# Patient Record
Sex: MALE | Race: WHITE | Employment: OTHER | URBAN - METROPOLITAN AREA
[De-identification: names, ages, dates, MRNs, and addresses within clinical notes are randomized per-mention and may not be internally consistent; named-entity substitution may affect disease eponyms.]

---

## 2017-01-01 ENCOUNTER — PATIENT OUTREACH (OUTPATIENT)
Dept: FAMILY MEDICINE CLINIC | Age: 77
End: 2017-01-01

## 2017-01-01 ENCOUNTER — OFFICE VISIT (OUTPATIENT)
Dept: ONCOLOGY | Age: 77
End: 2017-01-01

## 2017-01-01 ENCOUNTER — TELEPHONE (OUTPATIENT)
Dept: FAMILY MEDICINE CLINIC | Age: 77
End: 2017-01-01

## 2017-01-01 ENCOUNTER — APPOINTMENT (OUTPATIENT)
Dept: MRI IMAGING | Age: 77
DRG: 470 | End: 2017-01-01
Attending: INTERNAL MEDICINE
Payer: MEDICARE

## 2017-01-01 ENCOUNTER — TELEPHONE (OUTPATIENT)
Dept: ONCOLOGY | Age: 77
End: 2017-01-01

## 2017-01-01 ENCOUNTER — HOSPITAL ENCOUNTER (OUTPATIENT)
Dept: CT IMAGING | Age: 77
Discharge: HOME | End: 2017-12-06
Attending: INTERNAL MEDICINE
Payer: MEDICARE

## 2017-01-01 ENCOUNTER — HOSPITAL ENCOUNTER (OUTPATIENT)
Dept: CT IMAGING | Age: 77
Discharge: HOME | End: 2017-12-15
Attending: INTERNAL MEDICINE
Payer: MEDICARE

## 2017-01-01 ENCOUNTER — OFFICE VISIT (OUTPATIENT)
Dept: FAMILY MEDICINE CLINIC | Age: 77
End: 2017-01-01

## 2017-01-01 ENCOUNTER — ANESTHESIA (OUTPATIENT)
Dept: SURGERY | Age: 77
DRG: 470 | End: 2017-01-01
Payer: MEDICARE

## 2017-01-01 ENCOUNTER — HOSPITAL ENCOUNTER (INPATIENT)
Age: 77
LOS: 2 days | Discharge: HOME HEALTH | DRG: 470 | End: 2017-12-23
Attending: STUDENT IN AN ORGANIZED HEALTH CARE EDUCATION/TRAINING PROGRAM | Admitting: FAMILY MEDICINE
Payer: MEDICARE

## 2017-01-01 ENCOUNTER — HOME CARE VISIT (OUTPATIENT)
Dept: SCHEDULING | Facility: HOME HEALTH | Age: 77
End: 2017-01-01
Payer: MEDICARE

## 2017-01-01 ENCOUNTER — DOCUMENTATION ONLY (OUTPATIENT)
Dept: ONCOLOGY | Age: 77
End: 2017-01-01

## 2017-01-01 ENCOUNTER — APPOINTMENT (OUTPATIENT)
Dept: GENERAL RADIOLOGY | Age: 77
DRG: 470 | End: 2017-01-01
Attending: ORTHOPAEDIC SURGERY
Payer: MEDICARE

## 2017-01-01 ENCOUNTER — ANESTHESIA EVENT (OUTPATIENT)
Dept: SURGERY | Age: 77
DRG: 470 | End: 2017-01-01
Payer: MEDICARE

## 2017-01-01 ENCOUNTER — APPOINTMENT (OUTPATIENT)
Dept: GENERAL RADIOLOGY | Age: 77
DRG: 470 | End: 2017-01-01
Attending: STUDENT IN AN ORGANIZED HEALTH CARE EDUCATION/TRAINING PROGRAM
Payer: MEDICARE

## 2017-01-01 ENCOUNTER — HOSPITAL ENCOUNTER (OUTPATIENT)
Dept: GENERAL RADIOLOGY | Age: 77
Discharge: HOME | End: 2017-12-15
Attending: RADIOLOGY
Payer: MEDICARE

## 2017-01-01 ENCOUNTER — HOSPITAL ENCOUNTER (OUTPATIENT)
Dept: INFUSION THERAPY | Age: 77
Discharge: HOME | End: 2017-11-29
Payer: MEDICARE

## 2017-01-01 ENCOUNTER — HOME CARE VISIT (OUTPATIENT)
Dept: HOME HEALTH SERVICES | Facility: HOME HEALTH | Age: 77
End: 2017-01-01
Payer: MEDICARE

## 2017-01-01 ENCOUNTER — HOSPITAL ENCOUNTER (OUTPATIENT)
Dept: CT IMAGING | Age: 77
Discharge: HOME | End: 2017-11-22
Attending: FAMILY MEDICINE
Payer: SELF-PAY

## 2017-01-01 ENCOUNTER — HOME HEALTH ADMISSION (OUTPATIENT)
Dept: HOME HEALTH SERVICES | Facility: HOME HEALTH | Age: 77
End: 2017-01-01
Payer: MEDICARE

## 2017-01-01 VITALS
HEART RATE: 82 BPM | TEMPERATURE: 98.1 F | HEIGHT: 70 IN | WEIGHT: 184 LBS | SYSTOLIC BLOOD PRESSURE: 111 MMHG | RESPIRATION RATE: 20 BRPM | OXYGEN SATURATION: 95 % | BODY MASS INDEX: 26.34 KG/M2 | DIASTOLIC BLOOD PRESSURE: 69 MMHG

## 2017-01-01 VITALS
WEIGHT: 184 LBS | HEIGHT: 70 IN | RESPIRATION RATE: 16 BRPM | OXYGEN SATURATION: 99 % | TEMPERATURE: 97.6 F | SYSTOLIC BLOOD PRESSURE: 121 MMHG | HEART RATE: 81 BPM | DIASTOLIC BLOOD PRESSURE: 73 MMHG | BODY MASS INDEX: 26.34 KG/M2

## 2017-01-01 VITALS
HEIGHT: 71 IN | WEIGHT: 183 LBS | TEMPERATURE: 96.6 F | SYSTOLIC BLOOD PRESSURE: 123 MMHG | HEART RATE: 78 BPM | BODY MASS INDEX: 25.62 KG/M2 | OXYGEN SATURATION: 94 % | DIASTOLIC BLOOD PRESSURE: 71 MMHG | RESPIRATION RATE: 20 BRPM

## 2017-01-01 VITALS
HEART RATE: 81 BPM | SYSTOLIC BLOOD PRESSURE: 116 MMHG | OXYGEN SATURATION: 99 % | RESPIRATION RATE: 18 BRPM | WEIGHT: 184 LBS | BODY MASS INDEX: 25.76 KG/M2 | DIASTOLIC BLOOD PRESSURE: 72 MMHG | HEIGHT: 71 IN | TEMPERATURE: 98.1 F

## 2017-01-01 VITALS
TEMPERATURE: 100 F | HEART RATE: 120 BPM | RESPIRATION RATE: 18 BRPM | DIASTOLIC BLOOD PRESSURE: 73 MMHG | SYSTOLIC BLOOD PRESSURE: 121 MMHG

## 2017-01-01 VITALS
HEART RATE: 115 BPM | RESPIRATION RATE: 18 BRPM | DIASTOLIC BLOOD PRESSURE: 70 MMHG | TEMPERATURE: 100.7 F | BODY MASS INDEX: 26.2 KG/M2 | SYSTOLIC BLOOD PRESSURE: 118 MMHG | OXYGEN SATURATION: 91 % | HEIGHT: 70 IN | WEIGHT: 183 LBS

## 2017-01-01 VITALS
RESPIRATION RATE: 18 BRPM | TEMPERATURE: 97.9 F | SYSTOLIC BLOOD PRESSURE: 120 MMHG | DIASTOLIC BLOOD PRESSURE: 62 MMHG | OXYGEN SATURATION: 96 % | HEART RATE: 86 BPM

## 2017-01-01 VITALS
RESPIRATION RATE: 22 BRPM | SYSTOLIC BLOOD PRESSURE: 121 MMHG | WEIGHT: 192 LBS | HEIGHT: 70 IN | OXYGEN SATURATION: 96 % | TEMPERATURE: 97.8 F | DIASTOLIC BLOOD PRESSURE: 76 MMHG | BODY MASS INDEX: 27.49 KG/M2 | HEART RATE: 83 BPM

## 2017-01-01 VITALS
HEART RATE: 89 BPM | RESPIRATION RATE: 26 BRPM | OXYGEN SATURATION: 97 % | DIASTOLIC BLOOD PRESSURE: 51 MMHG | HEIGHT: 71 IN | SYSTOLIC BLOOD PRESSURE: 98 MMHG | WEIGHT: 184 LBS | BODY MASS INDEX: 25.76 KG/M2

## 2017-01-01 VITALS
DIASTOLIC BLOOD PRESSURE: 60 MMHG | TEMPERATURE: 98.1 F | SYSTOLIC BLOOD PRESSURE: 108 MMHG | HEART RATE: 75 BPM | OXYGEN SATURATION: 98 %

## 2017-01-01 DIAGNOSIS — Z71.89 ADVANCE CARE PLANNING: ICD-10-CM

## 2017-01-01 DIAGNOSIS — R50.9 FEVER, UNSPECIFIED FEVER CAUSE: ICD-10-CM

## 2017-01-01 DIAGNOSIS — C34.92 NON-SMALL CELL CANCER OF LEFT LUNG (HCC): Primary | ICD-10-CM

## 2017-01-01 DIAGNOSIS — S72.001D CLOSED FRACTURE OF NECK OF RIGHT FEMUR WITH ROUTINE HEALING, SUBSEQUENT ENCOUNTER: ICD-10-CM

## 2017-01-01 DIAGNOSIS — C34.12 MALIGNANT NEOPLASM OF UPPER LOBE OF LEFT LUNG (HCC): ICD-10-CM

## 2017-01-01 DIAGNOSIS — R93.89 ABNORMAL CHEST CT: ICD-10-CM

## 2017-01-01 DIAGNOSIS — Z71.89 DNR (DO NOT RESUSCITATE) DISCUSSION: ICD-10-CM

## 2017-01-01 DIAGNOSIS — I10 ESSENTIAL HYPERTENSION: ICD-10-CM

## 2017-01-01 DIAGNOSIS — C34.12 MALIGNANT NEOPLASM OF UPPER LOBE OF LEFT LUNG (HCC): Primary | ICD-10-CM

## 2017-01-01 DIAGNOSIS — J44.9 CHRONIC OBSTRUCTIVE PULMONARY DISEASE, UNSPECIFIED COPD TYPE (HCC): ICD-10-CM

## 2017-01-01 DIAGNOSIS — E27.8 ADRENAL MASS (HCC): ICD-10-CM

## 2017-01-01 DIAGNOSIS — R91.8 LUNG MASS: ICD-10-CM

## 2017-01-01 DIAGNOSIS — E27.8 ADRENAL NODULE (HCC): ICD-10-CM

## 2017-01-01 DIAGNOSIS — Z71.89 GOALS OF CARE, COUNSELING/DISCUSSION: ICD-10-CM

## 2017-01-01 DIAGNOSIS — R05.9 COUGH: ICD-10-CM

## 2017-01-01 DIAGNOSIS — C34.90 NON-SMALL CELL LUNG CANCER, UNSPECIFIED LATERALITY (HCC): ICD-10-CM

## 2017-01-01 DIAGNOSIS — Z71.89 ACP (ADVANCE CARE PLANNING): ICD-10-CM

## 2017-01-01 DIAGNOSIS — R91.8 LUNG MASS: Primary | ICD-10-CM

## 2017-01-01 DIAGNOSIS — R04.2 BLOOD IN SPUTUM: ICD-10-CM

## 2017-01-01 DIAGNOSIS — M06.9 RHEUMATOID ARTHRITIS INVOLVING MULTIPLE SITES, UNSPECIFIED RHEUMATOID FACTOR PRESENCE: ICD-10-CM

## 2017-01-01 DIAGNOSIS — K59.00 CONSTIPATION, UNSPECIFIED CONSTIPATION TYPE: ICD-10-CM

## 2017-01-01 DIAGNOSIS — C79.72 MALIGNANT NEOPLASM METASTATIC TO LEFT ADRENAL GLAND (HCC): ICD-10-CM

## 2017-01-01 DIAGNOSIS — S72.001A CLOSED FRACTURE OF NECK OF RIGHT FEMUR, INITIAL ENCOUNTER (HCC): Primary | ICD-10-CM

## 2017-01-01 DIAGNOSIS — C34.92 NON-SMALL CELL CANCER OF LEFT LUNG (HCC): ICD-10-CM

## 2017-01-01 DIAGNOSIS — Z00.00 ROUTINE GENERAL MEDICAL EXAMINATION AT A HEALTH CARE FACILITY: Primary | ICD-10-CM

## 2017-01-01 DIAGNOSIS — M25.551 RIGHT HIP PAIN: ICD-10-CM

## 2017-01-01 DIAGNOSIS — R06.02 SHORTNESS OF BREATH: ICD-10-CM

## 2017-01-01 LAB
ABO + RH BLD: NORMAL
ALBUMIN SERPL-MCNC: 1.8 G/DL (ref 3.5–5)
ALBUMIN SERPL-MCNC: 2 G/DL (ref 3.5–5)
ALBUMIN SERPL-MCNC: 2.1 G/DL (ref 3.5–5)
ALBUMIN SERPL-MCNC: 2.6 G/DL (ref 3.5–4.8)
ALBUMIN/GLOB SERPL: 0.4 {RATIO} (ref 1.1–2.2)
ALBUMIN/GLOB SERPL: 0.7 {RATIO} (ref 1.2–2.2)
ALP SERPL-CCNC: 59 U/L (ref 45–117)
ALP SERPL-CCNC: 64 IU/L (ref 39–117)
ALP SERPL-CCNC: 65 U/L (ref 45–117)
ALP SERPL-CCNC: 67 U/L (ref 45–117)
ALT SERPL-CCNC: 14 U/L (ref 12–78)
ALT SERPL-CCNC: 16 U/L (ref 12–78)
ALT SERPL-CCNC: 30 IU/L (ref 0–44)
ALT SERPL-CCNC: 9 U/L (ref 12–78)
ANION GAP SERPL CALC-SCNC: 3 MMOL/L (ref 5–15)
ANION GAP SERPL CALC-SCNC: 6 MMOL/L (ref 5–15)
ANION GAP SERPL CALC-SCNC: 7 MMOL/L (ref 5–15)
APTT PPP: 31.7 SEC (ref 22.1–32.5)
AST SERPL-CCNC: 21 U/L (ref 15–37)
AST SERPL-CCNC: 22 U/L (ref 15–37)
AST SERPL-CCNC: 23 U/L (ref 15–37)
AST SERPL-CCNC: 44 IU/L (ref 0–40)
ATRIAL RATE: 85 BPM
BASOPHILS # BLD AUTO: 0 X10E3/UL (ref 0–0.2)
BASOPHILS # BLD: 0 K/UL (ref 0–0.1)
BASOPHILS NFR BLD AUTO: 0 %
BASOPHILS NFR BLD: 0 % (ref 0–1)
BILIRUB SERPL-MCNC: 0.3 MG/DL (ref 0.2–1)
BILIRUB SERPL-MCNC: 0.3 MG/DL (ref 0.2–1)
BILIRUB SERPL-MCNC: 0.4 MG/DL (ref 0.2–1)
BILIRUB SERPL-MCNC: 0.5 MG/DL (ref 0–1.2)
BLOOD GROUP ANTIBODIES SERPL: NORMAL
BUN SERPL-MCNC: 10 MG/DL (ref 6–20)
BUN SERPL-MCNC: 7 MG/DL (ref 6–20)
BUN SERPL-MCNC: 9 MG/DL (ref 6–20)
BUN SERPL-MCNC: 9 MG/DL (ref 8–27)
BUN/CREAT SERPL: 12 (ref 12–20)
BUN/CREAT SERPL: 14 (ref 12–20)
BUN/CREAT SERPL: 17 (ref 10–24)
BUN/CREAT SERPL: 9 (ref 12–20)
CALCIUM SERPL-MCNC: 8.7 MG/DL (ref 8.5–10.1)
CALCIUM SERPL-MCNC: 8.7 MG/DL (ref 8.5–10.1)
CALCIUM SERPL-MCNC: 8.8 MG/DL (ref 8.5–10.1)
CALCIUM SERPL-MCNC: 8.9 MG/DL (ref 8.6–10.2)
CALCULATED P AXIS, ECG09: 17 DEGREES
CALCULATED R AXIS, ECG10: -60 DEGREES
CALCULATED T AXIS, ECG11: 66 DEGREES
CHLORIDE SERPL-SCNC: 86 MMOL/L (ref 96–106)
CHLORIDE SERPL-SCNC: 95 MMOL/L (ref 97–108)
CHLORIDE SERPL-SCNC: 98 MMOL/L (ref 97–108)
CHLORIDE SERPL-SCNC: 99 MMOL/L (ref 97–108)
CO2 SERPL-SCNC: 26 MMOL/L (ref 21–32)
CO2 SERPL-SCNC: 28 MMOL/L (ref 18–29)
CO2 SERPL-SCNC: 28 MMOL/L (ref 21–32)
CO2 SERPL-SCNC: 30 MMOL/L (ref 21–32)
CREAT SERPL-MCNC: 0.53 MG/DL (ref 0.76–1.27)
CREAT SERPL-MCNC: 0.7 MG/DL (ref 0.7–1.3)
CREAT SERPL-MCNC: 0.75 MG/DL (ref 0.7–1.3)
CREAT SERPL-MCNC: 0.76 MG/DL (ref 0.7–1.3)
DIAGNOSIS, 93000: NORMAL
DIFFERENTIAL METHOD BLD: ABNORMAL
EOSINOPHIL # BLD AUTO: 0 X10E3/UL (ref 0–0.4)
EOSINOPHIL # BLD: 0 K/UL (ref 0–0.4)
EOSINOPHIL # BLD: 0.1 K/UL (ref 0–0.4)
EOSINOPHIL # BLD: 0.1 K/UL (ref 0–0.4)
EOSINOPHIL # BLD: 0.2 K/UL (ref 0–0.4)
EOSINOPHIL NFR BLD AUTO: 1 %
EOSINOPHIL NFR BLD: 0 % (ref 0–7)
EOSINOPHIL NFR BLD: 1 % (ref 0–7)
EOSINOPHIL NFR BLD: 1 % (ref 0–7)
EOSINOPHIL NFR BLD: 4 % (ref 0–7)
ERYTHROCYTE [DISTWIDTH] IN BLOOD BY AUTOMATED COUNT: 14.4 % (ref 11.5–14.5)
ERYTHROCYTE [DISTWIDTH] IN BLOOD BY AUTOMATED COUNT: 14.9 % (ref 12.3–15.4)
ERYTHROCYTE [DISTWIDTH] IN BLOOD BY AUTOMATED COUNT: 15 % (ref 11.5–14.5)
ERYTHROCYTE [DISTWIDTH] IN BLOOD BY AUTOMATED COUNT: 15.2 % (ref 11.5–14.5)
ERYTHROCYTE [DISTWIDTH] IN BLOOD BY AUTOMATED COUNT: 15.2 % (ref 11.5–14.5)
ERYTHROCYTE [DISTWIDTH] IN BLOOD BY AUTOMATED COUNT: 15.3 % (ref 11.5–14.5)
FERRITIN SERPL-MCNC: 461 NG/ML (ref 26–388)
FOLATE SERPL-MCNC: 16 NG/ML (ref 5–21)
GFR SERPLBLD CREATININE-BSD FMLA CKD-EPI: 103 ML/MIN/1.73
GFR SERPLBLD CREATININE-BSD FMLA CKD-EPI: 120 ML/MIN/1.73
GLOBULIN SER CALC-MCNC: 3.7 G/DL (ref 1.5–4.5)
GLOBULIN SER CALC-MCNC: 4.6 G/DL (ref 2–4)
GLOBULIN SER CALC-MCNC: 4.8 G/DL (ref 2–4)
GLOBULIN SER CALC-MCNC: 4.9 G/DL (ref 2–4)
GLUCOSE SERPL-MCNC: 102 MG/DL (ref 65–100)
GLUCOSE SERPL-MCNC: 88 MG/DL (ref 65–100)
GLUCOSE SERPL-MCNC: 95 MG/DL (ref 65–100)
GLUCOSE SERPL-MCNC: 97 MG/DL (ref 65–99)
HCT VFR BLD AUTO: 25.9 % (ref 36.6–50.3)
HCT VFR BLD AUTO: 26.9 % (ref 36.6–50.3)
HCT VFR BLD AUTO: 27 % (ref 36.6–50.3)
HCT VFR BLD AUTO: 28.7 % (ref 37.5–51)
HCT VFR BLD AUTO: 29.5 % (ref 36.6–50.3)
HCT VFR BLD AUTO: 30.5 % (ref 36.6–50.3)
HGB BLD-MCNC: 10 G/DL (ref 12.1–17)
HGB BLD-MCNC: 8.3 G/DL (ref 12.1–17)
HGB BLD-MCNC: 8.8 G/DL (ref 12.1–17)
HGB BLD-MCNC: 8.9 G/DL (ref 12.1–17)
HGB BLD-MCNC: 9.4 G/DL (ref 12.1–17)
HGB BLD-MCNC: 9.6 G/DL (ref 12.6–17.7)
IMM GRANULOCYTES # BLD: 0 X10E3/UL (ref 0–0.1)
IMM GRANULOCYTES NFR BLD: 0 %
INR PPP: 1.1 (ref 0.9–1.1)
INR PPP: 1.2 (ref 0.9–1.1)
IRON SATN MFR SERPL: 8 % (ref 20–50)
IRON SERPL-MCNC: 17 UG/DL (ref 35–150)
LYMPHOCYTES # BLD AUTO: 0.9 X10E3/UL (ref 0.7–3.1)
LYMPHOCYTES # BLD: 0.5 K/UL (ref 0.8–3.5)
LYMPHOCYTES # BLD: 0.9 K/UL (ref 0.8–3.5)
LYMPHOCYTES # BLD: 1 K/UL (ref 0.8–3.5)
LYMPHOCYTES # BLD: 1 K/UL (ref 0.8–3.5)
LYMPHOCYTES NFR BLD AUTO: 12 %
LYMPHOCYTES NFR BLD: 11 % (ref 12–49)
LYMPHOCYTES NFR BLD: 12 % (ref 12–49)
LYMPHOCYTES NFR BLD: 19 % (ref 12–49)
LYMPHOCYTES NFR BLD: 8 % (ref 12–49)
MCH RBC QN AUTO: 27.7 PG (ref 26–34)
MCH RBC QN AUTO: 28.1 PG (ref 26–34)
MCH RBC QN AUTO: 28.2 PG (ref 26–34)
MCH RBC QN AUTO: 28.3 PG (ref 26–34)
MCH RBC QN AUTO: 28.4 PG (ref 26–34)
MCH RBC QN AUTO: 29.4 PG (ref 26.6–33)
MCHC RBC AUTO-ENTMCNC: 31.9 G/DL (ref 30–36.5)
MCHC RBC AUTO-ENTMCNC: 32 G/DL (ref 30–36.5)
MCHC RBC AUTO-ENTMCNC: 32.6 G/DL (ref 30–36.5)
MCHC RBC AUTO-ENTMCNC: 32.8 G/DL (ref 30–36.5)
MCHC RBC AUTO-ENTMCNC: 33.1 G/DL (ref 30–36.5)
MCHC RBC AUTO-ENTMCNC: 33.4 G/DL (ref 31.5–35.7)
MCV RBC AUTO: 85.4 FL (ref 80–99)
MCV RBC AUTO: 86.2 FL (ref 80–99)
MCV RBC AUTO: 86.3 FL (ref 80–99)
MCV RBC AUTO: 87.1 FL (ref 80–99)
MCV RBC AUTO: 88 FL (ref 79–97)
MCV RBC AUTO: 88.1 FL (ref 80–99)
MONOCYTES # BLD AUTO: 1 X10E3/UL (ref 0.1–0.9)
MONOCYTES # BLD: 0.5 K/UL (ref 0–1)
MONOCYTES # BLD: 0.6 K/UL (ref 0–1)
MONOCYTES # BLD: 0.6 K/UL (ref 0–1)
MONOCYTES # BLD: 0.7 K/UL (ref 0–1)
MONOCYTES NFR BLD AUTO: 12 %
MONOCYTES NFR BLD: 10 % (ref 5–13)
MONOCYTES NFR BLD: 11 % (ref 5–13)
MONOCYTES NFR BLD: 7 % (ref 5–13)
MONOCYTES NFR BLD: 9 % (ref 5–13)
NEUTROPHILS # BLD AUTO: 5.8 X10E3/UL (ref 1.4–7)
NEUTROPHILS NFR BLD AUTO: 75 %
NEUTS SEG # BLD: 3.6 K/UL (ref 1.8–8)
NEUTS SEG # BLD: 4.7 K/UL (ref 1.8–8)
NEUTS SEG # BLD: 5.5 K/UL (ref 1.8–8)
NEUTS SEG # BLD: 7.4 K/UL (ref 1.8–8)
NEUTS SEG NFR BLD: 66 % (ref 32–75)
NEUTS SEG NFR BLD: 77 % (ref 32–75)
NEUTS SEG NFR BLD: 82 % (ref 32–75)
NEUTS SEG NFR BLD: 82 % (ref 32–75)
OSMOLALITY SERPL: 276 MOSM/KG H2O
OSMOLALITY UR: 390 MOSM/KG H2O
P-R INTERVAL, ECG05: 122 MS
PLATELET # BLD AUTO: 124 K/UL (ref 150–400)
PLATELET # BLD AUTO: 126 K/UL (ref 150–400)
PLATELET # BLD AUTO: 139 K/UL (ref 150–400)
PLATELET # BLD AUTO: 158 K/UL (ref 150–400)
PLATELET # BLD AUTO: 191 X10E3/UL (ref 150–379)
PLATELET # BLD AUTO: 192 K/UL (ref 150–400)
POTASSIUM SERPL-SCNC: 4.2 MMOL/L (ref 3.5–5.2)
POTASSIUM SERPL-SCNC: 4.3 MMOL/L (ref 3.5–5.1)
POTASSIUM SERPL-SCNC: 4.3 MMOL/L (ref 3.5–5.1)
POTASSIUM SERPL-SCNC: 4.4 MMOL/L (ref 3.5–5.1)
PROT SERPL-MCNC: 6.3 G/DL (ref 6–8.5)
PROT SERPL-MCNC: 6.4 G/DL (ref 6.4–8.2)
PROT SERPL-MCNC: 6.9 G/DL (ref 6.4–8.2)
PROT SERPL-MCNC: 6.9 G/DL (ref 6.4–8.2)
PROTHROMBIN TIME: 11.3 SEC (ref 9–11.1)
PROTHROMBIN TIME: 11.8 SEC (ref 9–11.1)
Q-T INTERVAL, ECG07: 356 MS
QRS DURATION, ECG06: 104 MS
QTC CALCULATION (BEZET), ECG08: 423 MS
RBC # BLD AUTO: 3 M/UL (ref 4.1–5.7)
RBC # BLD AUTO: 3.1 M/UL (ref 4.1–5.7)
RBC # BLD AUTO: 3.15 M/UL (ref 4.1–5.7)
RBC # BLD AUTO: 3.26 X10E6/UL (ref 4.14–5.8)
RBC # BLD AUTO: 3.35 M/UL (ref 4.1–5.7)
RBC # BLD AUTO: 3.54 M/UL (ref 4.1–5.7)
RBC MORPH BLD: ABNORMAL
RETICS/RBC NFR AUTO: 0.8 % (ref 0.7–2.1)
SODIUM SERPL-SCNC: 127 MMOL/L (ref 134–144)
SODIUM SERPL-SCNC: 128 MMOL/L (ref 136–145)
SODIUM SERPL-SCNC: 132 MMOL/L (ref 136–145)
SODIUM SERPL-SCNC: 132 MMOL/L (ref 136–145)
SODIUM UR-SCNC: 74 MMOL/L
SPECIMEN EXP DATE BLD: NORMAL
THERAPEUTIC RANGE,PTTT: NORMAL SECS (ref 58–77)
TIBC SERPL-MCNC: 202 UG/DL (ref 250–450)
TSH SERPL DL<=0.005 MIU/L-ACNC: 1.78 UIU/ML (ref 0.45–4.5)
VENTRICULAR RATE, ECG03: 85 BPM
VIT B12 SERPL-MCNC: 858 PG/ML (ref 211–911)
WBC # BLD AUTO: 5.4 K/UL (ref 4.1–11.1)
WBC # BLD AUTO: 5.8 K/UL (ref 4.1–11.1)
WBC # BLD AUTO: 6.5 K/UL (ref 4.1–11.1)
WBC # BLD AUTO: 7.2 K/UL (ref 4.1–11.1)
WBC # BLD AUTO: 7.8 X10E3/UL (ref 3.4–10.8)
WBC # BLD AUTO: 9.1 K/UL (ref 4.1–11.1)

## 2017-01-01 PROCEDURE — 65270000029 HC RM PRIVATE

## 2017-01-01 PROCEDURE — 74011000258 HC RX REV CODE- 258

## 2017-01-01 PROCEDURE — 97530 THERAPEUTIC ACTIVITIES: CPT

## 2017-01-01 PROCEDURE — 77030011640 HC PAD GRND REM COVD -A: Performed by: ORTHOPAEDIC SURGERY

## 2017-01-01 PROCEDURE — 83930 ASSAY OF BLOOD OSMOLALITY: CPT | Performed by: HOSPITALIST

## 2017-01-01 PROCEDURE — 77030013140 HC MSK NEB VYRM -A

## 2017-01-01 PROCEDURE — 36415 COLL VENOUS BLD VENIPUNCTURE: CPT | Performed by: INTERNAL MEDICINE

## 2017-01-01 PROCEDURE — 97161 PT EVAL LOW COMPLEX 20 MIN: CPT

## 2017-01-01 PROCEDURE — 36415 COLL VENOUS BLD VENIPUNCTURE: CPT | Performed by: STUDENT IN AN ORGANIZED HEALTH CARE EDUCATION/TRAINING PROGRAM

## 2017-01-01 PROCEDURE — G0152 HHCP-SERV OF OT,EA 15 MIN: HCPCS

## 2017-01-01 PROCEDURE — 70553 MRI BRAIN STEM W/O & W/DYE: CPT

## 2017-01-01 PROCEDURE — 77030011943

## 2017-01-01 PROCEDURE — 74011250636 HC RX REV CODE- 250/636: Performed by: ANESTHESIOLOGY

## 2017-01-01 PROCEDURE — 77030006812 HC BLD SAW RECIP STRY -B: Performed by: ORTHOPAEDIC SURGERY

## 2017-01-01 PROCEDURE — 74011000250 HC RX REV CODE- 250: Performed by: ORTHOPAEDIC SURGERY

## 2017-01-01 PROCEDURE — 86900 BLOOD TYPING SEROLOGIC ABO: CPT | Performed by: STUDENT IN AN ORGANIZED HEALTH CARE EDUCATION/TRAINING PROGRAM

## 2017-01-01 PROCEDURE — 85025 COMPLETE CBC W/AUTO DIFF WBC: CPT | Performed by: STUDENT IN AN ORGANIZED HEALTH CARE EDUCATION/TRAINING PROGRAM

## 2017-01-01 PROCEDURE — 74011250636 HC RX REV CODE- 250/636: Performed by: RADIOLOGY

## 2017-01-01 PROCEDURE — 71260 CT THORAX DX C+: CPT

## 2017-01-01 PROCEDURE — 400013 HH SOC

## 2017-01-01 PROCEDURE — 88173 CYTOPATH EVAL FNA REPORT: CPT | Performed by: RADIOLOGY

## 2017-01-01 PROCEDURE — C1776 JOINT DEVICE (IMPLANTABLE): HCPCS | Performed by: ORTHOPAEDIC SURGERY

## 2017-01-01 PROCEDURE — 74011000250 HC RX REV CODE- 250: Performed by: STUDENT IN AN ORGANIZED HEALTH CARE EDUCATION/TRAINING PROGRAM

## 2017-01-01 PROCEDURE — 74011250636 HC RX REV CODE- 250/636: Performed by: ORTHOPAEDIC SURGERY

## 2017-01-01 PROCEDURE — G0297 LDCT FOR LUNG CA SCREEN: HCPCS

## 2017-01-01 PROCEDURE — 77030031139 HC SUT VCRL2 J&J -A: Performed by: ORTHOPAEDIC SURGERY

## 2017-01-01 PROCEDURE — 74011000250 HC RX REV CODE- 250: Performed by: INTERNAL MEDICINE

## 2017-01-01 PROCEDURE — 97116 GAIT TRAINING THERAPY: CPT

## 2017-01-01 PROCEDURE — 93005 ELECTROCARDIOGRAM TRACING: CPT

## 2017-01-01 PROCEDURE — 88305 TISSUE EXAM BY PATHOLOGIST: CPT | Performed by: RADIOLOGY

## 2017-01-01 PROCEDURE — 36415 COLL VENOUS BLD VENIPUNCTURE: CPT | Performed by: ORTHOPAEDIC SURGERY

## 2017-01-01 PROCEDURE — 74011250636 HC RX REV CODE- 250/636: Performed by: STUDENT IN AN ORGANIZED HEALTH CARE EDUCATION/TRAINING PROGRAM

## 2017-01-01 PROCEDURE — 88342 IMHCHEM/IMCYTCHM 1ST ANTB: CPT | Performed by: RADIOLOGY

## 2017-01-01 PROCEDURE — 3E0T3BZ INTRODUCTION OF ANESTHETIC AGENT INTO PERIPHERAL NERVES AND PLEXI, PERCUTANEOUS APPROACH: ICD-10-PCS | Performed by: ANESTHESIOLOGY

## 2017-01-01 PROCEDURE — 94640 AIRWAY INHALATION TREATMENT: CPT

## 2017-01-01 PROCEDURE — 77030003666 HC NDL SPINAL BD -A

## 2017-01-01 PROCEDURE — 74011250636 HC RX REV CODE- 250/636: Performed by: PHYSICIAN ASSISTANT

## 2017-01-01 PROCEDURE — 97535 SELF CARE MNGMENT TRAINING: CPT | Performed by: OCCUPATIONAL THERAPIST

## 2017-01-01 PROCEDURE — 77030007866 HC KT SPN ANES BBMI -B

## 2017-01-01 PROCEDURE — 49180 BIOPSY ABDOMINAL MASS: CPT

## 2017-01-01 PROCEDURE — 77030012935 HC DRSG AQUACEL BMS -B

## 2017-01-01 PROCEDURE — 74011250637 HC RX REV CODE- 250/637: Performed by: ORTHOPAEDIC SURGERY

## 2017-01-01 PROCEDURE — 74011000250 HC RX REV CODE- 250

## 2017-01-01 PROCEDURE — 85730 THROMBOPLASTIN TIME PARTIAL: CPT | Performed by: INTERNAL MEDICINE

## 2017-01-01 PROCEDURE — 74011636320 HC RX REV CODE- 636/320: Performed by: RADIOLOGY

## 2017-01-01 PROCEDURE — 82728 ASSAY OF FERRITIN: CPT | Performed by: INTERNAL MEDICINE

## 2017-01-01 PROCEDURE — 74178 CT ABD&PLV WO CNTR FLWD CNTR: CPT

## 2017-01-01 PROCEDURE — 96372 THER/PROPH/DIAG INJ SC/IM: CPT

## 2017-01-01 PROCEDURE — 77030018547 HC SUT ETHBND1 J&J -B: Performed by: ORTHOPAEDIC SURGERY

## 2017-01-01 PROCEDURE — G0151 HHCP-SERV OF PT,EA 15 MIN: HCPCS

## 2017-01-01 PROCEDURE — 77030033067 HC SUT PDO STRATFX SPIR J&J -B: Performed by: ORTHOPAEDIC SURGERY

## 2017-01-01 PROCEDURE — 99285 EMERGENCY DEPT VISIT HI MDM: CPT

## 2017-01-01 PROCEDURE — 77030011264 HC ELECTRD BLD EXT COVD -A: Performed by: ORTHOPAEDIC SURGERY

## 2017-01-01 PROCEDURE — 76010000153 HC OR TIME 1.5 TO 2 HR: Performed by: ORTHOPAEDIC SURGERY

## 2017-01-01 PROCEDURE — 82607 VITAMIN B-12: CPT | Performed by: INTERNAL MEDICINE

## 2017-01-01 PROCEDURE — 73552 X-RAY EXAM OF FEMUR 2/>: CPT

## 2017-01-01 PROCEDURE — 77012 CT SCAN FOR NEEDLE BIOPSY: CPT

## 2017-01-01 PROCEDURE — 77030003503 HC NDL BIOP TISS BD -B

## 2017-01-01 PROCEDURE — 76450000000

## 2017-01-01 PROCEDURE — 83540 ASSAY OF IRON: CPT | Performed by: INTERNAL MEDICINE

## 2017-01-01 PROCEDURE — 74011250637 HC RX REV CODE- 250/637: Performed by: NURSE PRACTITIONER

## 2017-01-01 PROCEDURE — 77030002933 HC SUT MCRYL J&J -A: Performed by: ORTHOPAEDIC SURGERY

## 2017-01-01 PROCEDURE — 74011000250 HC RX REV CODE- 250: Performed by: ANESTHESIOLOGY

## 2017-01-01 PROCEDURE — 77030020788: Performed by: ORTHOPAEDIC SURGERY

## 2017-01-01 PROCEDURE — 77030020186 HC BOOT HL PROTCT SAGE -B

## 2017-01-01 PROCEDURE — A9576 INJ PROHANCE MULTIPACK: HCPCS | Performed by: RADIOLOGY

## 2017-01-01 PROCEDURE — 76000 FLUOROSCOPY <1 HR PHYS/QHP: CPT

## 2017-01-01 PROCEDURE — 85610 PROTHROMBIN TIME: CPT | Performed by: INTERNAL MEDICINE

## 2017-01-01 PROCEDURE — 74011250636 HC RX REV CODE- 250/636

## 2017-01-01 PROCEDURE — 71010 XR CHEST PORT: CPT

## 2017-01-01 PROCEDURE — 0SRR0JA REPLACEMENT OF RIGHT HIP JOINT, FEMORAL SURFACE WITH SYNTHETIC SUBSTITUTE, UNCEMENTED, OPEN APPROACH: ICD-10-PCS | Performed by: ORTHOPAEDIC SURGERY

## 2017-01-01 PROCEDURE — 85610 PROTHROMBIN TIME: CPT | Performed by: HOSPITALIST

## 2017-01-01 PROCEDURE — 73502 X-RAY EXAM HIP UNI 2-3 VIEWS: CPT

## 2017-01-01 PROCEDURE — 77030032490 HC SLV COMPR SCD KNE COVD -B

## 2017-01-01 PROCEDURE — 80053 COMPREHEN METABOLIC PANEL: CPT | Performed by: STUDENT IN AN ORGANIZED HEALTH CARE EDUCATION/TRAINING PROGRAM

## 2017-01-01 PROCEDURE — 84300 ASSAY OF URINE SODIUM: CPT | Performed by: HOSPITALIST

## 2017-01-01 PROCEDURE — 85025 COMPLETE CBC W/AUTO DIFF WBC: CPT | Performed by: ORTHOPAEDIC SURGERY

## 2017-01-01 PROCEDURE — 82746 ASSAY OF FOLIC ACID SERUM: CPT | Performed by: INTERNAL MEDICINE

## 2017-01-01 PROCEDURE — 77030018836 HC SOL IRR NACL ICUM -A: Performed by: ORTHOPAEDIC SURGERY

## 2017-01-01 PROCEDURE — 83935 ASSAY OF URINE OSMOLALITY: CPT | Performed by: HOSPITALIST

## 2017-01-01 PROCEDURE — 77030035236 HC SUT PDS STRATFX BARB J&J -B: Performed by: ORTHOPAEDIC SURGERY

## 2017-01-01 PROCEDURE — 85025 COMPLETE CBC W/AUTO DIFF WBC: CPT | Performed by: INTERNAL MEDICINE

## 2017-01-01 PROCEDURE — 99152 MOD SED SAME PHYS/QHP 5/>YRS: CPT

## 2017-01-01 PROCEDURE — 88360 TUMOR IMMUNOHISTOCHEM/MANUAL: CPT | Performed by: RADIOLOGY

## 2017-01-01 PROCEDURE — 97110 THERAPEUTIC EXERCISES: CPT

## 2017-01-01 PROCEDURE — 97165 OT EVAL LOW COMPLEX 30 MIN: CPT | Performed by: OCCUPATIONAL THERAPIST

## 2017-01-01 PROCEDURE — 74011250636 HC RX REV CODE- 250/636: Performed by: NURSE PRACTITIONER

## 2017-01-01 PROCEDURE — 77030020782 HC GWN BAIR PAWS FLX 3M -B

## 2017-01-01 PROCEDURE — G0157 HHC PT ASSISTANT EA 15: HCPCS

## 2017-01-01 PROCEDURE — 99153 MOD SED SAME PHYS/QHP EA: CPT

## 2017-01-01 PROCEDURE — 85045 AUTOMATED RETICULOCYTE COUNT: CPT | Performed by: INTERNAL MEDICINE

## 2017-01-01 PROCEDURE — 80053 COMPREHEN METABOLIC PANEL: CPT | Performed by: ORTHOPAEDIC SURGERY

## 2017-01-01 PROCEDURE — 36415 COLL VENOUS BLD VENIPUNCTURE: CPT

## 2017-01-01 PROCEDURE — 85027 COMPLETE CBC AUTOMATED: CPT

## 2017-01-01 PROCEDURE — 77010033678 HC OXYGEN DAILY

## 2017-01-01 PROCEDURE — 76060000034 HC ANESTHESIA 1.5 TO 2 HR: Performed by: ORTHOPAEDIC SURGERY

## 2017-01-01 PROCEDURE — 76210000006 HC OR PH I REC 0.5 TO 1 HR: Performed by: ORTHOPAEDIC SURGERY

## 2017-01-01 PROCEDURE — 77030012935 HC DRSG AQUACEL BMS -B: Performed by: ORTHOPAEDIC SURGERY

## 2017-01-01 PROCEDURE — 77030010507 HC ADH SKN DERMBND J&J -B: Performed by: ORTHOPAEDIC SURGERY

## 2017-01-01 PROCEDURE — 77030027138 HC INCENT SPIROMETER -A

## 2017-01-01 DEVICE — ARTICUL/EZE FEMORAL HEAD DIAMETER 28MM +1.5 12/14 TAPER
Type: IMPLANTABLE DEVICE | Site: HIP | Status: FUNCTIONAL
Brand: ARTICUL/EZE

## 2017-01-01 DEVICE — CORAIL HIP SYSTEM CEMENTLESS FEMORAL STEM 12/14 AMT 135 DEGREES KHO SIZE 15 HA COATED HIGH OFFSET NO COLLAR
Type: IMPLANTABLE DEVICE | Site: HIP | Status: FUNCTIONAL
Brand: CORAIL

## 2017-01-01 DEVICE — SELF CENTERING BI-POLAR HEAD 28MM ID 51MM OD
Type: IMPLANTABLE DEVICE | Site: HIP | Status: FUNCTIONAL
Brand: SELF CENTERING

## 2017-01-01 DEVICE — STEM FEM PRSS FT HIP BPLR/UPLR CEM: Type: IMPLANTABLE DEVICE | Site: HIP | Status: FUNCTIONAL

## 2017-01-01 RX ORDER — ASPIRIN 325 MG
325 TABLET, DELAYED RELEASE (ENTERIC COATED) ORAL 2 TIMES DAILY
Status: DISCONTINUED | OUTPATIENT
Start: 2017-01-01 | End: 2017-01-01 | Stop reason: HOSPADM

## 2017-01-01 RX ORDER — HYDROXYZINE HYDROCHLORIDE 10 MG/1
10 TABLET, FILM COATED ORAL
Status: DISCONTINUED | OUTPATIENT
Start: 2017-01-01 | End: 2017-01-01 | Stop reason: HOSPADM

## 2017-01-01 RX ORDER — LIDOCAINE HYDROCHLORIDE 10 MG/ML
0.1 INJECTION, SOLUTION EPIDURAL; INFILTRATION; INTRACAUDAL; PERINEURAL AS NEEDED
Status: DISCONTINUED | OUTPATIENT
Start: 2017-01-01 | End: 2017-01-01 | Stop reason: HOSPADM

## 2017-01-01 RX ORDER — NALOXONE HYDROCHLORIDE 0.4 MG/ML
0.4 INJECTION, SOLUTION INTRAMUSCULAR; INTRAVENOUS; SUBCUTANEOUS AS NEEDED
Status: DISCONTINUED | OUTPATIENT
Start: 2017-01-01 | End: 2017-01-01 | Stop reason: HOSPADM

## 2017-01-01 RX ORDER — FAMOTIDINE 20 MG/1
20 TABLET, FILM COATED ORAL 2 TIMES DAILY
Status: DISCONTINUED | OUTPATIENT
Start: 2017-01-01 | End: 2017-01-01 | Stop reason: HOSPADM

## 2017-01-01 RX ORDER — IPRATROPIUM BROMIDE AND ALBUTEROL SULFATE 2.5; .5 MG/3ML; MG/3ML
3 SOLUTION RESPIRATORY (INHALATION)
Status: DISCONTINUED | OUTPATIENT
Start: 2017-01-01 | End: 2017-01-01 | Stop reason: HOSPADM

## 2017-01-01 RX ORDER — ACETAMINOPHEN 500 MG
1000 TABLET ORAL ONCE
Status: COMPLETED | OUTPATIENT
Start: 2017-01-01 | End: 2017-01-01

## 2017-01-01 RX ORDER — ONDANSETRON 2 MG/ML
4 INJECTION INTRAMUSCULAR; INTRAVENOUS AS NEEDED
Status: DISCONTINUED | OUTPATIENT
Start: 2017-01-01 | End: 2017-01-01 | Stop reason: HOSPADM

## 2017-01-01 RX ORDER — SODIUM CHLORIDE, SODIUM LACTATE, POTASSIUM CHLORIDE, CALCIUM CHLORIDE 600; 310; 30; 20 MG/100ML; MG/100ML; MG/100ML; MG/100ML
125 INJECTION, SOLUTION INTRAVENOUS CONTINUOUS
Status: DISCONTINUED | OUTPATIENT
Start: 2017-01-01 | End: 2017-01-01 | Stop reason: HOSPADM

## 2017-01-01 RX ORDER — IPRATROPIUM BROMIDE AND ALBUTEROL SULFATE 2.5; .5 MG/3ML; MG/3ML
3 SOLUTION RESPIRATORY (INHALATION)
Qty: 150 NEBULE | Refills: 11 | Status: SHIPPED | OUTPATIENT
Start: 2017-01-01

## 2017-01-01 RX ORDER — SODIUM CHLORIDE 0.9 % (FLUSH) 0.9 %
5-10 SYRINGE (ML) INJECTION EVERY 8 HOURS
Status: DISCONTINUED | OUTPATIENT
Start: 2017-01-01 | End: 2017-01-01 | Stop reason: HOSPADM

## 2017-01-01 RX ORDER — CEFAZOLIN SODIUM IN 0.9 % NACL 2 G/50 ML
2 INTRAVENOUS SOLUTION, PIGGYBACK (ML) INTRAVENOUS EVERY 8 HOURS
Status: DISCONTINUED | OUTPATIENT
Start: 2017-01-01 | End: 2017-01-01 | Stop reason: SDUPTHER

## 2017-01-01 RX ORDER — LIDOCAINE AND PRILOCAINE 25; 25 MG/G; MG/G
CREAM TOPICAL AS NEEDED
Qty: 30 G | Refills: 0 | Status: SHIPPED | OUTPATIENT
Start: 2017-01-01

## 2017-01-01 RX ORDER — FENTANYL CITRATE 50 UG/ML
INJECTION, SOLUTION INTRAMUSCULAR; INTRAVENOUS AS NEEDED
Status: DISCONTINUED | OUTPATIENT
Start: 2017-01-01 | End: 2017-01-01 | Stop reason: HOSPADM

## 2017-01-01 RX ORDER — CELECOXIB 100 MG/1
200 CAPSULE ORAL 2 TIMES DAILY
Status: DISCONTINUED | OUTPATIENT
Start: 2017-01-01 | End: 2017-01-01 | Stop reason: HOSPADM

## 2017-01-01 RX ORDER — DOCUSATE SODIUM 100 MG/1
100 CAPSULE, LIQUID FILLED ORAL
Status: DISCONTINUED | OUTPATIENT
Start: 2017-01-01 | End: 2017-01-01 | Stop reason: HOSPADM

## 2017-01-01 RX ORDER — OXYCODONE HYDROCHLORIDE 5 MG/1
5 TABLET ORAL
Status: DISCONTINUED | OUTPATIENT
Start: 2017-01-01 | End: 2017-01-01 | Stop reason: SDUPTHER

## 2017-01-01 RX ORDER — ALBUTEROL SULFATE 0.83 MG/ML
5 SOLUTION RESPIRATORY (INHALATION)
Qty: 225 EACH | Refills: 5 | Status: SHIPPED | OUTPATIENT
Start: 2017-01-01 | End: 2017-01-01 | Stop reason: ALTCHOICE

## 2017-01-01 RX ORDER — POLYETHYLENE GLYCOL 3350 17 G/17G
17 POWDER, FOR SOLUTION ORAL DAILY PRN
Status: DISCONTINUED | OUTPATIENT
Start: 2017-01-01 | End: 2017-01-01 | Stop reason: HOSPADM

## 2017-01-01 RX ORDER — ALBUTEROL SULFATE 0.83 MG/ML
2.5 SOLUTION RESPIRATORY (INHALATION)
Status: COMPLETED | OUTPATIENT
Start: 2017-01-01 | End: 2017-01-01

## 2017-01-01 RX ORDER — ALBUTEROL SULFATE 0.83 MG/ML
5 SOLUTION RESPIRATORY (INHALATION)
Qty: 225 EACH | Refills: 5 | Status: SHIPPED | OUTPATIENT
Start: 2017-01-01 | End: 2017-01-01 | Stop reason: SDUPTHER

## 2017-01-01 RX ORDER — MIDAZOLAM HYDROCHLORIDE 1 MG/ML
INJECTION, SOLUTION INTRAMUSCULAR; INTRAVENOUS AS NEEDED
Status: DISCONTINUED | OUTPATIENT
Start: 2017-01-01 | End: 2017-01-01 | Stop reason: HOSPADM

## 2017-01-01 RX ORDER — MORPHINE SULFATE 2 MG/ML
2 INJECTION, SOLUTION INTRAMUSCULAR; INTRAVENOUS
Status: DISCONTINUED | OUTPATIENT
Start: 2017-01-01 | End: 2017-01-01 | Stop reason: HOSPADM

## 2017-01-01 RX ORDER — KETOROLAC TROMETHAMINE 30 MG/ML
30 INJECTION, SOLUTION INTRAMUSCULAR; INTRAVENOUS
Status: DISCONTINUED | OUTPATIENT
Start: 2017-01-01 | End: 2017-01-01

## 2017-01-01 RX ORDER — FENTANYL CITRATE 50 UG/ML
100 INJECTION, SOLUTION INTRAMUSCULAR; INTRAVENOUS
Status: DISPENSED | OUTPATIENT
Start: 2017-01-01 | End: 2017-01-01

## 2017-01-01 RX ORDER — POLYETHYLENE GLYCOL 3350 17 G/17G
17 POWDER, FOR SOLUTION ORAL DAILY
Status: DISCONTINUED | OUTPATIENT
Start: 2017-01-01 | End: 2017-01-01 | Stop reason: HOSPADM

## 2017-01-01 RX ORDER — LIDOCAINE AND PRILOCAINE 25; 25 MG/G; MG/G
CREAM TOPICAL AS NEEDED
Qty: 30 G | Refills: 0 | Status: SHIPPED | OUTPATIENT
Start: 2017-01-01 | End: 2017-01-01 | Stop reason: SDUPTHER

## 2017-01-01 RX ORDER — HYDROMORPHONE HYDROCHLORIDE 2 MG/ML
.25-1 INJECTION, SOLUTION INTRAMUSCULAR; INTRAVENOUS; SUBCUTANEOUS
Status: DISCONTINUED | OUTPATIENT
Start: 2017-01-01 | End: 2017-01-01 | Stop reason: HOSPADM

## 2017-01-01 RX ORDER — PROPOFOL 10 MG/ML
INJECTION, EMULSION INTRAVENOUS
Status: DISCONTINUED | OUTPATIENT
Start: 2017-01-01 | End: 2017-01-01 | Stop reason: HOSPADM

## 2017-01-01 RX ORDER — SODIUM CHLORIDE 9 MG/ML
125 INJECTION, SOLUTION INTRAVENOUS CONTINUOUS
Status: DISPENSED | OUTPATIENT
Start: 2017-01-01 | End: 2017-01-01

## 2017-01-01 RX ORDER — DIPHENHYDRAMINE HYDROCHLORIDE 50 MG/ML
12.5 INJECTION, SOLUTION INTRAMUSCULAR; INTRAVENOUS AS NEEDED
Status: DISCONTINUED | OUTPATIENT
Start: 2017-01-01 | End: 2017-01-01 | Stop reason: HOSPADM

## 2017-01-01 RX ORDER — MIDAZOLAM HYDROCHLORIDE 1 MG/ML
5 INJECTION, SOLUTION INTRAMUSCULAR; INTRAVENOUS
Status: DISPENSED | OUTPATIENT
Start: 2017-01-01 | End: 2017-01-01

## 2017-01-01 RX ORDER — OXYCODONE HYDROCHLORIDE 5 MG/1
5 TABLET ORAL
Status: DISCONTINUED | OUTPATIENT
Start: 2017-01-01 | End: 2017-01-01 | Stop reason: HOSPADM

## 2017-01-01 RX ORDER — SODIUM CHLORIDE 0.9 % (FLUSH) 0.9 %
5-10 SYRINGE (ML) INJECTION AS NEEDED
Status: DISCONTINUED | OUTPATIENT
Start: 2017-01-01 | End: 2017-01-01 | Stop reason: HOSPADM

## 2017-01-01 RX ORDER — OXYCODONE HYDROCHLORIDE 5 MG/1
10 TABLET ORAL
Status: DISCONTINUED | OUTPATIENT
Start: 2017-01-01 | End: 2017-01-01 | Stop reason: HOSPADM

## 2017-01-01 RX ORDER — SODIUM CHLORIDE 9 MG/ML
125 INJECTION, SOLUTION INTRAVENOUS CONTINUOUS
Status: DISCONTINUED | OUTPATIENT
Start: 2017-01-01 | End: 2017-01-01

## 2017-01-01 RX ORDER — ONDANSETRON 2 MG/ML
4 INJECTION INTRAMUSCULAR; INTRAVENOUS
Status: ACTIVE | OUTPATIENT
Start: 2017-01-01 | End: 2017-01-01

## 2017-01-01 RX ORDER — LEVOFLOXACIN 750 MG/1
750 TABLET ORAL DAILY
Qty: 7 TAB | Refills: 0 | Status: ON HOLD | OUTPATIENT
Start: 2017-01-01 | End: 2017-01-01

## 2017-01-01 RX ORDER — HEPARIN SODIUM 5000 [USP'U]/ML
5000 INJECTION, SOLUTION INTRAVENOUS; SUBCUTANEOUS ONCE
Status: COMPLETED | OUTPATIENT
Start: 2017-01-01 | End: 2017-01-01

## 2017-01-01 RX ORDER — FACIAL-BODY WIPES
10 EACH TOPICAL DAILY PRN
Status: DISCONTINUED | OUTPATIENT
Start: 2017-01-01 | End: 2017-01-01 | Stop reason: HOSPADM

## 2017-01-01 RX ORDER — PROCHLORPERAZINE MALEATE 10 MG
10 TABLET ORAL
Qty: 50 TAB | Refills: 5 | Status: SHIPPED | OUTPATIENT
Start: 2017-01-01 | End: 2017-01-01 | Stop reason: SDUPTHER

## 2017-01-01 RX ORDER — CEFAZOLIN SODIUM IN 0.9 % NACL 2 G/50 ML
2 INTRAVENOUS SOLUTION, PIGGYBACK (ML) INTRAVENOUS ONCE
Status: DISCONTINUED | OUTPATIENT
Start: 2017-01-01 | End: 2017-01-01

## 2017-01-01 RX ORDER — OXYCODONE HYDROCHLORIDE 5 MG/1
5-10 TABLET ORAL
Qty: 56 TAB | Refills: 0 | Status: SHIPPED | OUTPATIENT
Start: 2017-01-01 | End: 2018-01-01

## 2017-01-01 RX ORDER — SODIUM CHLORIDE, SODIUM LACTATE, POTASSIUM CHLORIDE, CALCIUM CHLORIDE 600; 310; 30; 20 MG/100ML; MG/100ML; MG/100ML; MG/100ML
100 INJECTION, SOLUTION INTRAVENOUS CONTINUOUS
Status: DISCONTINUED | OUTPATIENT
Start: 2017-01-01 | End: 2017-01-01 | Stop reason: HOSPADM

## 2017-01-01 RX ORDER — AMOXICILLIN 250 MG
1 CAPSULE ORAL 2 TIMES DAILY
Status: DISCONTINUED | OUTPATIENT
Start: 2017-01-01 | End: 2017-01-01 | Stop reason: HOSPADM

## 2017-01-01 RX ORDER — ACETAMINOPHEN 325 MG/1
650 TABLET ORAL EVERY 6 HOURS
Status: DISCONTINUED | OUTPATIENT
Start: 2017-01-01 | End: 2017-01-01 | Stop reason: HOSPADM

## 2017-01-01 RX ORDER — SODIUM CHLORIDE 0.9 % (FLUSH) 0.9 %
5-10 SYRINGE (ML) INJECTION EVERY 8 HOURS
Status: DISCONTINUED | OUTPATIENT
Start: 2017-01-01 | End: 2017-01-01

## 2017-01-01 RX ORDER — ENOXAPARIN SODIUM 100 MG/ML
40 INJECTION SUBCUTANEOUS EVERY 24 HOURS
Status: DISCONTINUED | OUTPATIENT
Start: 2017-01-01 | End: 2017-01-01 | Stop reason: HOSPADM

## 2017-01-01 RX ORDER — BUPIVACAINE HYDROCHLORIDE 7.5 MG/ML
INJECTION, SOLUTION EPIDURAL; RETROBULBAR AS NEEDED
Status: DISCONTINUED | OUTPATIENT
Start: 2017-01-01 | End: 2017-01-01 | Stop reason: HOSPADM

## 2017-01-01 RX ORDER — CEFAZOLIN SODIUM IN 0.9 % NACL 2 G/50 ML
2 INTRAVENOUS SOLUTION, PIGGYBACK (ML) INTRAVENOUS EVERY 8 HOURS
Status: COMPLETED | OUTPATIENT
Start: 2017-01-01 | End: 2017-01-01

## 2017-01-01 RX ORDER — ACETAMINOPHEN 325 MG/1
325 TABLET ORAL EVERY 6 HOURS
Qty: 112 TAB | Refills: 0 | Status: SHIPPED | OUTPATIENT
Start: 2017-01-01 | End: 2018-01-01

## 2017-01-01 RX ORDER — IPRATROPIUM BROMIDE AND ALBUTEROL SULFATE 2.5; .5 MG/3ML; MG/3ML
3 SOLUTION RESPIRATORY (INHALATION)
Qty: 180 ML | Refills: 11 | Status: SHIPPED | OUTPATIENT
Start: 2017-01-01 | End: 2017-01-01 | Stop reason: SDUPTHER

## 2017-01-01 RX ORDER — KETOROLAC TROMETHAMINE 30 MG/ML
30 INJECTION, SOLUTION INTRAMUSCULAR; INTRAVENOUS
Status: COMPLETED | OUTPATIENT
Start: 2017-01-01 | End: 2017-01-01

## 2017-01-01 RX ORDER — ONDANSETRON HYDROCHLORIDE 8 MG/1
8 TABLET, FILM COATED ORAL
Qty: 45 TAB | Refills: 5 | Status: SHIPPED | OUTPATIENT
Start: 2017-01-01

## 2017-01-01 RX ORDER — ONDANSETRON HYDROCHLORIDE 8 MG/1
8 TABLET, FILM COATED ORAL
Qty: 45 TAB | Refills: 5 | Status: SHIPPED | OUTPATIENT
Start: 2017-01-01 | End: 2017-01-01 | Stop reason: SDUPTHER

## 2017-01-01 RX ORDER — PROCHLORPERAZINE MALEATE 10 MG
10 TABLET ORAL
Qty: 50 TAB | Refills: 5 | Status: SHIPPED | OUTPATIENT
Start: 2017-01-01

## 2017-01-01 RX ORDER — ALBUTEROL SULFATE 0.83 MG/ML
SOLUTION RESPIRATORY (INHALATION)
Qty: 225 EACH | Refills: 11 | Status: SHIPPED | OUTPATIENT
Start: 2017-01-01

## 2017-01-01 RX ADMIN — IPRATROPIUM BROMIDE AND ALBUTEROL SULFATE 3 ML: .5; 3 SOLUTION RESPIRATORY (INHALATION) at 15:54

## 2017-01-01 RX ADMIN — IPRATROPIUM BROMIDE 1 DOSE: 0.5 SOLUTION RESPIRATORY (INHALATION) at 20:00

## 2017-01-01 RX ADMIN — SODIUM CHLORIDE 125 ML/HR: 900 INJECTION, SOLUTION INTRAVENOUS at 03:01

## 2017-01-01 RX ADMIN — ACETAMINOPHEN 650 MG: 325 TABLET ORAL at 17:58

## 2017-01-01 RX ADMIN — IOPAMIDOL 95 ML: 755 INJECTION, SOLUTION INTRAVENOUS at 14:36

## 2017-01-01 RX ADMIN — IPRATROPIUM BROMIDE 1 DOSE: 0.5 SOLUTION RESPIRATORY (INHALATION) at 07:36

## 2017-01-01 RX ADMIN — IPRATROPIUM BROMIDE 1 DOSE: 0.5 SOLUTION RESPIRATORY (INHALATION) at 15:08

## 2017-01-01 RX ADMIN — SODIUM CHLORIDE 125 ML/HR: 900 INJECTION, SOLUTION INTRAVENOUS at 03:37

## 2017-01-01 RX ADMIN — CELECOXIB 200 MG: 100 CAPSULE ORAL at 09:44

## 2017-01-01 RX ADMIN — SODIUM CHLORIDE 125 ML/HR: 900 INJECTION, SOLUTION INTRAVENOUS at 18:34

## 2017-01-01 RX ADMIN — IPRATROPIUM BROMIDE AND ALBUTEROL SULFATE 3 ML: .5; 3 SOLUTION RESPIRATORY (INHALATION) at 09:31

## 2017-01-01 RX ADMIN — ASPIRIN 325 MG: 325 TABLET, DELAYED RELEASE ORAL at 09:18

## 2017-01-01 RX ADMIN — CELECOXIB 200 MG: 100 CAPSULE ORAL at 09:18

## 2017-01-01 RX ADMIN — DOCUSATE SODIUM AND SENNOSIDES 1 TABLET: 8.6; 5 TABLET, FILM COATED ORAL at 17:57

## 2017-01-01 RX ADMIN — ALBUTEROL SULFATE 2.5 MG: 2.5 SOLUTION RESPIRATORY (INHALATION) at 11:45

## 2017-01-01 RX ADMIN — CEFAZOLIN 0.2 G: 1 INJECTION, POWDER, FOR SOLUTION INTRAMUSCULAR; INTRAVENOUS; PARENTERAL at 10:50

## 2017-01-01 RX ADMIN — FENTANYL CITRATE 25 MCG: 50 INJECTION, SOLUTION INTRAMUSCULAR; INTRAVENOUS at 10:09

## 2017-01-01 RX ADMIN — MIDAZOLAM HYDROCHLORIDE 2 MG: 1 INJECTION, SOLUTION INTRAMUSCULAR; INTRAVENOUS at 13:00

## 2017-01-01 RX ADMIN — IPRATROPIUM BROMIDE AND ALBUTEROL SULFATE 3 ML: .5; 3 SOLUTION RESPIRATORY (INHALATION) at 22:35

## 2017-01-01 RX ADMIN — ACETAMINOPHEN 650 MG: 325 TABLET ORAL at 22:08

## 2017-01-01 RX ADMIN — POLYETHYLENE GLYCOL 3350 17 G: 17 POWDER, FOR SOLUTION ORAL at 09:18

## 2017-01-01 RX ADMIN — BUPIVACAINE HYDROCHLORIDE 2.8 ML: 7.5 INJECTION, SOLUTION EPIDURAL; RETROBULBAR at 12:28

## 2017-01-01 RX ADMIN — ASPIRIN 325 MG: 325 TABLET, DELAYED RELEASE ORAL at 09:44

## 2017-01-01 RX ADMIN — CEFAZOLIN 2 G: 1 INJECTION, POWDER, FOR SOLUTION INTRAMUSCULAR; INTRAVENOUS; PARENTERAL at 12:30

## 2017-01-01 RX ADMIN — ACETAMINOPHEN 650 MG: 325 TABLET ORAL at 11:16

## 2017-01-01 RX ADMIN — PROPOFOL 25 MCG/KG/MIN: 10 INJECTION, EMULSION INTRAVENOUS at 13:00

## 2017-01-01 RX ADMIN — ACETAMINOPHEN 1000 MG: 500 TABLET ORAL at 10:42

## 2017-01-01 RX ADMIN — FAMOTIDINE 20 MG: 20 TABLET, FILM COATED ORAL at 09:44

## 2017-01-01 RX ADMIN — ACETAMINOPHEN 650 MG: 325 TABLET ORAL at 05:53

## 2017-01-01 RX ADMIN — OXYCODONE HYDROCHLORIDE 5 MG: 5 TABLET ORAL at 05:53

## 2017-01-01 RX ADMIN — IPRATROPIUM BROMIDE AND ALBUTEROL SULFATE 3 ML: .5; 3 SOLUTION RESPIRATORY (INHALATION) at 04:23

## 2017-01-01 RX ADMIN — MIDAZOLAM HYDROCHLORIDE 1 MG: 1 INJECTION, SOLUTION INTRAMUSCULAR; INTRAVENOUS at 10:07

## 2017-01-01 RX ADMIN — Medication 10 ML: at 00:53

## 2017-01-01 RX ADMIN — ASPIRIN 325 MG: 325 TABLET, DELAYED RELEASE ORAL at 17:58

## 2017-01-01 RX ADMIN — IPRATROPIUM BROMIDE 1 DOSE: 0.5 SOLUTION RESPIRATORY (INHALATION) at 13:16

## 2017-01-01 RX ADMIN — IPRATROPIUM BROMIDE AND ALBUTEROL SULFATE 3 ML: .5; 3 SOLUTION RESPIRATORY (INHALATION) at 19:25

## 2017-01-01 RX ADMIN — KETOROLAC TROMETHAMINE 30 MG: 30 INJECTION, SOLUTION INTRAMUSCULAR at 21:39

## 2017-01-01 RX ADMIN — GADOTERIDOL 15 ML: 279.3 INJECTION, SOLUTION INTRAVENOUS at 09:15

## 2017-01-01 RX ADMIN — OXYCODONE HYDROCHLORIDE 5 MG: 5 TABLET ORAL at 22:08

## 2017-01-01 RX ADMIN — CELECOXIB 200 MG: 100 CAPSULE ORAL at 17:57

## 2017-01-01 RX ADMIN — IPRATROPIUM BROMIDE 1 DOSE: 0.5 SOLUTION RESPIRATORY (INHALATION) at 11:13

## 2017-01-01 RX ADMIN — SODIUM CHLORIDE, SODIUM LACTATE, POTASSIUM CHLORIDE, AND CALCIUM CHLORIDE 100 ML/HR: 600; 310; 30; 20 INJECTION, SOLUTION INTRAVENOUS at 10:44

## 2017-01-01 RX ADMIN — Medication 10 ML: at 01:54

## 2017-01-01 RX ADMIN — FENTANYL CITRATE 25 MCG: 50 INJECTION, SOLUTION INTRAMUSCULAR; INTRAVENOUS at 10:01

## 2017-01-01 RX ADMIN — Medication 10 ML: at 01:29

## 2017-01-01 RX ADMIN — MORPHINE SULFATE 2 MG: 2 INJECTION, SOLUTION INTRAMUSCULAR; INTRAVENOUS at 03:37

## 2017-01-01 RX ADMIN — OXYCODONE HYDROCHLORIDE 5 MG: 5 TABLET ORAL at 20:15

## 2017-01-01 RX ADMIN — FENTANYL CITRATE 50 MCG: 50 INJECTION, SOLUTION INTRAMUSCULAR; INTRAVENOUS at 12:18

## 2017-01-01 RX ADMIN — Medication 10 ML: at 03:38

## 2017-01-01 RX ADMIN — Medication 10 ML: at 22:25

## 2017-01-01 RX ADMIN — FAMOTIDINE 20 MG: 20 TABLET, FILM COATED ORAL at 17:57

## 2017-01-01 RX ADMIN — DOCUSATE SODIUM AND SENNOSIDES 1 TABLET: 8.6; 5 TABLET, FILM COATED ORAL at 09:18

## 2017-01-01 RX ADMIN — MIDAZOLAM HYDROCHLORIDE 1 MG: 1 INJECTION, SOLUTION INTRAMUSCULAR; INTRAVENOUS at 12:18

## 2017-01-01 RX ADMIN — MIDAZOLAM HYDROCHLORIDE 1 MG: 1 INJECTION, SOLUTION INTRAMUSCULAR; INTRAVENOUS at 12:22

## 2017-01-01 RX ADMIN — DOCUSATE SODIUM AND SENNOSIDES 1 TABLET: 8.6; 5 TABLET, FILM COATED ORAL at 09:44

## 2017-01-01 RX ADMIN — CEFAZOLIN 2 G: 1 INJECTION, POWDER, FOR SOLUTION INTRAMUSCULAR; INTRAVENOUS; PARENTERAL at 03:03

## 2017-01-01 RX ADMIN — FAMOTIDINE 20 MG: 20 TABLET, FILM COATED ORAL at 09:18

## 2017-01-01 RX ADMIN — ENOXAPARIN SODIUM 40 MG: 40 INJECTION SUBCUTANEOUS at 22:25

## 2017-01-01 RX ADMIN — ENOXAPARIN SODIUM 40 MG: 40 INJECTION SUBCUTANEOUS at 22:08

## 2017-01-01 RX ADMIN — FENTANYL CITRATE 50 MCG: 50 INJECTION, SOLUTION INTRAMUSCULAR; INTRAVENOUS at 13:00

## 2017-01-01 RX ADMIN — HEPARIN SODIUM 5000 UNITS: 5000 INJECTION, SOLUTION INTRAVENOUS; SUBCUTANEOUS at 00:52

## 2017-01-01 RX ADMIN — CEFAZOLIN 2 G: 1 INJECTION, POWDER, FOR SOLUTION INTRAMUSCULAR; INTRAVENOUS; PARENTERAL at 20:15

## 2017-01-01 RX ADMIN — MIDAZOLAM HYDROCHLORIDE 1 MG: 1 INJECTION, SOLUTION INTRAMUSCULAR; INTRAVENOUS at 10:01

## 2017-08-04 NOTE — TELEPHONE ENCOUNTER
----- Message from Mike Damon sent at 8/4/2017  9:35 AM EDT -----  Regarding: Dr. Mariaelena Brunson  Pt checking on status of refill Solution for his inhaler, to [facility] [phone] on [address]. Best contact number [phone].

## 2017-08-04 NOTE — TELEPHONE ENCOUNTER
PSR called pt to let him know that pharm was sent to pharm. Pt is confused on what he needs to . PSR asked pt if he is talking in regards to inhaler that goes in your mouth or the neb. Pt kept saying the solution that goes in the inhaler. PSR called pharm. Pharm has not filled inhalers for pt before only neb solutions. Please call pt to clarify. Sounds like pt is using everyday because he is asking for a 30 day supply because the last fill only lasted 10days for pt.

## 2017-08-07 NOTE — TELEPHONE ENCOUNTER
Patient wants to get a refill on his solution. Please call him back asap to find out which one he is requesting. He is running out & I am not sure which medication he is talking about.  Call him back [facility]

## 2017-08-08 NOTE — TELEPHONE ENCOUNTER
Katerina Gutierrez from [facility] needs a change for albuterol nebulizer solution rx to be for # of vials for 90 days order.(360 vials per month) Check yesterdays notes.  Please resend or verbal  @[phone]

## 2017-11-13 NOTE — PROGRESS NOTES
Patient here for shortness of breath. He has noticed that in the past 4-5 months he has quit smoking but still the shortness of breath increasing. He also stopped taking lisinopril hctz around the same time. He has nasal congestion. He sometimes spits up blood x 2 weeks ago. Sinus drainage noted. He states clear thick mucous. Congestion noted when lying down, he can not sleep. He states sometimes when lying down he feels pressure on chest.     1. Have you been to the ER, urgent care clinic since your last visit? Hospitalized since your last visit? No    2. Have you seen or consulted any other health care providers outside of the [address] [place] [address] since your last visit? Include any pap smears or colon screening. No     Wt loss, inc cough, due for labs    Due for cpx        Medicare Wellness Exam:    Chief Complaint   Patient presents with    Shortness of Breath     breathing problems with activity, sometimes lying down     he is a 78y.o. year old male who presents for evaluation for their Medicare Wellness Visit. [place] Romance is completed and assessed=yes  Depression Screen is completed and assessed=yes  Medication list reviewed and adjusted for accuracy=yes  Immunizations reviewed and updated=yes  Health/Preventative Screenings reviewed and updated=yes  ADL Functions reviewed=yes    Patient Active Problem List    Diagnosis    Advance care planning     I discussed with patient living will and gave a legal form for patient to fill out and bring back to the office.  HTN (hypertension)    Rheumatoid arthritis(714.0)    COPD (chronic obstructive pulmonary disease) (HCC)    Basal cell cancer       Reviewed PmHx, RxHx, FmHx, SocHx, AllgHx and updated and dated in the chart.     Review of Systems - negative except as listed above in the HPI    Objective:     Vitals:    11/13/17 1417   BP: 121/76   Pulse: 83   Resp: 22   Temp: 97.8 °F (36.6 °C)   SpO2: 96%   Weight: 192 lb (87.1 kg)   Height: 5' 10\" (1.778 m)     Physical Examination: General appearance - alert, well appearing, and in no distress  Neck - supple, no significant adenopathy  Chest - clear to auscultation, no wheezes, rales or rhonchi, symmetric air entry  Heart - normal rate, regular rhythm, normal S1, S2, no murmurs, rubs, clicks or gallops  Abdomen - soft, nontender, nondistended, no masses or organomegaly  Extremities - peripheral pulses normal, no pedal edema, no clubbing or cyanosis    Assessment/ Plan:   Diagnoses and all orders for this visit:    1. Routine general medical examination at a health care facility  -see below    2. Chronic obstructive pulmonary disease, unspecified COPD type ([facility] [address]  -     albuterol-ipratropium (DUO-NEB) 2.5 mg-0.5 mg/3 ml nebu; 3 mL by Nebulization route every four (4) hours as needed. 3. Essential hypertension  -     METABOLIC PANEL, COMPREHENSIVE  -     CBC WITH AUTOMATED DIFF  -     TSH 3RD GENERATION    4. Advance care planning  -I discussed with patient living will and gave a legal form for patient to fill out and bring back to the office. 5. Blood in sputum  -     CT LOW DOSE LUNG CANCER SCREENING; Future  -concern for lung CA with wt loss as well    6. Cough  -     XR CHEST PA LAT; Future  -     albuterol-ipratropium (DUO-NEB) 2.5 mg-0.5 mg/3 ml nebu; 3 mL by Nebulization route every four (4) hours as needed. -     CT LOW DOSE LUNG CANCER SCREENING;  Future         -Pain evaluation performed in office  -Cognitive Screen performed in office  -Depression Screen, Fall risks (by up and go test)  and ADL functionality were addressed  -Medication list updated and reviewed for any changes   -A comprehensive review of medical issues and a plan was formulated  -End of life planning was addressed with pt   -Health Screenings for preventions were addressed and a plan was formulated  -Shingles Vaccine was recommended  -Discussed with patient cancer risk factors and appropriate screenings for age  -Patient evaluated for colonoscopy and referred if needed per screeing criteria  -Labs from previous visits were discussed with patient   -Discussed with patient diet and exercise and formulated a plan as needed  -An Advanced care plan was developed with the patient.  -Alcohol screening performed and was negative    -Follow-up Disposition: Not on File    I have discussed the diagnosis with the patient and the intended plan as seen in the above orders. The patient understands and agrees with the plan. The patient has received an after-visit summary and questions were answered concerning future plans. Medication Side Effects and Warnings were discussed with patien  Patient Labs were reviewed and or requested  Patient Past Records were reviewed and or requested    There are no Patient Instructions on file for this visit.       Mallory Samuels M.D.

## 2017-11-14 NOTE — PROGRESS NOTES
Na is low, pt is anemic, make sure pt gets CT done of chest, have pt rto for further testing after xray    Jamil De Paz

## 2017-11-14 NOTE — PROGRESS NOTES
ID verified X 3- inform pt of lab rersults and that dr. Jacinta Aschoff wants to see him after he has CT done- pt voiced understanding.

## 2017-11-27 NOTE — PROGRESS NOTES
Patient here for results of lung scan. 1. Have you been to the ER, urgent care clinic since your last visit? Hospitalized since your last visit? No    2. Have you seen or consulted any other health care providers outside of the [address] [place] [address] since your last visit? Include any pap smears or colon screening. No       Chief Complaint   Patient presents with    Follow-up     lung scan results     He is a 78 y.o. male who presents for evalution. Reviewed PmHx, RxHx, FmHx, SocHx, AllgHx and updated and dated in the chart. Patient Active Problem List    Diagnosis    Advance care planning     I discussed with patient living will and gave a legal form for patient to fill out and bring back to the office.  HTN (hypertension)    Rheumatoid arthritis(714.0)    COPD (chronic obstructive pulmonary disease) (HCC)    Basal cell cancer       Review of Systems - negative except as listed above in the HPI    Objective:     Vitals:    11/27/17 1335   BP: 111/69   Pulse: 82   Resp: 20   Temp: 98.1 °F (36.7 °C)   SpO2: 95%   Weight: 184 lb (83.5 kg)   Height: 5' 10\" (1.778 m)     Physical Examination: General appearance - alert, well appearing, and in no distress  Chest - clear to auscultation, no wheezes, rales or rhonchi, symmetric air entry  Heart - normal rate, regular rhythm, normal S1, S2, no murmurs, rubs, clicks or gallops    Assessment/ Plan:   Diagnoses and all orders for this visit:    1. Lung mass  -     REFERRAL TO HEMATOLOGY ONCOLOGY  -appt time >50 min  ->50% spent in counseling  -set up appt with Dr Valverde  -[facility] mets   -p CT results    2. Chronic obstructive pulmonary disease, unspecified COPD type ([facility] [address]  -mid stage    3. Essential hypertension  -at goal       Follow-up Disposition:  Return if symptoms worsen or fail to improve. I have discussed the diagnosis with the patient and the intended plan as seen in the above orders. The patient understands and agrees with the plan.  The patient has received an after-visit summary and questions were answered concerning future plans. Medication Side Effects and Warnings were discussed with patient  Patient Labs were reviewed and or requested:  Patient Past Records were reviewed and or requested    Edith Pizarro M.D. There are no Patient Instructions on file for this visit.

## 2017-11-27 NOTE — MR AVS SNAPSHOT
Visit Information Date & Time Provider Department Dept. Phone Encounter #  
 11/27/2017  1:15 PM Ruth Meek MD [facility] [phone] [id number] Follow-up Instructions Return if symptoms worsen or fail to improve. Upcoming Health Maintenance Date Due DTaP/Tdap/Td series (1 - Tdap) 9/29/1963 ZOSTER VACCINE AGE 60> 7/29/2002 GLAUCOMA SCREENING Q2Y 9/29/2007 Pneumococcal 65+ Low/Medium Risk (1 of 2 - PCV13) 9/29/2007 MEDICARE YEARLY EXAM 11/14/2018 Allergies as of 11/27/2017  Review Complete On: 11/27/2017 By: Ruth Meek MD  
  
 Severity Noted Reaction Type Reactions Pcn [Penicillins]  10/21/2014    Hives Current Immunizations  Reviewed on 10/3/2016 No immunizations on file. Not reviewed this visit You Were Diagnosed With   
  
 Codes Comments Lung mass    -  Primary ICD-10-CM: R91.8 ICD-9-CM: 476. 6 Chronic obstructive pulmonary disease, unspecified COPD type ([facility]     ICD-10-CM: J44.9 ICD-9-CM: 321 Essential hypertension     ICD-10-CM: I10 
ICD-9-CM: 401.9 Vitals BP Pulse Temp Resp Height(growth percentile) Weight(growth percentile) 111/69 82 98.1 °F (36.7 °C) 20 5' 10\" (1.778 m) 184 lb (83.5 kg) SpO2 BMI Smoking Status 95% 26.4 kg/m2 Current Every Day Smoker Vitals History BMI and BSA Data Body Mass Index Body Surface Area  
 26.4 kg/m 2 2.03 m 2 Preferred Pharmacy Pharmacy Name Phone [facility] [address] [place] [address], [address] [place] [address] [phone] Your Updated Medication List  
  
   
This list is accurate as of: 11/27/17  1:49 PM.  Always use your most recent med list.  
  
  
  
  
 * albuterol 2.5 mg /3 mL (0.083 %) nebulizer solution Commonly known as:  PROVENTIL VENTOLIN  
USE 2 VIALS VIAL NEBULIZER EVERY 4 HOURS AS NEEDED FOR WHEEZING  
  
 * albuterol 2.5 mg /3 mL (0.083 %) nebulizer solution Commonly known as:  PROVENTIL VENTOLIN  
6 mL by Nebulization route every four (4) hours as needed for Wheezing. albuterol-ipratropium 2.5 mg-0.5 mg/3 ml Nebu Commonly known as:  DUO-NEB  
3 mL by Nebulization route every four (4) hours as needed. * Notice: This list has 2 medication(s) that are the same as other medications prescribed for you. Read the directions carefully, and ask your doctor or other care provider to review them with you. We Performed the Following REFERRAL TO HEMATOLOGY ONCOLOGY [MQK54 Custom] Follow-up Instructions Return if symptoms worsen or fail to improve. Referral Information Referral ID Referred By Referred To  
  
 [id number] René COY MD   
   [address] [place] [address] Phone: [phone] Fax: [phone] Visits Status Start Date End Date 1 New Request 11/27/17 11/27/18 If your referral has a status of pending review or denied, additional information will be sent to support the outcome of this decision. Introducing [facility]! Romayne Duster introduces MarketMeSuite patient portal. Now you can access parts of your medical record, email your doctor's office, and request medication refills online. 1. In your internet browser, go to [email or web address]://[email or web address] Interactive Experience. [employer]/Fitness Interactive Experience 2. Click on the First Time User? Click Here link in the Sign In box. You will see the New Member Sign Up page. 3. Enter your MarketMeSuite Access Code exactly as it appears below. You will not need to use this code after youve completed the sign-up process. If you do not sign up before the expiration date, you must request a new code. · MarketMeSuite Access Code: [email or web address] Expires: 2/19/2018  2:18 PM 
 
4.  Enter the last four digits of your Social Security Number (xxxx) and Date of Birth (mm/dd/yyyy) as indicated and click Submit. You will be taken to the next sign-up page. 5. Create a Hypejar ID. This will be your Hypejar login ID and cannot be changed, so think of one that is secure and easy to remember. 6. Create a Hypejar password. You can change your password at any time. 7. Enter your Password Reset Question and Answer. This can be used at a later time if you forget your password. 8. Enter your e-mail address. You will receive e-mail notification when new information is available in [address] 9. Click Sign Up. You can now view and download portions of your medical record. 10. Click the Download Summary menu link to download a portable copy of your medical information. If you have questions, please visit the Frequently Asked Questions section of the Hypejar website. Remember, Hypejar is NOT to be used for urgent needs. For medical emergencies, dial 911. Now available from your iPhone and Android! Please provide this summary of care documentation to your next provider. Your primary care clinician is listed as NORMA COY. If you have any questions after today's visit, please call [phone].

## 2017-11-29 NOTE — MR AVS SNAPSHOT
Visit Information Date & Time Provider Department Dept. Phone Encounter #  
 11/29/2017  3:00 PM Shakir Dudley MD [facility] at [address] [place] [address] Rd [id number] Follow-up Instructions Return for TBD. Upcoming Health Maintenance Date Due DTaP/Tdap/Td series (1 - Tdap) 9/29/1963 ZOSTER VACCINE AGE 60> 7/29/2002 GLAUCOMA SCREENING Q2Y 9/29/2007 Pneumococcal 65+ Low/Medium Risk (1 of 2 - PCV13) 9/29/2007 MEDICARE YEARLY EXAM 11/14/2018 Allergies as of 11/29/2017  Review Complete On: 11/29/2017 By: Sharmila Carlos LPN Severity Noted Reaction Type Reactions Pcn [Penicillins]  10/21/2014    Hives Current Immunizations  Reviewed on 10/3/2016 No immunizations on file. Not reviewed this visit You Were Diagnosed With   
  
 Codes Comments Malignant neoplasm of upper lobe of left lung ([facility] [address]    -  Primary ICD-10-CM: C34.12 
ICD-9-CM: 162.3 Lung mass     ICD-10-CM: R91.8 ICD-9-CM: 786.6 Abnormal chest CT     ICD-10-CM: R93.8 ICD-9-CM: 793.2 Adrenal nodule ([facility] [address]     ICD-10-CM: E27.9 ICD-9-CM: 255.8 Fever, unspecified fever cause     ICD-10-CM: R50.9 ICD-9-CM: 780.60 Constipation, unspecified constipation type     ICD-10-CM: K59.00 ICD-9-CM: 564.00 Vitals BP Pulse Temp Resp Height(growth percentile) Weight(growth percentile) 118/70 (BP 1 Location: Right arm, BP Patient Position: Sitting) (!) 115 (!) 100.7 °F (38.2 °C) (Oral) 18 5' 10\" (1.778 m) 183 lb (83 kg) SpO2 BMI Smoking Status 91% 26.26 kg/m2 Current Every Day Smoker Vitals History BMI and BSA Data Body Mass Index Body Surface Area  
 26.26 kg/m 2 2.02 m 2 Preferred Pharmacy Pharmacy Name Phone [facility] [address] [place] [address], [address] [place] [address] [phone] Your Updated Medication List  
  
   
 This list is accurate as of: 11/29/17  4:07 PM.  Always use your most recent med list.  
  
  
  
  
 albuterol 2.5 mg /3 mL (0.083 %) nebulizer solution Commonly known as:  PROVENTIL VENTOLIN  
USE 2 VIALS VIAL NEBULIZER EVERY 4 HOURS AS NEEDED FOR WHEEZING  
  
 albuterol-ipratropium 2.5 mg-0.5 mg/3 ml Nebu Commonly known as:  DUO-NEB  
3 mL by Nebulization route every four (4) hours as needed. levoFLOXacin 750 mg tablet Commonly known as:  Lamount Chalet Take 1 Tab by mouth daily. Prescriptions Sent to Pharmacy Refills  
 levoFLOXacin (LEVAQUIN) 750 mg tablet 0 Sig: Take 1 Tab by mouth daily. Class: Normal  
 Pharmacy: [facility] [address] [place] [address] [address] [place] [address] #: [phone] Route: Oral  
  
Follow-up Instructions Return for TBD. To-Do List   
 Around 12/01/2017 Imaging:  CT CHEST ABD PELV W CONT Introducing [facility]! [employer] introduces GetThis patient portal. Now you can access parts of your medical record, email your doctor's office, and request medication refills online. 1. In your internet browser, go to [email or web address] 2. Click on the First Time User? Click Here link in the Sign In box. You will see the New Member Sign Up page. 3. Enter your GetThis Access Code exactly as it appears below. You will not need to use this code after youve completed the sign-up process. If you do not sign up before the expiration date, you must request a new code. · GetThis Access Code: [email or web address] Expires: 2/19/2018  2:18 PM 
 
4. Enter the last four digits of your Social Security Number (xxxx) and Date of Birth (mm/dd/yyyy) as indicated and click Submit. You will be taken to the next sign-up page. 5. Create a GetThis ID. This will be your GetThis login ID and cannot be changed, so think of one that is secure and easy to remember. 6. Create a Parascale password. You can change your password at any time. 7. Enter your Password Reset Question and Answer. This can be used at a later time if you forget your password. 8. Enter your e-mail address. You will receive e-mail notification when new information is available in [address] 9. Click Sign Up. You can now view and download portions of your medical record. 10. Click the Download Summary menu link to download a portable copy of your medical information. If you have questions, please visit the Frequently Asked Questions section of the Parascale website. Remember, Parascale is NOT to be used for urgent needs. For medical emergencies, dial 911. Now available from your iPhone and Android! Please provide this summary of care documentation to your next provider. Your primary care clinician is listed as NORMA COY. If you have any questions after today's visit, please call [phone].

## 2017-11-29 NOTE — PROGRESS NOTES
[facility] VISIT NOTE    5100  Pt arrived at [facility] ambulatory and in no distress for labs. Blood pressure 121/73, pulse (!) 120, temperature 100 °F (37.8 °C), resp. rate 18. Patient says Dr. Dulce Maria Singleton is aware of his fever and high heart rate. Labs drawn peripherally from [facility].    1635  D/C'd from [facility] ambulatory and in no distress.

## 2017-11-29 NOTE — PROGRESS NOTES
[facility] [place] at [address], [address] [place] [address]  Jahaira Deshpande: [phone]  F: [phone]      Reason for Visit:   Leana Aguilar is a 78 y.o. male who is seen in consultation at the request of Dr. Belem Colmenares for evaluation of lung cancer. Treatment History:   · Low-dose CT Chest 11/22/2017: Probably left upper lobe mass obstructing left upper lobe bronchus, near complete atelectasis of FANY, probably left hilar adenopathy. 3.9cm left adrenal nodule. Moderate centrilobular emphysema. History of Present Illness:   Leana Aguilar is a pleasant 78 y.o. male who presents today for evaluation of lung cancer. He reports progressive dyspnea over the past several years, much worse recently. Poor appetite with progressive weight loss, 40 pounds over 1.5 years. Significant fatigue. Some urinary retention symptoms. Some constipation that he is managing with OTC measures. No dysuria. He was febrile in the office today, though he did not feel any subjective fevers, just some chills. He denies pain. He quit smoking summer 2017. Previously smoked 50+ pack years. He lives in [place] with his grandson. He is a retired . Past Medical History:   Diagnosis Date    HTN (hypertension) 10/21/2014    Rheumatoid arthritis(714.0) 10/21/2014    Skin cancer     Sun-damaged skin     Sunburn, blistering       History reviewed. No pertinent surgical history. Social History   Substance Use Topics    Smoking status: Current Every Day Smoker     Packs/day: 1.00    Smokeless tobacco: Never Used    Alcohol use No      History reviewed. No pertinent family history. Current Outpatient Prescriptions   Medication Sig    levoFLOXacin (LEVAQUIN) 750 mg tablet Take 1 Tab by mouth daily.  albuterol-ipratropium (DUO-NEB) 2.5 mg-0.5 mg/3 ml nebu 3 mL by Nebulization route every four (4) hours as needed.     albuterol (PROVENTIL VENTOLIN) 2.5 mg /3 mL (0.083 %) nebulizer solution USE 2 VIALS VIAL NEBULIZER EVERY 4 HOURS AS NEEDED FOR WHEEZING     No current facility-administered medications for this visit. Allergies   Allergen Reactions    Pcn [Penicillins] Hives        Review of Systems: A complete review of systems was obtained, negative except as described above. Physical Exam:     Visit Vitals    /70 (BP 1 Location: Right arm, BP Patient Position: Sitting)    Pulse (!) 115    Temp (!) 100.7 °F (38.2 °C) (Oral)    Resp 18    Ht 5' 10\" (1.778 m)    Wt 183 lb (83 kg)    SpO2 91%    BMI 26.26 kg/m2     ECOG PS: 1  General: No distress  Eyes: PERRLA, anicteric sclerae  HENT: Atraumatic, OP clear  Neck: Supple  Lymphatic: No cervical, supraclavicular, or inguinal adenopathy  Respiratory: CTAB, increased work of breathing  CV: Normal rate, regular rhythm, no murmurs, no peripheral edema  GI: Soft, nontender, nondistended, no masses, no hepatomegaly, no splenomegaly  MS: Normal gait and station. Digits without clubbing or cyanosis. Skin: No rashes, ecchymoses, or petechiae. Normal temperature, turgor, and texture. Psych: Alert, oriented, appropriate affect, normal judgment/insight    Results:     Lab Results   Component Value Date/Time    WBC 7.8 11/13/2017 02:49 PM    HGB 9.6 11/13/2017 02:49 PM    HCT 28.7 11/13/2017 02:49 PM    PLATELET 884 40/05/6699 02:49 PM    MCV 88 11/13/2017 02:49 PM    ABS.  NEUTROPHILS 5.8 11/13/2017 02:49 PM     Lab Results   Component Value Date/Time    Sodium 127 11/13/2017 02:49 PM    Potassium 4.2 11/13/2017 02:49 PM    Chloride 86 11/13/2017 02:49 PM    CO2 28 11/13/2017 02:49 PM    Glucose 97 11/13/2017 02:49 PM    BUN 9 11/13/2017 02:49 PM    Creatinine 0.53 11/13/2017 02:49 PM    GFR est  11/13/2017 02:49 PM    GFR est non- 11/13/2017 02:49 PM    Calcium 8.9 11/13/2017 02:49 PM     Lab Results   Component Value Date/Time    Bilirubin, total 0.5 11/13/2017 02:49 PM    ALT (SGPT) 30 11/13/2017 02:49 PM AST (SGOT) 44 11/13/2017 02:49 PM    Alk. phosphatase 64 11/13/2017 02:49 PM    Protein, total 6.3 11/13/2017 02:49 PM    Albumin 2.6 11/13/2017 02:49 PM       Records reviewed and summarized above. Radiology report(s) reviewed above. Assessment:   1) Abnormal Chest CT  Imaging is highly concerning for lung cancer. As this was a low-dose Chest CT, I recommend further imaging to include a CT C/A/P with contrast.    2) Adrenal nodule  Uncertain significance, but concerning for a met based on size and imaging. CT A/P to further evaluate. 3) Anemia, normocytic  Check anemia labs    4) Fever  Possible post-obstruction PNA? Start levofloxacin. 5) COPD  Noted on CT. He will need pulmonary evaluation at some point, but we can wait on CT results. 6) Constipation  Continue OTC measures. Consider miralax    7) H/o RA  Not currently on therapy, symptoms well controlled. This may limit our ability to treat with immunotherapy, depending on the severity of his disease. Plan:     · Levofloxacin 750mg PO daily for 7 days  · Labs: CBC, PT, PTT, retic, iron profile, ferritin, B12, folate  · CT C/A/P with contrast  · Referral for EBUS vs CT guided biopsy, pending results of above  · Follow up TBD    I appreciate the opportunity to participate in Mr. Qasim Jackson's care.     Signed By: Cristobal Ragsdale MD

## 2017-12-01 NOTE — PROGRESS NOTES
Oncology Navigator  Psychosocial Assessment    Reason for Assessment:    []Depression  []Anxiety  []Caregiver [place]  []Maladaptive Coping with Serious Illness   [x]Other: initial consultation; dx lung cancer    Sources of Information:    [x]Patient  []Family  []Staff  []Medical Record    Advance Care Planning: No AMD, Conversation deferred. No flowsheet data found.     Mental Status:    [x]Alert  []Lethargic  []Unresponsive  Oriented to:  [x]Person  [x]Place  [x]Time  [x]Situation      Barriers to Learning:    []Language  []Developmental  []Cognitive  []Altered Mental Status  []Visual/Hearing Impairment  []Unable to Read/Write  []Motivational   [x]No Barriers Identified  []Other:    Relationship Status:  [x]Single  []  []Significant Other/Life Partner  []  []  [x]      Living Circumstances:  []Lives Alone  [x]Family/Significant Other in Household  []Roommates  []Children in the Home  []Paid Caregivers  []Assisted Living Facility/Group Home  []Skilled [address]  []Homeless  []Incarcerated  []Environmental/Care Concerns  []Other:    Support System:    []Strong  []Fair  []Limited    Financial/Legal Concerns:    []Uninsured  [x]Limited Income/Resources  []Non-Citizen  []No Concerns Identified  []Financial POA:    []Other:    Congregation/Spiritual/Existential:  []Strong Sense of Spirituality  []Involved in [employer]  []Request  Visit  []Expressing [place] Eth  [x]No Concerns Identified    Coping with Illness:         Patient: Family/Caregiver:   Understanding and Acceptance of Illness/Prognosis  [] []   Strong Sense of Resilience [] []   Self Reflection [] []   Engaged Support System [] []   Does not Readily Discuss Illness [] []   Denial of Terminal Status [] []   Anger [] []   Depression [] []   Anxiety/Fear [] []   Bargaining [] []   Recent Diagnosis/Prognosis [x] []   Difficulties with Body Image [] []   Loss of Identity [] []   Excessive Substance Use [] []   Mental Health History [] []   Enmeshed Relationships [] []   History of Loss [] []   Anticipatory Grief [] []   Concern for Complicated Grief [] []   Suicidal Ideation or Plan [] []   Unable to assess [] []                  Narrative: Pt here for initial consultation with Dr. Siddharth Davis. Met with pt to introduce social work role and supports available to pt. Visit was brief as pt verbalized a need to get home and he still has labs work today. Pt tells me he lives with his adult grandson who is disabled. Pt has a neighbor that is able to help with transportation occasionally. No other source of support identified. Pt has Medicare Complete insurance and rx coverage. He expressed concern with his ability to afford out-of-pocket cost of care. His tells me his income is limited. Reviewed information about the Care Card; pt provided with application to complete and bring back with required financial documentation. Will meet with pt when he RTC. Encouraged pt to reach out to me if needed and provided contact information. Pt voiced understanding. Referrals:     I. Transportation    Medicaid ([employer]) []   [facility] []                                    Regional organization  []                                      Financial Assistance/Medication Access    Patient assistance program (Care Card) [x]   Co-pay assistance  []                                    Leukemia [employer] Lymphoma Society []   [address]  []   [facility] [address] []   [facility]  []     Emotional support    Peer support group []   Local counseling []                                    Online support group []   Coordination of psychiatry consult []     Goals/Plan:   1. Pt provided Care Card application  2. This MSW will meet with pt when he RTC  3.  Ongoing psychosocial support as needed    -ESTELA Beauchamp

## 2017-12-06 NOTE — PROGRESS NOTES
[employer]  Financial Navigator Encounter    Patient Name: Sari Clemente    Narrative: Patient here today for a consultation with Dr. Laquita Gale. Met with patient to discuss the care card as he is self pay. Patient states that he has completed the care card application and submitted it about 3 months ago along with his supporting financial documents but has not heard any status updates as of yet. Patient states that he has two copies in case [address] needs one. Informed patient that I will follow up with the financial assistance team to make sure they received his application and supporting documents and check the status of it. Patient states that he wants to apply for Medicaid. Informed him that our , Darin Vásquez, will be meeting with him today and can discuss more about applying to Medicaid with him. Plan: Contact [facility] Fin. Assistance team to check the status of his care card application. Will provide update to patient when I get more information. Patient verbalized understanding.

## 2017-12-06 NOTE — TELEPHONE ENCOUNTER
[address] at [facility]  [phone]    CT results reviewed. Lung mass and adrenal mass which we had seen on his prior CT are noted, along with another indeterminate lung lesion, but nothing else. I will ask IR to get a biopsy of the adrenal mass, and follow up with him to review results. I called and discussed with the patient and he is agreeable.

## 2017-12-07 NOTE — TELEPHONE ENCOUNTER
[address] at [place] [address]  [phone]    12/07/17- Voicemail left for patient to return phone call. Need to follow up appointment with  one week after biopsy is scheduled.

## 2017-12-11 NOTE — TELEPHONE ENCOUNTER
Called pt and left a voicemail requesting a return call to schedule at follow up appointment with Dr. Jailyn Motley a week after 12/15 (biopsy). Awaiting pts return call.

## 2017-12-15 NOTE — H&P
Radiology History and Physical    Patient: Nate Giron 78 y.o. male       Chief Complaint: No chief complaint on file. History of Present Illness: adrenal met bx    History:    Past Medical History:   Diagnosis Date    HTN (hypertension) 10/21/2014    Rheumatoid arthritis(714.0) 10/21/2014    Skin cancer     Sun-damaged skin     Sunburn, blistering      No family history on file. Social History     Social History    Marital status: SINGLE     Spouse name: N/A    Number of children: N/A    Years of education: N/A     Occupational History    Not on file. Social History Main Topics    Smoking status: Current Every Day Smoker     Packs/day: 1.00    Smokeless tobacco: Never Used    Alcohol use No    Drug use: No    Sexual activity: No     Other Topics Concern    Not on file     Social History Narrative       Allergies: Allergies   Allergen Reactions    Pcn [Penicillins] Hives       Current Medications:  Current Outpatient Prescriptions   Medication Sig    levoFLOXacin (LEVAQUIN) 750 mg tablet Take 1 Tab by mouth daily.  albuterol-ipratropium (DUO-NEB) 2.5 mg-0.5 mg/3 ml nebu 3 mL by Nebulization route every four (4) hours as needed.  albuterol (PROVENTIL VENTOLIN) 2.5 mg /3 mL (0.083 %) nebulizer solution USE 2 VIALS VIAL NEBULIZER EVERY 4 HOURS AS NEEDED FOR WHEEZING     Current Facility-Administered Medications   Medication Dose Route Frequency    midazolam (VERSED) injection 5 mg  5 mg IntraVENous RAD PRN    fentaNYL citrate (PF) injection 100 mcg  100 mcg IntraVENous RAD PRN        Physical Exam:  Blood pressure 136/74, pulse 83, resp. rate 17, height 5' 10.5\" (1.791 m), weight 83.5 kg (184 lb), SpO2 100 %.   LUNG: clear to auscultation bilaterally, HEART: regular rate and rhythm      Alerts:    Hospital Problems  Date Reviewed: 11/29/2017    None          Laboratory:    No results for input(s): HGB, HCT, WBC, PLT, INR, BUN, CREA, K, CRCLT, HGBEXT, HCTEXT, PLTEXT in the last 72 hours.    No lab exists for component: PTT, PT, INREXT      Plan of Care/Planned Procedure:  Risks, benefits, and alternatives reviewed with patient and he agrees to proceed with the procedure.        Parth Carty MD

## 2017-12-15 NOTE — DISCHARGE INSTRUCTIONS
[facility] INSTRUCTIONS    General Instructions:     A biopsy is the removal of a small piece of tissue for microscopic examination or testing. Healthy tissue can be obtained for the purpose of tissue-type matching for transplants. Unhealthy tissues are more commonly biopsied to diagnose disease. Lung Biopsy:     A needle lung biopsy is performed when there is a mass discovered in the lung area. The most serious risk is infection, bleeding, and pneumothorax (a collapsed lung). Signs of pneumothorax include shortness of breath, rapid heart rate, and blueness of the skin. If any of these occur, call 911. Liver Biopsy: This test helps detect cancer, infections, and the cause of an enlargement of the liver or elevated liver enzymes. It also helps to diagnose a number of liver diseases. The pain associated with the procedure may be felt in the shoulder. The risks include internal bleeding, pneumothorax, and injury to the surrounding organs. Renal Biopsy: This procedure is sometimes done to evaluate a transplanted kidney. It is also used to evaluate unexplained decrease in kidney function, blood, or protein in the urine. You may see bright red blood in the urine the first 24 hours after the test. If the bleeding lasts longer, you need to call your doctor. There is a risk of infection and bleeding into the muscle, which may cause soreness. Spinal Biopsy: This test is sometimes done in conjunction with other procedures. Your back will be sore, as we are taking a small sample of bone, which is slightly more difficult to sample than tissue. General Biopsy:     A mass can grow in any area of the body, and we are taking a specimen as ordered by your doctor. The risks are the same. They include bleeding, pain, and infection. Home Care Instructions: You may resume your regular diet and medication regimen.  Do not drink alcohol, drive, or make any important legal decisions in the next 24 hours. Do not lift anything heavier than a gallon of milk until the soreness goes away. You may use over the counter acetaminophen or ibuprofen for the soreness. You may apply an ice pack to the affected area for 20-30 minutes at time for the first 24 hours. After that, you may apply a heat pack. Call If: You should call your Physician and/or the Radiology Nurse if you have any questions or concerns about the biopsy site. Call if you should have increased pain, fever, redness, drainage, or bleeding more than a small spot on the bandage. Follow-Up Instructions: Please see your ordering doctor as he/she has requested. To Reach Us:        Patient Signature:  Date: 12/15/2017  Discharging Nurse: Britney Pulido RN             Sedation for a Medical Procedure: Care Instructions  Your Care Instructions    For a minor procedure or surgery, you will get a sedative to help you relax. This drug will make you sleepy. It is usually given in a vein (by IV). A shot may also be used to numb the area. If you had local anesthesia, you may feel some pain and discomfort as it wears off. If you have pain, don't be afraid to say so. Pain medicine works better if you take it before the pain gets bad. Common side effects from sedation include:  · Feeling sleepy. (Your doctors and nurses will make sure you are not too sleepy to go home.)  · Nausea and vomiting. This usually does not last long. · Feeling tired. Follow-up care is a key part of your treatment and safety. Be sure to make and go to all appointments, and call your doctor if you are having problems. It's also a good idea to know your test results and keep a list of the medicines you take. How can you care for yourself at home? Activity  ? · Don't do anything for 24 hours that requires attention to detail.  It takes time for the medicine effects to completely wear off.   ? · For your safety, you should not drive or operate any machinery that could be dangerous until the medicine wears off and you can think clearly and react easily. ? · Rest when you feel tired. Getting enough sleep will help you recover. Diet  ? · You can eat your normal diet, unless your doctor gives you other instructions. If your stomach is upset, try clear liquids and bland, low-fat foods like plain toast or rice. ? · Drink plenty of fluids (unless your doctor tells you not to). ? · Don't drink alcohol for 24 hours. Medicines  ? · Be safe with medicines. Read and follow all instructions on the label. ¨ If the doctor gave you a prescription medicine for pain, take it as prescribed. ¨ If you are not taking a prescription pain medicine, ask your doctor if you can take an over-the-counter medicine. ? · If you think your pain medicine is making you sick to your stomach:  ¨ Take your medicine after meals (unless your doctor has told you not to). ¨ Ask your doctor for a different pain medicine. When should you call for help? Call 911 anytime you think you may need emergency care. For example, call if:  ? · You have severe trouble breathing. ? · You passed out (lost consciousness). ?Call your doctor now or seek immediate medical care if:  ? · You have trouble breathing. ? · You have ongoing or worsening nausea or vomiting. ? · You have a fever. ? · You have a new or worse headache. ? · The medicine is not wearing off and you can't think clearly. ? Watch closely for changes in your health, and be sure to contact your doctor if:  ? · You do not get better as expected. Where can you learn more? Go to http://kayla-dylan.info/. Enter T797 in the search box to learn more about \"Sedation for a Medical Procedure: Care Instructions. \"  Current as of: August 14, 2016  Content Version: 11.4  © 9605-1369 [employer].  Care instructions adapted under license by [employer] (which disclaims liability or warranty for this information). If you have questions about a medical condition or this instruction, always ask your healthcare professional. [place] [address] any warranty or liability for your use of this information.

## 2017-12-15 NOTE — IP AVS SNAPSHOT
[address] [id number] Patient: Terrance Johns MRN: [id number] WX About your hospitalization You were admitted on:  December 15, 2017 You last received care in the:  [facility] You were discharged on:  December 15, 2017 Why you were hospitalized Your primary diagnosis was:  Not on File Things You Need To Do (next 8 weeks) Friday Dec 22, 2017 ESTABLISHED PATIENT with Kenny Ernandez NP at  1:15 PM  
Where:  [facility] at [address]) Discharge Orders None A check maxi indicates which time of day the medication should be taken. My Medications ASK your physician about these medications Instructions Each Dose to Equal  
 Morning Noon Evening Bedtime  
 albuterol 2.5 mg /3 mL (0.083 %) nebulizer solution Commonly known as:  PROVENTIL VENTOLIN Your last dose was: Your next dose is:    
   
   
 USE 2 VIALS VIAL NEBULIZER EVERY 4 HOURS AS NEEDED FOR WHEEZING  
     
   
   
   
  
 albuterol-ipratropium 2.5 mg-0.5 mg/3 ml Nebu Commonly known as:  Keisha Galvan Your last dose was: Your next dose is:    
   
   
 3 mL by Nebulization route every four (4) hours as needed. 3 mL  
    
   
   
   
  
 levoFLOXacin 750 mg tablet Commonly known as:  Chelsi Dowling Your last dose was: Your next dose is: Take 1 Tab by mouth daily. 750 mg Discharge Instructions [address] General Instructions: A biopsy is the removal of a small piece of tissue for microscopic examination or testing. Healthy tissue can be obtained for the purpose of tissue-type matching for transplants. Unhealthy tissues are more commonly biopsied to diagnose disease. Lung Biopsy: A needle lung biopsy is performed when there is a mass discovered in the lung area. The most serious risk is infection, bleeding, and pneumothorax (a collapsed lung). Signs of pneumothorax include shortness of breath, rapid heart rate, and blueness of the skin. If any of these occur, call 911. Liver Biopsy: This test helps detect cancer, infections, and the cause of an enlargement of the liver or elevated liver enzymes. It also helps to diagnose a number of liver diseases. The pain associated with the procedure may be felt in the shoulder. The risks include internal bleeding, pneumothorax, and injury to the surrounding organs. Renal Biopsy: This procedure is sometimes done to evaluate a transplanted kidney. It is also used to evaluate unexplained decrease in kidney function, blood, or protein in the urine. You may see bright red blood in the urine the first 24 hours after the test. If the bleeding lasts longer, you need to call your doctor. There is a risk of infection and bleeding into the muscle, which may cause soreness. Spinal Biopsy: This test is sometimes done in conjunction with other procedures. Your back will be sore, as we are taking a small sample of bone, which is slightly more difficult to sample than tissue. General Biopsy: 
   A mass can grow in any area of the body, and we are taking a specimen as ordered by your doctor. The risks are the same. They include bleeding, pain, and infection. Home Care Instructions: You may resume your regular diet and medication regimen. Do not drink alcohol, drive, or make any important legal decisions in the next 24 hours. Do not lift anything heavier than a gallon of milk until the soreness goes away. You may use over the counter acetaminophen or ibuprofen for the soreness. You may apply an ice pack to the affected area for 20-30 minutes at time for the first 24 hours. After that, you may apply a heat pack. Call If: You should call your Physician and/or the Radiology Nurse if you have any questions or concerns about the biopsy site. Call if you should have increased pain, fever, redness, drainage, or bleeding more than a small spot on the bandage. Follow-Up Instructions: Please see your ordering doctor as he/she has requested. To Reach Us:   
 
 
Patient Signature: 
Date: 12/15/2017 Discharging Nurse: Ambreen Rivera RN Sedation for a Medical Procedure: Care Instructions Your Care Instructions For a minor procedure or surgery, you will get a sedative to help you relax. This drug will make you sleepy. It is usually given in a vein (by IV). A shot may also be used to numb the area. If you had local anesthesia, you may feel some pain and discomfort as it wears off. If you have pain, don't be afraid to say so. Pain medicine works better if you take it before the pain gets bad. Common side effects from sedation include: · Feeling sleepy. (Your doctors and nurses will make sure you are not too sleepy to go home.) · Nausea and vomiting. This usually does not last long. · Feeling tired. Follow-up care is a key part of your treatment and safety. Be sure to make and go to all appointments, and call your doctor if you are having problems. It's also a good idea to know your test results and keep a list of the medicines you take. How can you care for yourself at home? Activity ? · Don't do anything for 24 hours that requires attention to detail. It takes time for the medicine effects to completely wear off.  
? · For your safety, you should not drive or operate any machinery that could be dangerous until the medicine wears off and you can think clearly and react easily. ? · Rest when you feel tired. Getting enough sleep will help you recover. Diet ? · You can eat your normal diet, unless your doctor gives you other instructions.  If your stomach is upset, try clear liquids and bland, low-fat foods like plain toast or rice. ? · Drink plenty of fluids (unless your doctor tells you not to). ? · Don't drink alcohol for 24 hours. Medicines ? · Be safe with medicines. Read and follow all instructions on the label. ¨ If the doctor gave you a prescription medicine for pain, take it as prescribed. ¨ If you are not taking a prescription pain medicine, ask your doctor if you can take an over-the-counter medicine. ? · If you think your pain medicine is making you sick to your stomach: 
¨ Take your medicine after meals (unless your doctor has told you not to). ¨ Ask your doctor for a different pain medicine. When should you call for help? Call 911 anytime you think you may need emergency care. For example, call if: 
? · You have severe trouble breathing. ? · You passed out (lost consciousness). ?Call your doctor now or seek immediate medical care if: 
? · You have trouble breathing. ? · You have ongoing or worsening nausea or vomiting. ? · You have a fever. ? · You have a new or worse headache. ? · The medicine is not wearing off and you can't think clearly. ? Watch closely for changes in your health, and be sure to contact your doctor if: 
? · You do not get better as expected. Where can you learn more? Go to http://kayla-dylan.info/. Enter P426 in the search box to learn more about \"Sedation for a Medical Procedure: Care Instructions. \" Current as of: August 14, 2016 Content Version: 11.4 © 5803-7629 [employer]. Care instructions adapted under license by [employer] (which disclaims liability or warranty for this information). If you have questions about a medical condition or this instruction, always ask your healthcare professional. [address] [address] any warranty or liability for your use of this information. Introducing hospitals & [facility] SERVICES! [facility] introduces [facility] patient portal. Now you can access parts of your medical record, email your doctor's office, and request medication refills online. 1. In your internet browser, go to [email or web address] 2. Click on the First Time User? Click Here link in the Sign In box. You will see the New Member Sign Up page. 3. Enter your [facility] Access Code exactly as it appears below. You will not need to use this code after youve completed the sign-up process. If you do not sign up before the expiration date, you must request a new code. · [facility] Access Code: [email or web address] Expires: 2/19/2018  2:18 PM 
 
4. Enter the last four digits of your Social Security Number (xxxx) and Date of Birth (mm/dd/yyyy) as indicated and click Submit. You will be taken to the next sign-up page. 5. Create a [facility]lan ID. This will be your [facility] login ID and cannot be changed, so think of one that is secure and easy to remember. 6. Create a [facility]lan password. You can change your password at any time. 7. Enter your Password Reset Question and Answer. This can be used at a later time if you forget your password. 8. Enter your e-mail address. You will receive e-mail notification when new information is available in [address] 9. Click Sign Up. You can now view and download portions of your medical record. 10. Click the Download Summary menu link to download a portable copy of your medical information. If you have questions, please visit the Frequently Asked Questions section of the [employer] website. Remember, [facility] is NOT to be used for urgent needs. For medical emergencies, dial 911. Now available from your iPhone and Android! Unresulted Labs-Please follow up with your PCP about these lab tests Order Current Status CT BX GUIDED NDL In process XR CHEST PORT In process Providers Seen During Your Hospitalization Provider Specialty Primary office phone Will Wu MD Hematology and Oncology [phone] Your Primary Care Physician (PCP) Primary Care Physician Office Phone Office Fax 0090 [address] Kruger [phone] You are allergic to the following Allergen Reactions Pcn (Penicillins) Hives Recent Documentation Height Weight BMI Smoking Status 1.791 m 83.5 kg 26.03 kg/m2 Current Every Day Smoker Emergency Contacts Name Discharge Info Relation Home Work Mobile Lucius Jackson DISCHARGE CAREGIVER [3] Other Relative [6] [phone] Patient Belongings The following personal items are in your possession at time of discharge: 
                             
 
  
  
 Please provide this summary of care documentation to your next provider. Signatures-by signing, you are acknowledging that this After Visit Summary has been reviewed with you and you have received a copy. Patient Signature:  ____________________________________________________________ Date:  ____________________________________________________________  
  
Rohan Melvina Provider Signature:  ____________________________________________________________ Date:  ____________________________________________________________

## 2017-12-15 NOTE — PROGRESS NOTES
0830 Patient arrived for CT adrenal mass biopsy. NPO and ride verified. Arrives ambulatory and denies pain. S1S2, Lungs CTA, baseline V.S.'s obtained and entered. I.V. Started and medications ordered and obtained. 9624 Dr. Diana Reilly over to consent patient with review of risks and benefits. 0930 Notified CT of readiness. 5020 Patient taken to CT and placed prone on the table and connected to monitor. Time out 1010 Start time 1010 End time 1032 . Pateint given 2 mg Versed and Fentanyl 50 mcg for sedation. Patient tolerated well. C/D/I dressing to left posterior lower back. 1040 Back to [facility] for recovery. Connected to monitor and fluids provided. Alert and denies pain. Lunch ordered. 2 hour post chest x-ray ordered per Dr. Diana Reilly. [address] arrived and 50 % consumed. Complains of poor appetite. 1155 Discharge instructions reviewed with verbalization of understanding. Paper copy given. Chest x-ray set for 1230.  1230 Portable chest x-ray taken. 1808 [facility] read per Dr. Diana Reilly patient may be discharged. VSS.   1300 I.V. Removed and patient dressed for discharge to home with friend Tiny Farris. Discharge instructions signed as received in computer.   1308 Patient taken via wheelchair to awaiting ride at [facility]

## 2017-12-20 NOTE — IP AVS SNAPSHOT
[address] [place] [address] 104 1007 [facility] 
[phone] Patient: Lloyd Shah MRN: [id number] KKK:[id number] My Medications TAKE these medications as instructed Instructions Each Dose to Equal  
 Morning Noon Evening Bedtime  
 acetaminophen 325 mg tablet Commonly known as:  TYLENOL Your next dose is: Today 12/23/17 at 5pm  
   
 Take 1 Tab by mouth every six (6) hours for 28 days. 325 mg  
    
   
   
   
  
 albuterol 2.5 mg /3 mL (0.083 %) nebulizer solution Commonly known as:  PROVENTIL VENTOLIN Your next dose is: Take as needed USE 2 VIALS VIAL NEBULIZER EVERY 4 HOURS AS NEEDED FOR WHEEZING  
     
   
   
   
  
 albuterol-ipratropium 2.5 mg-0.5 mg/3 ml Nebu Commonly known as:  Pia Mey Your last dose was: Today 12/23/17, 4pm  
Your next dose is: Take as needed 3 mL by Nebulization route every four (4) hours as needed. 3 mL  
    
   
   
   
  
 OTHER(NON-FORMULARY) Your next dose is: Take as needed Natural Colon Cleanse four pills by mouth twice daily as needed for constipation. oxyCODONE IR 5 mg immediate release tablet Commonly known as:  Claribel Fanjalen Your next dose is: Take as needed Take 1-2 Tabs by mouth every four (4) hours as needed. Max Daily Amount: 60 mg.  
 5-10 mg  
    
   
   
   
  
 tiotropium-olodaterol 2.5-2.5 mcg/actuation Mist  
Commonly known as:  STIOLTO RESPIMAT Your next dose is: Take as directed Take 2 Puffs by inhalation daily. 2 Puff Where to Get Your Medications Information on where to get these meds will be given to you by the nurse or doctor. !  Ask your nurse or doctor about these medications  
  acetaminophen 325 mg tablet  
 oxyCODONE IR 5 mg immediate release tablet  
 tiotropium-olodaterol 2.5-2.5 mcg/actuation Mist

## 2017-12-20 NOTE — IP AVS SNAPSHOT
[address] [facility] 
 
 
 [address] [facility] [address] 100[address] [facility] 
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), Htn (Hypertension), Copd (Chronic Obstructive Pulmonary Disease) (Hcc), Rheumatoid Arthritis Involving Multiple Sites (Hcc) Things You Need To Do (next 8 weeks) Schedule an appointment with Avery Hernández MD as soon as possible for a visit today To discuss your cancer therapy Phone:  [phone] Where:  One [facility], [address] Follow up with [address] [place] [address] Phone:  [phone] Where:  [address] 532 [facility], [address] [address] Schedule an appointment with Pancho Willoughby MD as soon as possible for a visit today To discuss COPD and for outpatient lung function tests Phone:  [phone] Where:  [address] [facility], [address] [address],[address], [place] [address] Schedule an appointment with Cecelia May MD as soon as possible for a visit in 4 week(s) Call [phone] extension [phone] Phone:  [phone] Where:  [facility], 61 [facility], [address] [place] [address] Follow up with Ranjana Shirley MD  
  
Phone:  [phone] Where:  [address], [address] [address] Discharge Orders None A check maxi indicates which time of day the medication should be taken. My Medications TAKE these medications as instructed Instructions Each Dose to Equal  
 Morning Noon Evening Bedtime acetaminophen 325 mg tablet Commonly known as:  TYLENOL Your next dose is: Today 12/23/17 at 5pm  
   
 Take 1 Tab by mouth every six (6) hours for 28 days. 325 mg  
    
   
   
   
  
 albuterol 2.5 mg /3 mL (0.083 %) nebulizer solution Commonly known as:  PROVENTIL VENTOLIN Your next dose is: Take as needed USE 2 VIALS VIAL NEBULIZER EVERY 4 HOURS AS NEEDED FOR WHEEZING  
     
   
   
   
  
 albuterol-ipratropium 2.5 mg-0.5 mg/3 ml Nebu Commonly known as:  Kory Oliveira Your last dose was: Today 12/23/17, 4pm  
Your next dose is: Take as needed 3 mL by Nebulization route every four (4) hours as needed. 3 mL  
    
   
   
   
  
 OTHER(NON-FORMULARY) Your next dose is: Take as needed Natural Colon Cleanse four pills by mouth twice daily as needed for constipation. oxyCODONE IR 5 mg immediate release tablet Commonly known as:  Danne Copper Your next dose is: Take as needed Take 1-2 Tabs by mouth every four (4) hours as needed. Max Daily Amount: 60 mg.  
 5-10 mg  
    
   
   
   
  
 tiotropium-olodaterol 2.5-2.5 mcg/actuation Mist  
Commonly known as:  STIOLTO RESPIMAT Your next dose is: Take as directed Take 2 Puffs by inhalation daily. 2 Puff Where to Get Your Medications Information on where to get these meds will be given to you by the nurse or doctor. ! Ask your nurse or doctor about these medications  
  acetaminophen 325 mg tablet  
 oxyCODONE IR 5 mg immediate release tablet  
 tiotropium-olodaterol 2.5-2.5 mcg/actuation Mist  
  
  
  
  
Discharge Instructions After Hospital Care Plan:  Discharge Instructions Anterior Approach Partial Hip Replacement-Dr. Heavenly Romero Patient Lazarus Mayor Date of procedure:12/21/2017 Procedure:Procedure(s): HIP HEMIARTHROPLASTY, RIGHT; ANTERIOR APPROACH Surgeon:Surgeon(s) and Role: Candida Alcantara MD - Primary PCP: Gabi Suero MD 
Date of discharge: No discharge date for patient encounter. Follow up appointments 
-follow up with Dr. Matilde Pulido in 4 weeks. Call [phone] extension [phone] to make an appointment. [facility] Agency: _______________________   phone: _____________________ The agency will contact you to arrange dates/times for visits. Please call them if you do not hear from them within 24 hours after you are discharged When to call your Orthopaedic Surgeon: 
-Signs of hip dislocation-increased hip pain, unrelieved pain or if you have difficulty or are unable to walk 
-Signs of infection-if your incision is red; continues to have drainage; drainage has a foul odor or if you have a persistent fever over 101 degrees 
-Signs of a blood clot in your leg-calf pain, tenderness, redness, swelling of lower leg When to call your Primary Care Physician: 
-Concerns about medical conditions such as diabetes, high blood pressure, asthma, congestive heart failure 
-Call if blood sugars are elevated, persistent headache or dizziness, coughing or congestion, constipation or diarrhea, burning with urination, abnormal heart rate When to call [id number] go to the nearest emergency room 
-acute onset of chest pain, shortness of breath, difficulty breathing Activity 
- weight bearing as tolerated with walker or crutches. Refer to pages 26-36 of your handbook for instructions and pictures 
-20 repetitions of each exercise 3 times each day as instructed by the physical therapist.  Refer to pages 38-45 of our handbook for instructions and pictures 
-get up every one hour and walk (except at night when sleeping) -Avoid extreme movement of hip to prevent dislocation 
-Do not drive or operate heavy machinery. Incision Care 
-the Aquacel (brown, waterproof) surgical dressing is to remain on your hip for 7 days.  On the 7th day have someone gently peel the dressing off by carefully lifting the edge and stretching it slightly to break the adhesive seal and leave incision open to air. You may take a shower with the Aquacel dressing in place. Preventing blood clots - Take aspirin 325 mg twice daily to prevent blood clots. Pain management - Please take scheduled 650 mg tylenol every 6 hours for 4 weeks - For breakthrough please take 5-10 mg oxycodone - Avoid alcoholic beverages while taking pain medication - Do not take any over-the-counter medication for pain except Tylenol (acetaminophen) - Please be aware that many medications contain Tylenol. We do not want you to over medicate so please read the information below as a guide. Do not take more than 4 Grams of Tylenol in a 24 hour - You may place an ice bag on your knee for 15-20 minutes after exercising and as needed throughout the day and night Diet 
-resume usual diet; drink plenty of fluids; eat foods high in fiber 
-Please take a stool softener (such as Senokot-S or Colace) to prevent constipation while you are takingoxycodone. If constipation occurs, take a laxative (such as Dulcolax tablets, Milk of Magnesia, or a suppository). Home Health Care or Rehab Protocol (to be followed by [address]) Physical Therapy-per physicians order Weight bearing status: 
Precautions at Admission: Fall, WBAT Mobility Status: 
Supine to Sit: Supervision, Additional time Sit to Stand: Contact guard assistance, Supervision Sit to Supine: Supervision, Additional time Bed to Chair: Stand-by asssistance, Additional time (using RW) Gait: 
Distance (ft): 200 Feet (ft) Ambulation - Level of Assistance: Supervision, Contact guard assistance Assistive Device: Gait belt, Walker, rolling Gait Abnormalities: Step to gait, Altered arm swing ADL status overall composite: Toilet Transfer : Stand-by asssistance, Additional time (RW) Physical Therapy -assessment and evaluation-bed mobility; functional transfers (bed, chair, bathroom, stairs); ambulation with equipment, car transfers, safety and ability to get out of house in the event of an emergency 
-review and maintain weight bearing as tolerated; avoid extreme movement of hip to prevent hip dislocation 
-discuss pain management 
-review how to do ADLs. Refer to pages 46-50 of handbook 
 
-Home Exercise Program-refer to pages 38-45 of handbook 
-supine exercises-quad sets, hamstring sets, gluteal sets, hip abduction/adduction slides, hip external rotation, heel slides (flexing the knee) -sitting exercises-ankle pumps, bicep curls (use weights as appropriate), triceps pushups, shoulder flexion (use weights as appropriate) -standing exercises holding onto supportive counter-toe raises, heel raises, mini-squats, heel/toe touches with knee bend, marching in place, hip abduction/adduction, hip external rotation, hip extension, hip abduction/extension/external rotation (concentration on gluteus jones), heel toe taps Physical therapy goals by discharge from [address] [place] [address] Drive ambulation with walker, crutches or cane if needed (level surfaces and   stairs) 
-Daily performance of home exercise program 
-Independent with stair climbing and car transfers 
-Progress to community ambulator (least restrictive assistive device) HOME DISCHARGE INSTRUCTIONS Sarah Guerra / [id number] : 1940 Admission date: 2017 Discharge date: 2017 Please bring this form with you to show your care provider at your follow-up appointment. Primary care provider:  Jhonny Moore MD 
 
Discharging provider:  Fernando Jarquin MD  - Family Medicine Resident Dr. Dieudonne Han - Attending, Family Medicine You have been admitted to the hospital with the following diagnoses: 
 
ACUTE DIAGNOSES: 
HIP FRACTURE, RIGHT Femur fracture, right ([facility] [address] . . . . . . . . . . . . . . . . . . . . . . . . . . . . . . . . . . . . . . . . . . . . . . . . . . . . . . . . . . . . . . . . . . . . . . . Amanda King FOLLOW-UP CARE RECOMMENDATIONS: 
 
Appointments Follow-up Information Follow up With Details Comments Contact Info Avery Hernández MD Schedule an appointment as soon as possible for a visit To discuss your cancer therapy [address] [facility] [address] 100[address] [facility] 
[phone] 201 [facility] [address] 
1st Floor [facility] [address] 
[phone] Pancho Willoughby MD Schedule an appointment as soon as possible for a visit To discuss COPD and for outpatient lung function tests 1000 [facility] [address] [place] [address] 
[phone] Cecelia May MD Schedule an appointment as soon as possible for a visit in 4 weeks Call [phone] extension 10 [facility] [address] 1007 [facility] 
[phone] Ranjana Shirley MD   [address]  [address] [address] [phone] Please follow up with your PCP regardin. Lung cancer 2. Your Advanced Medical Directive paperwork - please complete these as soon as possible so your wishes can be followed in the event that you are unable to make your own decisions 3. Make sure your pain after your surgery is under control MEDICATION CHANGES: 
1. Stiolto - 2 puffs every day (at the same time). Per lung doctor recommendations. 2. Take Aspirin 325mg twice daily to prevent blood clots 3. Take tylenol 650mg tylenol every 6 hours for 4 weeks for pain, and for any breakthrough pain take roxicodone 1-2 tablets every 4hours as needed. Follow-up tests needed: Bone scan (per Dr. Raysa Escobar) for staging of your cancer Pending test results: At the time of your discharge the following test results are still pending: none. Please make sure you review these results with your outpatient follow-up provider(s). Specific symptoms to watch for: chest pain, shortness of breath, fever, chills, nausea, vomiting, diarrhea, change in mentation, falling, weakness, bleeding. DIET/what to eat:  Regular Diet ACTIVITY:  Activity as tolerated Wound care: None Equipment needed:  None What to do if new or unexpected symptoms occur? If you experience any of the above symptoms (or should other concerns or questions arise after discharge) please call your primary care physician. Return to the emergency room if you cannot get hold of your doctor. · It is very important that you keep your follow-up appointment(s). · Please bring discharge papers, medication list (and/or medication bottles) to your follow-up appointments for review by your outpatient provider(s). · Please check the list of medications and be sure it includes every medication (even non-prescription medications) that your provider wants you to take. · It is important that you take the medication exactly as they are prescribed. · Keep your medication in the bottles provided by the pharmacist and keep a list of the medication names, dosages, and times to be taken in your wallet. · Do not take other medications without consulting your doctor. · If you have any questions about your medications or other instructions, please talk to your nurse or care provider before you leave the hospital.  
 
Information obtained by:  
 
I understand that if any problems occur once I am at home I am to contact my physician. These instructions were explained to me and I had the opportunity to ask questions. I understand and acknowledge receipt of the instructions indicated above. Physician's or R.N.'s Signature                                                                  Date/Time Patient or Representative Signature                                                          Date/Time Introducing [facility] & HE[facility]! [employer] introduces [employer] patient portal. Now you can access parts of your medical record, email your doctor's office, and request medication refills online. 1. In your internet browser, go to [email or web address] 2. Click on the First Time User? Click Here link in the Sign In box. You will see the New Member Sign Up page. 3. Enter your [employer] Access Code exactly as it appears below. You will not need to use this code after youve completed the sign-up process. If you do not sign up before the expiration date, you must request a new code. · [employer] Access Code: [email or web address] Expires: 2/19/2018  2:18 PM 
 
4. Enter the last four digits of your Social Security Number (xxxx) and Date of Birth (mm/dd/yyyy) as indicated and click Submit. You will be taken to the next sign-up page. 5. Create a [employer] ID. This will be your [employer] login ID and cannot be changed, so think of one that is secure and easy to remember. 6. Create a [employer] password. You can change your password at any time. 7. Enter your Password Reset Question and Answer. This can be used at a later time if you forget your password. 8. Enter your e-mail address. You will receive e-mail notification when new information is available in [address] 9. Click Sign Up. You can now view and download portions of your medical record. 10. Click the Download Summary menu link to download a portable copy of your medical information. If you have questions, please visit the Frequently Asked Questions section of the [employer] website. Remember, [employer] is NOT to be used for urgent needs. For medical emergencies, dial 911. Now available from your iPhone and Android! Last Palliative Care Discharge Note   
 12/22/17 1401  Version 1 of 1 The Palliative Medicine team was consulted as part of your / your loved one's care in the hospital. Our team is a supportive service; we strive to relieve suffering and improve quality of life. You identified the following goal(s) as your main focus for healthcare: Patient/Health Care Proxy Stated Goals: Prolong life Recover from surgery and return home. Pursue chemotherapy for NSCLC. Outpatient follow-up with Palliative Medicine team. Consider completing AMD/POST in near future. We reviewed advance care planning information, which includes the following: 
Advance Care Planning 12/22/2017 Patient's Healthcare Decision Maker is: Legal Next of Kin Primary Decision Maker Relationship to Patient Adult child Confirm Advance Directive None Patient Would Like to Complete Advance Directive Yes We reviewed / discussed your code status as: Full Code Full Code means perform CPR in the event of cardiac arrest 
   [facility] means do NOT perform CPR in the event of cardiac arrest 
   Partial Code means you have specific preferences, please discuss with your health care team 
   No Order means this issue was not addressed / resolved during your stay Because of the importance of this information, we are providing you with a printed copy to share with other healthcare providers after this hospitalization is complete. If any of the above information is incomplete or incorrect, please contact the Palliative Medicine team at [phone]. Providers Seen During Your Hospitalization Provider Specialty Primary office phone Maite Horn MD Emergency Medicine [phone] Efren Pedersen MD [facility] [phone] Jodie Aguilar MD [facility] [phone] Your Primary Care Physician (PCP) Primary Care Physician Office Phone Office Fax 0477 Kortney Patel Button [phone] You are allergic to the following Allergen Reactions Pcn (Penicillins) Hives Tolerated ancef graded challenge 12/21/2017 with no adverse side effects noted Recent Documentation Height Weight BMI Smoking Status 1.778 m 83.5 kg 26.4 kg/m2 Current Every Day Smoker Emergency Contacts Name Discharge Info Relation Home Work Mobile Lucius Jackson DISCHARGE CAREGIVER [3] Other Relative [6] [phone] Patient Belongings The following personal items are in your possession at time of discharge: 
  Dental Appliances: Uppers, Lowers  Visual Aid: Glasses Please provide this summary of care documentation to your next provider. Signatures-by signing, you are acknowledging that this After Visit Summary has been reviewed with you and you have received a copy. Patient Signature:  ____________________________________________________________ Date:  ____________________________________________________________  
  
Josph Perfect Provider Signature:  ____________________________________________________________ Date:  ____________________________________________________________

## 2017-12-21 PROBLEM — C79.72 MALIGNANT NEOPLASM METASTATIC TO LEFT ADRENAL GLAND (HCC): Status: ACTIVE | Noted: 2017-01-01

## 2017-12-21 PROBLEM — S72.91XA FEMUR FRACTURE, RIGHT (HCC): Status: ACTIVE | Noted: 2017-01-01

## 2017-12-21 PROBLEM — C34.92 NON-SMALL CELL CANCER OF LEFT LUNG (HCC): Status: ACTIVE | Noted: 2017-01-01

## 2017-12-21 NOTE — PERIOP NOTES
TRANSFER - OUT REPORT:    Verbal report given to LAURA Hernandez on Marie Pepper  being transferred to [phone] for routine post - op       Report consisted of patients Situation, Background, Assessment and   Recommendations(SBAR). Information from the following report(s) SBAR and MAR was reviewed with the receiving nurse. Opportunity for questions and clarification was provided.       Patient transported with:   Registered Nurse

## 2017-12-21 NOTE — ANESTHESIA POSTPROCEDURE EVALUATION
Post-Anesthesia Evaluation and Assessment    Patient: Emmy Ventura MRN: [id number]  SSN: [id number]    YOB: 1940  Age: 78 y.o. Sex: male       Cardiovascular Function/Vital Signs  Visit Vitals    /75    Pulse 88    Temp 36.9 °C (98.4 °F)    Resp 26    Ht 5' 10\" (1.778 m)    Wt 83.5 kg (184 lb)    SpO2 100%    BMI 26.4 kg/m2       Patient is status post spinal, MAC anesthesia for Procedure(s):  HIP HEMIARTHROPLASTY, RIGHT; ANTERIOR APPROACH. Nausea/Vomiting: None    Postoperative hydration reviewed and adequate. Pain:  Pain Scale 1: Numeric (0 - 10) (12/21/17 1433)  Pain Intensity 1: 0 (12/21/17 1433)   Managed    Neurological Status:   Neuro (WDL): Exceptions to WDL (12/21/17 1433)  Neuro  Neurologic State: Drowsy (12/21/17 1433)  Orientation Level: Oriented to person;Oriented to place;Oriented to situation (12/21/17 1433)  Cognition: Follows commands (12/21/17 1433)  Speech: Clear (12/21/17 1433)  LUE Motor Response: Purposeful (12/21/17 1433)  LLE Motor Response: Pharmacologically paralyzed (12/21/17 1433)  RUE Motor Response: Purposeful (12/21/17 1433)  RLE Motor Response: Pharmacologically paralyzed (12/21/17 1433)   At baseline    Mental Status and Level of Consciousness: Arousable    Pulmonary Status:   O2 Device: Nasal cannula (12/21/17 1433)   Adequate oxygenation and airway patent    Complications related to anesthesia: None    Post-anesthesia assessment completed.  No concerns    Signed By: Yinka Sandra MD     December 21, 2017

## 2017-12-21 NOTE — ED NOTES
TRANSFER - OUT REPORT:    Verbal report given to Bekah Senior RN (name) on Young Pipes  being transferred to ortho/surgical (unit) for routine progression of care       Report consisted of patients Situation, Background, Assessment and   Recommendations(SBAR). Information from the following report(s) SBAR, ED Summary, [facility] and Recent Results was reviewed with the receiving nurse. Lines:   Peripheral IV 12/20/17 Left Hand (Active)   Site Assessment Clean, dry, & intact 12/20/2017 11:02 PM   Phlebitis Assessment 0 12/20/2017 11:02 PM   Infiltration Assessment 0 12/20/2017 11:02 PM   Dressing Status Clean, dry, & intact 12/20/2017 11:02 PM   Hub Color/Line Status Pink 12/20/2017 11:02 PM        Opportunity for questions and clarification was provided.       Patient transported with:   [email or web address]

## 2017-12-21 NOTE — PROGRESS NOTES
Transported off unit to pre-op.  Upper denture plate removed and placed in denture cup and personal belonging bag which remains in room 402

## 2017-12-21 NOTE — PROGRESS NOTES
Problem: Falls - Risk of  Goal: *Absence of Falls  Document Ginger Fall Risk and appropriate interventions in the flowsheet.    Outcome: Progressing Towards Goal  Fall Risk Interventions:  Mobility Interventions: Bed/chair exit alarm         Medication Interventions: Bed/chair exit alarm    Elimination Interventions: Call light in reach, Bed/chair exit alarm, Patient to call for help with toileting needs    History of Falls Interventions: Bed/chair exit alarm, Investigate reason for fall     No falls during admission        Problem: Hip Fracture:Day of Admission Pre-op  Goal: Nutrition/Diet  Outcome: Progressing Towards Goal  NPO

## 2017-12-21 NOTE — CONSULTS
ORTHOPEDIC CONSULT    Subjective:     Date of Consultation:  December 20, 2017    Referring Physician:  Dr. Shwetha Freeman is a 78 y.o. male who was brought to [facility] via EMS tonight after sustaining a ground level fall at home tonight. He was in the kitchen with his grandson who left the  door down making him trip over it. He fell and landed onto his right side. He had immediate pain and deformity of his right hip. He was unable to ambulate following the fall. He states that he is being worked up by pulmonary for possible lung ca and had a biopsy done for a ?liver mass per the patient. He lives at home with his grandson. Patient Active Problem List    Diagnosis Date Noted    Advance care planning 02/09/2016    HTN (hypertension) 10/21/2014    Rheumatoid arthritis(714.0) 10/21/2014    COPD (chronic obstructive pulmonary disease) ([facility] [address] 10/21/2014    Basal cell cancer 10/21/2014     History reviewed. No pertinent family history. Social History   Substance Use Topics    Smoking status: Current Every Day Smoker     Packs/day: 1.00    Smokeless tobacco: Never Used    Alcohol use No     Past Medical History:   Diagnosis Date    HTN (hypertension) 10/21/2014    Rheumatoid arthritis(714.0) 10/21/2014    Skin cancer     Sun-damaged skin     Sunburn, blistering       History reviewed. No pertinent surgical history. Prior to Admission medications    Medication Sig Start Date End Date Taking? Authorizing Provider   levoFLOXacin (LEVAQUIN) 750 mg tablet Take 1 Tab by mouth daily. 11/29/17  Yes Donnie Phillips MD   albuterol-ipratropium (DUO-NEB) 2.5 mg-0.5 mg/3 ml nebu 3 mL by Nebulization route every four (4) hours as needed.  11/13/17  Yes Ramonita Zimmerman MD   albuterol (PROVENTIL VENTOLIN) 2.5 mg /3 mL (0.083 %) nebulizer solution USE 2 VIALS VIAL NEBULIZER EVERY 4 HOURS AS NEEDED FOR WHEEZING 8/7/17  Yes Ramonita Zimmerman MD     No current facility-administered medications for this encounter. Current Outpatient Prescriptions   Medication Sig    levoFLOXacin (LEVAQUIN) 750 mg tablet Take 1 Tab by mouth daily.  albuterol-ipratropium (DUO-NEB) 2.5 mg-0.5 mg/3 ml nebu 3 mL by Nebulization route every four (4) hours as needed.  albuterol (PROVENTIL VENTOLIN) 2.5 mg /3 mL (0.083 %) nebulizer solution USE 2 VIALS VIAL NEBULIZER EVERY 4 HOURS AS NEEDED FOR WHEEZING     Allergies   Allergen Reactions    Pcn [Penicillins] Hives        Review of Systems:  Pertinent items are noted in HPI. Objective:     Patient Vitals for the past 8 hrs:   BP Temp Pulse Resp SpO2 Height Weight   17 2056 104/67 98.7 °F (37.1 °C) 91 16 96 % 5' 10\" (1.778 m) 83.5 kg (184 lb)     Temp (24hrs), Av.7 °F (37.1 °C), Min:98.7 °F (37.1 °C), Max:98.7 °F (37.1 °C)        EXAM: GEN: Well developed male  PSYCH: AAO x 3  MUSC: Right leg is externally rotated and slightly shortened. There is pain to palpation of the right hip. There is no ecchymosis, lesions, or abrasions about the right hip. He is NVI distally. IMAGING:  COMPARISON: None.     FINDINGS: An AP view of the pelvis, a frogleg lateral view of the right hip ,  and frontal and lateral radiographs of the right femur demonstrate a superiorly  displaced, rotated, and angulated right femoral neck fracture.     IMPRESSION  IMPRESSION:  Displaced, rotated, angulated right femoral neck fracture. Data Review No results found for this or any previous visit (from the past 24 hour(s)). Assessment/Plan:     A: displaced right femoral neck fracture    P: 1. Admit to family practice. Appreciate their admission and surgical clearance workup  2. NWB Rt LE. 3. Plan for OR tomorrow at 11:30 with Dr. Archie Myers for right hip hemiarthroplasty if cleared by family practice. 4. Ortho will follow. Discussed case with Dr. Archie Myers who agrees with plan.     Hardy Zepeda, [address]   Orthopaedic Surgery [facility]UD[facility] [address]    Patient seen and examined. Agree with above. Briefly, 79 yo male s/p GLF resulting a left femoral neck fracture. Currently undergoing workup for possible lung Ca.  No obvious lesions of right hip.  -Plan for right hip hemiarthroplasty  - Risks discussed with patient and family  - Jesse, Alejandro Jimenez for [phone] today

## 2017-12-21 NOTE — PROGRESS NOTES
[address]  [phone]  Fax [phone]    Patient Name: Erin Orosco  YOB: 1940    Outpatient NN notified by inpatient care manager of patient's admission for right hip fracture on 12/20/17 to [address] [place] [address]. Plans for right hip hemiarthroplasty. NN will monitor chart for discharge plans.

## 2017-12-21 NOTE — PROGRESS NOTES
[facility]: MED RECONCILIATION    Comments/Recommendations:    Patient is awake, alert, and knowledgeable about home medications    Verifies allergies   Preferred pharmacy entered into EMR   The patient finds the albuterol-ipratropium more helpful for his breathing and he uses about three to five times per day. Medications added:     · Natural Colon Cleanse    Medications removed:    · Levofloxacin 7 day course started 11/29/17 and completed    Medications adjusted:    · None    Allergies: Pcn [penicillins]    Prior to Admission Medications:   Prior to Admission Medications   Prescriptions Last Dose Informant Patient Reported? Taking? OTHER,NON-FORMULARY, 12/20/2017 at am  Yes Yes   Sig: Natural Colon Cleanse four pills by mouth twice daily as needed for constipation. albuterol (PROVENTIL VENTOLIN) 2.5 mg /3 mL (0.083 %) nebulizer solution 12/20/2017 at Unknown time  No Yes   Sig: USE 2 VIALS VIAL NEBULIZER EVERY 4 HOURS AS NEEDED FOR WHEEZING   albuterol-ipratropium (DUO-NEB) 2.5 mg-0.5 mg/3 ml nebu 12/20/2017 at Unknown time  No Yes   Sig: 3 mL by Nebulization route every four (4) hours as needed.       Facility-Administered Medications: None      Thank you,    Terri Gerber, PharmD, BCPS

## 2017-12-21 NOTE — PROGRESS NOTES
[facility]  Family Medicine Residency  Post-operative note    S: Patient states he is doing well, but has no feeling in his legs. Is receiving breathing treatment at this time, feels his breathing is much better and he \"did not know I was having trouble breathing until I started getting the treatment. \" Denies any current SOB or CP, denies any N/V, dizziness, HA, lightheadedness. Is laughing and joking with visitor in room. No questions or complaints at this time. O:  Visit Vitals    /63 (BP 1 Location: Right arm, BP Patient Position: At rest)    Pulse 84    Temp 97.9 °F (36.6 °C)    Resp 18    Ht 5' 10\" (1.778 m)    Wt 184 lb (83.5 kg)    SpO2 92%    BMI 26.4 kg/m2     Gen: Patient resting comfortably in bed, NAD. A&O x 3. Pulm: Currently receiving breathing tx for COPD  Cardio: Normal rate, regular rhythm. Extremities: Lower extremities symmetrical in length, RLE no longer externally rotated. Patient without sensation at all in LE (just returned to room). Unable to move LE. 2+ distal pulses intact.      A/P:  - Currently has 2mg IV morphine q4h PRN for pain  - Will defer to surgery postop pain management  - Will change patient to clear liquid diet, await the rest of ortho's postop orders       See daily progress note for full A/P.

## 2017-12-21 NOTE — PROGRESS NOTES
Patient reports he has not voided since 1700 yesterday, reports that he has an enlarged prostate and either \" pees like a racehorse or just a little at a time\". Bladder scan reveals 258cc.

## 2017-12-21 NOTE — ANESTHESIA PROCEDURE NOTES
Spinal Block    Start time: 12/21/2017 12:18 PM  End time: 12/21/2017 12:28 PM  Performed by: Edita Buck  Authorized by: Edita Buck     Pre-procedure:   Indications: at surgeon's request and primary anesthetic  Preanesthetic Checklist: patient identified, risks and benefits discussed, anesthesia consent, site marked, patient being monitored and timeout performed    Timeout Time: 12:18          Spinal Block:   Patient Position:  Left lateral decubitus  Prep Region:  Lumbar  Prep: Betadine      Location:  L3-4  Technique:  Single shot        Needle:   Needle Type:  Quincke  Needle Gauge:  22 G  Attempts:  2      Events: CSF confirmed, no blood with aspiration and no paresthesia        Assessment:  Insertion:  Uncomplicated  Patient tolerance:  Patient tolerated the procedure well with no immediate complications

## 2017-12-21 NOTE — ANESTHESIA PREPROCEDURE EVALUATION
Anesthetic History   No history of anesthetic complications            Review of Systems / Medical History  Patient summary reviewed, nursing notes reviewed and pertinent labs reviewed    Pulmonary    COPD      Smoker         Neuro/Psych   Within defined limits           Cardiovascular    Hypertension              Exercise tolerance: >4 METS     GI/Hepatic/Renal  Within defined limits              Endo/Other        Cancer (left upper lobe mass) and anemia (hg = 8.9)     Other Findings            Physical Exam    Airway  Mallampati: II  TM Distance: 4 - 6 cm  Neck ROM: normal range of motion   Mouth opening: Normal     Cardiovascular    Rhythm: regular  Rate: normal         Dental    Dentition: Full upper dentures and Full lower dentures     Pulmonary  Breath sounds clear to auscultation               Abdominal         Other Findings            Anesthetic Plan    ASA: 3  Anesthesia type: spinal          Induction: Intravenous  Anesthetic plan and risks discussed with: Patient

## 2017-12-21 NOTE — PROGRESS NOTES
[address] [place] [address]   Office [phone]  Fax [phone]          Assessment and Plan     Lloyd Shah is a 78 y.o. male admitted for R femoral neck fracture after GLF. He has spent 0 night(s) in the hospital.    24 Hour Events: No acute events. R femoral neck fracture: X-ray of R hip and R femur confirmed displaced, rotated, angulated femoral neck fracture on admission.  -Ortho consulted: Patient scheduled for R hip hemiarthroplasty at 11:30 AM by Dr. Nimesh Kim. Patient is medically stable for surgery. -Morphine 2mg Q 4hrs for pain  -PT/OT to evaluate after surgery  -NPO at midnight for procedure     COPD: Patient reports history of COPD/Emphysema but has not had formal PFTs. He reports taking duonebs PRN at home for SOB. 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 Patient has adrenal mass that was recently biopsied and found to be positive for SCC. This is consistent with likely metastasis from primary lung cancer. Patient followed by Dr. Yesenia Okeefe of Heme/Onc outpatient.  -Heme/Onc consulted. -Patient may require further investigation of lung nodules. Will consult pulmonology if Heme/Onc deems this necessary.        Hypertension: Patient reports previously being diagnosed with HTN but stopped taking his medication on his own. He does not recall what medication he was previously prescribed. - BP normotensive on admission 104/67  - Will continue to monitor BPs     Osteoporosis: Patient had a hip fracture following a GLF with likely underlying osteoporosis. -Will start calcium and vitamin D supplementation after surgery  -Patient will need DEXA scan OP and likely will need to be started on bisphosphonate 4-6wks post fracture.        FEN/GI - NPO after midnight. NS at 125 mL/hr.   Activity - bedrest  DVT prophylaxis - Lovenox following surgery  GI prophylaxis - Not indicated at this time  Fall prophylaxis - Fall precautions ordered. Disposition - Admit to Surgical. Plan to d/c to TBD. Code Status - Full, discussed with patient / caregivers. I appreciate the opportunity to participate in the care of this patient,  Froylan Yao MD  [facility] Medicine Resident         Subjective / Objective     Subjective Patient reports he is doing well. Pain in R hip well controlled on current pain regimen. Reports having some back pain which is a chronic problem. Temp (24hrs), Av.5 °F (36.9 °C), Min:98.3 °F (36.8 °C), Max:98.7 °F (37.1 °C)     Objective:  General Appearance: In no acute distress. Vital signs: (most recent): Blood pressure 133/80, pulse 86, temperature 98.3 °F (36.8 °C), resp. rate 19, height 5' 10\" (1.778 m), weight 184 lb (83.5 kg), SpO2 95 %. Vital signs are normal.    HEENT: Normal HEENT exam.    Lungs:  Normal respiratory rate and normal effort. Breath sounds clear to auscultation. Heart: Normal rate. Regular rhythm. S1 normal and S2 normal.  No murmur, gallop or friction rub. Extremities: (RLE externally rotated and shortened. Decreased ROM 2/2 pain. )  Neurological: Patient is alert and oriented to person, place and time. Skin:  Warm. Abdomen: Abdomen is soft and non-distended. Bowel sounds are normal.   There is no abdominal tenderness. Pupils:  Pupils are equal, round, and reactive to light. Pulses: Distal pulses are intact.       Respiratory:   O2 Device: Room air     I/O:       CBC:  Recent Labs      17   2259   WBC  7.2   HGB  8.9*   HCT  26.9*   PLT  491       Metabolic Panel:  Recent Labs      17   2345  17   2259   NA   --   128*   K   --   4.3   CL   --   95*   CO2   --   26   BUN   --   10   CREA   --   0.70   GLU   --   102*   CA   --   8.7   ALB   --   2.1*   SGOT   --   22   ALT   --   14   INR  1.1   --           For Billing    Chief Complaint   Patient presents with   [facility] [place] Problems  Date Reviewed: 11/29/2017          Codes Class Noted POA    Femur fracture, right ([facility] [address] ICD-10-CM: K95.42RM  ICD-9-CM: 821.00  12/21/2017 Unknown

## 2017-12-21 NOTE — ROUTINE PROCESS
Primary Nurse Galen Lynch, LAURA and David Damico RN performed a dual skin assessment on this patient No impairment noted  Dhaval score is 19

## 2017-12-21 NOTE — H&P
[address] [facility] [address] [place] [address]   Office [phone]  Fax [phone]       Admission H&P     Name: Gatito Edmonds MRN: [id number]  Sex: Male   YOB: 1940  Age: 78 y.o. PCP: Mary Schroeder MD     Source of Information: patient, medical records    Chief complaint: GLF    History of Present Illness  Gatito Edmonds is a 78 y.o. 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 Patient reports that earlier today his grandson left the  door open and while walking in the kitchen he hit his left shin on the  door, tripped and fell on his right side. He instantly felt pain in his R hip and had his grandson call 911 and EMS came and brought him to the ED. During the fall he tried catching himself by holding onto the sink but was unsuccessful and on his way down he he did hit his head on a cabinet door. He denies having any CP, palpitations, or dizziness before the fall. Of note, patient has recently been seen by Dr. Crow Doherty of Heme/Onc and had biopsy of adrenal mass which showed SCC. Past Medical History:   Diagnosis Date    HTN (hypertension) 10/21/2014    Rheumatoid arthritis(714.0) 10/21/2014    Skin cancer     Sun-damaged skin     Sunburn, blistering       Patient Vitals for the past 12 hrs:   Temp Pulse Resp BP SpO2   12/21/17 0125 98.3 °F (36.8 °C) 86 19 133/80 95 %   12/21/17 0100 - - - 121/71 96 %   12/21/17 0030 - - - 126/70 95 %   12/21/17 0000 - - - 117/75 95 %   12/20/17 2330 - - - 113/66 95 %   12/20/17 2300 - - - 113/82 95 %   12/20/17 2230 - - - 117/70 95 %   12/20/17 2056 98.7 °F (37.1 °C) 91 16 104/67 96 %       In the ER, vital signs were remarkable for T98.7, HR 91, RR16, /67, SPO2 96%. Labs were remarkable for normal CBC with Hgb 8.9, CMP with sodium 128, INR 1.1. CXR showed unchanged L upper lobe mass.  R femur and R hip X-rays both showed displaced, rotated, angulated R femoral neck fracture. Pt was treated with Toradol 30mg IM X1 for pain. Home Medications   Prior to Admission medications    Medication Sig Start Date End Date Taking? Authorizing Provider   levoFLOXacin (LEVAQUIN) 750 mg tablet Take 1 Tab by mouth daily. 11/29/17  Yes Jorge Paul MD   albuterol-ipratropium (DUO-NEB) 2.5 mg-0.5 mg/3 ml nebu 3 mL by Nebulization route every four (4) hours as needed. 11/13/17  Yes Caroline Garner MD   albuterol (PROVENTIL VENTOLIN) 2.5 mg /3 mL (0.083 %) nebulizer solution USE 2 VIALS VIAL NEBULIZER EVERY 4 HOURS AS NEEDED FOR WHEEZING 8/7/17  Yes Caroline Garner MD       Allergies  Allergies   Allergen Reactions    Pcn [Penicillins] Hives       Past Medical History:   Diagnosis Date    HTN (hypertension) 10/21/2014    Rheumatoid arthritis(714.0) 10/21/2014    Skin cancer     Sun-damaged skin     Sunburn, blistering        Previous Hospitalization(s): None documented    History reviewed. No pertinent surgical history. History reviewed. No pertinent family history. Social History  Social History     Social History    Marital status: SINGLE     Spouse name: N/A    Number of children: N/A    Years of education: N/A     Occupational History    Not on file. Social History Main Topics    Smoking status: Current Every Day Smoker     Packs/day: 1.00    Smokeless tobacco: Never Used    Alcohol use No    Drug use: No    Sexual activity: No     Other Topics Concern    Not on file     Social History Narrative       Alcohol history: Not at all  Smoking history: Former smoker, smoked 1/2 to 1 ppd x 55 years, quit 8 months ago  Illicit drug history: Not at all    Living arrangement: patient lives with their family. Ambulates: Independently prior to current fall     Review of Systems     The following are negative unless bolded. General Fever, chills and unexpected weight change. HENT Ear pain, sinus pressure and sore throat. Eyes Visual disturbance. Respiratory Cough, chest tightness, shortness of breath and wheezing. Cardio Chest pain, palpitations and leg swelling. GI Nausea, vomiting, abd pain, diarrhea, constipation and blood in stool.  Dysuria. Extremities 10/10 pain in R hip with movement   Skin Color change and pallor. Neuro Weakness and headaches. Behavioral Confusion, nervous, anxious. Physical Exam  Visit Vitals    /80 (BP 1 Location: Right arm, BP Patient Position: Head of bed elevated (Comment degrees))    Pulse 86    Temp 98.3 °F (36.8 °C)    Resp 19    Ht 5' 10\" (1.778 m)    Wt 184 lb (83.5 kg)    SpO2 95%    BMI 26.4 kg/m2      O2 Device: Room air     Vitals Reviewed. General Oriented to person, place, and time and well-developed. Appears well-nourished, no distress. Not diaphoretic. HENT Head Normocephalic and atraumatic. Eyes Conjunctivae are normal, no discharge. No scleral icterus. Nose Nose normal, clear turbinates. Oral Oropharynx is clear and moist. No oropharyngeal exudate. Neck No thyromegaly present. No cervical adenopathy. Cardio Normal rate, regular rhythm. Exam reveals no gallop and no friction rub. No murmur heard. No chest wall tenderness. Pulmonary Effort normal and breath sounds normal. No respiratory distress. No wheezes, no rales. Abdominal Soft. Bowel sounds normal. No distension. No tenderness.  Deferred. Extremities RLE shortened and externally rotated with decreased ROM 2/2 pain. Neurological Alert and oriented to person, place, and time. Dermatology Skin is warm and dry. No rash noted. No erythema or pallor.     Psychiatric Affect and judgment normal.       Laboratory Data  Recent Results (from the past 8 hour(s))   EKG, 12 LEAD, INITIAL    Collection Time: 12/20/17 10:53 PM   Result Value Ref Range    Ventricular Rate 85 BPM    Atrial Rate 85 BPM    P-R Interval 122 ms    QRS Duration 104 ms    Q-T Interval 356 ms    QTC Calculation (Bezet) 423 ms    Calculated P Axis 17 degrees    Calculated R Axis -60 degrees    Calculated T Axis 66 degrees    Diagnosis       Sinus rhythm with premature supraventricular complexes  Incomplete right bundle branch block  Left anterior fascicular block  Minimal voltage criteria for LVH, may be normal variant  Abnormal ECG  When compared with ECG of 17-APR-2006 20:29,  premature supraventricular complexes are now present  Left anterior fascicular block is now present  Incomplete right bundle branch block has replaced Right bundle branch block     CBC WITH AUTOMATED DIFF    Collection Time: 12/20/17 10:59 PM   Result Value Ref Range    WBC 7.2 4.1 - 11.1 K/uL    RBC 3.15 (L) 4.10 - 5.70 M/uL    HGB 8.9 (L) 12.1 - 17.0 g/dL    HCT 26.9 (L) 36.6 - 50.3 %    MCV 85.4 80.0 - 99.0 FL    MCH 28.3 26.0 - 34.0 PG    MCHC 33.1 30.0 - 36.5 g/dL    RDW 15.0 (H) 11.5 - 14.5 %    PLATELET 366 497 - 715 K/uL    NEUTROPHILS 77 (H) 32 - 75 %    LYMPHOCYTES 12 12 - 49 %    MONOCYTES 10 5 - 13 %    EOSINOPHILS 1 0 - 7 %    BASOPHILS 0 0 - 1 %    ABS. NEUTROPHILS 5.5 1.8 - 8.0 K/UL    ABS. LYMPHOCYTES 0.9 0.8 - 3.5 K/UL    ABS. MONOCYTES 0.7 0.0 - 1.0 K/UL    ABS. EOSINOPHILS 0.1 0.0 - 0.4 K/UL    ABS.  BASOPHILS 0.0 0.0 - 0.1 K/UL   TYPE & SCREEN    Collection Time: 12/20/17 10:59 PM   Result Value Ref Range    Crossmatch Expiration 12/23/2017     ABO/Rh(D) A NEGATIVE     Antibody screen NEG    METABOLIC PANEL, COMPREHENSIVE    Collection Time: 12/20/17 10:59 PM   Result Value Ref Range    Sodium 128 (L) 136 - 145 mmol/L    Potassium 4.3 3.5 - 5.1 mmol/L    Chloride 95 (L) 97 - 108 mmol/L    CO2 26 21 - 32 mmol/L    Anion gap 7 5 - 15 mmol/L    Glucose 102 (H) 65 - 100 mg/dL    BUN 10 6 - 20 MG/DL    Creatinine 0.70 0.70 - 1.30 MG/DL    BUN/Creatinine ratio 14 12 - 20      GFR est AA >60 >60 ml/min/1.73m2    GFR est non-AA >60 >60 ml/min/1.73m2    Calcium 8.7 8.5 - 10.1 MG/DL    Bilirubin, total 0.3 0.2 - 1.0 MG/DL    ALT (SGPT) 14 12 - 78 U/L    AST (SGOT) 22 15 - 37 U/L    Alk. phosphatase 67 45 - 117 U/L    Protein, total 6.9 6.4 - 8.2 g/dL    Albumin 2.1 (L) 3.5 - 5.0 g/dL    Globulin 4.8 (H) 2.0 - 4.0 g/dL    A-G Ratio 0.4 (L) 1.1 - 2.2     PROTHROMBIN TIME + INR    Collection Time: 12/20/17 11:45 PM   Result Value Ref Range    INR 1.1 0.9 - 1.1      Prothrombin time 11.3 (H) 9.0 - 11.1 sec       Imaging  CXR Results  (Last 48 hours)               12/20/17 2211  XR CHEST PORT Final result    Impression:  IMPRESSION:   Left upper lobe mass similar to prior study. No new abnormality. Narrative:  INDICATION:   eval for airspace dx       EXAM:  AP CHEST RADIOGRAPH       COMPARISON: December 15, 2017       FINDINGS:       AP portable view of the chest demonstrates a normal cardiomediastinal   silhouette. The lungs are adequately expanded. There is left upper lobe mass   similar to the prior study. There is no pneumothorax or pleural effusion. The   osseous structures are unremarkable. CT Results  (Last 48 hours)    None        EKG: NSR with Left axis deviation and PVCs. No previous EKG for comparison. Assessment and Plan     Reubin Erp is a 78 y.o. male who is admitted for R femoral neck fracture 2/2 GLF. R femoral neck fracture: X-ray of R hip and R femur confirmed displaced, rotated, angulated femoral neck fracture on admission.  -Ortho consulted: Patient scheduled for R hip hemiarthroplasty at 11:30 AM by Dr. Ruthy Dockery. Patient is medically stable for surgery. -Morphine 2mg Q 4hrs for pain  -PT/OT to evaluate after surgery  -NPO at midnight for procedure    COPD: Patient reports history of COPD/Emphysema but has not had formal PFTs. He reports taking duonebs PRN at home for SOB. 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 Patient has adrenal mass that was recently biopsied and found positive for SCC. This is consistent with likely metastasis from primary lung cancer. Patient followed by Dr. Yasemin Mehta of Heme/Onc outpatient.  -Heme/Onc consulted. -Patient may require further investigation of lung nodules. Will consult pulmonology if Heme/Onc deems this necessary. Hypertension: Patient reports previously being diagnosed with HTN but stopped taking his medication on his own. He does not recall what medication he was previously prescribed. - BP normotensive on admission 104/67. At this time, 113/66  - Will continue to monitor BPs    Osteoporosis: Patient had a hip fracture following a GLF with likely underlying osteoporosis. -Will start calcium and vitamin D supplementation after procedure  -Patient will need DEXA scan OP and likely will need to be started on bisphosphonate 4-6wks post fracture. FEN/GI - NPO after midnight. NS at 125 mL/hr. Activity - bedrest  DVT prophylaxis - Lovenox following surgery  GI prophylaxis - Not indicated at this time  Fall prophylaxis - Fall precautions ordered. Disposition - Admit to Surgical. Plan to d/c to TBD. Code Status - Full, discussed with patient / caregivers. Next of Kin Name and Contact: Savannah Huffman (Adult grandson) - [phone]  Patient Javan Ayon will be discussed Dr. Cindy Weber.     1:37 AM, 12/21/17  Giovanni Curiel MD  Family Medicine Resident       For Billing    Chief Complaint   Patient presents with   [facility] [place] Problems  Date Reviewed: 11/29/2017          Codes Class Noted POA    Femur fracture, right ([facility] [address] ICD-10-CM: F14.73CT  ICD-9-CM: 821.00  12/21/2017 Unknown

## 2017-12-21 NOTE — CONSULTS
[facility] [place] [address]  [address] [facility], [address] [facility] [address] [facility]  Andrew Frater: [phone]  F: [phone]      Reason for Visit:   Brad Garcia is a 78 y.o. male who is seen in consultation at the request of Dr. Yumiko Stafford for evaluation of lung cacner. Oncology  History:   · Low-dose CT Chest 11/22/2017: Probably left upper lobe mass obstructing left upper lobe bronchus, near complete atelectasis of FANY, probably left hilar adenopathy. 3.9cm left adrenal nodule. Moderate centrilobular emphysema. · Left adrenal bx  12/15/2017: Metastatic squamous cell carcinoma, PDL1 pending  · Stage IV Non-small cell lung cancer    History of Present Illness:     Mr Shanice Lackey was admitted on 12/21/2017 from the ED arriving by EMS after sustaining a GLF at home; tripping over an open  door. ED xray show rt femoral neck fracture. Therefore he was admitted for surgery. Hospital consults: ortho    Mr Shanice Lackey states tripped over  door; tried to catch himself; hit his head slightly. Did not lose consciousness; no pain to head area. Having some pain to back area due to lying in the bed in one position. Had noticed at home he was having more problems breathing; uses an inhaler currently. Appetite was done and he has lost some weight; use to weigh 220#  And now weighs 184. Was experiencing constipation at home and has problems with his hemorrhoids( \" 's disease\") occasionally. Prior to fall; was up ambulating without any assistance or walker or cane; was driving    Smoking hx: smoked 1 ppd x 55+ years; stopped approx 6 months ago  Colonoscopy: never had   Retired . Lives with 27 yo grandson that is [employer].        Past Medical History:   Diagnosis Date    HTN (hypertension) 10/21/2014    Rheumatoid arthritis(714.0) 10/21/2014    Skin cancer     Sun-damaged skin     Sunburn, blistering       Past Surgical History:   Procedure Laterality Date    HX OTHER SURGICAL      Hemmorhiodectomy      Social History   Substance Use Topics    Smoking status: Current Every Day Smoker     Packs/day: 1.00    Smokeless tobacco: Never Used    Alcohol use No      History reviewed. No pertinent family history. Current Facility-Administered Medications   Medication Dose Route Frequency    albuterol-ipratropium (DUO-NEB) 2.5 MG-0.5 MG/3 ML  3 mL Nebulization Q4H PRN    sodium chloride (NS) flush 5-10 mL  5-10 mL IntraVENous Q8H    sodium chloride (NS) flush 5-10 mL  5-10 mL IntraVENous PRN    enoxaparin (LOVENOX) injection 40 mg  40 mg SubCUTAneous Q24H    morphine injection 2 mg  2 mg IntraVENous Q4H PRN    0.9% sodium chloride infusion  125 mL/hr IntraVENous CONTINUOUS      Allergies   Allergen Reactions    Pcn [Penicillins] Hives     Tolerated ancef graded challenge 12/21/2017 with no adverse side effects noted        Review of Systems: A complete review of systems was obtained, negative except as described above. Physical Exam:     Visit Vitals    /63 (BP 1 Location: Right arm, BP Patient Position: At rest)    Pulse 84    Temp 97.9 °F (36.6 °C)    Resp 18    Ht 5' 10\" (1.778 m)    Wt 184 lb (83.5 kg)    SpO2 97%    BMI 26.4 kg/m2     ECOG PS: 2  General: No distress  Eyes: PERRLA, anicteric sclerae  HENT: Atraumatic with normal appearance of ears and nose; OP clear  Neck: Supple; no thyromegaly   Lymphatic: No cervical, supraclavicular, or axillary adenopathy  Respiratory: CTAB, normal respiratory effort  CV: Normal rate, regular rhythm, no murmurs, no peripheral edema  GI: Soft, nontender, nondistended, no masses, no hepatomegaly, no splenomegaly  Skin: No rashes, ecchymoses, or petechiae. Normal temperature, turgor, and texture.   Neuro/Psych: Alert, oriented, appropriate affect, normal judgment/insight      Results:     Lab Results   Component Value Date/Time    WBC 7.2 12/20/2017 10:59 PM    HGB 8.9 12/20/2017 10:59 PM    HCT 26.9 12/20/2017 10:59 PM    PLATELET 034 32/97/7085 10:59 PM    MCV 85.4 12/20/2017 10:59 PM    ABS. NEUTROPHILS 5.5 12/20/2017 10:59 PM     Lab Results   Component Value Date/Time    Sodium 128 12/20/2017 10:59 PM    Potassium 4.3 12/20/2017 10:59 PM    Chloride 95 12/20/2017 10:59 PM    CO2 26 12/20/2017 10:59 PM    Glucose 102 12/20/2017 10:59 PM    BUN 10 12/20/2017 10:59 PM    Creatinine 0.70 12/20/2017 10:59 PM    GFR est AA >60 12/20/2017 10:59 PM    GFR est non-AA >60 12/20/2017 10:59 PM    Calcium 8.7 12/20/2017 10:59 PM     Lab Results   Component Value Date/Time    Bilirubin, total 0.3 12/20/2017 10:59 PM    ALT (SGPT) 14 12/20/2017 10:59 PM    AST (SGOT) 22 12/20/2017 10:59 PM    Alk. phosphatase 67 12/20/2017 10:59 PM    Protein, total 6.9 12/20/2017 10:59 PM    Albumin 2.1 12/20/2017 10:59 PM    Globulin 4.8 12/20/2017 10:59 PM     Lab Results   Component Value Date/Time    Reticulocyte count 0.8 11/29/2017 04:26 PM    Iron % saturation 8 11/29/2017 04:26 PM    TIBC 202 11/29/2017 04:26 PM    Ferritin 461 11/29/2017 04:26 PM    Vitamin B12 858 11/29/2017 04:26 PM    Folate 16.0 11/29/2017 04:26 PM    TSH 1.780 11/13/2017 02:49 PM     Lab Results   Component Value Date/Time    INR 1.1 12/20/2017 11:45 PM    aPTT 31.7 11/29/2017 04:26 PM     Lab Results   Component Value Date/Time    Prostate Specific Ag 3.6 02/09/2016 02:45 PM     12/6/2017 CT CHEST ABD  PELV  IMPRESSION:  1. Left adrenal metastasis. This can be biopsied if clinically indicated. 2. Large mass in the left upper lobe versus markedly atelectatic lung from  central tumor. Bronchoscopy can likely be diagnostic. 3. 7 mm nonspecific pleural-based nodule in the left lower lobe. 4. Extensive emphysematous change.     12/15/2017 CT BX GUIDED/ Left adrenal bx     Specimen Source   1: Fine Needle Aspiration, Left adrenal   CYTOLOGIC INTERPRETATION:   Left adrenal mass, core biopsy:   Metastatic squamous cell carcinoma (see Comment).    General Categorization   Positive for malignancy. 12/20/2017 XR CHEST  IMPRESSION:  Left upper lobe mass similar to prior study. No new abnormality. 12/20/2017 Femur RT  IMPRESSION:  Displaced, rotated, angulated right femoral neck fracture. Assessment and Recommendations:   1. Right Femoral neck fx  S/p GLF  Ortho consulted: plan for Rt hip hemiarthroplasty today/ Dr Manasa Roberts (12/21)      2. Metastatic Non-small cell lung cancer (Squamous Cell Carcinoma)  His cancer is not curable and management is with palliative intent. I reviewed his diagnosis, prognosis, and management options with him this morning. My recommendation is to complete his staging evaluation with an MRI brain. A bone scan would be helpful as well, given the recent fracture. These can wait until after his surgery. PDL1 status on his pathology is pending and will help to guide treatment options (ie immunotherapy vs chemotherapy). He is uncertain about whether he would want palliative chemotherapy, and he wants to give this some thought for a few days. NP discussed with MRI and ortho/Dr Chowdhury and there should be no contraindication following surgery. Will eventually need port placed if he opts for therapy. 3.COPD  Noted on CT. He is rather symptomatic from this, along with his lung mass. We will consult pulmonary for tomorrow (as patient having surgery today) to assist with symptom management and to establish out patient follow up  From oncology standpoint we do not need any additional tissue for diagnostic purposes      4. Symptom Management/Goals of care  He would benefit from palliative care involvement as inpatient and outpatient. Will ask them to see tomorrow (as patient is having surgery today)    5. Constipation  Bowel regimen in place      Patient seen in conjunction with Cameron Jennings NP.       Signed By: Sergei Pierce MD

## 2017-12-21 NOTE — PROGRESS NOTES
Occupational Therapy  Order received and chart reviewed, patient currently off the floor at sx. Will follow up tomorrow.    Figueroa Mendoza, OTR/L

## 2017-12-21 NOTE — ED PROVIDER NOTES
HPI Comments: Mr. Melonie Andujar is a 77 y/o male presenting to ED after he sustained a GLF when the  door was open and he accidentally walked into it and fell onto his right hip. He denies LOC, denies any other injuries. He is complaining of right hip/leg pain. Patient is a 78 y.o. male presenting with fall. The history is provided by the patient. Fall   Pertinent negatives include no fever, no numbness, no abdominal pain, no nausea, no vomiting and no headaches. Past Medical History:   Diagnosis Date    HTN (hypertension) 10/21/2014    Rheumatoid arthritis(714.0) 10/21/2014    Skin cancer     Sun-damaged skin     Sunburn, blistering        History reviewed. No pertinent surgical history. History reviewed. No pertinent family history. Social History     Social History    Marital status: SINGLE     Spouse name: N/A    Number of children: N/A    Years of education: N/A     Occupational History    Not on file. Social History Main Topics    Smoking status: Current Every Day Smoker     Packs/day: 1.00    Smokeless tobacco: Never Used    Alcohol use No    Drug use: No    Sexual activity: No     Other Topics Concern    Not on file     Social History Narrative         ALLERGIES: Pcn [penicillins]    Review of Systems   Constitutional: Negative for chills, diaphoresis, fatigue and fever. HENT: Negative for congestion, rhinorrhea, sinus pressure, sore throat, trouble swallowing and voice change. Eyes: Negative for photophobia and visual disturbance. Respiratory: Negative for cough, chest tightness and shortness of breath. Cardiovascular: Negative for chest pain, palpitations and leg swelling. Gastrointestinal: Negative for abdominal pain, blood in stool, constipation, diarrhea, nausea and vomiting. Musculoskeletal: Positive for gait problem and joint swelling. Negative for arthralgias, myalgias and neck pain.    Neurological: Negative for dizziness, weakness, light-headedness, numbness and headaches. Vitals:    12/20/17 2056   BP: 104/67   Pulse: 91   Resp: 16   Temp: 98.7 °F (37.1 °C)   SpO2: 96%   Weight: 83.5 kg (184 lb)   Height: 5' 10\" (1.778 m)            Physical Exam   Constitutional: He is oriented to person, place, and time. He appears well-developed and well-nourished. No distress. HENT:   Head: Normocephalic and atraumatic. Nose: Nose normal.   Mouth/Throat: Oropharynx is clear and moist. No oropharyngeal exudate. Eyes: Conjunctivae and EOM are normal. Right eye exhibits no discharge. Left eye exhibits no discharge. No scleral icterus. Neck: Normal range of motion. Neck supple. No JVD present. No tracheal deviation present. No thyromegaly present. Cardiovascular: Normal rate, regular rhythm, normal heart sounds and intact distal pulses. Exam reveals no gallop and no friction rub. No murmur heard. Pulmonary/Chest: Effort normal and breath sounds normal. No stridor. No respiratory distress. He has no wheezes. He has no rales. He exhibits no tenderness. Abdominal: Bowel sounds are normal. He exhibits no distension and no mass. There is no tenderness. There is no rebound. Musculoskeletal: He exhibits tenderness. He exhibits no edema. Pt unable to range right hip 2/2 pain. Tender over lateral aspect of femur. Lymphadenopathy:     He has no cervical adenopathy. Neurological: He is alert and oriented to person, place, and time. No cranial nerve deficit. Coordination normal.   Skin: Skin is warm and dry. No rash noted. He is not diaphoretic. No erythema. No pallor. Psychiatric: He has a normal mood and affect. His behavior is normal. Judgment and thought content normal.   Nursing note and vitals reviewed.        MDM  Number of Diagnoses or Management Options  Closed fracture of neck of right femur, initial encounter [facility]):      Amount and/or Complexity of Data Reviewed  Clinical lab tests: ordered and reviewed  Tests in the radiology section of CPT®: ordered and reviewed  Review and summarize past medical records: yes  Discuss the patient with other providers: yes    Risk of Complications, Morbidity, and/or Mortality  Presenting problems: moderate  Diagnostic procedures: moderate  Management options: moderate    Patient Progress  Patient progress: stable    ED Course       Procedures      10:57 PM  Consult to Ortho Dr. Jorge Henriquez pt to be medically cleared and will plan on OR tomorrow @ 11:30 for fixation. 10:58 PM  Patient is being admitted to the hospital.  The results of their tests and reasons for their admission have been discussed with them and/or available family. They convey agreement and understanding for the need to be admitted and for their admission diagnosis. Consultation has been made with the inpatient physician specialist for hospitalization. LABORATORY TESTS:  No results found for this or any previous visit (from the past 12 hour(s)). IMAGING RESULTS:  XR HIP RT W OR WO PELV 2-3 VWS   Final Result      XR FEMUR RT 2 VS   Final Result      XR CHEST PORT   Final Result        Xr Hip Rt W Or Wo Pelv 2-3 Vws    Result Date: 12/20/2017  EXAM:  XR HIP RT W OR WO PELV 2-3 VWS, XR FEMUR RT 2 VS INDICATION:   Fracture after fall. Right hip pain and deformity. COMPARISON: None. FINDINGS: An AP view of the pelvis, a frogleg lateral view of the right hip , and frontal and lateral radiographs of the right femur demonstrate a superiorly displaced, rotated, and angulated right femoral neck fracture. IMPRESSION:  Displaced, rotated, angulated right femoral neck fracture. Xr Femur Rt 2 Vs    Result Date: 12/20/2017  EXAM:  XR HIP RT W OR WO PELV 2-3 VWS, XR FEMUR RT 2 VS INDICATION:   Fracture after fall. Right hip pain and deformity. COMPARISON: None.  FINDINGS: An AP view of the pelvis, a frogleg lateral view of the right hip , and frontal and lateral radiographs of the right femur demonstrate a superiorly displaced, rotated, and angulated right femoral neck fracture. IMPRESSION:  Displaced, rotated, angulated right femoral neck fracture. Xr Chest Port    Result Date: 12/20/2017  INDICATION:   eval for airspace dx EXAM:  AP CHEST RADIOGRAPH COMPARISON: December 15, 2017 FINDINGS: AP portable view of the chest demonstrates a normal cardiomediastinal silhouette. The lungs are adequately expanded. There is left upper lobe mass similar to the prior study. There is no pneumothorax or pleural effusion. The osseous structures are unremarkable. IMPRESSION: Left upper lobe mass similar to prior study. No new abnormality. MEDICATIONS GIVEN:  Medications   ketorolac (TORADOL) injection 30 mg (30 mg IntraMUSCular Given 12/20/17 2139)       IMPRESSION:  1. Closed fracture of neck of right femur, initial encounter ([facility] [address]        PLAN:  1.  Admit to [address], [place]

## 2017-12-21 NOTE — PROGRESS NOTES
1530: Primary Nurse Karina Bruno and Mannie Atkins RN performed a dual skin assessment on this patient No impairment noted--Dhaval score is 16    Bedside and Verbal shift change report given to Negar Franklin (oncoming nurse) by [employer] (offgoing nurse). Report included the following information SBAR, Kardex, Procedure Summary, Intake/Output, MAR and Recent Results.

## 2017-12-21 NOTE — ED TRIAGE NOTES
Pt arrived via EMS with c/o of fall. Patient tripped venancio , c/o of right hip and leg pain. Denies LOC.

## 2017-12-21 NOTE — PROGRESS NOTES
12/21/2017 1:51 PM [facility] are in network with [employer]. Pt's in network and out of network benefits are the same. For pt to go to [facility](in network and out of network) he will have a $295/day copay for the first 6 days and then he would be covered at 100% after. Pt will be covered at 100% for in or out of network SNF placement. CM will follow. 12/21/2017  9:47 AM TYREE Burkett for pt's pcp was notified of pt's admission. 12/21/2017 9:36 AM Case management consult for \"anticipate will need SNF after hip surgery\" received. Met with pt. Charted address and phone number confirmed. Pt lives with his 28year old grandson, Fernando Chandler in a 1 story home in [place]. Pt has no history of home health and does not own any dme. Pt was independent with adls and driving prior to admission. Pt has rx coverage and fills his scripts at [facility]. Rehab placement discussed, [facility] vs SNF, choice given. Pt selected [facility] as preference and [facility] and [facility] as SNF backups. Referrals sent to preferred facilities via [employer]. Pt will need auth for rehab placement. CM will follow for final discharge recommendations. CARMELA Brown   Care Management Interventions  PCP Verified by CM:  Yes Chloe Santamaria )  Transition of Care Consult (CM Consult): SNF  Partner SNF: No  Reason Why Partner SNF Not Chosen: Location  MyChart Signup: No  Discharge Durable Medical Equipment: No  Physical Therapy Consult: Yes  Occupational Therapy Consult: Yes  Speech Therapy Consult: No  Current Support Network: Own Home  Confirm Follow Up Transport: Family

## 2017-12-21 NOTE — PROGRESS NOTES
Orders received to begin PT after hemiarthroplasty of right hip following GLF. Patient received in bed alert but without sensation or motor control following spinal anesthesia. Educated him regarding anterior hip precautions, and handout left at bedside. Will initiate PT early in AM. Nursing aware.

## 2017-12-22 NOTE — PROGRESS NOTES
Spiritual Care Assessment/Progress Notes    Bernhard Lesch [id number]  [id number]    1940  78 y.o.  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 ([facility] 12/21/2017    Advance care planning 02/09/2016    HTN (hypertension) 10/21/2014    Rheumatoid arthritis involving multiple sites ([facility] 10/21/2014    COPD (chronic obstructive pulmonary disease) ([facility] 10/21/2014    Basal cell cancer 10/21/2014        Date: 12/22/2017       Level of Shinto/Spiritual Activity:  []         Involved in mary beth tradition/spiritual practice    []         Not involved in mary beth tradition/spiritual practice  [x]         Spiritually Beliefs   []         Claims no spiritual orientation    []         seeking spiritual identity  []         Feels alienated from Yarsanism practice/tradition  []         Feels angry about Yarsanism practice/tradition  []         Spirituality/Yarsanism tradition  a resource for coping at this time.   []         Not able to assess due to medical condition    Services Provided Today:  []         crisis intervention    []         reading Scriptures  [x]         spiritual assessment    []         prayer  [x]         empathic listening/emotional support  []         rites and rituals (cite in comments)  []         life review     []         Yarsanism support  []         theological development   []         advocacy  []         ethical dialog     []         blessing  []         bereavement support    []         support to family  []         anticipatory grief support   []         help with AMD  []         spiritual guidance    []         meditation      Spiritual Care Needs  []         Emotional Support  []         Spiritual/Adventist Care  []         Loss/Adjustment  []         Advocacy/Referral                /Ethics  []         No needs expressed at               this time  [x]         Other: (note in               comments)  [address]  []         Follow up visits with               pt/family  []         Provide materials  []         Schedule sacraments  []         Contact Community               Clergy  [x]         Follow up as needed  []         Other: (note in               comments)     Comments: Responded to In Basket request for an Advance Medical Directive (AMD). Consulted with Palliative Care RN Practitioner Liana Cook who has addressed AMD and decision making with Mr. Migue Perry. Mr. Migue Perry shared his grandson lives with him. He has some mental challenges that at times are difficult. Mr. Migue Perry began to share about his beliefs in God. He knows there is a God and he believes that God gives humans the freedom of choice and does not interfere with those choices. A neighbor came in during visit and they began to share together. Provided spiritual presence and advised of  Availability.   Visited by: Zuleyka Barber [address] [facility] [address] ([id number])

## 2017-12-22 NOTE — ACP (ADVANCE CARE PLANNING)
Patient does not have an AMD, but was given paperwork by PCP in November. Has not been completed. I reviewed AMD with patient and asked who he would want to make decisions for him in the event that he couldn't make his own decisions. States that it would be his daughter Max Phillips would insist on it\"). I discussed that it is up to him to appoint someone if he would want it to be someone other than his legal nok (which is his daughter). I left a blank AMD with him and he wants to talk to his grandson. I explained that when he is seen in clinic, this can be followed up on as well, but that I wanted him to start to have these discussions with his family so that his wishes are known. ACP updated to reflect this.

## 2017-12-22 NOTE — ACP (ADVANCE CARE PLANNING)
Responded to In Basket request for an Advance Medical Directive (AMD). Consulted with Palliative Care RN Practitioner Servando Felix who has addressed AMD and decision making with Mr. Blossom Peña.     Visited by: Lizz Mchugh [address] [facility] [address] ([id number])

## 2017-12-22 NOTE — CONSULTS
Palliative Medicine Consult  Franco: [phone] ([phone])    Patient Name: Alison Ray  YOB: 1940    Date of Initial Consult: 12/22/2017  Reason for Consult: Overwhelming symptoms  Requesting Provider: Joy Mejia NP  Primary Care Physician: Billie Novoa MD     SUMMARY:   Alison Ray is a 78 y.o. with a past history of HTN, RA, COPD, BCC, tobacco abuse (quit summer 2017) who was admitted on 12/20/2017 from home with a diagnosis of right femoral neck fracture. Patient reported to ED after sustaining a ground level fall at home. He was in the kitchen with his grandson who left the  door down making him trip over it. He fell and landed onto his right side. He had immediate pain and deformity of his right hip. He was unable to ambulate following the fall. Xray of right hip and femur revealed displaced, rotated, angulated right femoral neck fracture. Patient underwent right bipolar hemiarthroplasty on 12/21/2017. In November, 2017, patient presented to PCP with complaints of progressive dyspnea over the past several years, much worse recently. Poor appetite with progressive weight loss, 40 pounds over 1.5 years. Significant fatigue. Underwent low dose CT of lung 11/22--> Probably left upper lobe mass obstructing left upper lobe bronchus, near complete atelectasis of FANY, probably left hilar adenopathy. 3.9cm left adrenal nodule. Moderate centrilobular emphysema. CT chest and abdomen done and showed Left adrenal metastasis, large mass in the left upper lobe versus markedly atelectatic lung from central tumor. Bronchoscopy can likely be diagnostic, 7 mm nonspecific pleural-based nodule in the left lower lobe, and extensive emphysematous change. Biopsy done on 12/15 of adrenal mass revealed metastatic squamous cell carcinoma. Scheduled for MRI today to complete staging.     Current medical issues leading to Palliative Medicine involvement include: pain related to recent hip fracture and replacement. SH: He lives in [place] with his grandson. He is a retired , also did [address] work in [employer]. Has daughter who he has been estranged from for 15 years that he has just reconnected with and will see over the Christmas holiday. Reports he still drives and is independent in ADLs. PALLIATIVE DIAGNOSES:   1. Right hip pain  2. Shortness of breath  3. Goals of care  4. DNR discussion  5. ACP  6. Newly diagnosed NSCLC       PLAN:   1. Met with patient and introduced the role of Palliative Medicine  2. Symptom Management--> Patient reports that his pain is controlled (0.5/10 pain level currently) and his shortness of breath is worse with activity. Relief with nebulizer treatments at home, supplemental oxygen inpatient (not on home oxygen). Continue optimizing symptom management. Heme Onc requested outpatient follow-up with Palliative Medicine for continued symptom management. I have sent an request to nurse navigator for screening and to schedule an appt for patient. 3. Goals of care--> Patient reports that he spoke with Dr. Ruben Russ this morning and he is going to get a port and give chemo \"a shot\". Reports that if it makes him feels worse then he feels now, he may not complete it but wants to give it a chance so that he can have 1-2 more years, was told he would only have 3-6 months if he didn't pursue it and wanting to extend his life for his grandson. Described how he has raised his grandson, Torin Love, since he was born (now 28) and Yvon's struggles with bipolar disorder, unable to graduate college (Chase Loredo always wanted to do and I don't have the heart to tell him he won't be able to do it\"), and unable to maintain meaningful employment. Reports he has a daughter who lives out of state, and until this year hadn't spoken with her for 15 years Eveline Richards has issues of her own\"), but she is coming to [address] [facility] on Sunday.  Patient looking forward to being discharged tomorrow or Sunday. 4. DNR discussion--> Addressed what the patient would want done in the event of cardiopulmonary arrest and what resuscitation would entail. Patient states that he would want chest compressions, defibrillation as well as intubation is his heart were to stop (FULL CODE). Patient also states he would not want \"to live on machines if there was no hope of me getting any better\". Reinforced the importance of having these discussions with family, as well as completing AMD/POST (see below). 5. ACP--> Patient does not have an AMD, but was given paperwork by PCP in November. Has not been completed. I reviewed AMD with patient and asked who he would want to make decisions for him in the event that he couldn't make his own decisions. States that it would be his daughter Gisele Goodpasture would insist on it\"). I discussed that it is up to him to appoint someone if he would want it to be someone other than his legal nok (which is his daughter). I left a blank AMD with him and he wants to talk to his grandson. I explained that when he is seen in clinic, this can be followed up on as well, but that I wanted him to start to have these discussions with his family so that his wishes are known. ACP updated to reflect this. 6. Initial consult note routed to primary continuity provider  7. Communicated plan of care with: Palliative IDT       GOALS OF CARE / TREATMENT PREFERENCES:     GOALS OF CARE: recover from surgery and return home. Pursue chemotherapy for NSCLC. Outpatient follow-up with Palliative Medicine team. Consider completing AMD/POST in near future.   Patient/Health Care Proxy Stated Goals: Prolong life      TREATMENT PREFERENCES:   Code Status: Full Code    Advance Care Planning:  Advance Care Planning 12/21/2017   Patient's Healthcare Decision Maker is: Legal Next of Kin   Confirm Advance Directive None   Patient Would Like to Complete Advance Directive Yes       Medical Interventions: Full interventions Other Instructions: Other:    As far as possible, the palliative care team has discussed with patient / health care proxy about goals of care / treatment preferences for patient. HISTORY:     History obtained from: patient, chart, bedside nurse    CHIEF COMPLAINT: Right hip pain    HPI/SUBJECTIVE:    The patient is:   [x] Verbal and participatory  [] Non-participatory due to:     Patient reported to ED after sustaining a ground level fall at home. He was in the kitchen with his grandson who left the  door down making him trip over it. He fell and landed onto his right side. He had immediate pain and deformity of his right hip. He was unable to ambulate following the fall. Xray of right hip and femur revealed displaced, rotated, angulated right femoral neck fracture. Patient underwent right bipolar hemiarthroplasty on 12/21/2017.       Clinical Pain Assessment (nonverbal scale for severity on nonverbal patients):   Clinical Pain Assessment  Severity: 1  Location: right hip  Character: sore  Duration: since 12/20  Factors: movement  Frequency: intermittant          Duration: for how long has pt been experiencing pain (e.g., 2 days, 1 month, years)  Frequency: how often pain is an issue (e.g., several times per day, once every few days, constant)     FUNCTIONAL ASSESSMENT:     Palliative Performance Scale (PPS):  PPS: 70       PSYCHOSOCIAL/SPIRITUAL SCREENING:     Palliative IDT has assessed this patient for cultural preferences / practices and a referral made as appropriate to needs (Cultural Services, Patient Advocacy, Ethics, etc.)    Advance Care Planning:  Advance Care Planning 12/21/2017   Patient's Healthcare Decision Maker is: Legal Next of Kin   Confirm Advance Directive None   Patient Would Like to Complete Advance Directive Yes       Any spiritual / Mandaen concerns:  [] Yes /  [x] No    Caregiver Burnout:  [] Yes /  [] No /  [x] No Caregiver Present      Anticipatory grief assessment:   [x] Normal  / [] Maladaptive       ESAS Anxiety: Anxiety: 0    ESAS Depression: Depression: 0        REVIEW OF SYSTEMS:     Positive and pertinent negative findings in ROS are noted above in HPI. The following systems were [x] reviewed / [] unable to be reviewed as noted in HPI  Other findings are noted below. Systems: constitutional, ears/nose/mouth/throat, respiratory, gastrointestinal, genitourinary, musculoskeletal, integumentary, neurologic, psychiatric, endocrine. Positive findings noted below. Modified ESAS Completed by: provider   Fatigue: 2 Drowsiness: 0   Depression: 0 Pain: 1 (actual pain acale of 0.5/10 per patient)   Anxiety: 0 Nausea: 0   Anorexia: 0 Dyspnea: 2     Constipation: No              PHYSICAL EXAM:     From RN flowsheet:  Wt Readings from Last 3 Encounters:   12/20/17 184 lb (83.5 kg)   12/15/17 184 lb (83.5 kg)   11/29/17 183 lb (83 kg)     Blood pressure 105/65, pulse 88, temperature 98 °F (36.7 °C), resp. rate 18, height 5' 10\" (1.778 m), weight 184 lb (83.5 kg), SpO2 96 %.     Pain Scale 1: Numeric (0 - 10)  Pain Intensity 1: 2  Pain Onset 1: Sx  Pain Location 1: Hip, Back  Pain Orientation 1: Right, Lower  Pain Description 1: Aching  Pain Intervention(s) 1: Medication (see MAR)  Last bowel movement, if known:     Constitutional: frail, NAD, sitting up in chair eating lunch  Eyes: pupils equal, anicteric  ENMT: no nasal discharge, moist mucous membranes  Cardiovascular: regular rhythm, distal pulses intact, -KATHERINE  Respiratory: breathing not labored, symmetric, diminished breath sounds  Gastrointestinal: soft non-tender, +bowel sounds  Musculoskeletal: right hip with dressing c/d/i, no tenderness to palpation  Skin: warm, dry  Neurologic: following commands, moving all extremities  Psychiatric: full affect, no hallucinations  Other:       HISTORY:     Principal Problem:    Femur fracture, right ([address] (12/21/2017)    Active Problems:    HTN (hypertension) (10/21/2014) Rheumatoid arthritis involving multiple sites [facility]) (10/21/2014)      COPD (chronic obstructive pulmonary disease) ([facility] (10/21/2014)      Non-small cell cancer of left lung ([facility] (12/21/2017)      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 No pertinent family history. History reviewed, no pertinent family history.   Social History   Substance Use Topics    Smoking status: Current Every Day Smoker     Packs/day: 1.00    Smokeless tobacco: Never Used    Alcohol use No     Allergies   Allergen Reactions    Pcn [Penicillins] Hives     Tolerated ancef graded challenge 12/21/2017 with no adverse side effects noted      Current Facility-Administered Medications   Medication Dose Route Frequency    0.9% sodium chloride infusion  125 mL/hr IntraVENous CONTINUOUS    sodium chloride (NS) flush 5-10 mL  5-10 mL IntraVENous PRN    acetaminophen (TYLENOL) tablet 650 mg  650 mg Oral Q6H    oxyCODONE IR (ROXICODONE) tablet 5 mg  5 mg Oral Q3H PRN    celecoxib (CELEBREX) capsule 200 mg  200 mg Oral BID    naloxone (NARCAN) injection 0.4 mg  0.4 mg IntraVENous PRN    ondansetron (ZOFRAN) injection 4 mg  4 mg IntraVENous Q4H PRN    prochlorperazine (COMPAZINE) with saline injection 5 mg  5 mg IntraVENous Q6H PRN    hydrOXYzine HCl (ATARAX) tablet 10 mg  10 mg Oral Q8H PRN    famotidine (PEPCID) tablet 20 mg  20 mg Oral BID    senna-docusate (PERICOLACE) 8.6-50 mg per tablet 1 Tab  1 Tab Oral BID    polyethylene glycol (MIRALAX) packet 17 g  17 g Oral DAILY    [START ON 12/24/2017] bisacodyl (DULCOLAX) suppository 10 mg  10 mg Rectal DAILY PRN    aspirin delayed-release tablet 325 mg 325 mg Oral BID    albuterol/ipratropium (DUONEB) neb solution  1 Dose Nebulization QID RT    albuterol-ipratropium (DUO-NEB) 2.5 MG-0.5 MG/3 ML  3 mL Nebulization Q4H PRN    sodium chloride (NS) flush 5-10 mL  5-10 mL IntraVENous Q8H    sodium chloride (NS) flush 5-10 mL  5-10 mL IntraVENous PRN    enoxaparin (LOVENOX) injection 40 mg  40 mg SubCUTAneous Q24H    morphine injection 2 mg  2 mg IntraVENous Q4H PRN    docusate sodium (COLACE) capsule 100 mg  100 mg Oral DAILY PRN    polyethylene glycol (MIRALAX) packet 17 g  17 g Oral DAILY PRN    oxyCODONE IR (ROXICODONE) tablet 10 mg  10 mg Oral Q4H PRN          LAB AND IMAGING FINDINGS:     Lab Results   Component Value Date/Time    WBC 5.8 12/22/2017 12:54 AM    HGB 9.4 12/22/2017 12:54 AM    PLATELET 219 16/17/4800 12:54 AM     Lab Results   Component Value Date/Time    Sodium 132 12/22/2017 12:54 AM    Potassium 4.4 12/22/2017 12:54 AM    Chloride 98 12/22/2017 12:54 AM    CO2 28 12/22/2017 12:54 AM    BUN 7 12/22/2017 12:54 AM    Creatinine 0.75 12/22/2017 12:54 AM    Calcium 8.7 12/22/2017 12:54 AM      Lab Results   Component Value Date/Time    AST (SGOT) 23 12/22/2017 12:54 AM    Alk.  phosphatase 65 12/22/2017 12:54 AM    Protein, total 6.9 12/22/2017 12:54 AM    Albumin 2.0 12/22/2017 12:54 AM    Globulin 4.9 12/22/2017 12:54 AM     Lab Results   Component Value Date/Time    INR 1.1 12/20/2017 11:45 PM    Prothrombin time 11.3 12/20/2017 11:45 PM    aPTT 31.7 11/29/2017 04:26 PM      Lab Results   Component Value Date/Time    Iron 17 11/29/2017 04:26 PM    TIBC 202 11/29/2017 04:26 PM    Iron % saturation 8 11/29/2017 04:26 PM    Ferritin 461 11/29/2017 04:26 PM      No results found for: PH, PCO2, PO2  No components found for: GLPOC   No results found for: CPK, CKMB             Total time: 70 min  Counseling / coordination time, spent as noted above:   > 50% counseling / coordination?:     Prolonged service was provided for  []30 min   []75 min in face to face time in the presence of the patient, spent as noted above. Time Start:   Time End:   Note: this can only be billed with 68804 (initial) or 44495 (follow up). If multiple start / stop times, list each separately.

## 2017-12-22 NOTE — PROGRESS NOTES
12/22/2017 2:47 PM WEEKEND DISCHARGE:    Planning for pt to discharge home on 12/23 with [facility]. [facility] will see pt on 12/26, confirmed with Ortho this is ok. Nursing on day of discharge please call [facility] at [phone] to notify. 2:46 PM [facility] cannot accept pt.   12/22/2017 12:16 PM At [address] accept pt's insurance. Referrals sent to [facility] and [facility] via [employer] [facility]. [facility] is not in network with pt's insurance. RW delivered to pt.    12/22/2017 11:25 AM [facility] health would have a start of care on 12/26. CM sent referral to [facility] [address] to check if start of care could be at an earlier date. CM will follow up.     12/22/2017 10:29 AM Case management consult for home health and rw received. Discussed with PT and OT, recommending home health for discharge. Met with pt and discussed home health and rw. Choices given for home health and dme agencies. Pt selected [facility]. Referral sent to [facility]. RW order sent to [facility]. Pt's RW will be delivered today. CM will follow.  CARMELA Plummer

## 2017-12-22 NOTE — ROUTINE PROCESS
Orthopedic & Surgical Nursing Communication Tool        12/22/2017     Bedside and Verbal shift change report given to [facility] PSIQUIATRICO MYCHAL, RN (incoming nurse) by Tamera Stewart RN (outgoing nurse) on Tabby Mejía a 78 y.o. male who was admitted on 12/20/2017  8:51 PM. Report included the following information SBAR, Procedure Summary, Intake/Output and MAR. Opportunity for questions and clarifications were given to the incoming nurse. Patient's bed is in low position, side rails x3, bed alarm on, call bell within reach and patient not in distress. Hourly rounding performed throughout shift.     Tamera Stewart RN

## 2017-12-22 NOTE — PROGRESS NOTES
Problem: Self Care Deficits Care Plan (Adult)  Goal: *Acute Goals and Plan of Care (Insert Text)  Occupational Therapy Goals  Initiated 12/22/2017  1. Patient will perform lower body ADLs with AE as needed and supervision/set-up within 7 day(s). 2.  Patient will perform upper body ADLs standing 5 mins without fatigue or LOB with supervision/set-up within 7 day(s). 3.  Patient will perform toilet transfer with supervision/set-up using RW within 7 day(s). 4.  Patient will perform all aspects of toileting with supervision/set-up within 7 day(s). 5.  Patient will participate in upper extremity therapeutic exercise/activities with independence for 10 minutes within 7 day(s). 6.  Patient will utilize energy conservation techniques during functional activities without cues within 7 day(s). Occupational Therapy EVALUATION  Patient: Sarah Guerra (46 y.o. male)  Date: 12/22/2017  Primary Diagnosis: HIP FRACTURE, RIGHT  Femur fracture, right (HCC)  Procedure(s) (LRB):  HIP HEMIARTHROPLASTY, RIGHT; ANTERIOR APPROACH (Right) 1 Day Post-Op   Precautions:  Fall, WBAT, Total hip (anterior)    ASSESSMENT :  Based on the objective data described below, the patient presents with decreased endurance and safety following R anterior THR following fall at home with resulting hip fx. In addition pt was dx last week with metastatic lung CA. He currently requires up to min A for LE ADLs and toileting and CGA for functional mobility using RW to amb. His 27 y/o grandson resides with him and per pt can assist 24/7 as needed. Recommend HH therapy at discharge. Pt stated he does not want rehab. Patient will benefit from skilled intervention to address the above impairments.   Patients rehabilitation potential is considered to be Good  Factors which may influence rehabilitation potential include:   [x]                None noted  []                Mental ability/status  []                Medical condition  [] Home/family situation and support systems  []                Safety awareness  []                Pain tolerance/management  []                Other:      PLAN :  Recommendations and Planned Interventions:  [x]                  Self Care Training                  [x]           Therapeutic Activities  [x]                  Functional Mobility Training    []           Cognitive Retraining  [x]                  Therapeutic Exercises           [x]           Endurance Activities  [x]                  Balance Training                   []           Neuromuscular Re-Education  []                  Visual/Perceptual Training     [x]      Home Safety Training  [x]                  Patient Education                 [x]           Family Training/Education  []                  Other (comment):    Frequency/Duration: Patient will be followed by occupational therapy 5 times a week to address goals. Discharge Recommendations: Home Health  Further Equipment Recommendations for Discharge: RW     SUBJECTIVE:   Patient stated Don't ever smoke.     OBJECTIVE DATA SUMMARY:   HISTORY:   Past Medical History:   Diagnosis Date    HTN (hypertension) 10/21/2014    Rheumatoid arthritis(714.0) 10/21/2014    Skin cancer     Sun-damaged skin     Sunburn, blistering      Past Surgical History:   Procedure Laterality Date    HX OTHER SURGICAL      Hemmorhiodectomy       Prior Level of Function/Environment/Context: Independent, drives, active  Home Situation  Home Environment: Apartment  # Steps to Enter: 0  One/Two Story Residence: One story  Living Alone: No  Support Systems: Family member(s) (30 y/o grandson lives with pt)  Patient Expects to be Discharged to[de-identified] Apartment  Current DME Used/Available at Home: None  Tub or Shower Type: Tub/Shower combination  [x]  Right hand dominant   []  Left hand dominant    EXAMINATION OF PERFORMANCE DEFICITS:  Cognitive/Behavioral Status:  Neurologic State: Alert; Appropriate for age  Orientation Level: Oriented X4  Cognition: Appropriate decision making; Appropriate for age attention/concentration; Follows commands  Perception: Appears intact  Perseveration: No perseveration noted  Safety/Judgement: Awareness of environment;Driving appropriateness; Fall prevention;Home safety; Insight into deficits    Hearing: Auditory  Auditory Impairment: None    Vision/Perceptual:       Acuity: Impaired near vision    Corrective Lenses: Reading glasses    Range of Motion:  AROM: Generally decreased, functional  PROM: Generally decreased, functional                      Strength:  Strength: Generally decreased, functional                Coordination:  Coordination: Within functional limits  Fine Motor Skills-Upper: Left Intact; Right Intact    Gross Motor Skills-Upper: Left Intact; Right Intact    Tone & Sensation:  Tone: Normal  Sensation: Intact                      Balance:  Sitting: Intact  Standing: Intact; With support (RW)    Functional Mobility and Transfers for ADLs:  Bed Mobility:  Supine to Sit: Supervision; Additional time  Sit to Supine: Supervision; Additional time  Scooting: Supervision    Transfers:  Sit to Stand: Stand-by asssistance;Assist x1;Additional time (RW in front of him)  Bed to Chair: Stand-by asssistance; Additional time (using RW)  Toilet Transfer : Stand-by asssistance; Additional time (RW)    ADL Assessment:  Feeding: Independent    Oral Facial Hygiene/Grooming: Stand-by assistance; Additional time (RW standing at sink)    Bathing: Contact guard assistance; Additional time;Assist x1 (seated bending forward to reach feet)    Upper Body Dressing: Setup; Additional time    Lower Body Dressing: Contact guard assistance; Additional time (seated bending forward to reach feet)    Toileting: Stand by assistance; Additional time (using RW for safety with standing)                ADL Intervention and task modifications:  Cognitive Retraining  Safety/Judgement: Awareness of environment;Driving appropriateness; Fall prevention;Home safety; Insight into deficits    Educated patient on energy conservation techniques, strategies to maximize quality of life and decrease stress/anxiety. 1. Deep breathing  2. Educated on pacing and making sure he/she takes short frequent breaks (e.g. In the shower wash the upper body, rest for 1 minute, then wash the lower body, etc)  3. Educated on using cooler water in the shower so as to not get fatigued from the heat  4. Educated on drying off by using a arsen cloth robe  5. Educated on re-arranging his/her routine to allow for rest breaks in the morning routine  6. Educated on using a mantra and medication to decrease feelings of anxiety, especially when short of breath  7. Educated on looking at the consequences of his/her actions before deciding he/she needs to take on a task (e.g not getting down on one's hands and knees to wash floors because it will take all of one's energy for the day and result in exhaustion). 8. Educated on DME used to help conserve energy, such as a shower seat, a stool or chair in the kitchen, and pushing or pulling items instead of carrying them. 9. Educated on fall alert necklaces/bracelets to increase safety    Bathing: Patient instructed and indicated understanding, when bathing to not submerge wound in water, stand to shower or sponge bathe, cover wound with plastic and tape to ensure no water reaches bandage/wound without cues. Dressing joint: Patient instructed to don/doff RLE first/last.  Patient instructed and indicated understanding to don all clothing while sitting prior to standing, doff all clothing to knees while standing, then sit to doff clothing off from knees to feet in order to facilitate fall prevention, pain management, and energy conservation.    Home safety: Patient instructed and indicated understanding on home modifications and safety (raise height of ADL objects, appropriate height of chair surfaces, recliner safety, change of floor surfaces, clear pathways) to increase independence and fall prevention. Standing: Patient instructed to walk up to sink/counter top/surfaces, step into walker to increase safety of joint and fall prevention. Patient educated about hip anatomy verbally and with pictures and educated to avoid external rotation of R LE. Instructed to apply concept to ADLs within the home (no reaching across body to L side, square off while using objects, slide objects along surfaces). Patient instructed to increase amount of time standing, observe standing position during ADLs in order to increase even weight bearing through bilateral LEs in order to increase independence with ADLs. Goal to be reached 30 days post - op, per orthopedic surgeon or per PT. Patient indicated understanding. Tub/shower transfer: Patient instructed and indicated understanding regarding when it is safe to begin transfer into tub/shower (complete stairs with PT, advance exercises with PT high enough to clear tub/shower height). Patient instructed to use the same technique as used with stairs when entering and exiting tub/shower (\"up with the non-surgical, down with the surgical leg\"). Functional Measure:  Barthel Index:    Bathin  Bladder: 10  Bowels: 10  Groomin  Dressin  Feeding: 10  Mobility: 0  Stairs: 0  Toilet Use: 5  Transfer (Bed to Chair and Back): 10  Total: 55       Barthel and G-code impairment scale:  Percentage of impairment CH  0% CI  1-19% CJ  20-39% CK  40-59% CL  60-79% CM  80-99% CN  100%   Barthel Score 0-100 100 99-80 79-60 59-40 20-39 1-19   0   Barthel Score 0-20 20 17-19 13-16 9-12 5-8 1-4 0      The Barthel ADL Index: Guidelines  1. The index should be used as a record of what a patient does, not as a record of what a patient could do. 2. The main aim is to establish degree of independence from any help, physical or verbal, however minor and for whatever reason.   3. The need for supervision renders the patient not independent. 4. A patient's performance should be established using the best available evidence. Asking the patient, friends/relatives and nurses are the usual sources, but direct observation and common sense are also important. However direct testing is not needed. 5. Usually the patient's performance over the preceding 24-48 hours is important, but occasionally longer periods will be relevant. 6. Middle categories imply that the patient supplies over 50 per cent of the effort. 7. Use of aids to be independent is allowed. Shannan Preston., Barthel, D.W. ([id number]). Functional evaluation: the Barthel Index. [facility] (14)2. Milvia Ban carrie [place], J.J.M.F, Saturnino Mccullough., Diane Erickson., [place], [address] [place] [address] (1999). Measuring the change indisability after inpatient rehabilitation; comparison of the responsiveness of the Barthel Index and Functional Shorter Measure. Journal of Neurology, Neurosurgery, and Psychiatry, 66(4), 577-184. Aron Valderrama, N.J.A, CHRISTA Tovar, & Kylie Buitrago MALYSA. (2004.) Assessment of post-stroke quality of life in cost-effectiveness studies: The usefulness of the Barthel Index and the EuroQoL-5D. Quality of Life Research, 13, 376-68       G codes: In compliance with CMSs Claims Based Outcome Reporting, the following G-code set was chosen for this patient based on their primary functional limitation being treated: The outcome measure chosen to determine the severity of the functional limitation was the Barthel Index with a score of 55/100 which was correlated with the impairment scale. ?  Self Care:     - CURRENT STATUS: CK - 40%-59% impaired, limited or restricted    - GOAL STATUS: CJ - 20%-39% impaired, limited or restricted    - D/C STATUS:  ---------------To be determined---------------     Occupational Therapy Evaluation Charge Determination   History Examination Decision-Making   LOW Complexity : Brief history review  LOW Complexity : 1-3 performance deficits relating to physical, cognitive , or psychosocial skils that result in activity limitations and / or participation restrictions  LOW Complexity : No comorbidities that affect functional and no verbal or physical assistance needed to complete eval tasks       Based on the above components, the patient evaluation is determined to be of the following complexity level: LOW     Pain:  Pain Scale 1: Numeric (0 - 10)  Pain Intensity 1: 2              Activity Tolerance:   Fair  Please refer to the flowsheet for vital signs taken during this treatment. After treatment:   [x] Patient left in no apparent distress sitting up in chair  [] Patient left in no apparent distress in bed  [x] Call bell left within reach  [x] Nursing notified  [] Caregiver present  [x] Bed alarm activated    COMMUNICATION/EDUCATION:   The patients plan of care was discussed with: Registered Nurse and Rehabilitation Attendant. [x]    Home safety education was provided and the patient/caregiver indicated understanding. [x]    Patient/family have participated as able in goal setting and plan of care. [x]    Patient/family agree to work toward stated goals and plan of care. []    Patient understands intent and goals of therapy, but is neutral about his/her participation. []    Patient is unable to participate in goal setting and plan of care. This patients plan of care is appropriate for delegation to [facility].     Thank you for this referral.  Anne Camacho OT  Time Calculation: 28 mins

## 2017-12-22 NOTE — DISCHARGE INSTRUCTIONS
After Hospital Care Plan:  Discharge Instructions Anterior Approach   Partial Hip Replacement-Dr. Chowdhury  Patient Name:John D Bernis Spatz  Date of procedure:12/21/2017    Procedure:Procedure(s):  HIP HEMIARTHROPLASTY, RIGHT; ANTERIOR APPROACH  Surgeon:Surgeon(s) and Role:     * Hunter Olivares MD - Primary   PCP: Catarina Dunn MD  Date of discharge: No discharge date for patient encounter. Follow up appointments  -follow up with Dr. Krista Montalvo in 4 weeks. Call [phone] extension [phone] to make an appointment. [facility]: _______________________   phone: _____________________  The agency will contact you to arrange dates/times for visits. Please call them if you do not hear from them within 24 hours after you are discharged    When to call your Orthopaedic Surgeon:  -Signs of hip dislocation-increased hip pain, unrelieved pain or if you have difficulty or are unable to walk  -Signs of infection-if your incision is red; continues to have drainage; drainage has a foul odor or if you have a persistent fever over 101 degrees  -Signs of a blood clot in your leg-calf pain, tenderness, redness, swelling of lower leg    When to call your Primary Care Physician:  -Concerns about medical conditions such as diabetes, high blood pressure, asthma, congestive heart failure  -Call if blood sugars are elevated, persistent headache or dizziness, coughing or congestion, constipation or diarrhea, burning with urination, abnormal heart rate    When to call [id number] go to the nearest emergency room  -acute onset of chest pain, shortness of breath, difficulty breathing    Activity  - weight bearing as tolerated with walker or crutches.  Refer to pages 26-36 of your handbook for instructions and pictures  -20 repetitions of each exercise 3 times each day as instructed by the physical therapist.  Refer to pages 38-45 of our handbook for instructions and pictures  -get up every one hour and walk (except at night when sleeping)  -Avoid extreme movement of hip to prevent dislocation  -Do not drive or operate heavy machinery. Incision Care  -the Aquacel (brown, waterproof) surgical dressing is to remain on your hip for 7 days. On the 7th day have someone gently peel the dressing off by carefully lifting the edge and stretching it slightly to break the adhesive seal and leave incision open to air. You may take a shower with the Aquacel dressing in place. Preventing blood clots   - Take aspirin 325 mg twice daily to prevent blood clots. Pain management  - Please take scheduled 650 mg tylenol every 6 hours for 4 weeks  - For breakthrough please take 5-10 mg oxycodone     - Avoid alcoholic beverages while taking pain medication  - Do not take any over-the-counter medication for pain except Tylenol (acetaminophen)  - Please be aware that many medications contain Tylenol. We do not want you to over medicate so please read the information below as a guide. Do not take more than 4 Grams of Tylenol in a 24 hour  - You may place an ice bag on your knee for 15-20 minutes after exercising and as needed throughout the day and night      Diet  -resume usual diet; drink plenty of fluids; eat foods high in fiber  -Please take a stool softener (such as Senokot-S or Colace) to prevent constipation while you are takingoxycodone. If constipation occurs, take a laxative (such as Dulcolax tablets, Milk of Magnesia, or a suppository).     Home Health Care or Rehab Protocol (to be followed by Frederick Neves)      Physical Therapy-per physicians order    Weight bearing status:  Precautions at Admission: Fall, WBAT          Mobility Status:  Supine to Sit: Supervision, Additional time  Sit to Stand: Contact guard assistance, Supervision  Sit to Supine: Supervision, Additional time  Bed to Chair: Stand-by asssistance, Additional time (using RW)    Gait:  Distance (ft): 200 Feet (ft)  Ambulation - Level of Assistance: Supervision, Contact guard assistance  Assistive Device: Gait belt, Walker, rolling  Gait Abnormalities: Step to gait, Altered arm swing    ADL status overall composite: Toilet Transfer : Stand-by asssistance, Additional time (RW)       Physical Therapy  -assessment and evaluation-bed mobility; functional transfers (bed, chair, bathroom, stairs); ambulation with equipment, car transfers, safety and ability to get out of house in the event of an emergency  -review and maintain weight bearing as tolerated; avoid extreme movement of hip to prevent hip dislocation  -discuss pain management  -review how to do ADLs. Refer to pages 46-50 of handbook    -Home Exercise Program-refer to pages 38-45 of handbook  -supine exercises-quad sets, hamstring sets, gluteal sets, hip abduction/adduction slides, hip external rotation, heel slides (flexing the knee)  -sitting exercises-ankle pumps, bicep curls (use weights as appropriate), triceps pushups, shoulder flexion (use weights as appropriate)  -standing exercises holding onto supportive counter-toe raises, heel raises, mini-squats, heel/toe touches with knee bend, marching in place, hip abduction/adduction, hip external rotation, hip extension, hip abduction/extension/external rotation (concentration on gluteus jones), heel toe taps    Physical therapy goals by discharge from [facility] [address] ambulation with walker, crutches or cane if needed (level surfaces and   stairs)  -Daily performance of home exercise program  -Independent with stair climbing and car transfers  -Progress to community ambulator (least restrictive assistive device)        HOME DISCHARGE INSTRUCTIONS    Alicia Fox / [id number] : 1940    Admission date: 2017 Discharge date: 2017     Please bring this form with you to show your care provider at your follow-up appointment.     Primary care provider:  Ramonita Zimmerman MD    Discharging provider:  Libia Aragon MD  - Family Medicine Resident  Dr. Liza Collins - Attending, Family Medicine     You have been admitted to the hospital with the following diagnoses:    ACUTE DIAGNOSES:  HIP FRACTURE, RIGHT  Femur fracture, right ([address]  . . . . . . . . . . . . . . . . . . . . . . . . . . . . . . . . . . . . . . . . . . . . . . . . . . . . . . . . . . . . . . . . . . . . . . . [place] FOLLOW-UP CARE RECOMMENDATIONS:    Appointments  Follow-up Information     Follow up With Details Comments [address] MD Abram Schedule an appointment as soon as possible for a visit To discuss your cancer therapy [address]  [address] [facility]  [phone]      [address] [facility] [address]  1[address] [facility] [address]  [address] [place] [address] MD Isidro Schedule an appointment as soon as possible for a visit To discuss COPD and for outpatient lung function tests [place] [address] [id number] [place] [address]      Sergio Ferreira MD Schedule an appointment as soon as possible for a visit in 4 weeks Call [phone] [facility] [phone]  [address]   [phone]             Please follow up with your PCP regardin. Lung cancer  2. Your Advanced Medical Directive paperwork - please complete these as soon as possible so your wishes can be followed in the event that you are unable to make your own decisions  3. Make sure your pain after your surgery is under control     MEDICATION CHANGES:  1. Stiolto - 2 puffs every day (at the same time). Per lung doctor recommendations. 2. Take Aspirin 325mg twice daily to prevent blood clots  3. Take tylenol 650mg tylenol every 6 hours for 4 weeks for pain, and for any breakthrough pain take roxicodone 1-2 tablets every 4hours as needed.     Follow-up tests needed: Bone scan (per Dr. Crow Doherty) for staging of your cancer Pending test results: At the time of your discharge the following test results are still pending: none. Please make sure you review these results with your outpatient follow-up provider(s). Specific symptoms to watch for: chest pain, shortness of breath, fever, chills, nausea, vomiting, diarrhea, change in mentation, falling, weakness, bleeding. DIET/what to eat:  Regular Diet    ACTIVITY:  Activity as tolerated    Wound care: None    Equipment needed:  None    What to do if new or unexpected symptoms occur? If you experience any of the above symptoms (or should other concerns or questions arise after discharge) please call your primary care physician. Return to the emergency room if you cannot get hold of your doctor. · It is very important that you keep your follow-up appointment(s). · Please bring discharge papers, medication list (and/or medication bottles) to your follow-up appointments for review by your outpatient provider(s). · Please check the list of medications and be sure it includes every medication (even non-prescription medications) that your provider wants you to take. · It is important that you take the medication exactly as they are prescribed. · Keep your medication in the bottles provided by the pharmacist and keep a list of the medication names, dosages, and times to be taken in your wallet. · Do not take other medications without consulting your doctor. · If you have any questions about your medications or other instructions, please talk to your nurse or care provider before you leave the hospital.     Information obtained by:     I understand that if any problems occur once I am at home I am to contact my physician. These instructions were explained to me and I had the opportunity to ask questions. I understand and acknowledge receipt of the instructions indicated above. Physician's or R.N.'s Signature                                                                  Date/Time                                                                                                                                              Patient or Representative Signature                                                          Date/Time

## 2017-12-22 NOTE — PROGRESS NOTES
Problem: Mobility Impaired (Adult and Pediatric)  Goal: *Acute Goals and Plan of Care (Insert Text)  Physical Therapy Goals  Initiated 12/22/2017    1. Patient will move from supine to sit and sit to supine  in bed with independence within 4 days. 2. Patient will perform sit to stand with modified independence within 4 days. 3. Patient will ambulate with modified independence for 200 feet with the least restrictive device within 7 days. 4. Patient will verbalize and demonstrate understanding of anerior precautions per protocol within 7 days. 6. Patient will perform home exercise program per protocol with supervision/set-up within 7 days. physical Therapy TREATMENT  Patient: Swathi Vaughn (45 y.o. male)  Date: 12/22/2017  Diagnosis: HIP FRACTURE, RIGHT  Femur fracture, right (HCC) Femur fracture, right (HCC)  Procedure(s) (LRB):  HIP HEMIARTHROPLASTY, RIGHT; ANTERIOR APPROACH (Right) 1 Day Post-Op  Precautions: Fall, WBAT, Total hip (anterior)    ASSESSMENT:  He currently reports more pain this afternoon, 1 out of 10 pain with mobility. Noted that patient does not have anterior hip precautions and reviewed this with the patient and amended his home instruction sheet. Performed anterior hip exercises in sitting for range of motion and strengthening. Also performed transfer and gait training in the hallwy with the rolling walker. Patient requires cues for safer hand placement on the walker and for walker distance. Also reviewed well leg lift technique using a belt for back to bed.   Recommend HH therapy at discharge. Recommend review of transfer technique of in/out of bed and review safe sit <> stand technique and walker use to proper hand placement to decrease fall risk.   Progression toward goals:  [x]    Improving appropriately and progressing toward goals  []    Improving slowly and progressing toward goals  []    Not making progress toward goals and plan of care will be adjusted     PLAN:  Patient continues to benefit from skilled intervention to address the above impairments. Continue treatment per established plan of care. Discharge Recommendations:  Home Health  Further Equipment Recommendations for Discharge:  Already received a walker     SUBJECTIVE:   Patient stated I've been sitting up since this morning.     OBJECTIVE DATA SUMMARY:   Critical Behavior:  Neurologic State: Alert, Appropriate for age  Orientation Level: Oriented X4  Cognition: Appropriate decision making, Appropriate for age attention/concentration, Follows commands  Safety/Judgement: Awareness of environment, Driving appropriateness, Fall prevention, Home safety, Insight into deficits  Functional Mobility Training:  Bed Mobility:     Supine to Sit: Supervision; Additional time  Sit to Supine: Supervision; Additional time  Scooting: Supervision        Transfers:  Sit to Stand: Stand-by asssistance  Stand to Sit: Stand-by asssistance        Bed to Chair: Stand-by asssistance; Additional time (using RW)                    Balance:  Sitting: Intact  Standing: Intact; With support (RW)  Ambulation/Gait Training:  Distance (ft): 120 Feet (ft)  Assistive Device: Walker, rolling;Gait belt  Ambulation - Level of Assistance: Stand-by asssistance        Gait Abnormalities: Step to gait        Base of Support: Widened;Shift to left                             Stairs:          Pain:  Pain Scale 1: Numeric (0 - 10)  Pain Intensity 1: 0              Activity Tolerance:   Some increased pain with mobility - patient states \"it is bearable. \"  Please refer to the flowsheet for vital signs taken during this treatment.   After treatment:   []    Patient left in no apparent distress sitting up in chair  [x]    Patient left in no apparent distress in bed  [x]    Call bell left within reach  [x]    Nursing notified  []    Caregiver present  []    Bed alarm activated    COMMUNICATION/COLLABORATION:   The patients plan of care was discussed with: Registered Nurse and     Santaan Castillo, PT   Time Calculation: 29 mins

## 2017-12-22 NOTE — PROGRESS NOTES
[facility] [place] [facility]  [address] [facility], [address] [facility]  Andrew Frater: [phone]  F: [phone]      Reason for Visit:   Brad Garcia is a 78 y.o. male who is seen for follow up of lung cancer. Oncology History:   · Low-dose CT Chest 11/22/2017: Probably left upper lobe mass obstructing left upper lobe bronchus, near complete atelectasis of FANY, probably left hilar adenopathy. 3.9cm left adrenal nodule. Moderate centrilobular emphysema. · CT C/A/P 12/6/2017: Left adrenal metastasis. Large mass in the left upper lobe versus markedly atelectatic lung from central tumor. 7 mm nonspecific pleural-based nodule in the left lower lobe. Extensive emphysematous change. · CT guided biopsy of left adrenal mass 12/15/2017: Metastatic squamous cell carcinoma, PDL1 pending  · Stage IV Non-small cell lung cancer    Interval History:   He had surgery yesterday for his hip fracture. He reports feeling much better today. Stood up for awhile and did well. Minimal pain, except some chronic back pain. Energy fair. Cough better. He has given more thought to our discussion yesterday. He is now leaning towards trying some treatment for his cancer. Medications reviewed in the EMR. Allergies   Allergen Reactions    Pcn [Penicillins] Hives     Tolerated ancef graded challenge 12/21/2017 with no adverse side effects noted        Review of Systems: A 6-point review of systems was obtained, negative except as reviewed in the HPI.     Physical Exam:     Visit Vitals    /72 (BP 1 Location: Right arm, BP Patient Position: At rest)    Pulse (!) 104    Temp 99 °F (37.2 °C)    Resp 30  Comment: rn notified    Ht 5' 10\" (1.778 m)    Wt 184 lb (83.5 kg)    SpO2 93%    BMI 26.4 kg/m2     General: No distress, elderly male  Eyes: Anicteric sclerae  HENT: Atraumatic  Neck: Supple  Respiratory: Normal respiratory effort  CV: No peripheral edema  GI: Soft, nontender, nondistended, no masses, no hepatomegaly, no splenomegaly  Skin: No rashes, ecchymoses, or petechiae  Psych: Alert, oriented, appropriate affect, normal judgment/insight      Results:     Lab Results   Component Value Date/Time    WBC 5.8 12/22/2017 12:54 AM    HGB 9.4 12/22/2017 12:54 AM    HCT 29.5 12/22/2017 12:54 AM    PLATELET 115 28/24/2140 12:54 AM    MCV 88.1 12/22/2017 12:54 AM    ABS. NEUTROPHILS 4.7 12/22/2017 12:54 AM     Lab Results   Component Value Date/Time    Sodium 132 12/22/2017 12:54 AM    Potassium 4.4 12/22/2017 12:54 AM    Chloride 98 12/22/2017 12:54 AM    CO2 28 12/22/2017 12:54 AM    Glucose 95 12/22/2017 12:54 AM    BUN 7 12/22/2017 12:54 AM    Creatinine 0.75 12/22/2017 12:54 AM    GFR est AA >60 12/22/2017 12:54 AM    GFR est non-AA >60 12/22/2017 12:54 AM    Calcium 8.7 12/22/2017 12:54 AM     Lab Results   Component Value Date/Time    Bilirubin, total 0.4 12/22/2017 12:54 AM    ALT (SGPT) 16 12/22/2017 12:54 AM    AST (SGOT) 23 12/22/2017 12:54 AM    Alk. phosphatase 65 12/22/2017 12:54 AM    Protein, total 6.9 12/22/2017 12:54 AM    Albumin 2.0 12/22/2017 12:54 AM    Globulin 4.9 12/22/2017 12:54 AM     Lab Results   Component Value Date/Time    Reticulocyte count 0.8 11/29/2017 04:26 PM    Iron % saturation 8 11/29/2017 04:26 PM    TIBC 202 11/29/2017 04:26 PM    Ferritin 461 11/29/2017 04:26 PM    Vitamin B12 858 11/29/2017 04:26 PM    Folate 16.0 11/29/2017 04:26 PM    TSH 1.780 11/13/2017 02:49 PM     Lab Results   Component Value Date/Time    INR 1.1 12/20/2017 11:45 PM    aPTT 31.7 11/29/2017 04:26 PM     Lab Results   Component Value Date/Time    Prostate Specific Ag 3.6 02/09/2016 02:45 PM         12/20/2017 XR CHEST  IMPRESSION:  Left upper lobe mass similar to prior study. No new abnormality. 12/20/2017 Femur RT  IMPRESSION:  Displaced, rotated, angulated right femoral neck fracture. Assessment and Recommendations:   1. Right Femoral neck fx  S/p GLF.   Now s/p sha 12/21/2017. Post-op care per ortho. 2. Metastatic Non-small cell lung cancer (Squamous Cell Carcinoma)  His cancer is not curable and management is with palliative intent. He is now interested in receiving some palliative systemic therapy. PDL1 status on his pathology is pending and will help to guide treatment options (ie immunotherapy vs chemotherapy). My recommendation is to complete his staging evaluation with an MRI brain. A bone scan would be helpful as well, given the recent fracture. He would like to try to get the MRI today, but hold off on bone scan until outpatient. We also discussed port placement, but he would like to hold off on this for now as well, until after he has had a chance to discuss with his family. We will arrange for outpatient follow up to discuss therapy further. 3.COPD  Noted on CT. He is rather symptomatic from this, along with his lung mass. Outpatient plan had been to refer him to pulmonary to assist in managing these symptoms, adjusting meds, etc.  Now that he may need to go to rehab for awhile, I would like them to weigh in before discharge. (From oncology standpoint we do not need any additional tissue for diagnostic purposes). Discussed with Dr. Salinas Block. 4.Symptom Management/Goals of care  Outpatient plan had been to refer to palliative care to assist in symptom management. Now that he may to go to rehab for awhile, I would like them to weigh in before discharge. 5. Constipation  Bowel regimen in place      6. Anemia, normocytic  Labs most consistent with anemia of chronic disease. Monitor. 7. Thrombocytopenia  Mild. New 52/58/7944, uncertain significance. Possibly secondary to consumption with surgery yesterday? Monitor, consider further testing if this progresses. 8. Dispo  PT, OT planned.   If he needs rehab, I am hoping it can be a short stay, as we don't want to delay initiation of his cancer treatment any more than necessary. Over the long holiday weekend my colleague Dr. Dakota Klein is covering. I will return on Tuesday. If the patient is discharged before then, we will work on arranging follow up in the office once he is out of rehab.       Signed By: Zoey Sibley MD

## 2017-12-22 NOTE — PROGRESS NOTES
Signed MRI checklist form printed and placed on hard copy chart as unable to locate functioning signature pad.

## 2017-12-22 NOTE — PROGRESS NOTES
No new complaints overnight. Denies chest pain or shortness of breath. Tolerating diet well      GEN:  NAD.  AOx3   ABD:  S/NT/ND   RLE:  Dressing C/D/I , 5/5 motor, Calf nttp (Bilat), Sensation rossly intact to light touch throughout, 1+ dp/pt pulses, foot perfused    Patient Vitals for the past 24 hrs:   Temp Pulse Resp BP SpO2   12/22/17 0423 - - - - 93 %   12/22/17 0417 99 °F (37.2 °C) (!) 104 30 121/72 94 %   12/22/17 0041 97.9 °F (36.6 °C) 99 24 112/74 -   12/21/17 2236 - - - - 98 %   12/21/17 1943 97.8 °F (36.6 °C) 89 18 124/77 97 %   12/21/17 1839 - - - - 95 %   12/21/17 1835 97.7 °F (36.5 °C) 85 18 109/75 (!) 89 %   12/21/17 1740 97.4 °F (36.3 °C) 86 15 132/67 99 %   12/21/17 1648 97.6 °F (36.4 °C) 89 14 122/64 99 %   12/21/17 1555 - - - - 92 %   12/21/17 1528 97.9 °F (36.6 °C) 84 18 115/63 97 %   12/21/17 1500 - 88 26 109/75 100 %   12/21/17 1450 - 89 23 126/72 100 %   12/21/17 1445 - 91 20 122/71 100 %   12/21/17 1440 - 96 28 113/69 100 %   12/21/17 1435 - 96 16 100/55 (!) 88 %   12/21/17 1433 98.4 °F (36.9 °C) 97 26 112/70 97 %   12/21/17 1145 - (!) 106 - 129/86 -   12/21/17 1013 98.9 °F (37.2 °C) (!) 101 22 144/85 90 %   12/21/17 0819 98.5 °F (36.9 °C) 90 19 140/83 92 %       Current Facility-Administered Medications   Medication Dose Route Frequency    albuterol-ipratropium (DUO-NEB) 2.5 MG-0.5 MG/3 ML  3 mL Nebulization Q4H PRN    sodium chloride (NS) flush 5-10 mL  5-10 mL IntraVENous Q8H    sodium chloride (NS) flush 5-10 mL  5-10 mL IntraVENous PRN    enoxaparin (LOVENOX) injection 40 mg  40 mg SubCUTAneous Q24H    morphine injection 2 mg  2 mg IntraVENous Q4H PRN    0.9% sodium chloride infusion  125 mL/hr IntraVENous CONTINUOUS    docusate sodium (COLACE) capsule 100 mg  100 mg Oral DAILY PRN    polyethylene glycol (MIRALAX) packet 17 g  17 g Oral DAILY PRN    ceFAZolin in 0.9% NS (ANCEF) IVPB soln 2 g  2 g IntraVENous Q8H    oxyCODONE IR (ROXICODONE) tablet 5 mg  5 mg Oral Q4H PRN    oxyCODONE IR (ROXICODONE) tablet 10 mg  10 mg Oral Q4H PRN       Lab Results   Component Value Date/Time    HGB 9.4 12/22/2017 12:54 AM    INR 1.1 12/20/2017 11:45 PM       Lab Results   Component Value Date/Time    Sodium 132 12/22/2017 12:54 AM    Potassium 4.4 12/22/2017 12:54 AM    Chloride 98 12/22/2017 12:54 AM    CO2 28 12/22/2017 12:54 AM    BUN 7 12/22/2017 12:54 AM    Creatinine 0.75 12/22/2017 12:54 AM    Calcium 8.7 12/22/2017 12:54 AM            78 y.o. male s/p right direct anterior partial hip arthroplasty on 12/21/2017  . Doing well.      Precautions: None  ABX: Complete 24 hours perioperative abx  PATHWAY: Straight bath per protocol  DVT Prophylaxis:  mg enteric coated BID  Weight Bearing: WBAT RLE   Pain Control: Diclofenac, Tylenol, scheduled tramadol, oxy 5 mg for breakthrough, dilaudid IV 0.5 mg for secondary breakthrough  Disposition: Home, HHPT  Vs Rehab

## 2017-12-22 NOTE — PROGRESS NOTES
[address] [place] [address]   Office [phone]  Fax [phone]          Assessment and Plan     Marj Majano is a 78 y.o. male with known RA, HTN, COPD, and recently diagnosed of lung with metastases to adrenal gland. He has spent 1 night(s) in the hospital.    24 Hour Events:   - s/p R hip hemiarthroplasty 12/21, POD 1  - Seen by heme/onc yesterday - MRI brain today for staging  - Pulm and palliative to see today    R femoral neck fracture s/p R hip hemiarthroplasty 12/21: X-ray of R hip and R femur confirmed displaced, rotated, angulated femoral neck fracture on admission.  -Morphine 2mg Q 4hrs PRN, roxicodone 10 q4h PRN, roxicodone 5mg q4h PRN. Pain control per ortho. 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 Patient has adrenal mass that was recently biopsied and found positive for SCC. Sees Dr. Zeina Muller outpatient. Noncurative, treatment would be with palliative intent. - Heme/Onc consulted, have ordered MRI brain for staging.   - Pulm consulted for lung mass complicated by COPD. Was to see outpatient but now consulted to see while inpatient. - Palliative consulted for recent diagnosis. Was to see outpatient but now consulted to see while he is here.      COPD: Patient reports history of COPD/Emphysema but has not had formal PFTs. He reports taking duonebs PRN at home for SOB. -continue home duonebs PRN   -Pulm consulted, appreciate [facility].      Hypertension: Patient reports previously being diagnosed with HTN but stopped taking his medication on his own. He does not recall what medication he was previously prescribed. - BP Normotensive at this time  - Will continue to monitor BPs     Osteoporosis: Patient had a hip fracture following a GLF with likely underlying osteoporosis.    - Consider calcium and vitamin D supplementation, bone scan - will defer to heme/onc. Hyponatremia chronic, POA Na 128, this morning 132. - Likely related to [facility]. Serum osm 276, [place] and [facility] are pending. Anemia - likely of chronic disease.   - Iron panel 17 shows low iron (17), low TIBC (202), low % sat (8), with elevated ferritin (461)  - heme/onc following    FEN/GI - Full liquids. Advance as tolerated. Activity - Ambulate with assistance (PT/OT)  DVT prophylaxis - Lovenox  GI prophylaxis - Not indicated at this time  Fall prophylaxis - Not indicated at this time. Unit - Surgical  Admission Status - Admitted  IV Access - Peripheral x 1  Disposition - Plan to d/c to D. (PT/OT)  Code Status - Full    I appreciate the opportunity to participate in the care of this patient,  Tres Hassan MD  Family Medicine Resident         Subjective / Objective     Subjective  Temp (24hrs), Av.1 °F (36.7 °C), Min:97.4 °F (36.3 °C), Max:99 °F (37.2 °C)     Patient states he is doing okay, is on 2L O2 and says it helps his breathing. Was not on any O2 at home. Likes the breathing treatments, says he does not know he is having troubl ebreathing until he feels better after the treatments. Denies current SOB or CP, denies any surgical hip pain. Says he stood up twice overnight w help of nursing and had no pain. Denies N/V, abdominal pain. Passing a lot of flatus. Has not had a BM. Has regained sensation and motor function in his legs. Objective:  General Appearance:  Comfortable, well-appearing and in no acute distress. Vital signs: (most recent): Blood pressure 121/72, pulse (!) 104, temperature 99 °F (37.2 °C), resp. rate 30, height 5' 10\" (1.778 m), weight 184 lb (83.5 kg), SpO2 93 %. Lungs:  Normal respiratory rate and normal effort. (Patient with slightly increased WOB after leaning forward for lung exam. Transmitted upper airway sounds. )  Heart: Normal rate. Regular rhythm. Extremities:  There is no deformity or dependent edema.  (Sensation intact in b/l LE)  Neurological: Patient is alert and oriented to person, place and time. Abdomen: Abdomen is soft. Bowel sounds are normal.   There is no abdominal tenderness. Pulses: Distal pulses are intact.       Respiratory: O2 Flow Rate (L/min): 4 l/min O2 Device: Nasal cannula     I/O:    Date 12/21/17 0700 - 12/22/17 0659 12/22/17 0700 - 12/23/17 0659   Shift [id number] 1900-0659 24 Hour Total 6326-8474 1737-6778 24 Hour Total   I  N  T  A  K  E   I.V.  (mL/kg/hr) 1400  (1.4)  1400  (0.7)         Volume (lactated Ringers infusion) 1400  1400       Shift Total  (mL/kg) 1400  (16.8)  1400  (16.8)      O  U  T  P  U  T   Urine  (mL/kg/hr) 100  (0.1) 950  (0.9) 1050  (0.5)         Urine Voided  950 950         Urine Output 100  100       Blood 100  100         Estimated Blood Loss 100  100       Shift Total  (mL/kg) 200  (2.4) 950  (11.4) 1150  (13.8)      NET 1200 -950 250      Weight (kg) 83.5 83.5 83.5 83.5 83.5 83.5       CBC:  Recent Labs      12/22/17   0054  12/20/17   2259   WBC  5.8  7.2   HGB  9.4*  8.9*   HCT  29.5*  26.9*   PLT  124*  300       Metabolic Panel:  Recent Labs      12/22/17   0054  12/20/17   2345  12/20/17   2259   NA  132*   --   128*   K  4.4   --   4.3   CL  98   --   95*   CO2  28   --   26   BUN  7   --   10   CREA  0.75   --   0.70   GLU  95   --   102*   CA  8.7   --   8.7   ALB  2.0*   --   2.1*   SGOT  23   --   22   ALT  16   --   14   INR   --   1.1   --           For Billing    Chief Complaint   Patient presents with   [facility] Problems  Date Reviewed: 11/29/2017          Codes Class Noted POA    * (Principal)Femur fracture, right ([facility] ICD-10-CM: S72.91XA  ICD-9-CM: 821.00  12/21/2017 Unknown        Non-small cell cancer of left lung ([facility] ICD-10-CM: C34.92  ICD-9-CM: 162.9  12/21/2017 Unknown        Malignant neoplasm metastatic to left adrenal gland ([facility] ICD-10-CM: C79.72  ICD-9-CM: 198.7  12/21/2017 Unknown        HTN (hypertension) ICD-10-CM: I10  ICD-9-CM: 401.9  10/21/2014 Yes        Rheumatoid arthritis involving multiple sites [facility]) ICD-10-CM: M06.9  ICD-9-CM: 714.0  10/21/2014 Yes        COPD (chronic obstructive pulmonary disease) ([facility] ICD-10-CM: J44.9  ICD-9-CM: 496  10/21/2014 Yes

## 2017-12-22 NOTE — CONSULTS
Name: Gisele Alas: [place]   : 1940 Admit Date: 2017   Phone: [phone]  Room: [facility]/   PCP: Amy Cedeno MD  MRN: [id number]   Date: 2017  Code: Full Code        HPI:      Chart and notes reviewed. Data reviewed. I review the patient's current medications in the medical record at each encounter.  I have evaluated and examined the patient. 10:36 AM       History was obtained from patient. I was asked by Archana Calderon MD to see Bobby Early in consultation for a chief complaint of cough and COPD. History of Present Illness:  Mr. Delilah Shahid is a 79 yo male with a PMH HTN, RA, NSCLC-FANY mass, COPD. He is POD #1 from right hip arthroplasty. He has been up walking and is up in chair now. We were asked to see him for medication management for COPD. He has been using Duonebs at home and feels these help. He is not currently on breathing regimen in hospital but feels ok at this time. He reports his cough is much improved from a few days ago. Had some production this morning but first time in a few days. Was treated w/ abx recently for URI sounds like. He is a former smoker, quit 7 mos ago. Smoked 1PPD x 55years. He was a . He is to have an MRI for staging today. He and Dr. Linda Jones will be discussing treatment options outpatient. He will likely go to rehab for his hip. Hgb 9.4 stable  Na 132, improved from 129  Afebrile  93% 3LNC  In 1400 Out 1150    Images:CXR  FANY mass, unchanged, no acute process        Past Medical History:   Diagnosis Date    HTN (hypertension) 10/21/2014    Rheumatoid arthritis(714.0) 10/21/2014    Skin cancer     Sun-damaged skin     Sunburn, blistering        Past Surgical History:   Procedure Laterality Date    HX OTHER SURGICAL      Hemmorhiodectomy       History reviewed. No pertinent family history.     Social History   Substance Use Topics    Smoking status: Current Every Day Smoker Packs/day: 1.00    Smokeless tobacco: Never Used    Alcohol use No       Allergies   Allergen Reactions    Pcn [Penicillins] Hives     Tolerated ancef graded challenge 12/21/2017 with no adverse side effects noted       Current Facility-Administered Medications   Medication Dose Route Frequency    0.9% sodium chloride infusion  125 mL/hr IntraVENous CONTINUOUS    sodium chloride (NS) flush 5-10 mL  5-10 mL IntraVENous PRN    acetaminophen (TYLENOL) tablet 650 mg  650 mg Oral Q6H    oxyCODONE IR (ROXICODONE) tablet 5 mg  5 mg Oral Q3H PRN    celecoxib (CELEBREX) capsule 200 mg  200 mg Oral BID    naloxone (NARCAN) injection 0.4 mg  0.4 mg IntraVENous PRN    ondansetron (ZOFRAN) injection 4 mg  4 mg IntraVENous Q4H PRN    prochlorperazine (COMPAZINE) with saline injection 5 mg  5 mg IntraVENous Q6H PRN    hydrOXYzine HCl (ATARAX) tablet 10 mg  10 mg Oral Q8H PRN    famotidine (PEPCID) tablet 20 mg  20 mg Oral BID    senna-docusate (PERICOLACE) 8.6-50 mg per tablet 1 Tab  1 Tab Oral BID    polyethylene glycol (MIRALAX) packet 17 g  17 g Oral DAILY    [START ON 12/24/2017] bisacodyl (DULCOLAX) suppository 10 mg  10 mg Rectal DAILY PRN    aspirin delayed-release tablet 325 mg  325 mg Oral BID    albuterol-ipratropium (DUO-NEB) 2.5 MG-0.5 MG/3 ML  3 mL Nebulization Q4H PRN    sodium chloride (NS) flush 5-10 mL  5-10 mL IntraVENous Q8H    sodium chloride (NS) flush 5-10 mL  5-10 mL IntraVENous PRN    enoxaparin (LOVENOX) injection 40 mg  40 mg SubCUTAneous Q24H    morphine injection 2 mg  2 mg IntraVENous Q4H PRN    docusate sodium (COLACE) capsule 100 mg  100 mg Oral DAILY PRN    polyethylene glycol (MIRALAX) packet 17 g  17 g Oral DAILY PRN    ceFAZolin in 0.9% NS (ANCEF) IVPB soln 2 g  2 g IntraVENous Q8H    oxyCODONE IR (ROXICODONE) tablet 10 mg  10 mg Oral Q4H PRN         REVIEW OF SYSTEMS   Negative except as stated in the HPI.       Physical Exam:   Visit Vitals    /75 (BP 1 Location: Right arm, BP Patient Position: During activity)    Pulse 93    Temp 98.2 °F (36.8 °C)    Resp 18    Ht 5' 10\" (1.778 m)    Wt 83.5 kg (184 lb)    SpO2 93%    BMI 26.4 kg/m2       General:  Alert, cooperative, no distress, appears stated age. Head:  Normocephalic, without obvious abnormality, atraumatic. Eyes:  Conjunctivae/corneas clear. EOMs intact. Nose: Nares normal. Septum midline. Mucosa normal. No drainage or sinus tenderness. Throat: Lips, mucosa, and tongue normal. Teeth and gums normal.   Neck: Supple, symmetrical, trachea midline. Lungs:   Scattered rhonchi, but aerating well. Chest wall:  No tenderness or deformity. Heart:  Regular rate and rhythm, S1, S2 normal, no murmur, click, rub or gallop. Abdomen:   Soft, non-tender. Bowel sounds normal. No masses,  No organomegaly. Extremities: Extremities normal, atraumatic, no cyanosis or edema. Pulses: 2+ and symmetric all extremities. Skin: POD #1 right hip fx repair   Lymph nodes: Cervical, supraclavicular, and axillary nodes normal.   Neurologic: Grossly nonfocal       Lab Results   Component Value Date/Time    Sodium 132 12/22/2017 12:54 AM    Potassium 4.4 12/22/2017 12:54 AM    Chloride 98 12/22/2017 12:54 AM    CO2 28 12/22/2017 12:54 AM    BUN 7 12/22/2017 12:54 AM    Creatinine 0.75 12/22/2017 12:54 AM    Glucose 95 12/22/2017 12:54 AM    Calcium 8.7 12/22/2017 12:54 AM       Lab Results   Component Value Date/Time    WBC 5.8 12/22/2017 12:54 AM    HGB 9.4 12/22/2017 12:54 AM    PLATELET 367 51/47/0791 12:54 AM    MCV 88.1 12/22/2017 12:54 AM       Lab Results   Component Value Date/Time    INR 1.1 12/20/2017 11:45 PM    aPTT 31.7 11/29/2017 04:26 PM    AST (SGOT) 23 12/22/2017 12:54 AM    Alk.  phosphatase 65 12/22/2017 12:54 AM    Protein, total 6.9 12/22/2017 12:54 AM    Albumin 2.0 12/22/2017 12:54 AM    Globulin 4.9 12/22/2017 12:54 AM       Lab Results   Component Value Date/Time    Iron 17 11/29/2017 04:26 PM    TIBC 202 11/29/2017 04:26 PM    Iron % saturation 8 11/29/2017 04:26 PM    Ferritin 461 11/29/2017 04:26 PM       Lab Results   Component Value Date/Time    TSH 1.780 11/13/2017 02:49 PM        No results found for: PH, PHI, PCO2, PCO2I, PO2, PO2I, HCO3, HCO3I, FIO2, FIO2I    No results found for: CPK, RCK1, RCK2, RCK3, RCK4, CKNDX, CKND1, TROPT, TROIQ, BNPP, BNP     No results found for: CULT    No results found for: TOXA1, RPR, HBCM, HBSAG, HAAB, HCAB1, HAAT, G6PD, CRYAC, HIVGT, HIVR, HIV1, HIV12, HIVPC, HIVRPI    No results found for: VANCT, CPK    No results found for: COLOR, APPRN, SPGRU, APOORVA, PROTU, GLUCU, KETU, BILU, BLDU, UROU, FLORA, LEUKU, WBCU, RBCU, UEPI, BACTU, CASTS, UCRY    IMPRESSION  · Right hip fx s/p arthroplasty  · NSCLC-stage IV  · COPD-not in exacerbation  · HTN  · RA    PLAN  · Oncology following-to discuss further treatment options for CA as outpatient. Having MRI Brain done for staging. · Will add scheduled duonebs  · Add mucinex  · OOB  · IS    Thank you for the consultation, we will be happy to follow along in his care. We will see as needed over the weekend.        Cristian Avila NP

## 2017-12-22 NOTE — PROGRESS NOTES
Problem: Mobility Impaired (Adult and Pediatric)  Goal: *Acute Goals and Plan of Care (Insert Text)  Physical Therapy Goals  Initiated 12/22/2017    1. Patient will move from supine to sit and sit to supine  in bed with independence within 4 days. 2. Patient will perform sit to stand with modified independence within 4 days. 3. Patient will ambulate with modified independence for 200 feet with the least restrictive device within 7 days. 4. Patient will verbalize and demonstrate understanding of anerior precautions per protocol within 7 days. 6. Patient will perform home exercise program per protocol with supervision/set-up within 7 days. physical Therapy EVALUATION  Patient: Kevin Gooden (47 y.o. male)  Date: 12/22/2017  Primary Diagnosis: HIP FRACTURE, RIGHT  Femur fracture, right (HCC)  Procedure(s) (LRB):  HIP HEMIARTHROPLASTY, RIGHT; ANTERIOR APPROACH (Right) 1 Day Post-Op   Precautions: Fall, WBAT, Total hip (anterior)    ASSESSMENT :  Based on the objective data described below, the patient presents with decreased endurance and safety following R anterior THR following fall at home with resulting hip fx. In addition pt was dx last week with metastatic lung CA. He currently reports very little pain . 5 (half of a point) out of 10 pain and requires Supervision to CGA for functional mobility using RW to amb. His 29 y/o grandson resides with him and per pt can assist 24/7 as needed. Reviewed anterior hip precautions and performed visual demonstration. Performed anterior hip exercises in sitting for range of motion and strengthening. Also performed transfer and gait training in the hallwy with the rolling walker. Patient requires cues to maintain precautions with turning to left with the walker and to keep the walker closer. Recommend HH therapy at discharge. Pt stated he does not want rehab. Patient will benefit from skilled intervention to address the above impairments.   Patients rehabilitation potential is considered to be Excellent  Factors which may influence rehabilitation potential include:   []         None noted  []         Mental ability/status  [x]         Medical condition  []         Home/family situation and support systems  []         Safety awareness  [x]         Pain tolerance/management  []         Other:      PLAN :  Recommendations and Planned Interventions:  [x]           Bed Mobility Training             []    Neuromuscular Re-Education  [x]           Transfer Training                   []    Orthotic/Prosthetic Training  [x]           Gait Training                         []    Modalities  [x]           Therapeutic Exercises           []    Edema Management/Control  [x]           Therapeutic Activities            [x]    Patient and Family Training/Education  []           Other (comment):    Frequency/Duration: Patient will be followed by physical therapy  twice daily to address goals. Discharge Recommendations: Home Health  Further Equipment Recommendations for Discharge: rolling walker ordered this morning     SUBJECTIVE:   Patient stated My grandson is there 24/7.    OBJECTIVE DATA SUMMARY:   Documentation for home O2:     ROOM AIR    AT REST   O2 SATS  96 HR  92   ROOM AIR WITH ACTIVITY 02 SATS  88 HR  105         ROOM AIR PATIENT LEFT COMFORTABLY  SITTING/SUPINE 02 SATS  92 HR  104     HISTORY:    Past Medical History:   Diagnosis Date    HTN (hypertension) 10/21/2014    Rheumatoid arthritis(714.0) 10/21/2014    Skin cancer     Sun-damaged skin     Sunburn, blistering      Past Surgical History:   Procedure Laterality Date    HX OTHER SURGICAL      Hemmorhiodectomy     Prior Level of Function/Home Situation: lives with his adult grandson in a one level home with no steps to enter.   Did not use an assistive device prior to admit  Personal factors and/or comorbidities impacting plan of care:     Home Situation  Home Environment: Apartment  # Steps to Enter: 0  One/Two Story Residence: One story  Living Alone: No  Support Systems: Family member(s) (28 y/o grandson lives with pt)  Patient Expects to be Discharged to[de-identified] Apartment  Current DME Used/Available at Home: None  Tub or Shower Type: Tub/Shower combination    EXAMINATION/PRESENTATION/DECISION MAKING:   Critical Behavior:  Neurologic State: Alert, Appropriate for age  Orientation Level: Oriented X4  Cognition: Appropriate decision making, Appropriate for age attention/concentration, Follows commands  Safety/Judgement: Awareness of environment, Driving appropriateness, Fall prevention, Home safety, Insight into deficits  Hearing: Auditory  Auditory Impairment: None    Range Of Motion:  AROM: Generally decreased, functional           PROM: Generally decreased, functional           Strength:    Strength: Generally decreased, functional                    Tone & Sensation:   Tone: Normal              Sensation: Intact               Coordination:  Coordination: Within functional limits  Vision:   Acuity: Impaired near vision  Corrective Lenses: Reading glasses  Functional Mobility:  Bed Mobility:     Supine to Sit: Supervision; Additional time  Sit to Supine: Supervision; Additional time  Scooting: Supervision  Transfers:  Sit to Stand: Stand-by asssistance  Stand to Sit: Stand-by asssistance        Bed to Chair: Stand-by asssistance; Additional time (using RW)              Balance:   Sitting: Intact  Standing: Intact; With support (RW)  Ambulation/Gait Training:  Distance (ft): 100 Feet (ft)  Assistive Device: Walker, rolling;Gait belt  Ambulation - Level of Assistance: Stand-by asssistance        Gait Abnormalities: Step to gait        Base of Support: Widened;Shift to left                              Stairs: Therapeutic Exercises:   Performed anterior hip exercises in sitting for range of motion and strengthening.     Functional Measure:  Barthel Index:    Bathin  Bladder: 10  Bowels: 10  Grooming: 5  Dressin  Feeding: 10  Mobility: 0  Stairs: 0  Toilet Use: 5  Transfer (Bed to Chair and Back): 10  Total: 55       Barthel and G-code impairment scale:  Percentage of impairment CH  0% CI  1-19% CJ  20-39% CK  40-59% CL  60-79% CM  80-99% CN  100%   Barthel Score 0-100 100 99-80 79-60 59-40 20-39 1-19   0   Barthel Score 0-20 20 17-19 13-16 9-12 5-8 1-4 0      The Barthel ADL Index: Guidelines  1. The index should be used as a record of what a patient does, not as a record of what a patient could do. 2. The main aim is to establish degree of independence from any help, physical or verbal, however minor and for whatever reason. 3. The need for supervision renders the patient not independent. 4. A patient's performance should be established using the best available evidence. Asking the patient, friends/relatives and nurses are the usual sources, but direct observation and common sense are also important. However direct testing is not needed. 5. Usually the patient's performance over the preceding 24-48 hours is important, but occasionally longer periods will be relevant. 6. Middle categories imply that the patient supplies over 50 per cent of the effort. 7. Use of aids to be independent is allowed. Krzysztof Connors., Barthel, D.W. (0697). Functional evaluation: the Barthel Index. [address] [facility] (14)2. VALERIO Guidry, Ata Davison., Quyen Gooden., [facility], [address] [place] [address] (). Measuring the change indisability after inpatient rehabilitation; comparison of the responsiveness of the Barthel Index and Functional Energy Measure. Journal of Neurology, Neurosurgery, and Psychiatry, 66(4), 551-540. Lily Mayorga, N.J.A, Yung Oglesby  W.J.M, & Henrry Kim, M.A. (2004.) Assessment of post-stroke quality of life in cost-effectiveness studies: The usefulness of the Barthel Index and the EuroQoL-5D. Quality of Life Research, 13, 060-63       G codes:   In compliance with CMSs Claims Based Outcome Reporting, the following G-code set was chosen for this patient based on their primary functional limitation being treated: The outcome measure chosen to determine the severity of the functional limitation was the Barthel with a score of 55/100 which was correlated with the impairment scale. ? Mobility - Walking and Moving Around:     - CURRENT STATUS: CK - 40%-59% impaired, limited or restricted    - GOAL STATUS: CJ - 20%-39% impaired, limited or restricted    - D/C STATUS:  ---------------To be determined---------------      Physical Therapy Evaluation Charge Determination   History Examination Presentation Decision-Making   MEDIUM  Complexity : 1-2 comorbidities / personal factors will impact the outcome/ POC  MEDIUM Complexity : 3 Standardized tests and measures addressing body structure, function, activity limitation and / or participation in recreation  LOW Complexity : Stable, uncomplicated  LOW Complexity : FOTO score of       Based on the above components, the patient evaluation is determined to be of the following complexity level: LOW     Pain:  Pain Scale 1: Numeric (0 - 10)  Pain Intensity 1: 2              Activity Tolerance:   No nausea or lightheadedness with mobility  Please refer to the flowsheet for vital signs taken during this treatment. After treatment:   [x]         Patient left in no apparent distress sitting up in chair  []         Patient left in no apparent distress in bed  [x]         Call bell left within reach  [x]         Nursing notified  []         Caregiver present  [x]         Chair alarm activated    COMMUNICATION/EDUCATION:   The patients plan of care was discussed with: Registered Nurse and . [x]         Fall prevention education was provided and the patient/caregiver indicated understanding. [x]         Patient/family have participated as able in goal setting and plan of care.   [x]         Patient/family agree to work toward stated goals and plan of care. []         Patient understands intent and goals of therapy, but is neutral about his/her participation. []         Patient is unable to participate in goal setting and plan of care.     Thank you for this referral.  Tami Median, PT   Time Calculation: 27 mins

## 2017-12-22 NOTE — PROGRESS NOTES
[address] at [facility]  [phone]    Notes reviewed, could not get MRI due to problem with machine. Also, it looks like plan is for discharge home rather than rehab. [id number] for discharge from oncology perspective, I will arrange outpatient follow up. If he remains in hospital, can reattempt MRI.

## 2017-12-22 NOTE — PALLIATIVE CARE DISCHARGE
The Palliative Medicine team was consulted as part of your / your loved one's care in the hospital. Our team is a supportive service; we strive to relieve suffering and improve quality of life. You identified the following goal(s) as your main focus for healthcare: Patient/Health Care Proxy Stated Goals: Prolong life  Recover from surgery and return home. Pursue chemotherapy for NSCLC. Outpatient follow-up with Palliative Medicine team. Consider completing AMD/POST in near future. We reviewed advance care planning information, which includes the following:  Advance Care Planning 12/22/2017   Patient's Healthcare Decision Maker is: Legal Next of Kin   Primary Decision Maker Relationship to Patient Adult child   Confirm Advance Directive None   Patient Would Like to Complete Advance Directive Yes       We reviewed / discussed your code status as: Full Code     Full Code means perform CPR in the event of cardiac arrest     [facility] means do NOT perform CPR in the event of cardiac arrest     Partial Code means you have specific preferences, please discuss with your health care team     No Order means this issue was not addressed / resolved during your stay    Because of the importance of this information, we are providing you with a printed copy to share with other healthcare providers after this hospitalization is complete. If any of the above information is incomplete or incorrect, please contact the Palliative Medicine team at [phone].

## 2017-12-23 NOTE — PROGRESS NOTES
Problem: Mobility Impaired (Adult and Pediatric)  Goal: *Acute Goals and Plan of Care (Insert Text)  Physical Therapy Goals  Initiated 12/22/2017    1. Patient will move from supine to sit and sit to supine  in bed with independence within 4 days. 2. Patient will perform sit to stand with modified independence within 4 days. 3. Patient will ambulate with modified independence for 200 feet with the least restrictive device within 7 days. 4. Patient will verbalize and demonstrate understanding of anerior precautions per protocol within 7 days. 6. Patient will perform home exercise program per protocol with supervision/set-up within 7 days. physical Therapy TREATMENT  including 6 minute walk test results  Patient: Cally Coffey (55 y.o. male)  Date: 12/23/2017  Diagnosis: HIP FRACTURE, RIGHT  Femur fracture, right (HCC) Femur fracture, right (HCC)  Procedure(s) (LRB):  HIP HEMIARTHROPLASTY, RIGHT; ANTERIOR APPROACH (Right) 2 Days Post-Op  Precautions: Fall, WBAT    ASSESSMENT:  Patient returning from MRI and transferring from [facility] to bed with technician. Patient agreeing to participate with PT 0/10 pain. Patient stating he lives with grandson and has no steps to enter home. Patient able to transfer cga/supervision, ambulates 200' with RW cga/supervision with occasional standing rest breaks for PLB due to lower SATs. Patient able to increase 02 SATs with this technique. Upon returning to room patient requesting to utilize restroom and performed standing with grab bar and RW nearby mod I. Patient able to stand to wash hands Indep with good balance. Patient returned to bedside chair with alarm for breakfast.  Patient's RW height adjusted due to noted flexed posture during gait. PT instructed patient on fall precautions and to use call bell. Patient performed 6MWT. At the 3 min maxi his 02 SATs did decrease however with PLB and a standing rest break he was able to recover to 89-92% on room air.   PT advise nurse of progress. Patient will benefit from HHPT at discharge home with samanta available for assistance 24/7. Documentation for home O2:     ROOM AIR    AT REST   O2 SATS  94 HR  81   ROOM AIR WITH ACTIVITY 02 SATS  79-85 HR     (0   ) LITERS OF O2  With PLB only WITH ACTIVITY O2 SATS  89-92 HR  88   (0  )LITERS OF 02  With PLB only PATIENT LEFT COMFORTABLY  SITTING/SUPINE 02 SATS  91-93 HR  84     Progression toward goals:  [x]    Improving appropriately and progressing toward goals  []    Improving slowly and progressing toward goals  []    Not making progress toward goals and plan of care will be adjusted     PLAN:  Patient continues to benefit from skilled intervention to address the above impairments. Continue treatment per established plan of care. Discharge Recommendations:  Home Health  Further Equipment Recommendations for Discharge:  Has RW in room     SUBJECTIVE:   Patient stated I don't need any oxygen    OBJECTIVE DATA SUMMARY:   Critical Behavior:  Neurologic State: Alert  Orientation Level: Oriented X4  Cognition: Appropriate decision making, Appropriate for age attention/concentration, Appropriate safety awareness, Follows commands  Safety/Judgement: Awareness of environment, Driving appropriateness, Fall prevention, Home safety, Insight into deficits  Functional Mobility Training:  Bed Mobility:   NT                 Transfers:  Sit to Stand: Contact guard assistance;Supervision  Stand to Sit: Supervision                             Balance:  Sitting: Intact  Standing: Intact; With support  Ambulation/Gait Training:  Distance (ft): 200 Feet (ft)  Assistive Device: Gait belt;Walker, rolling  Ambulation - Level of Assistance: Supervision;Contact guard assistance        Gait Abnormalities: Step to gait; Altered arm swing        Base of Support: Widened                             Stairs:did not occur patient denies having any stairs at home to negotiate              6 MWT results:  Distance Walked in Feet (ft): 200 ft. Pre Heart Rate: 81    Pre O2 Saturation: 98        Post Heart Rate: 100    Post O2 Saturation: 79    Assistive device used: Assistive Device: Gait belt;Walker, rolling        Normative data: 30-57 years old = 0 feet; Men 39-80 years old = 1889 feet; Women 3680 years mfd=2952 feet  Modified 10 point Deyvi RPE scale utilized where 0 = no breathlessness at all; 10 = maximum exertion  Please refer to the flowsheet for any additional vital signs taken during this treatment. Pain:  Pain Scale 1: Numeric (0 - 10)  Pain Intensity 1: 0  Pain Location 1: Hip;Back  Pain Orientation 1: Posterior;Right  Pain Description 1: Aching; Sore  Pain Intervention(s) 1: Medication (see MAR)  Activity Tolerance:   See above  After treatment:   [x]    Patient left in no apparent distress sitting up in chair  []    Patient left in no apparent distress in bed  [x]    Call bell left within reach  [x]    Nursing notified  []    Caregiver present  [x]    Chair alarm activated    COMMUNICATION/COLLABORATION:   The patients plan of care was discussed with: Registered Nurse    Tk Wood DPT   Time Calculation: 24 mins

## 2017-12-23 NOTE — PROGRESS NOTES
Verbal shift change report given to [facility] (oncoming nurse) by Harjinder Wang (offgoing nurse). Report included the following information SBAR and MAR.

## 2017-12-23 NOTE — PROGRESS NOTES
Problem: Falls - Risk of  Goal: *Absence of Falls  Document Ginger Fall Risk and appropriate interventions in the flowsheet. Outcome: Progressing Towards Goal  Fall Risk Interventions:  Mobility Interventions: Bed/chair exit alarm, Patient to call before getting OOB         Medication Interventions: Bed/chair exit alarm, Patient to call before getting OOB    Elimination Interventions: Call light in reach, Bed/chair exit alarm, Patient to call for help with toileting needs    History of Falls Interventions: Bed/chair exit alarm, Evaluate medications/consider consulting pharmacy        Problem: Hip Fracture: Post-Op Day 1  Goal: Activity/Safety  Outcome: Progressing Towards Goal  Ambulating with walker and 1 assist  Goal: Respiratory  Outcome: Progressing Towards Goal  Patient tolerating RA  Goal: *Optimal pain control at patient's stated goal  Outcome: Progressing Towards Goal  Pt pain well controlled with scheduled tylenol and PRN oxycodone     Bedside shift change report given to Ching Garcia RN (oncoming nurse) by Michael Seals RN (offgoing nurse). Report included the following information SBAR, Kardex, Procedure Summary, Intake/Output, MAR and Recent Results.

## 2017-12-23 NOTE — PROGRESS NOTES
Physical Therapy  PT attempted this morning however patient getting an MRI . We will follow this patient and attempt again as our schedule allows. Thank you.     Malcolm Mccann, PT, DPT, CLT

## 2017-12-23 NOTE — PROGRESS NOTES
I have reviewed discharge instructions with the patient and friend: reviewed s/s to report, f/up apptmts, meds (Rx for Oxycodone given). The patient and friend verbalized understanding.

## 2017-12-23 NOTE — PROGRESS NOTES
Orthopaedic Progress Note  Post Op day: 2 Days Post-Op    2017 11:18 AM     Patient: Jimena Bower MRN: [id number]  SSN: [id number]    YOB: 1940  Age: 78 y.o. Sex: male      Admit date:  2017  Date of Surgery:  2017   Procedures:  Procedure(s):  HIP HEMIARTHROPLASTY, RIGHT; ANTERIOR APPROACH  Admitting Physician:  Otf Reed MD   Surgeon:  Jodine Gowers) and Role:     * Irasema Brush MD - Primary    Consulting Physician(s): Treatment Team: Attending Provider: Otf Reed MD; Consulting Provider: Carlo Warren MD; Consulting Provider: Sukhdev Roberts MD; Consulting Provider: Irasema Brush MD; Care Manager: Reed Ramos; Consulting Provider: Yoli Mckeon MD; Consulting Provider: Sujatha Newman MD    SUBJECTIVE:     Jimena Bower is a 78 y.o. male is 2 Days Post-Op s/p Procedure(s):  HIP HEMIARTHROPLASTY, RIGHT; ANTERIOR APPROACH with an appropriate level of post-operative pain. No complaints of nausea, vomiting, dizziness, lightheadedness, chest pain, or shortness of breath. OBJECTIVE:       Physical Exam:  General: Alert, cooperative, no distress. Respiratory: Respirations unlabored  Neurological:  Neurovascular exam within normal limits. Motor: + DF/PF. Musculoskeletal: Calves soft, supple, non-tender upon palpation. Dressing/Wound:  Clean, dry and intact. No significant erythema or swelling. Vital Signs:      Patient Vitals for the past 8 hrs:   BP Temp Pulse Resp SpO2   17 0943 - - 86 - 94 %   17 0753 106/71 97 °F (36.1 °C) 75 18 97 %                                          Temp (24hrs), Av.6 °F (36.4 °C), Min:97 °F (36.1 °C), Max:98 °F (36.7 °C)      Labs:        Recent Labs      17   0213   17   2345   HCT  25.9*   < >   --    HGB  8.3*   < >   --    INR   --    --   1.1    < > = values in this interval not displayed.      Lab Results   Component Value Date/Time    Sodium 132 12/23/2017 02:13 AM    Potassium 4.3 12/23/2017 02:13 AM    Chloride 99 12/23/2017 02:13 AM    CO2 30 12/23/2017 02:13 AM    Glucose 88 12/23/2017 02:13 AM    BUN 9 12/23/2017 02:13 AM    Creatinine 0.76 12/23/2017 02:13 AM    Calcium 8.8 12/23/2017 02:13 AM       PT/OT:        Gait  Base of Support: Widened  Gait Abnormalities: Step to gait, Altered arm swing  Ambulation - Level of Assistance: Supervision, Contact guard assistance  Distance (ft): 200 Feet (ft)  Assistive Device: Gait belt, Walker, rolling       Patient mobility  Bed Mobility Training  Supine to Sit: Supervision, Additional time  Sit to Supine: Supervision, Additional time  Scooting: Supervision  Transfer Training  Sit to Stand: Contact guard assistance, Supervision  Stand to Sit: Supervision  Bed to Chair: [employer], Additional time (using RW)      Gait Training  Assistive Device: Gait belt, Walker, rolling  Ambulation - Level of Assistance: Supervision, Contact guard assistance  Distance (ft): 200 Feet (ft)            ASSESSMENT / PLAN:   Principal Problem:    Femur fracture, right ([facility] (12/21/2017)    Active Problems:    HTN (hypertension) (10/21/2014)      Rheumatoid arthritis involving multiple sites (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 Doing quite well with PT. Safe to go home with Home PT. Set up with Case Management. Pt. Ready for discharge with ok with medicine.     Signed By:  David Kessler PA-C    [id number] [facility]

## 2017-12-24 NOTE — DISCHARGE SUMMARY
[address] [facility] [address] [place] [address]   Office [phone]  Fax [phone]       Discharge / Transfer / Off-Service Note     Name: Tran Trejo MRN: [id number]  Sex: Male   YOB: 1940  Age: 78 y.o. PCP: Dk Roberson MD     Date of admission: 12/20/2017  Date of discharge/transfer: 12/23/2017    Attending physician at admission: Dr. Cecilia Hogan    Attending physician at discharge/transfer: Dr. Clau Garcia    Resident physician at discharge/transfer: Divine Varghese MD     Consultants during hospitalization  Dr. Irasema Campos MD (Orthopedic Surgery)  Evelyn Shepherd PA-C (Orthopedic Surgery)  Dr. Crista Robbins (Heme/Onc)  Yvon Andersen NP (Palliative)  Avie Pallas, NP (Pulmonary)  Dr. Coretta Mckeon MD (Pulmonary)     Admission diagnoses   HIP FRACTURE, RIGHT  Femur fracture, right ([address]    Recommended follow-up after discharge  1. PCP - see below  2. Heme/Onc - treatment options and staging for SCC. May also need bone scan. 3. Pulmonary - PFTs and COPD treatment regimen  4. Ortho - Dr. Scarlett Lorenzoning     Things to follow up on with PCP:  1. 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 AMD paperwork  3. Postop pain  4. Anemia  5. Hyponatremia  6. Possible osteoporosis evaluation      Medication Changes:  1. New Medications: Stiolto per pulm,  per ortho, tylenol 650mg per ortho, roxicodone PRN per ortho  2. Modified Medications: none  3. Discontinued Medications: none    History of Present Illness  Per admitting provider, Dr. Vianney Segovia, \"Andrea Taylor is a 78 y.o. 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 Patient reports that earlier today his grandson left the  door open and while walking in the kitchen he hit his left shin on the  door, tripped and fell on his right side. He instantly felt pain in his R hip and had his grandson call 911 and EMS came and brought him to the ED. During the fall he tried catching himself by holding onto the sink but was unsuccessful and on his way down he he did hit his head on a cabinet door. He denies having any CP, palpitations, or dizziness before the fall. Of note, patient has recently been seen by Dr. Kay Duke of Heme/Onc and had biopsy of adrenal mass which showed SCC. \"    HOSPITAL COURSE  R femoral neck fracture s/p R hip hemiarthroplasty 12/21: X-ray of R hip and R femur confirmed displaced, rotated, angulated femoral neck fracture on admission. R hip hemiarthroplasty performed by Orthopedic Surgery 12/21 with no issues. Postop pain control per ortho. Patient was evaluated by PT/OT who recommended home with [facility] due to patient preference over inpatient rehab. Stage IV Squamous Cell Carcinoma of the lung with metastasis to adrenal gland: Recent diagnosis. Patient with known lung nodules that were unchanged on CXR in ED on admission. Patient has adrenal mass that was recently biopsied and found positive for SCC. Sees Dr. Kay Duke outpatient. Noncurative, treatment would be with palliative intent. Heme/Onc consulted, patient received MRI brain for staging, and may also need bone scan as an outpatient. Final read for MRI brain was not back yet at time of discharge, patient to f/u with Dr. Kya Duke outpatient. Palliative was consulted, patient instructed to bring AMD paperwork back to PCP for completion.       COPD with recent diagnosis Stage IV SCC of the lung: Patient reports history of COPD/Emphysema but has not had formal PFTs. He reports taking duonebs PRN at home for SOB. Patient received scheduled duonebs while inpatient, and was on continuous O2 2L. However, patient was able to compensate with pursed-lip breathing when working with PT/OT and did not qualify for home oxygen at discharge at this time. Patient to f/u with Pulm outpatient for PFTs and COPD treatment regimen. Patient was discharged with stiolto per pulm recommendations.     Hypertension: Patient reports previously being diagnosed with HTN but stopped taking his medication on his own. He does not recall what medication he was previously prescribed. Normotensive on admission, did not require any medications.       Osteoporosis: Patient had a hip fracture following a GLF with likely underlying osteoporosis. Consider calcium and vitamin D supplementation - patient to f/u with PCP.      Hyponatremia chronic, POA Na 128, at discharge was 132. Patient to f/u with PCP.      Anemia - likely of chronic disease. Iron panel 11/29/17 shows low iron (17), low TIBC (202), low % sat (8), with elevated ferritin (461). Patient to f/u with heme/onc and PCP. Physical exam at discharge:    Vitals Reviewed. General Oriented to person, place, and time and well-developed. Appears well-nourished, no distress. Not diaphoretic. HENT Head Normocephalic and atraumatic. Eyes Conjunctivae are normal, no discharge. No scleral icterus. Nose Nose normal, clear turbinates. Oral Oropharynx is clear and moist. No oropharyngeal exudate. Neck No thyromegaly present. No cervical adenopathy. Cardio Normal rate, regular rhythm. Exam reveals no gallop and no friction rub. No murmur heard. No chest wall tenderness. Pulmonary Effort normal and breath sounds normal. No respiratory distress. No wheezes, no rales. Abdominal Soft. Bowel sounds normal. No distension. No tenderness.  Deferred. Extremities No edema of lower extremities. Leg lengths symmetric in length, no longer externally rotated. Neurological Alert and oriented to person, place, and time. Dermatology Skin is warm and dry. No rash noted. No erythema or pallor. Psychiatric Affect and judgment normal.        Condition at discharge: Stable.     Labs  Recent Labs      12/23/17   1157  12/23/17   0213  12/22/17   0054   WBC  6.5  5.4  5.8   HGB  8.8*  8.3*  9.4*   HCT  27.0*  25.9*  29.5*   PLT  139*  126*  124* Recent Labs      12/23/17   0213  12/22/17   0054   NA  132*  132*   K  4.3  4.4   CL  99  98   CO2  30  28   BUN  9  7   CREA  0.76  0.75   GLU  88  95   CA  8.8  8.7     Recent Labs      12/23/17   0213  12/22/17   0054   SGOT  21  23   ALT  9*  16   AP  59  65   TBILI  0.3  0.4   TP  6.4  6.9   ALB  1.8*  2.0*   GLOB  4.6*  4.9*     No results for input(s): PH, PCO2, PO2, TNIPOC, TROIQ, INR, PTP, APTT, FE, TIBC, PSAT, FERR, GLUCPOC in the last 72 hours. No lab exists for component: RODERICK ButlerEXT    Imaging  12/20  XR HIP RT W OR WO PELV 2-3 VWS, XR FEMUR RT 2 VS  INDICATION:   Fracture after fall. Right hip pain and deformity.  COMPARISON: None.   FINDINGS: An AP view of the pelvis, a frogleg lateral view of the right hip ,  and frontal and lateral radiographs of the right femur demonstrate a superiorly  displaced, rotated, and angulated right femoral neck fracture.   IMPRESSION:  Displaced, rotated, angulated right femoral neck fracture.     12/20 AP CHEST RADIOGRAPH   COMPARISON: December 15, 2017   FINDINGS: AP portable view of the chest demonstrates a normal cardiomediastinal  silhouette. The lungs are adequately expanded. There is left upper lobe mass  similar to the prior study. There is no pneumothorax or pleural effusion. The  osseous structures are unremarkable. IMPRESSION:  Left upper lobe mass similar to prior study. No new abnormality. 12/21 MRI BRAIN W WO CONT  *PRELIMINARY REPORT*     There is no acute intracranial abnormality. There is no evidence of intracranial  metastatic disease or acute infarct.     Preliminary report was provided by Dr. Damaris Morales, the on-call radiologist, at 12:31  PM     Final report to follow.     Chronic diagnoses   Problem List as of 12/23/2017  Date Reviewed: 11/29/2017          Codes Class Noted - Resolved    * (Principal)Femur fracture, right (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 HTN (hypertension) ICD-10-CM: I10  ICD-9-CM: 401.9  10/21/2014 - Present        Rheumatoid arthritis involving multiple sites [facility]) ICD-10-CM: M06.9  ICD-9-CM: 714.0  10/21/2014 - Present        COPD (chronic obstructive pulmonary disease) ([facility] ICD-10-CM: J44.9  ICD-9-CM: 496  10/21/2014 - Present        Basal cell cancer ICD-10-CM: C44.91  ICD-9-CM: 173.91  10/21/2014 - Present              Discharge/Transfer Medications  Discharge Medication List as of 12/23/2017  3:16 PM      START taking these medications    Details   oxyCODONE IR (ROXICODONE) 5 mg immediate release tablet Take 1-2 Tabs by mouth every four (4) hours as needed. Max Daily Amount: 60 mg., Print, Disp-56 Tab, R-0      acetaminophen (TYLENOL) 325 mg tablet Take 1 Tab by mouth every six (6) hours for 28 days. , Print, Disp-112 Tab, R-0      tiotropium-olodaterol (STIOLTO RESPIMAT) 2.5-2.5 mcg/actuation mist Take 2 Puffs by inhalation daily. , Print, Disp-4 Inhaler, R-2         CONTINUE these medications which have NOT CHANGED    Details   OTHER,NON-FORMULARY, Natural Colon Cleanse four pills by mouth twice daily as needed for constipation. , Historical Med      albuterol-ipratropium (DUO-NEB) 2.5 mg-0.5 mg/3 ml nebu 3 mL by Nebulization route every four (4) hours as needed., Normal, Disp-150 Nebule, R-11      albuterol (PROVENTIL VENTOLIN) 2.5 mg /3 mL (0.083 %) nebulizer solution USE 2 VIALS VIAL NEBULIZER EVERY 4 HOURS AS NEEDED FOR WHEEZING, Normal, Disp-225 Each, R-11              Diet:  Regular diet.    Activity:  As tolerated    Disposition: [address] [facility]    Discharge instructions to patient/family  Please seek medical attention for any new or worsening symptoms particularly fever, chest pain, shortness of breath, abdominal pain, nausea, vomiting    Follow up plans/appointments  Follow-up Information     Follow up With Details Amber Kelley MD Schedule an appointment as soon as possible for a visit To discuss your cancer therapy [address]  [address] [facility]  [phone]      201 [facility] [address]  [address] [place] [address]  [address] [facility] [address] MD Isidro Schedule an appointment as soon as possible for a visit To discuss COPD and for outpatient lung function tests [facility] [address]  [place] [id number] [facility]      Darin Adbul MD Schedule an appointment as soon as possible for a visit in 4 weeks Call [phone] [facility] [phone]  [address] [facility]  [phone]             Rob Grant MD  Family Medicine Resident       For Billing    Chief Complaint   Patient presents with   [facility] [place] Problems  Date Reviewed: 11/29/2017          Codes Class Noted POA    * (Principal)Femur fracture, right ([facility] ICD-10-CM: A87.78GC  ICD-9-CM: 821.00  12/21/2017 Unknown        Non-small cell cancer of left lung (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 ICD-10-CM: C79.72  ICD-9-CM: 198.7  12/21/2017 Unknown        HTN (hypertension) ICD-10-CM: I10  ICD-9-CM: 401.9  10/21/2014 Yes        Rheumatoid arthritis involving multiple sites [facility]) ICD-10-CM: M06.9  ICD-9-CM: 714.0  10/21/2014 Yes        COPD (chronic obstructive pulmonary disease) ([facility] ICD-10-CM: J44.9  ICD-9-CM: 976 10/21/2014 Yes

## 2017-12-26 NOTE — PROGRESS NOTES
TYREE Watson, Notified of patients d/c and set up with [facility] and need for a f/u appt scheduled./America Leslie Manager of Case Management

## 2017-12-27 PROBLEM — S72.001A CLOSED FRACTURE OF NECK OF RIGHT FEMUR (HCC): Status: ACTIVE | Noted: 2017-01-01

## 2017-12-27 NOTE — PROGRESS NOTES
Cancer [place] at [facility]  [address] [facility], [address]  Carl Claw: [phone]  F: [phone]      Reason for Visit:   Emory Moe is a 78 y.o. male who is seen in consultation at the request of Dr. Jacinta Aschoff for evaluation of lung cancer. Treatment History:   · Low-dose CT Chest 11/22/2017: Probably left upper lobe mass obstructing left upper lobe bronchus, near complete atelectasis of FANY, probably left hilar adenopathy. 3.9cm left adrenal nodule. Moderate centrilobular emphysema. · CT C/A/P 12/6/2017: Left adrenal metastasis. Large mass in the left upper lobe versus markedly atelectatic lung from central tumor. 7 mm nonspecific pleural-based nodule in the left lower lobe. Extensive emphysematous change. 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  · MRI Brain 12/23/2017: no intracranial metastatic disease  · Stage IV Non-small cell lung cancer    History of Present Illness:   He returns today to follow up after his recent hospitalization. See inpatient notes for details. He fell and fractured his hip, required surgery. He is recovering well, getting home PT. Pain well controlled, taking oxycodone 2-3x/day which seems to be working. Appetite poor, but eating and weight stable. Insomnia. PAST HISTORY: The following sections were reviewed and updated in the EMR as appropriate: PMH, SH, FH, Medications, Allergies. Allergies   Allergen Reactions    Pcn [Penicillins] Hives     Tolerated ancef graded challenge 12/21/2017 with no adverse side effects noted      Review of Systems: A complete review of systems was obtained, reviewed, and scanned into the EMR. Pertinent findings reviewed above.       Physical Exam:     Visit Vitals    /71 (BP 1 Location: Left arm, BP Patient Position: Sitting)    Pulse 78    Temp 96.6 °F (35.9 °C) (Temporal)    Resp 20    Ht 5' 11\" (1.803 m)    Wt 183 lb (83 kg)    SpO2 94%    BMI 25.52 kg/m2     ECOG PS: 1  General: No distress  Eyes: PERRLA, anicteric sclerae  HENT: Atraumatic, OP clear  Neck: Supple  Lymphatic: No cervical, supraclavicular, or inguinal adenopathy  Respiratory: CTAB, increased work of breathing  CV: Normal rate, regular rhythm, no murmurs, no peripheral edema  GI: Soft, nontender, nondistended, no masses, no hepatomegaly, no splenomegaly  MS: Normal gait and station. Digits without clubbing or cyanosis. Skin: No rashes, ecchymoses, or petechiae. Normal temperature, turgor, and texture. Psych: Alert, oriented, appropriate affect, normal judgment/insight    Results:     Lab Results   Component Value Date/Time    WBC 6.5 12/23/2017 11:57 AM    HGB 8.8 12/23/2017 11:57 AM    HCT 27.0 12/23/2017 11:57 AM    PLATELET 127 14/86/8839 11:57 AM    MCV 87.1 12/23/2017 11:57 AM    ABS. NEUTROPHILS 3.6 12/23/2017 02:13 AM     Lab Results   Component Value Date/Time    Sodium 132 12/23/2017 02:13 AM    Potassium 4.3 12/23/2017 02:13 AM    Chloride 99 12/23/2017 02:13 AM    CO2 30 12/23/2017 02:13 AM    Glucose 88 12/23/2017 02:13 AM    BUN 9 12/23/2017 02:13 AM    Creatinine 0.76 12/23/2017 02:13 AM    GFR est AA >60 12/23/2017 02:13 AM    GFR est non-AA >60 12/23/2017 02:13 AM    Calcium 8.8 12/23/2017 02:13 AM     Lab Results   Component Value Date/Time    Bilirubin, total 0.3 12/23/2017 02:13 AM    ALT (SGPT) 9 12/23/2017 02:13 AM    AST (SGOT) 21 12/23/2017 02:13 AM    Alk. phosphatase 59 12/23/2017 02:13 AM    Protein, total 6.4 12/23/2017 02:13 AM    Albumin 1.8 12/23/2017 02:13 AM    Globulin 4.6 12/23/2017 02:13 AM       Radiology report(s) reviewed above. Assessment:   1) Metastatic Non-small cell lung cancer (Squamous Cell Carcinoma)  Stage IV, PDL1 negative  He has disease within his lung and left adrenal gland. His cancer is not curable and management is with palliative intent. He is interested in receiving some palliative systemic therapy. His PDL1 returned negative, so he is not a candidate for first line immunotherapy. My recommendation is for palliative chemotherapy with Carboplatin and Gemcitabine, given for 4 cycles. This can potentially be followed by maintenance gemcitabine. We discussed the risks and benefits of Gemcitabine and Carboplatin chemotherapy. Potential side effects include, but are not limited to, nausea, vomiting, diarrhea, constipation, mucositis, taste changes, myelosuppression, infection, fatigue, alopecia, pulmonary toxicity, neuropathy, allergic reactions, infertility, and rarely, death. The patient has consented to beginning chemotherapy. We will give him 2 weeks to recover further from surgery. We will plan to restage with CT C/A/P after C#2 and C#4. Given his recent fracture, I think he would benefit from a bone scan to rule out osseous metastases. He would also benefit from port placement for therapy. He may be a candidate for the LungMAP study. I will ask the research nurses to review his chart for eligibility. 2) Right Femoral neck fx  S/p surgery 12/21/2017. Getting PT at home. Disabled parking tag provided today. 3) Anemia of chronic disease  Monitor    4) COPD  Seen by pulmonary in the hospital.  Follow up with them as outpatient. 5) Pain  Secondary to fracture and cancer. Seen by palliative care in the hospital.  Follow up with them as outpatient. 6) H/o RA  Not currently on therapy, symptoms well controlled. 7) CODE STATUS: DNR  We discussed today and he is agreeable. DDNR signed today. 8) ACP  Introduced by palliative care, he is working on this.  to discuss further with him.     Plan:     · Port placement  · Bone scan  Plan to proceed in about 2 weeks with C#1 Gemcitabine (1000 mg/m2 on days 1 and 8) and Carboplatin (AUC 5 on day 1) given every 3 weeks x 4 cycles  Labs: CBC on days 1 and 8, BMP, Magnesium, Phosphorus on day 1  Antiemetic Prophylaxis: Palonosetron on day 1, Dexamethasone on day 1 and day 8, Ondansetron prior to day 8  PRN Antiemetics: Ondansetron, Compazine  EMLA cream for port  · Follow up with pulmonary  · Follow up with palliative care  ·  to meet with patient today, provide resources and counseling  · DDNR signed today  · Return to see us with the start of chemotherapy      Signed By: Jorge Paul MD

## 2017-12-27 NOTE — MR AVS SNAPSHOT
Visit Information Date & Time Provider Department Dept. Phone Encounter #  
 12/27/2017  3:00 PM Cristobal [facility]agsdale [facility] [address] [place] [address] [id number] Follow-up Instructions Return in about 2 weeks (around 1/9/2018) for Rafi/Abram, [facility]IC for Carboplatin and Gemctabine C#1. Follow-up and Disposition History Upcoming Health Maintenance Date Due DTaP/Tdap/Td series (1 - Tdap) 9/29/1963 ZOSTER VACCINE AGE 60> 7/29/2002 GLAUCOMA SCREENING Q2Y 9/29/2007 Pneumococcal 65+ High/Highest Risk (1 of 2 - PCV13) 9/29/2007 MEDICARE YEARLY EXAM 11/14/2018 Allergies as of 12/27/2017  Review Complete On: 12/27/2017 By: Cristobal Ragsdale MD  
  
 Severity Noted Reaction Type Reactions Pcn [Penicillins]  10/21/2014    Hives Tolerated ancef graded challenge 12/21/2017 with no adverse side effects noted Current Immunizations  Reviewed on 10/3/2016 No immunizations on file. Not reviewed this visit You Were Diagnosed With   
  
 Codes Comments Non-small cell cancer of left lung ([facility]    -  Primary ICD-10-CM: C34.92 
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)     ICD-10-CM: D23.77 ICD-9-CM: 198. 7 Chronic obstructive pulmonary disease, unspecified COPD type ([facility]     ICD-10-CM: J44.9 ICD-9-CM: 261 Closed fracture of neck of right femur with routine healing, subsequent encounter     ICD-10-CM: S72.001D ICD-9-CM: V54.13 Rheumatoid arthritis involving multiple sites, unspecified rheumatoid factor presence ([facility].)     ICD-10-CM: M06.9 ICD-9-CM: 714.0 Vitals BP Pulse Temp Resp Height(growth percentile) 123/71 (BP 1 Location: Left arm, BP Patient Position: Sitting) 78 96.6 °F (35.9 °C) (Temporal) 20 5' 11\" (1.803 m) Weight(growth percentile) SpO2 BMI Smoking Status 183 lb (83 kg) 94% 25.52 kg/m2 Current Every Day Smoker Vitals History BMI and BSA Data Body Mass Index Body Surface Area 25.52 kg/m 2 2.04 m 2 Preferred Pharmacy Pharmacy Name Phone [facility] [address] [facility]  [facility] [phone] Your Updated Medication List  
  
   
This list is accurate as of: 12/27/17  4:27 PM.  Always use your most recent med list.  
  
  
  
  
 acetaminophen 325 mg tablet Commonly known as:  TYLENOL Take 1 Tab by mouth every six (6) hours for 28 days. albuterol 2.5 mg /3 mL (0.083 %) nebulizer solution Commonly known as:  PROVENTIL VENTOLIN  
USE 2 VIALS VIAL NEBULIZER EVERY 4 HOURS AS NEEDED FOR WHEEZING  
  
 albuterol-ipratropium 2.5 mg-0.5 mg/3 ml Nebu Commonly known as:  DUO-NEB  
3 mL by Nebulization route every four (4) hours as needed. aspirin 325 mg tablet Commonly known as:  ASPIRIN Take 325 mg by mouth two (2) times a day. lidocaine-prilocaine topical cream  
Commonly known as:  EMLA Apply  to affected area as needed for Pain (Apply 30-60 min. prior to having your port accessed). ondansetron hcl 8 mg tablet Commonly known as:  Reynolds Collet Take 1 Tab by mouth every eight (8) hours as needed for Nausea. OTHER(NON-FORMULARY) Natural Colon Cleanse four pills by mouth twice daily as needed for constipation. oxyCODONE IR 5 mg immediate release tablet Commonly known as:  Sharon Zepedael Take 1-2 Tabs by mouth every four (4) hours as needed. Max Daily Amount: 60 mg.  
  
 prochlorperazine 10 mg tablet Commonly known as:  COMPAZINE Take 1 Tab by mouth every six (6) hours as needed for Nausea. tiotropium-olodaterol 2.5-2.5 mcg/actuation Mist  
Commonly known as:  Arthea Mourning Take 2 Puffs by inhalation daily. Prescriptions Sent to Pharmacy Refills  
 prochlorperazine (COMPAZINE) 10 mg tablet 5 Sig: Take 1 Tab by mouth every six (6) hours as needed for Nausea.   
 Class: Normal  
 Pharmacy: [facility] [place], [place] [address] [place] [address] Ph #: [phone] Route: Oral  
 lidocaine-prilocaine (EMLA) topical cream 0 Sig: Apply  to affected area as needed for Pain (Apply 30-60 min. prior to having your port accessed). Class: Normal  
 [facility] [address] [place] [address] Ph #: [phone] Route: Topical  
 ondansetron hcl (ZOFRAN) 8 mg tablet 5 Sig: Take 1 Tab by mouth every eight (8) hours as needed for Nausea. Class: Normal  
 Pharmacy: [facility] [address] Ph #: [phone] Route: Oral  
  
We Performed the Following DO NOT RESUSCITATE [facility] REFERRAL TO PALLIATIVE MEDICINE [[id number] Custom] Comments:  
 Please evaluate patient for lung cancer. Follow-up Instructions Return in about 2 weeks (around 1/9/2018) for JIA Caceres for Carboplatin and Gemctabine C#1. To-Do List   
 12/28/2017 To Be Determined Appointment with Carmita Webb at [place] [address]  
  
 12/28/2017 2:30 PM  
  Appointment with Jeanie Jacinto at [place] [address] Around 12/29/2017 Imaging:  IR INSERT TUNL CVC W PORT OVER 5 YEARS Around 12/29/2017 Imaging:  NM BONE SCAN [facility] BODY   
  
 01/02/2018 To Be Determined Appointment with Jeanie Jacinto at [place] [address]  
  
 01/04/2018 To Be Determined Appointment with Jeanie Jacinto at [place] [address]  
  
 01/09/2018 To Be Determined Appointment with Jeanie Jacinto at [place] [address]  
  
 01/11/2018 To Be Determined Appointment with Carol Garcia PT at [place] [address] Referral Information Referral ID Referred By Referred To  
  
 [id number] Tsering Henry Not Available Visits Status Start Date End Date 1 New Request 12/27/17 12/27/18 If your referral has a status of pending review or denied, additional information will be sent to support the outcome of this decision. Introducing [facility] SERVICES! Josh Cid introduces [email or web address] patient portal. Now you can access parts of your medical record, email your doctor's office, and request medication refills online. 1. In your internet browser, go to [email or web address] 2. Click on the First Time User? Click Here link in the Sign In box. You will see the New Member Sign Up page. 3. Enter your [email or web address] Access Code exactly as it appears below. You will not need to use this code after youve completed the sign-up process. If you do not sign up before the expiration date, you must request a new code. · [email or web address] Access Code: [email or web address] Expires: 2/19/2018  2:18 PM 
 
4. Enter the last four digits of your Social Security Number (xxxx) and Date of Birth (mm/dd/yyyy) as indicated and click Submit. You will be taken to the next sign-up page. 5. Create a [email or web address] ID. This will be your [email or web address] login ID and cannot be changed, so think of one that is secure and easy to remember. 6. Create a [email or web address] password. You can change your password at any time. 7. Enter your Password Reset Question and Answer. This can be used at a later time if you forget your password. 8. Enter your e-mail address. You will receive e-mail notification when new information is available in [address] 9. Click Sign Up. You can now view and download portions of your medical record. 10. Click the Download Summary menu link to download a portable copy of your medical information. If you have questions, please visit the Frequently Asked Questions section of the [email or web address] website.  Remember, [email or web address] is NOT to be used for urgent needs. For medical emergencies, dial 911. Now available from your iPhone and Android! Please provide this summary of care documentation to your next provider. Your primary care clinician is listed as NORMA COY. If you have any questions after today's visit, please call [phone].

## 2017-12-28 NOTE — PROGRESS NOTES
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 Nurse Navigator(NN) attempted to contact the patient. No answer LM on VM. Follow up appt completed with Dr Chantal Beasley on 12/27/17. Will need to make an appt with Dr Burke Kilgore, pulmonologist, Dr Edmundo Trivedi, ortho and Dr Colby Brown, PCP. Things to follow up on with PCP:  1. 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 Postop pain  3. Anemia  4. Hyponatremia  5. Possible osteoporosis evaluation     Readmission Risk  RRAT score: 25 High  Total Hospitalizations/ED visits:  0 in the past 6 months prior to most recent hospitalization    Home Health orders at discharge? yes If yes, SN and PT.  what agency and what services? [place] 12/26/17    Advance Medical Directive on file in EMR? yes DNI/DNR    NN Plan: Will need to follow up on symptoms, review medications, care plan and goals.  Continue to attempt to contact patient during 30 day post hospital period

## 2017-12-29 NOTE — TELEPHONE ENCOUNTER
Called patient to inform him of his upcoming appointment on 1/9 at 10:00 in the infusion center and 10:45 with Dr. Jailyn Motley. Patient verbalized understanding and denied any further questions/concerns.

## 2018-01-01 ENCOUNTER — HOME CARE VISIT (OUTPATIENT)
Dept: SCHEDULING | Facility: HOME HEALTH | Age: 78
End: 2018-01-01
Payer: MEDICARE

## 2018-01-01 ENCOUNTER — TELEPHONE (OUTPATIENT)
Dept: ONCOLOGY | Age: 78
End: 2018-01-01

## 2018-01-01 ENCOUNTER — APPOINTMENT (OUTPATIENT)
Dept: INFUSION THERAPY | Age: 78
End: 2018-01-01
Payer: MEDICARE

## 2018-01-01 ENCOUNTER — APPOINTMENT (OUTPATIENT)
Dept: CT IMAGING | Age: 78
DRG: 180 | End: 2018-01-01
Attending: NURSE PRACTITIONER
Payer: MEDICARE

## 2018-01-01 ENCOUNTER — HOSPITAL ENCOUNTER (OUTPATIENT)
Dept: INFUSION THERAPY | Age: 78
Discharge: HOME | End: 2018-01-30
Payer: MEDICARE

## 2018-01-01 ENCOUNTER — NURSE NAVIGATOR (OUTPATIENT)
Dept: PALLATIVE CARE | Age: 78
End: 2018-01-01

## 2018-01-01 ENCOUNTER — HOSPITAL ENCOUNTER (OUTPATIENT)
Dept: INFUSION THERAPY | Age: 78
Discharge: HOME | End: 2018-04-10
Payer: MEDICARE

## 2018-01-01 ENCOUNTER — HOSPITAL ENCOUNTER (OUTPATIENT)
Dept: GENERAL RADIOLOGY | Age: 78
Discharge: HOME | End: 2018-01-22
Payer: MEDICARE

## 2018-01-01 ENCOUNTER — HOSPITAL ENCOUNTER (OUTPATIENT)
Dept: RADIATION THERAPY | Age: 78
Discharge: HOME | End: 2018-04-24

## 2018-01-01 ENCOUNTER — HOSPITAL ENCOUNTER (OUTPATIENT)
Dept: INFUSION THERAPY | Age: 78
Discharge: HOME | End: 2018-01-09
Payer: MEDICARE

## 2018-01-01 ENCOUNTER — HOSPITAL ENCOUNTER (OUTPATIENT)
Dept: INFUSION THERAPY | Age: 78
Discharge: HOME | End: 2018-03-27
Payer: MEDICARE

## 2018-01-01 ENCOUNTER — OFFICE VISIT (OUTPATIENT)
Dept: FAMILY MEDICINE CLINIC | Age: 78
End: 2018-01-01

## 2018-01-01 ENCOUNTER — HOME CARE VISIT (OUTPATIENT)
Dept: HOME HEALTH SERVICES | Facility: HOME HEALTH | Age: 78
End: 2018-01-01
Payer: MEDICARE

## 2018-01-01 ENCOUNTER — HOSPITAL ENCOUNTER (OUTPATIENT)
Dept: INFUSION THERAPY | Age: 78
End: 2018-01-01

## 2018-01-01 ENCOUNTER — TELEPHONE (OUTPATIENT)
Dept: PALLATIVE CARE | Age: 78
End: 2018-01-01

## 2018-01-01 ENCOUNTER — HOSPITAL ENCOUNTER (OUTPATIENT)
Dept: INFUSION THERAPY | Age: 78
Discharge: HOME | End: 2018-02-13
Payer: MEDICARE

## 2018-01-01 ENCOUNTER — HOSPITAL ENCOUNTER (OUTPATIENT)
Dept: INFUSION THERAPY | Age: 78
Discharge: HOME | End: 2018-05-29
Payer: MEDICARE

## 2018-01-01 ENCOUNTER — RESEARCH ENCOUNTER (OUTPATIENT)
Dept: ONCOLOGY | Age: 78
End: 2018-01-01

## 2018-01-01 ENCOUNTER — HOSPITAL ENCOUNTER (OUTPATIENT)
Dept: INFUSION THERAPY | Age: 78
Discharge: HOME | End: 2018-05-17
Payer: MEDICARE

## 2018-01-01 ENCOUNTER — HOSPITAL ENCOUNTER (OUTPATIENT)
Dept: INFUSION THERAPY | Age: 78
Discharge: HOME | End: 2018-03-28
Payer: MEDICARE

## 2018-01-01 ENCOUNTER — OFFICE VISIT (OUTPATIENT)
Dept: ONCOLOGY | Age: 78
End: 2018-01-01

## 2018-01-01 ENCOUNTER — HOSPITAL ENCOUNTER (OUTPATIENT)
Dept: INFUSION THERAPY | Age: 78
Discharge: HOME | End: 2018-01-18
Payer: MEDICARE

## 2018-01-01 ENCOUNTER — HOME HEALTH ADMISSION (OUTPATIENT)
Dept: HOME HEALTH SERVICES | Facility: HOME HEALTH | Age: 78
End: 2018-01-01
Payer: MEDICARE

## 2018-01-01 ENCOUNTER — PATIENT OUTREACH (OUTPATIENT)
Dept: FAMILY MEDICINE CLINIC | Age: 78
End: 2018-01-01

## 2018-01-01 ENCOUNTER — HOSPITAL ENCOUNTER (OUTPATIENT)
Dept: INFUSION THERAPY | Age: 78
Discharge: HOME | End: 2018-04-03
Payer: MEDICARE

## 2018-01-01 ENCOUNTER — HOSPITAL ENCOUNTER (OUTPATIENT)
Dept: INFUSION THERAPY | Age: 78
Discharge: HOME | End: 2018-02-01
Payer: MEDICARE

## 2018-01-01 ENCOUNTER — HOSPITAL ENCOUNTER (OUTPATIENT)
Dept: INFUSION THERAPY | Age: 78
Discharge: HOME | End: 2018-02-06
Payer: MEDICARE

## 2018-01-01 ENCOUNTER — OFFICE VISIT (OUTPATIENT)
Dept: CARDIOLOGY CLINIC | Age: 78
End: 2018-01-01

## 2018-01-01 ENCOUNTER — DOCUMENTATION ONLY (OUTPATIENT)
Dept: FAMILY MEDICINE CLINIC | Age: 78
End: 2018-01-01

## 2018-01-01 ENCOUNTER — HOSPITAL ENCOUNTER (OUTPATIENT)
Dept: INFUSION THERAPY | Age: 78
Discharge: HOME | End: 2018-03-13
Payer: MEDICARE

## 2018-01-01 ENCOUNTER — APPOINTMENT (OUTPATIENT)
Dept: INFUSION THERAPY | Age: 78
End: 2018-01-01

## 2018-01-01 ENCOUNTER — HOSPITAL ENCOUNTER (OUTPATIENT)
Dept: CT IMAGING | Age: 78
Discharge: HOME | End: 2018-04-21
Attending: NURSE PRACTITIONER
Payer: MEDICARE

## 2018-01-01 ENCOUNTER — HOSPITAL ENCOUNTER (OUTPATIENT)
Dept: INFUSION THERAPY | Age: 78
Discharge: HOME | End: 2018-01-16
Payer: MEDICARE

## 2018-01-01 ENCOUNTER — HOSPITAL ENCOUNTER (OUTPATIENT)
Dept: CT IMAGING | Age: 78
Discharge: HOME | End: 2018-02-24
Attending: NURSE PRACTITIONER
Payer: MEDICARE

## 2018-01-01 ENCOUNTER — HOSPITAL ENCOUNTER (OUTPATIENT)
Dept: INFUSION THERAPY | Age: 78
Discharge: HOME | End: 2018-05-15
Payer: MEDICARE

## 2018-01-01 ENCOUNTER — HOSPITAL ENCOUNTER (OUTPATIENT)
Dept: INFUSION THERAPY | Age: 78
Discharge: HOME | End: 2018-03-06
Payer: MEDICARE

## 2018-01-01 ENCOUNTER — HOSPITAL ENCOUNTER (INPATIENT)
Age: 78
LOS: 2 days | Discharge: HOME HEALTH | DRG: 180 | End: 2018-05-18
Attending: EMERGENCY MEDICINE | Admitting: FAMILY MEDICINE
Payer: MEDICARE

## 2018-01-01 ENCOUNTER — HOSPITAL ENCOUNTER (OUTPATIENT)
Dept: INFUSION THERAPY | Age: 78
Discharge: HOME | End: 2018-05-23
Payer: MEDICARE

## 2018-01-01 ENCOUNTER — HOSPITAL ENCOUNTER (OUTPATIENT)
Dept: INTERVENTIONAL RADIOLOGY/VASCULAR | Age: 78
Discharge: HOME | End: 2018-01-04
Attending: INTERNAL MEDICINE | Admitting: INTERNAL MEDICINE
Payer: MEDICARE

## 2018-01-01 ENCOUNTER — HOSPITAL ENCOUNTER (OUTPATIENT)
Dept: NUCLEAR MEDICINE | Age: 78
Discharge: HOME | End: 2018-01-05
Attending: INTERNAL MEDICINE
Payer: MEDICARE

## 2018-01-01 ENCOUNTER — HOSPITAL ENCOUNTER (OUTPATIENT)
Dept: INFUSION THERAPY | Age: 78
Discharge: HOME | End: 2018-04-17
Payer: MEDICARE

## 2018-01-01 ENCOUNTER — HOME CARE VISIT (OUTPATIENT)
Dept: HOME HEALTH SERVICES | Facility: HOME HEALTH | Age: 78
End: 2018-01-01

## 2018-01-01 ENCOUNTER — HOSPITAL ENCOUNTER (OUTPATIENT)
Dept: INFUSION THERAPY | Age: 78
Discharge: HOME | End: 2018-02-27
Payer: MEDICARE

## 2018-01-01 ENCOUNTER — HOSPITAL ENCOUNTER (OUTPATIENT)
Dept: INFUSION THERAPY | Age: 78
End: 2018-01-01
Payer: MEDICARE

## 2018-01-01 ENCOUNTER — HOSPICE ADMISSION (OUTPATIENT)
Dept: HOSPICE | Facility: HOSPICE | Age: 78
End: 2018-01-01

## 2018-01-01 ENCOUNTER — HOSPITAL ENCOUNTER (OUTPATIENT)
Dept: INFUSION THERAPY | Age: 78
Discharge: HOME | End: 2018-03-01
Payer: MEDICARE

## 2018-01-01 ENCOUNTER — OFFICE VISIT (OUTPATIENT)
Dept: PALLATIVE CARE | Age: 78
End: 2018-01-01

## 2018-01-01 ENCOUNTER — APPOINTMENT (OUTPATIENT)
Dept: GENERAL RADIOLOGY | Age: 78
DRG: 180 | End: 2018-01-01
Attending: EMERGENCY MEDICINE
Payer: MEDICARE

## 2018-01-01 VITALS
TEMPERATURE: 96.8 F | DIASTOLIC BLOOD PRESSURE: 72 MMHG | SYSTOLIC BLOOD PRESSURE: 124 MMHG | RESPIRATION RATE: 16 BRPM | HEART RATE: 63 BPM

## 2018-01-01 VITALS
HEART RATE: 69 BPM | DIASTOLIC BLOOD PRESSURE: 78 MMHG | TEMPERATURE: 96.8 F | OXYGEN SATURATION: 100 % | RESPIRATION RATE: 18 BRPM | SYSTOLIC BLOOD PRESSURE: 124 MMHG

## 2018-01-01 VITALS
DIASTOLIC BLOOD PRESSURE: 69 MMHG | WEIGHT: 165 LBS | OXYGEN SATURATION: 93 % | OXYGEN SATURATION: 99 % | TEMPERATURE: 97.7 F | HEART RATE: 88 BPM | SYSTOLIC BLOOD PRESSURE: 112 MMHG | RESPIRATION RATE: 16 BRPM | RESPIRATION RATE: 18 BRPM | HEIGHT: 70 IN | BODY MASS INDEX: 23.62 KG/M2 | TEMPERATURE: 97.6 F | HEART RATE: 78 BPM | DIASTOLIC BLOOD PRESSURE: 58 MMHG | SYSTOLIC BLOOD PRESSURE: 110 MMHG

## 2018-01-01 VITALS
HEART RATE: 62 BPM | DIASTOLIC BLOOD PRESSURE: 68 MMHG | OXYGEN SATURATION: 92 % | TEMPERATURE: 97.3 F | SYSTOLIC BLOOD PRESSURE: 112 MMHG | RESPIRATION RATE: 18 BRPM

## 2018-01-01 VITALS
SYSTOLIC BLOOD PRESSURE: 109 MMHG | DIASTOLIC BLOOD PRESSURE: 66 MMHG | BODY MASS INDEX: 24.91 KG/M2 | RESPIRATION RATE: 16 BRPM | HEIGHT: 70 IN | WEIGHT: 174 LBS | HEART RATE: 93 BPM | TEMPERATURE: 96.9 F | OXYGEN SATURATION: 94 %

## 2018-01-01 VITALS
HEART RATE: 83 BPM | WEIGHT: 173.7 LBS | RESPIRATION RATE: 18 BRPM | DIASTOLIC BLOOD PRESSURE: 70 MMHG | BODY MASS INDEX: 24.32 KG/M2 | SYSTOLIC BLOOD PRESSURE: 116 MMHG | TEMPERATURE: 97.2 F | HEIGHT: 71 IN

## 2018-01-01 VITALS
DIASTOLIC BLOOD PRESSURE: 64 MMHG | RESPIRATION RATE: 18 BRPM | SYSTOLIC BLOOD PRESSURE: 97 MMHG | BODY MASS INDEX: 25.1 KG/M2 | WEIGHT: 179.3 LBS | HEIGHT: 71 IN | TEMPERATURE: 96.9 F | HEART RATE: 87 BPM | OXYGEN SATURATION: 99 %

## 2018-01-01 VITALS
WEIGHT: 168.65 LBS | HEART RATE: 85 BPM | SYSTOLIC BLOOD PRESSURE: 113 MMHG | DIASTOLIC BLOOD PRESSURE: 72 MMHG | BODY MASS INDEX: 24.14 KG/M2 | HEIGHT: 70 IN | RESPIRATION RATE: 21 BRPM | TEMPERATURE: 97.4 F | OXYGEN SATURATION: 94 %

## 2018-01-01 VITALS
HEART RATE: 83 BPM | TEMPERATURE: 97.4 F | SYSTOLIC BLOOD PRESSURE: 127 MMHG | RESPIRATION RATE: 18 BRPM | OXYGEN SATURATION: 90 % | DIASTOLIC BLOOD PRESSURE: 82 MMHG

## 2018-01-01 VITALS
DIASTOLIC BLOOD PRESSURE: 69 MMHG | HEART RATE: 76 BPM | RESPIRATION RATE: 20 BRPM | TEMPERATURE: 97.8 F | OXYGEN SATURATION: 95 % | HEIGHT: 71 IN | WEIGHT: 174 LBS | BODY MASS INDEX: 24.36 KG/M2 | SYSTOLIC BLOOD PRESSURE: 110 MMHG

## 2018-01-01 VITALS
HEART RATE: 75 BPM | RESPIRATION RATE: 18 BRPM | DIASTOLIC BLOOD PRESSURE: 70 MMHG | TEMPERATURE: 97.7 F | SYSTOLIC BLOOD PRESSURE: 124 MMHG | OXYGEN SATURATION: 98 %

## 2018-01-01 VITALS
HEART RATE: 84 BPM | RESPIRATION RATE: 18 BRPM | OXYGEN SATURATION: 94 % | TEMPERATURE: 97.4 F | SYSTOLIC BLOOD PRESSURE: 94 MMHG | DIASTOLIC BLOOD PRESSURE: 63 MMHG

## 2018-01-01 VITALS
HEART RATE: 75 BPM | BODY MASS INDEX: 24.79 KG/M2 | HEIGHT: 71 IN | DIASTOLIC BLOOD PRESSURE: 66 MMHG | RESPIRATION RATE: 18 BRPM | WEIGHT: 177.1 LBS | TEMPERATURE: 97 F | SYSTOLIC BLOOD PRESSURE: 101 MMHG

## 2018-01-01 VITALS
DIASTOLIC BLOOD PRESSURE: 56 MMHG | HEIGHT: 71 IN | HEART RATE: 74 BPM | BODY MASS INDEX: 24.46 KG/M2 | TEMPERATURE: 97.9 F | WEIGHT: 174.7 LBS | RESPIRATION RATE: 16 BRPM | SYSTOLIC BLOOD PRESSURE: 112 MMHG

## 2018-01-01 VITALS
SYSTOLIC BLOOD PRESSURE: 106 MMHG | DIASTOLIC BLOOD PRESSURE: 56 MMHG | RESPIRATION RATE: 18 BRPM | OXYGEN SATURATION: 92 % | TEMPERATURE: 97.3 F | HEART RATE: 71 BPM

## 2018-01-01 VITALS
DIASTOLIC BLOOD PRESSURE: 64 MMHG | TEMPERATURE: 96.6 F | SYSTOLIC BLOOD PRESSURE: 108 MMHG | RESPIRATION RATE: 20 BRPM | HEART RATE: 78 BPM

## 2018-01-01 VITALS
HEART RATE: 65 BPM | TEMPERATURE: 97.5 F | RESPIRATION RATE: 18 BRPM | DIASTOLIC BLOOD PRESSURE: 60 MMHG | SYSTOLIC BLOOD PRESSURE: 110 MMHG | OXYGEN SATURATION: 96 %

## 2018-01-01 VITALS
HEART RATE: 79 BPM | RESPIRATION RATE: 18 BRPM | SYSTOLIC BLOOD PRESSURE: 101 MMHG | TEMPERATURE: 97.9 F | DIASTOLIC BLOOD PRESSURE: 65 MMHG

## 2018-01-01 VITALS
HEIGHT: 71 IN | HEART RATE: 73 BPM | DIASTOLIC BLOOD PRESSURE: 76 MMHG | TEMPERATURE: 98.1 F | BODY MASS INDEX: 24.54 KG/M2 | SYSTOLIC BLOOD PRESSURE: 112 MMHG | WEIGHT: 175.3 LBS

## 2018-01-01 VITALS
OXYGEN SATURATION: 100 % | SYSTOLIC BLOOD PRESSURE: 115 MMHG | HEART RATE: 84 BPM | BODY MASS INDEX: 24.14 KG/M2 | RESPIRATION RATE: 18 BRPM | WEIGHT: 172.4 LBS | DIASTOLIC BLOOD PRESSURE: 78 MMHG | TEMPERATURE: 98 F | HEIGHT: 71 IN

## 2018-01-01 VITALS
HEART RATE: 70 BPM | SYSTOLIC BLOOD PRESSURE: 115 MMHG | TEMPERATURE: 97.9 F | RESPIRATION RATE: 18 BRPM | DIASTOLIC BLOOD PRESSURE: 75 MMHG | OXYGEN SATURATION: 100 %

## 2018-01-01 VITALS
DIASTOLIC BLOOD PRESSURE: 64 MMHG | SYSTOLIC BLOOD PRESSURE: 102 MMHG | HEIGHT: 71 IN | RESPIRATION RATE: 18 BRPM | OXYGEN SATURATION: 100 % | BODY MASS INDEX: 24.74 KG/M2 | TEMPERATURE: 97.9 F | HEART RATE: 89 BPM | WEIGHT: 176.7 LBS

## 2018-01-01 VITALS
BODY MASS INDEX: 25.73 KG/M2 | HEART RATE: 83 BPM | DIASTOLIC BLOOD PRESSURE: 72 MMHG | TEMPERATURE: 99 F | OXYGEN SATURATION: 98 % | WEIGHT: 183.8 LBS | SYSTOLIC BLOOD PRESSURE: 116 MMHG | HEIGHT: 71 IN | RESPIRATION RATE: 16 BRPM

## 2018-01-01 VITALS
RESPIRATION RATE: 18 BRPM | DIASTOLIC BLOOD PRESSURE: 58 MMHG | TEMPERATURE: 97.6 F | SYSTOLIC BLOOD PRESSURE: 102 MMHG | OXYGEN SATURATION: 95 % | HEART RATE: 92 BPM

## 2018-01-01 VITALS
OXYGEN SATURATION: 92 % | BODY MASS INDEX: 24.34 KG/M2 | HEART RATE: 91 BPM | TEMPERATURE: 98.3 F | HEIGHT: 70 IN | DIASTOLIC BLOOD PRESSURE: 73 MMHG | RESPIRATION RATE: 18 BRPM | WEIGHT: 170 LBS | SYSTOLIC BLOOD PRESSURE: 107 MMHG

## 2018-01-01 VITALS
HEIGHT: 70 IN | HEART RATE: 97 BPM | OXYGEN SATURATION: 99 % | WEIGHT: 179 LBS | DIASTOLIC BLOOD PRESSURE: 69 MMHG | BODY MASS INDEX: 25.62 KG/M2 | TEMPERATURE: 96.6 F | SYSTOLIC BLOOD PRESSURE: 119 MMHG | RESPIRATION RATE: 20 BRPM

## 2018-01-01 VITALS
HEIGHT: 71 IN | BODY MASS INDEX: 24.92 KG/M2 | OXYGEN SATURATION: 100 % | HEART RATE: 50 BPM | TEMPERATURE: 98.8 F | WEIGHT: 178 LBS | DIASTOLIC BLOOD PRESSURE: 62 MMHG | SYSTOLIC BLOOD PRESSURE: 111 MMHG

## 2018-01-01 VITALS
OXYGEN SATURATION: 92 % | DIASTOLIC BLOOD PRESSURE: 56 MMHG | RESPIRATION RATE: 18 BRPM | TEMPERATURE: 97.3 F | HEART RATE: 67 BPM | SYSTOLIC BLOOD PRESSURE: 106 MMHG

## 2018-01-01 VITALS
HEIGHT: 71 IN | BODY MASS INDEX: 24.82 KG/M2 | WEIGHT: 177.3 LBS | SYSTOLIC BLOOD PRESSURE: 123 MMHG | TEMPERATURE: 97.8 F | DIASTOLIC BLOOD PRESSURE: 79 MMHG | RESPIRATION RATE: 16 BRPM | HEART RATE: 78 BPM

## 2018-01-01 VITALS
RESPIRATION RATE: 16 BRPM | WEIGHT: 170.5 LBS | BODY MASS INDEX: 23.87 KG/M2 | TEMPERATURE: 97.1 F | SYSTOLIC BLOOD PRESSURE: 119 MMHG | HEIGHT: 71 IN | DIASTOLIC BLOOD PRESSURE: 68 MMHG | HEART RATE: 79 BPM

## 2018-01-01 VITALS
BODY MASS INDEX: 25.2 KG/M2 | DIASTOLIC BLOOD PRESSURE: 57 MMHG | HEIGHT: 70 IN | SYSTOLIC BLOOD PRESSURE: 94 MMHG | TEMPERATURE: 96.8 F | WEIGHT: 176 LBS | RESPIRATION RATE: 20 BRPM | OXYGEN SATURATION: 98 % | HEART RATE: 67 BPM

## 2018-01-01 VITALS
SYSTOLIC BLOOD PRESSURE: 89 MMHG | RESPIRATION RATE: 22 BRPM | DIASTOLIC BLOOD PRESSURE: 55 MMHG | HEART RATE: 105 BPM | TEMPERATURE: 98.1 F | OXYGEN SATURATION: 93 %

## 2018-01-01 VITALS
DIASTOLIC BLOOD PRESSURE: 70 MMHG | TEMPERATURE: 97.1 F | HEART RATE: 72 BPM | HEIGHT: 71 IN | RESPIRATION RATE: 16 BRPM | WEIGHT: 181 LBS | OXYGEN SATURATION: 100 % | BODY MASS INDEX: 25.34 KG/M2 | SYSTOLIC BLOOD PRESSURE: 110 MMHG

## 2018-01-01 VITALS
TEMPERATURE: 97.7 F | RESPIRATION RATE: 20 BRPM | HEART RATE: 81 BPM | OXYGEN SATURATION: 90 % | SYSTOLIC BLOOD PRESSURE: 96 MMHG | DIASTOLIC BLOOD PRESSURE: 50 MMHG

## 2018-01-01 VITALS
HEIGHT: 70 IN | DIASTOLIC BLOOD PRESSURE: 66 MMHG | WEIGHT: 177 LBS | RESPIRATION RATE: 20 BRPM | SYSTOLIC BLOOD PRESSURE: 102 MMHG | OXYGEN SATURATION: 100 % | TEMPERATURE: 95.1 F | HEART RATE: 70 BPM | BODY MASS INDEX: 25.34 KG/M2

## 2018-01-01 VITALS
SYSTOLIC BLOOD PRESSURE: 123 MMHG | WEIGHT: 178.6 LBS | TEMPERATURE: 97.4 F | OXYGEN SATURATION: 99 % | RESPIRATION RATE: 18 BRPM | HEIGHT: 71 IN | BODY MASS INDEX: 25 KG/M2 | DIASTOLIC BLOOD PRESSURE: 75 MMHG | HEART RATE: 73 BPM

## 2018-01-01 VITALS
BODY MASS INDEX: 24.94 KG/M2 | HEART RATE: 77 BPM | OXYGEN SATURATION: 98 % | RESPIRATION RATE: 26 BRPM | TEMPERATURE: 97.4 F | WEIGHT: 178.13 LBS | HEIGHT: 71 IN | SYSTOLIC BLOOD PRESSURE: 124 MMHG | DIASTOLIC BLOOD PRESSURE: 63 MMHG

## 2018-01-01 VITALS
BODY MASS INDEX: 24.34 KG/M2 | RESPIRATION RATE: 20 BRPM | WEIGHT: 170 LBS | OXYGEN SATURATION: 97 % | HEIGHT: 70 IN | DIASTOLIC BLOOD PRESSURE: 70 MMHG | HEART RATE: 71 BPM | SYSTOLIC BLOOD PRESSURE: 120 MMHG | TEMPERATURE: 97.6 F

## 2018-01-01 VITALS
RESPIRATION RATE: 18 BRPM | SYSTOLIC BLOOD PRESSURE: 116 MMHG | DIASTOLIC BLOOD PRESSURE: 64 MMHG | OXYGEN SATURATION: 90 % | HEART RATE: 86 BPM | TEMPERATURE: 97.6 F

## 2018-01-01 VITALS
HEART RATE: 73 BPM | TEMPERATURE: 98.5 F | SYSTOLIC BLOOD PRESSURE: 118 MMHG | RESPIRATION RATE: 16 BRPM | DIASTOLIC BLOOD PRESSURE: 68 MMHG

## 2018-01-01 VITALS
RESPIRATION RATE: 18 BRPM | DIASTOLIC BLOOD PRESSURE: 68 MMHG | OXYGEN SATURATION: 92 % | HEART RATE: 78 BPM | TEMPERATURE: 97.7 F | SYSTOLIC BLOOD PRESSURE: 120 MMHG

## 2018-01-01 DIAGNOSIS — C34.92 NON-SMALL CELL CANCER OF LEFT LUNG (HCC): ICD-10-CM

## 2018-01-01 DIAGNOSIS — S72.001D CLOSED FRACTURE OF NECK OF RIGHT FEMUR WITH ROUTINE HEALING, SUBSEQUENT ENCOUNTER: ICD-10-CM

## 2018-01-01 DIAGNOSIS — D64.9 SYMPTOMATIC ANEMIA: Primary | ICD-10-CM

## 2018-01-01 DIAGNOSIS — C34.92 NON-SMALL CELL CANCER OF LEFT LUNG (HCC): Primary | ICD-10-CM

## 2018-01-01 DIAGNOSIS — R19.7 DIARRHEA, UNSPECIFIED TYPE: ICD-10-CM

## 2018-01-01 DIAGNOSIS — M25.551 RIGHT HIP PAIN: Primary | ICD-10-CM

## 2018-01-01 DIAGNOSIS — C79.72 MALIGNANT NEOPLASM METASTATIC TO LEFT ADRENAL GLAND (HCC): ICD-10-CM

## 2018-01-01 DIAGNOSIS — I48.91 ATRIAL FIBRILLATION, UNSPECIFIED TYPE (HCC): Primary | ICD-10-CM

## 2018-01-01 DIAGNOSIS — B37.2 YEAST INFECTION OF THE SKIN: ICD-10-CM

## 2018-01-01 DIAGNOSIS — J44.9 CHRONIC OBSTRUCTIVE PULMONARY DISEASE, UNSPECIFIED COPD TYPE (HCC): ICD-10-CM

## 2018-01-01 DIAGNOSIS — G89.3 CANCER ASSOCIATED PAIN: ICD-10-CM

## 2018-01-01 DIAGNOSIS — R07.81 RIB PAIN ON LEFT SIDE: Primary | ICD-10-CM

## 2018-01-01 DIAGNOSIS — M06.9 RHEUMATOID ARTHRITIS INVOLVING MULTIPLE SITES, UNSPECIFIED RHEUMATOID FACTOR PRESENCE: ICD-10-CM

## 2018-01-01 DIAGNOSIS — R63.0 POOR APPETITE: ICD-10-CM

## 2018-01-01 DIAGNOSIS — D64.9 ANEMIA, UNSPECIFIED TYPE: ICD-10-CM

## 2018-01-01 DIAGNOSIS — D69.6 THROMBOCYTOPENIA (HCC): ICD-10-CM

## 2018-01-01 DIAGNOSIS — K59.03 DRUG-INDUCED CONSTIPATION: ICD-10-CM

## 2018-01-01 DIAGNOSIS — C34.92 MALIGNANT NEOPLASM OF LEFT LUNG, UNSPECIFIED PART OF LUNG (HCC): Primary | ICD-10-CM

## 2018-01-01 DIAGNOSIS — R06.02 SHORTNESS OF BREATH: ICD-10-CM

## 2018-01-01 DIAGNOSIS — I95.9 HYPOTENSION, UNSPECIFIED HYPOTENSION TYPE: ICD-10-CM

## 2018-01-01 DIAGNOSIS — Z66 DNR (DO NOT RESUSCITATE): ICD-10-CM

## 2018-01-01 DIAGNOSIS — I10 ESSENTIAL HYPERTENSION: ICD-10-CM

## 2018-01-01 DIAGNOSIS — M79.89 LEG SWELLING: ICD-10-CM

## 2018-01-01 DIAGNOSIS — I48.91 ATRIAL FIBRILLATION, UNSPECIFIED TYPE (HCC): ICD-10-CM

## 2018-01-01 DIAGNOSIS — R53.81 PHYSICAL DEBILITY: ICD-10-CM

## 2018-01-01 DIAGNOSIS — R04.2 HEMOPTYSIS: ICD-10-CM

## 2018-01-01 DIAGNOSIS — M25.551 RIGHT HIP PAIN: ICD-10-CM

## 2018-01-01 DIAGNOSIS — E88.09 HYPOALBUMINEMIA: ICD-10-CM

## 2018-01-01 DIAGNOSIS — R07.89 LEFT-SIDED CHEST WALL PAIN: ICD-10-CM

## 2018-01-01 DIAGNOSIS — R53.0 NEOPLASTIC MALIGNANT RELATED FATIGUE: ICD-10-CM

## 2018-01-01 DIAGNOSIS — I82.409 DEEP VEIN THROMBOSIS (DVT) OF LOWER EXTREMITY, UNSPECIFIED CHRONICITY, UNSPECIFIED LATERALITY, UNSPECIFIED VEIN (HCC): Primary | ICD-10-CM

## 2018-01-01 DIAGNOSIS — Z71.89 DNR (DO NOT RESUSCITATE) DISCUSSION: ICD-10-CM

## 2018-01-01 DIAGNOSIS — Z71.89 GOALS OF CARE, COUNSELING/DISCUSSION: ICD-10-CM

## 2018-01-01 LAB
ABO + RH BLD: NORMAL
ALBUMIN SERPL-MCNC: 1.7 G/DL (ref 3.5–5)
ALBUMIN SERPL-MCNC: 1.9 G/DL (ref 3.5–5)
ALBUMIN SERPL-MCNC: 1.9 G/DL (ref 3.5–5)
ALBUMIN SERPL-MCNC: 2 G/DL (ref 3.5–5)
ALBUMIN SERPL-MCNC: 2.1 G/DL (ref 3.5–5)
ALBUMIN SERPL-MCNC: 2.2 G/DL (ref 3.5–5)
ALBUMIN SERPL-MCNC: 2.3 G/DL (ref 3.5–5)
ALBUMIN/GLOB SERPL: 0.4 {RATIO} (ref 1.1–2.2)
ALBUMIN/GLOB SERPL: 0.4 {RATIO} (ref 1.1–2.2)
ALBUMIN/GLOB SERPL: 0.5 {RATIO} (ref 1.1–2.2)
ALP SERPL-CCNC: 60 U/L (ref 45–117)
ALP SERPL-CCNC: 64 U/L (ref 45–117)
ALP SERPL-CCNC: 65 U/L (ref 45–117)
ALP SERPL-CCNC: 67 U/L (ref 45–117)
ALP SERPL-CCNC: 67 U/L (ref 45–117)
ALP SERPL-CCNC: 69 U/L (ref 45–117)
ALP SERPL-CCNC: 70 U/L (ref 45–117)
ALP SERPL-CCNC: 72 U/L (ref 45–117)
ALP SERPL-CCNC: 74 U/L (ref 45–117)
ALP SERPL-CCNC: 75 U/L (ref 45–117)
ALP SERPL-CCNC: 87 U/L (ref 45–117)
ALT SERPL-CCNC: 10 U/L (ref 12–78)
ALT SERPL-CCNC: 12 U/L (ref 12–78)
ALT SERPL-CCNC: 13 U/L (ref 12–78)
ALT SERPL-CCNC: 15 U/L (ref 12–78)
ALT SERPL-CCNC: 16 U/L (ref 12–78)
ALT SERPL-CCNC: 19 U/L (ref 12–78)
ALT SERPL-CCNC: 19 U/L (ref 12–78)
ALT SERPL-CCNC: 21 U/L (ref 12–78)
ALT SERPL-CCNC: 22 U/L (ref 12–78)
ALT SERPL-CCNC: 25 U/L (ref 12–78)
ALT SERPL-CCNC: 28 U/L (ref 12–78)
ANION GAP SERPL CALC-SCNC: 3 MMOL/L (ref 5–15)
ANION GAP SERPL CALC-SCNC: 4 MMOL/L (ref 5–15)
ANION GAP SERPL CALC-SCNC: 5 MMOL/L (ref 5–15)
ANION GAP SERPL CALC-SCNC: 5 MMOL/L (ref 5–15)
ANION GAP SERPL CALC-SCNC: 6 MMOL/L (ref 5–15)
ANION GAP SERPL CALC-SCNC: 7 MMOL/L (ref 5–15)
ANION GAP SERPL CALC-SCNC: 8 MMOL/L (ref 5–15)
ANION GAP SERPL CALC-SCNC: 8 MMOL/L (ref 5–15)
ANION GAP SERPL CALC-SCNC: 9 MMOL/L (ref 5–15)
APTT PPP: 32.7 SEC (ref 22.1–32)
AST SERPL-CCNC: 14 U/L (ref 15–37)
AST SERPL-CCNC: 18 U/L (ref 15–37)
AST SERPL-CCNC: 20 U/L (ref 15–37)
AST SERPL-CCNC: 20 U/L (ref 15–37)
AST SERPL-CCNC: 21 U/L (ref 15–37)
AST SERPL-CCNC: 24 U/L (ref 15–37)
AST SERPL-CCNC: 32 U/L (ref 15–37)
ATRIAL RATE: 105 BPM
ATRIAL RATE: 88 BPM
B PERT DNA SPEC QL NAA+PROBE: NOT DETECTED
BACTERIA SPEC CULT: ABNORMAL
BACTERIA SPEC CULT: ABNORMAL
BACTERIA SPEC CULT: NORMAL
BACTERIA SPEC CULT: NORMAL
BASOPHILS # BLD AUTO: 0 X10E3/UL (ref 0–0.2)
BASOPHILS # BLD: 0 K/UL (ref 0–0.1)
BASOPHILS NFR BLD AUTO: 0 %
BASOPHILS NFR BLD: 0 % (ref 0–1)
BASOPHILS NFR BLD: 1 % (ref 0–1)
BILIRUB DIRECT SERPL-MCNC: 0.1 MG/DL (ref 0–0.2)
BILIRUB DIRECT SERPL-MCNC: 0.3 MG/DL (ref 0–0.2)
BILIRUB SERPL-MCNC: 0.2 MG/DL (ref 0.2–1)
BILIRUB SERPL-MCNC: 0.3 MG/DL (ref 0.2–1)
BILIRUB SERPL-MCNC: 0.4 MG/DL (ref 0.2–1)
BILIRUB SERPL-MCNC: 0.5 MG/DL (ref 0.2–1)
BILIRUB SERPL-MCNC: 0.7 MG/DL (ref 0.2–1)
BILIRUB SERPL-MCNC: 0.8 MG/DL (ref 0.2–1)
BILIRUB SERPL-MCNC: 0.9 MG/DL (ref 0.2–1)
BLD PROD TYP BPU: NORMAL
BLOOD GROUP ANTIBODIES SERPL: NORMAL
BNP SERPL-MCNC: 2484 PG/ML (ref 0–450)
BPU ID: NORMAL
BUN SERPL-MCNC: 10 MG/DL (ref 6–20)
BUN SERPL-MCNC: 10 MG/DL (ref 6–20)
BUN SERPL-MCNC: 11 MG/DL (ref 6–20)
BUN SERPL-MCNC: 12 MG/DL (ref 6–20)
BUN SERPL-MCNC: 12 MG/DL (ref 6–20)
BUN SERPL-MCNC: 13 MG/DL (ref 6–20)
BUN SERPL-MCNC: 17 MG/DL (ref 6–20)
BUN SERPL-MCNC: 9 MG/DL (ref 6–20)
BUN SERPL-MCNC: 9 MG/DL (ref 6–20)
BUN/CREAT SERPL: 14 (ref 12–20)
BUN/CREAT SERPL: 14 (ref 12–20)
BUN/CREAT SERPL: 16 (ref 12–20)
BUN/CREAT SERPL: 16 (ref 12–20)
BUN/CREAT SERPL: 17 (ref 12–20)
BUN/CREAT SERPL: 19 (ref 12–20)
BUN/CREAT SERPL: 20 (ref 12–20)
BUN/CREAT SERPL: 21 (ref 12–20)
BUN/CREAT SERPL: 29 (ref 12–20)
C PNEUM DNA SPEC QL NAA+PROBE: NOT DETECTED
CALCIUM SERPL-MCNC: 8.1 MG/DL (ref 8.5–10.1)
CALCIUM SERPL-MCNC: 8.2 MG/DL (ref 8.5–10.1)
CALCIUM SERPL-MCNC: 8.3 MG/DL (ref 8.5–10.1)
CALCIUM SERPL-MCNC: 8.4 MG/DL (ref 8.5–10.1)
CALCIUM SERPL-MCNC: 8.5 MG/DL (ref 8.5–10.1)
CALCIUM SERPL-MCNC: 8.5 MG/DL (ref 8.5–10.1)
CALCIUM SERPL-MCNC: 8.6 MG/DL (ref 8.5–10.1)
CALCIUM SERPL-MCNC: 8.7 MG/DL (ref 8.5–10.1)
CALCIUM SERPL-MCNC: 8.7 MG/DL (ref 8.5–10.1)
CALCIUM SERPL-MCNC: 8.9 MG/DL (ref 8.5–10.1)
CALCIUM SERPL-MCNC: 9 MG/DL (ref 8.5–10.1)
CALCIUM SERPL-MCNC: 9 MG/DL (ref 8.5–10.1)
CALCIUM SERPL-MCNC: 9.2 MG/DL (ref 8.5–10.1)
CALCULATED P AXIS, ECG09: 47 DEGREES
CALCULATED R AXIS, ECG10: -58 DEGREES
CALCULATED R AXIS, ECG10: -62 DEGREES
CALCULATED T AXIS, ECG11: 56 DEGREES
CALCULATED T AXIS, ECG11: 90 DEGREES
CC UR VC: NORMAL
CHLORIDE SERPL-SCNC: 100 MMOL/L (ref 97–108)
CHLORIDE SERPL-SCNC: 100 MMOL/L (ref 97–108)
CHLORIDE SERPL-SCNC: 101 MMOL/L (ref 97–108)
CHLORIDE SERPL-SCNC: 101 MMOL/L (ref 97–108)
CHLORIDE SERPL-SCNC: 93 MMOL/L (ref 97–108)
CHLORIDE SERPL-SCNC: 95 MMOL/L (ref 97–108)
CHLORIDE SERPL-SCNC: 96 MMOL/L (ref 97–108)
CHLORIDE SERPL-SCNC: 96 MMOL/L (ref 97–108)
CHLORIDE SERPL-SCNC: 97 MMOL/L (ref 97–108)
CHLORIDE SERPL-SCNC: 98 MMOL/L (ref 97–108)
CO2 SERPL-SCNC: 25 MMOL/L (ref 21–32)
CO2 SERPL-SCNC: 26 MMOL/L (ref 21–32)
CO2 SERPL-SCNC: 26 MMOL/L (ref 21–32)
CO2 SERPL-SCNC: 27 MMOL/L (ref 21–32)
CO2 SERPL-SCNC: 28 MMOL/L (ref 21–32)
CO2 SERPL-SCNC: 28 MMOL/L (ref 21–32)
CO2 SERPL-SCNC: 29 MMOL/L (ref 21–32)
CO2 SERPL-SCNC: 29 MMOL/L (ref 21–32)
CO2 SERPL-SCNC: 30 MMOL/L (ref 21–32)
CREAT SERPL-MCNC: 0.47 MG/DL (ref 0.7–1.3)
CREAT SERPL-MCNC: 0.56 MG/DL (ref 0.7–1.3)
CREAT SERPL-MCNC: 0.56 MG/DL (ref 0.7–1.3)
CREAT SERPL-MCNC: 0.59 MG/DL (ref 0.7–1.3)
CREAT SERPL-MCNC: 0.59 MG/DL (ref 0.7–1.3)
CREAT SERPL-MCNC: 0.6 MG/DL (ref 0.7–1.3)
CREAT SERPL-MCNC: 0.63 MG/DL (ref 0.7–1.3)
CREAT SERPL-MCNC: 0.64 MG/DL (ref 0.7–1.3)
CREAT SERPL-MCNC: 0.64 MG/DL (ref 0.7–1.3)
CREAT SERPL-MCNC: 0.65 MG/DL (ref 0.7–1.3)
CREAT SERPL-MCNC: 0.66 MG/DL (ref 0.7–1.3)
CREAT SERPL-MCNC: 0.68 MG/DL (ref 0.7–1.3)
CREAT SERPL-MCNC: 0.78 MG/DL (ref 0.7–1.3)
CREAT UR-MCNC: 33.83 MG/DL
CROSSMATCH RESULT,%XM: NORMAL
DIAGNOSIS, 93000: NORMAL
DIAGNOSIS, 93000: NORMAL
DIFFERENTIAL METHOD BLD: ABNORMAL
EOSINOPHIL # BLD AUTO: 0 X10E3/UL (ref 0–0.4)
EOSINOPHIL # BLD: 0 K/UL (ref 0–0.4)
EOSINOPHIL # BLD: 0.1 K/UL (ref 0–0.4)
EOSINOPHIL NFR BLD AUTO: 0 %
EOSINOPHIL NFR BLD: 0 % (ref 0–7)
EOSINOPHIL NFR BLD: 1 % (ref 0–7)
ERYTHROCYTE [DISTWIDTH] IN BLOOD BY AUTOMATED COUNT: 14.6 % (ref 11.5–14.5)
ERYTHROCYTE [DISTWIDTH] IN BLOOD BY AUTOMATED COUNT: 16 % (ref 11.5–14.5)
ERYTHROCYTE [DISTWIDTH] IN BLOOD BY AUTOMATED COUNT: 16.1 % (ref 11.5–14.5)
ERYTHROCYTE [DISTWIDTH] IN BLOOD BY AUTOMATED COUNT: 16.3 % (ref 11.5–14.5)
ERYTHROCYTE [DISTWIDTH] IN BLOOD BY AUTOMATED COUNT: 16.6 % (ref 11.5–14.5)
ERYTHROCYTE [DISTWIDTH] IN BLOOD BY AUTOMATED COUNT: 18.3 % (ref 11.5–14.5)
ERYTHROCYTE [DISTWIDTH] IN BLOOD BY AUTOMATED COUNT: 18.8 % (ref 11.5–14.5)
ERYTHROCYTE [DISTWIDTH] IN BLOOD BY AUTOMATED COUNT: 18.8 % (ref 11.5–14.5)
ERYTHROCYTE [DISTWIDTH] IN BLOOD BY AUTOMATED COUNT: 18.9 % (ref 11.5–14.5)
ERYTHROCYTE [DISTWIDTH] IN BLOOD BY AUTOMATED COUNT: 19.1 % (ref 11.5–14.5)
ERYTHROCYTE [DISTWIDTH] IN BLOOD BY AUTOMATED COUNT: 19.3 % (ref 11.5–14.5)
ERYTHROCYTE [DISTWIDTH] IN BLOOD BY AUTOMATED COUNT: 19.6 % (ref 11.5–14.5)
ERYTHROCYTE [DISTWIDTH] IN BLOOD BY AUTOMATED COUNT: 19.7 % (ref 12.3–15.4)
ERYTHROCYTE [DISTWIDTH] IN BLOOD BY AUTOMATED COUNT: 19.9 % (ref 11.5–14.5)
ERYTHROCYTE [DISTWIDTH] IN BLOOD BY AUTOMATED COUNT: 19.9 % (ref 11.5–14.5)
ERYTHROCYTE [DISTWIDTH] IN BLOOD BY AUTOMATED COUNT: 20.5 % (ref 11.5–14.5)
ERYTHROCYTE [DISTWIDTH] IN BLOOD BY AUTOMATED COUNT: 20.7 % (ref 11.5–14.5)
ERYTHROCYTE [DISTWIDTH] IN BLOOD BY AUTOMATED COUNT: 20.7 % (ref 11.5–14.5)
ERYTHROCYTE [DISTWIDTH] IN BLOOD BY AUTOMATED COUNT: 21.3 % (ref 11.5–14.5)
ERYTHROCYTE [DISTWIDTH] IN BLOOD BY AUTOMATED COUNT: 22 % (ref 11.5–14.5)
ERYTHROCYTE [DISTWIDTH] IN BLOOD BY AUTOMATED COUNT: 22.3 % (ref 11.5–14.5)
FIBRINOGEN PPP-MCNC: 296 MG/DL (ref 200–475)
FLUAV H1 2009 PAND RNA SPEC QL NAA+PROBE: NOT DETECTED
FLUAV H1 RNA SPEC QL NAA+PROBE: NOT DETECTED
FLUAV H3 RNA SPEC QL NAA+PROBE: NOT DETECTED
FLUAV SUBTYP SPEC NAA+PROBE: NOT DETECTED
FLUBV RNA SPEC QL NAA+PROBE: NOT DETECTED
GLOBULIN SER CALC-MCNC: 4 G/DL (ref 2–4)
GLOBULIN SER CALC-MCNC: 4 G/DL (ref 2–4)
GLOBULIN SER CALC-MCNC: 4.1 G/DL (ref 2–4)
GLOBULIN SER CALC-MCNC: 4.2 G/DL (ref 2–4)
GLOBULIN SER CALC-MCNC: 4.3 G/DL (ref 2–4)
GLOBULIN SER CALC-MCNC: 4.4 G/DL (ref 2–4)
GLOBULIN SER CALC-MCNC: 4.6 G/DL (ref 2–4)
GLOBULIN SER CALC-MCNC: 4.6 G/DL (ref 2–4)
GLOBULIN SER CALC-MCNC: 4.7 G/DL (ref 2–4)
GLUCOSE SERPL-MCNC: 100 MG/DL (ref 65–100)
GLUCOSE SERPL-MCNC: 101 MG/DL (ref 65–100)
GLUCOSE SERPL-MCNC: 108 MG/DL (ref 65–100)
GLUCOSE SERPL-MCNC: 112 MG/DL (ref 65–100)
GLUCOSE SERPL-MCNC: 117 MG/DL (ref 65–100)
GLUCOSE SERPL-MCNC: 118 MG/DL (ref 65–100)
GLUCOSE SERPL-MCNC: 123 MG/DL (ref 65–100)
GLUCOSE SERPL-MCNC: 141 MG/DL (ref 65–100)
GLUCOSE SERPL-MCNC: 144 MG/DL (ref 65–100)
GLUCOSE SERPL-MCNC: 145 MG/DL (ref 65–100)
GLUCOSE SERPL-MCNC: 78 MG/DL (ref 65–100)
GLUCOSE SERPL-MCNC: 82 MG/DL (ref 65–100)
GLUCOSE SERPL-MCNC: 93 MG/DL (ref 65–100)
GRAM STN SPEC: ABNORMAL
HADV DNA SPEC QL NAA+PROBE: NOT DETECTED
HCOV 229E RNA SPEC QL NAA+PROBE: NOT DETECTED
HCOV HKU1 RNA SPEC QL NAA+PROBE: NOT DETECTED
HCOV NL63 RNA SPEC QL NAA+PROBE: NOT DETECTED
HCOV OC43 RNA SPEC QL NAA+PROBE: NOT DETECTED
HCT VFR BLD AUTO: 17.7 % (ref 36.6–50.3)
HCT VFR BLD AUTO: 19.6 % (ref 36.6–50.3)
HCT VFR BLD AUTO: 20.3 % (ref 36.6–50.3)
HCT VFR BLD AUTO: 20.7 % (ref 36.6–50.3)
HCT VFR BLD AUTO: 21.2 % (ref 36.6–50.3)
HCT VFR BLD AUTO: 21.3 % (ref 36.6–50.3)
HCT VFR BLD AUTO: 21.6 % (ref 36.6–50.3)
HCT VFR BLD AUTO: 21.9 % (ref 36.6–50.3)
HCT VFR BLD AUTO: 23.8 % (ref 36.6–50.3)
HCT VFR BLD AUTO: 23.9 % (ref 36.6–50.3)
HCT VFR BLD AUTO: 24.3 % (ref 36.6–50.3)
HCT VFR BLD AUTO: 24.5 % (ref 36.6–50.3)
HCT VFR BLD AUTO: 24.6 % (ref 36.6–50.3)
HCT VFR BLD AUTO: 24.9 % (ref 36.6–50.3)
HCT VFR BLD AUTO: 25.5 % (ref 36.6–50.3)
HCT VFR BLD AUTO: 25.6 % (ref 36.6–50.3)
HCT VFR BLD AUTO: 25.8 % (ref 36.6–50.3)
HCT VFR BLD AUTO: 25.8 % (ref 36.6–50.3)
HCT VFR BLD AUTO: 26.1 % (ref 36.6–50.3)
HCT VFR BLD AUTO: 27.2 % (ref 36.6–50.3)
HCT VFR BLD AUTO: 28.2 % (ref 37.5–51)
HCT VFR BLD AUTO: 29 % (ref 36.6–50.3)
HCT VFR BLD AUTO: 32.7 % (ref 36.6–50.3)
HGB BLD-MCNC: 10.1 G/DL (ref 12.1–17)
HGB BLD-MCNC: 5.7 G/DL (ref 12.1–17)
HGB BLD-MCNC: 6.3 G/DL (ref 12.1–17)
HGB BLD-MCNC: 6.4 G/DL (ref 12.1–17)
HGB BLD-MCNC: 6.5 G/DL (ref 12.1–17)
HGB BLD-MCNC: 6.7 G/DL (ref 12.1–17)
HGB BLD-MCNC: 6.8 G/DL (ref 12.1–17)
HGB BLD-MCNC: 6.8 G/DL (ref 12.1–17)
HGB BLD-MCNC: 7.1 G/DL (ref 12.1–17)
HGB BLD-MCNC: 7.5 G/DL (ref 12.1–17)
HGB BLD-MCNC: 7.7 G/DL (ref 12.1–17)
HGB BLD-MCNC: 7.8 G/DL (ref 12.1–17)
HGB BLD-MCNC: 7.8 G/DL (ref 12.1–17)
HGB BLD-MCNC: 8 G/DL (ref 12.1–17)
HGB BLD-MCNC: 8.1 G/DL (ref 12.1–17)
HGB BLD-MCNC: 8.1 G/DL (ref 12.1–17)
HGB BLD-MCNC: 8.2 G/DL (ref 12.1–17)
HGB BLD-MCNC: 8.2 G/DL (ref 12.1–17)
HGB BLD-MCNC: 8.3 G/DL (ref 12.1–17)
HGB BLD-MCNC: 8.6 G/DL (ref 12.1–17)
HGB BLD-MCNC: 8.7 G/DL (ref 12.1–17)
HGB BLD-MCNC: 9.1 G/DL (ref 12.1–17)
HGB BLD-MCNC: 9.2 G/DL (ref 13–17.7)
HMPV RNA SPEC QL NAA+PROBE: NOT DETECTED
HPIV1 RNA SPEC QL NAA+PROBE: NOT DETECTED
HPIV2 RNA SPEC QL NAA+PROBE: NOT DETECTED
HPIV3 RNA SPEC QL NAA+PROBE: NOT DETECTED
HPIV4 RNA SPEC QL NAA+PROBE: NOT DETECTED
IMM GRANULOCYTES # BLD: 0 K/UL
IMM GRANULOCYTES # BLD: 0 K/UL (ref 0–0.04)
IMM GRANULOCYTES # BLD: 0 K/UL (ref 0–0.04)
IMM GRANULOCYTES # BLD: 0.1 K/UL (ref 0–0.04)
IMM GRANULOCYTES # BLD: 0.1 X10E3/UL (ref 0–0.1)
IMM GRANULOCYTES NFR BLD AUTO: 0 %
IMM GRANULOCYTES NFR BLD AUTO: 1 % (ref 0–0.5)
IMM GRANULOCYTES NFR BLD AUTO: 2 % (ref 0–0.5)
IMM GRANULOCYTES NFR BLD AUTO: 2 % (ref 0–0.5)
IMM GRANULOCYTES NFR BLD: 1 %
INR PPP: 1.2 (ref 0.9–1.1)
LACTATE SERPL-SCNC: 0.7 MMOL/L (ref 0.4–2)
LYMPHOCYTES # BLD AUTO: 0.2 X10E3/UL (ref 0.7–3.1)
LYMPHOCYTES # BLD: 0.1 K/UL (ref 0.8–3.5)
LYMPHOCYTES # BLD: 0.1 K/UL (ref 0.8–3.5)
LYMPHOCYTES # BLD: 0.2 K/UL (ref 0.8–3.5)
LYMPHOCYTES # BLD: 0.3 K/UL (ref 0.8–3.5)
LYMPHOCYTES # BLD: 0.3 K/UL (ref 0.8–3.5)
LYMPHOCYTES # BLD: 0.4 K/UL (ref 0.8–3.5)
LYMPHOCYTES # BLD: 0.5 K/UL (ref 0.8–3.5)
LYMPHOCYTES # BLD: 0.5 K/UL (ref 0.8–3.5)
LYMPHOCYTES # BLD: 0.6 K/UL (ref 0.8–3.5)
LYMPHOCYTES # BLD: 0.7 K/UL (ref 0.8–3.5)
LYMPHOCYTES # BLD: 0.7 K/UL (ref 0.8–3.5)
LYMPHOCYTES # BLD: 0.8 K/UL (ref 0.8–3.5)
LYMPHOCYTES # BLD: 0.9 K/UL (ref 0.8–3.5)
LYMPHOCYTES # BLD: 0.9 K/UL (ref 0.8–3.5)
LYMPHOCYTES # BLD: 1.2 K/UL (ref 0.8–3.5)
LYMPHOCYTES NFR BLD AUTO: 2 %
LYMPHOCYTES NFR BLD: 1 % (ref 12–49)
LYMPHOCYTES NFR BLD: 15 % (ref 12–49)
LYMPHOCYTES NFR BLD: 17 % (ref 12–49)
LYMPHOCYTES NFR BLD: 17 % (ref 12–49)
LYMPHOCYTES NFR BLD: 18 % (ref 12–49)
LYMPHOCYTES NFR BLD: 19 % (ref 12–49)
LYMPHOCYTES NFR BLD: 2 % (ref 12–49)
LYMPHOCYTES NFR BLD: 21 % (ref 12–49)
LYMPHOCYTES NFR BLD: 22 % (ref 12–49)
LYMPHOCYTES NFR BLD: 22 % (ref 12–49)
LYMPHOCYTES NFR BLD: 24 % (ref 12–49)
LYMPHOCYTES NFR BLD: 3 % (ref 12–49)
LYMPHOCYTES NFR BLD: 41 % (ref 12–49)
LYMPHOCYTES NFR BLD: 8 % (ref 12–49)
LYMPHOCYTES NFR BLD: 9 % (ref 12–49)
M PNEUMO DNA SPEC QL NAA+PROBE: NOT DETECTED
MAGNESIUM SERPL-MCNC: 0.8 MG/DL (ref 1.6–2.4)
MAGNESIUM SERPL-MCNC: 1.9 MG/DL (ref 1.6–2.4)
MAGNESIUM SERPL-MCNC: 2 MG/DL (ref 1.6–2.4)
MCH RBC QN AUTO: 28.2 PG (ref 26–34)
MCH RBC QN AUTO: 28.4 PG (ref 26–34)
MCH RBC QN AUTO: 29.8 PG (ref 26–34)
MCH RBC QN AUTO: 30.1 PG (ref 26–34)
MCH RBC QN AUTO: 30.8 PG (ref 26–34)
MCH RBC QN AUTO: 30.9 PG (ref 26–34)
MCH RBC QN AUTO: 31 PG (ref 26–34)
MCH RBC QN AUTO: 31.2 PG (ref 26.6–33)
MCH RBC QN AUTO: 31.6 PG (ref 26–34)
MCH RBC QN AUTO: 31.8 PG (ref 26–34)
MCH RBC QN AUTO: 32.3 PG (ref 26–34)
MCH RBC QN AUTO: 32.3 PG (ref 26–34)
MCH RBC QN AUTO: 32.5 PG (ref 26–34)
MCH RBC QN AUTO: 32.6 PG (ref 26–34)
MCH RBC QN AUTO: 32.6 PG (ref 26–34)
MCH RBC QN AUTO: 32.8 PG (ref 26–34)
MCH RBC QN AUTO: 32.8 PG (ref 26–34)
MCH RBC QN AUTO: 33 PG (ref 26–34)
MCH RBC QN AUTO: 33.2 PG (ref 26–34)
MCHC RBC AUTO-ENTMCNC: 30.4 G/DL (ref 30–36.5)
MCHC RBC AUTO-ENTMCNC: 30.5 G/DL (ref 30–36.5)
MCHC RBC AUTO-ENTMCNC: 30.9 G/DL (ref 30–36.5)
MCHC RBC AUTO-ENTMCNC: 31 G/DL (ref 30–36.5)
MCHC RBC AUTO-ENTMCNC: 31.4 G/DL (ref 30–36.5)
MCHC RBC AUTO-ENTMCNC: 31.4 G/DL (ref 30–36.5)
MCHC RBC AUTO-ENTMCNC: 31.6 G/DL (ref 30–36.5)
MCHC RBC AUTO-ENTMCNC: 31.7 G/DL (ref 30–36.5)
MCHC RBC AUTO-ENTMCNC: 32 G/DL (ref 30–36.5)
MCHC RBC AUTO-ENTMCNC: 32.1 G/DL (ref 30–36.5)
MCHC RBC AUTO-ENTMCNC: 32.1 G/DL (ref 30–36.5)
MCHC RBC AUTO-ENTMCNC: 32.2 G/DL (ref 30–36.5)
MCHC RBC AUTO-ENTMCNC: 32.2 G/DL (ref 30–36.5)
MCHC RBC AUTO-ENTMCNC: 32.4 G/DL (ref 30–36.5)
MCHC RBC AUTO-ENTMCNC: 32.4 G/DL (ref 30–36.5)
MCHC RBC AUTO-ENTMCNC: 32.5 G/DL (ref 30–36.5)
MCHC RBC AUTO-ENTMCNC: 32.6 G/DL (ref 31.5–35.7)
MCHC RBC AUTO-ENTMCNC: 32.7 G/DL (ref 30–36.5)
MCHC RBC AUTO-ENTMCNC: 32.7 G/DL (ref 30–36.5)
MCHC RBC AUTO-ENTMCNC: 33 G/DL (ref 30–36.5)
MCHC RBC AUTO-ENTMCNC: 33.5 G/DL (ref 30–36.5)
MCV RBC AUTO: 100.4 FL (ref 80–99)
MCV RBC AUTO: 100.5 FL (ref 80–99)
MCV RBC AUTO: 100.8 FL (ref 80–99)
MCV RBC AUTO: 102.8 FL (ref 80–99)
MCV RBC AUTO: 103.9 FL (ref 80–99)
MCV RBC AUTO: 104.5 FL (ref 80–99)
MCV RBC AUTO: 106.4 FL (ref 80–99)
MCV RBC AUTO: 107.6 FL (ref 80–99)
MCV RBC AUTO: 108.9 FL (ref 80–99)
MCV RBC AUTO: 87.6 FL (ref 80–99)
MCV RBC AUTO: 87.7 FL (ref 80–99)
MCV RBC AUTO: 89.8 FL (ref 80–99)
MCV RBC AUTO: 92.7 FL (ref 80–99)
MCV RBC AUTO: 93 FL (ref 80–99)
MCV RBC AUTO: 93.2 FL (ref 80–99)
MCV RBC AUTO: 93.5 FL (ref 80–99)
MCV RBC AUTO: 95 FL (ref 80–99)
MCV RBC AUTO: 96 FL (ref 79–97)
MCV RBC AUTO: 96.2 FL (ref 80–99)
MCV RBC AUTO: 99.6 FL (ref 80–99)
MCV RBC AUTO: 99.6 FL (ref 80–99)
METAMYELOCYTES NFR BLD MANUAL: 1 %
METAMYELOCYTES NFR BLD MANUAL: 1 %
MONOCYTES # BLD AUTO: 0.4 X10E3/UL (ref 0.1–0.9)
MONOCYTES # BLD: 0.1 K/UL (ref 0–1)
MONOCYTES # BLD: 0.2 K/UL (ref 0–1)
MONOCYTES # BLD: 0.2 K/UL (ref 0–1)
MONOCYTES # BLD: 0.4 K/UL (ref 0–1)
MONOCYTES # BLD: 0.5 K/UL (ref 0–1)
MONOCYTES # BLD: 0.6 K/UL (ref 0–1)
MONOCYTES # BLD: 0.7 K/UL (ref 0–1)
MONOCYTES # BLD: 0.8 K/UL (ref 0–1)
MONOCYTES # BLD: 0.9 K/UL (ref 0–1)
MONOCYTES # BLD: 0.9 K/UL (ref 0–1)
MONOCYTES # BLD: 1 K/UL (ref 0–1)
MONOCYTES # BLD: 1 K/UL (ref 0–1)
MONOCYTES # BLD: 1.2 K/UL (ref 0–1)
MONOCYTES NFR BLD AUTO: 4 %
MONOCYTES NFR BLD: 1 % (ref 5–13)
MONOCYTES NFR BLD: 12 % (ref 5–13)
MONOCYTES NFR BLD: 14 % (ref 5–13)
MONOCYTES NFR BLD: 14 % (ref 5–13)
MONOCYTES NFR BLD: 18 % (ref 5–13)
MONOCYTES NFR BLD: 19 % (ref 5–13)
MONOCYTES NFR BLD: 19 % (ref 5–13)
MONOCYTES NFR BLD: 22 % (ref 5–13)
MONOCYTES NFR BLD: 22 % (ref 5–13)
MONOCYTES NFR BLD: 23 % (ref 5–13)
MONOCYTES NFR BLD: 24 % (ref 5–13)
MONOCYTES NFR BLD: 4 % (ref 5–13)
MONOCYTES NFR BLD: 6 % (ref 5–13)
MONOCYTES NFR BLD: 6 % (ref 5–13)
MONOCYTES NFR BLD: 7 % (ref 5–13)
MONOCYTES NFR BLD: 8 % (ref 5–13)
MONOCYTES NFR BLD: 9 % (ref 5–13)
MORPHOLOGY BLD-IMP: ABNORMAL
MYELOCYTES NFR BLD MANUAL: 2 %
MYELOCYTES NFR BLD MANUAL: 2 %
NEUTROPHILS # BLD AUTO: 10.1 X10E3/UL (ref 1.4–7)
NEUTROPHILS NFR BLD AUTO: 93 %
NEUTS BAND NFR BLD MANUAL: 1 % (ref 0–6)
NEUTS BAND NFR BLD MANUAL: 4 % (ref 0–6)
NEUTS BAND NFR BLD MANUAL: 5 % (ref 0–6)
NEUTS BAND NFR BLD MANUAL: 6 % (ref 0–6)
NEUTS SEG # BLD: 0.7 K/UL (ref 1.8–8)
NEUTS SEG # BLD: 0.9 K/UL (ref 1.8–8)
NEUTS SEG # BLD: 1.6 K/UL (ref 1.8–8)
NEUTS SEG # BLD: 1.6 K/UL (ref 1.8–8)
NEUTS SEG # BLD: 1.8 K/UL (ref 1.8–8)
NEUTS SEG # BLD: 11.2 K/UL (ref 1.8–8)
NEUTS SEG # BLD: 11.6 K/UL (ref 1.8–8)
NEUTS SEG # BLD: 11.9 K/UL (ref 1.8–8)
NEUTS SEG # BLD: 2.5 K/UL (ref 1.8–8)
NEUTS SEG # BLD: 2.7 K/UL (ref 1.8–8)
NEUTS SEG # BLD: 2.7 K/UL (ref 1.8–8)
NEUTS SEG # BLD: 2.8 K/UL (ref 1.8–8)
NEUTS SEG # BLD: 2.9 K/UL (ref 1.8–8)
NEUTS SEG # BLD: 3 K/UL (ref 1.8–8)
NEUTS SEG # BLD: 3.4 K/UL (ref 1.8–8)
NEUTS SEG # BLD: 4.5 K/UL (ref 1.8–8)
NEUTS SEG # BLD: 5 K/UL (ref 1.8–8)
NEUTS SEG # BLD: 6.6 K/UL (ref 1.8–8)
NEUTS SEG # BLD: 7.9 K/UL (ref 1.8–8)
NEUTS SEG NFR BLD: 33 % (ref 32–75)
NEUTS SEG NFR BLD: 52 % (ref 32–75)
NEUTS SEG NFR BLD: 52 % (ref 32–75)
NEUTS SEG NFR BLD: 53 % (ref 32–75)
NEUTS SEG NFR BLD: 59 % (ref 32–75)
NEUTS SEG NFR BLD: 61 % (ref 32–75)
NEUTS SEG NFR BLD: 61 % (ref 32–75)
NEUTS SEG NFR BLD: 63 % (ref 32–75)
NEUTS SEG NFR BLD: 68 % (ref 32–75)
NEUTS SEG NFR BLD: 69 % (ref 32–75)
NEUTS SEG NFR BLD: 73 % (ref 32–75)
NEUTS SEG NFR BLD: 75 % (ref 32–75)
NEUTS SEG NFR BLD: 80 % (ref 32–75)
NEUTS SEG NFR BLD: 82 % (ref 32–75)
NEUTS SEG NFR BLD: 84 % (ref 32–75)
NEUTS SEG NFR BLD: 89 % (ref 32–75)
NEUTS SEG NFR BLD: 90 % (ref 32–75)
NEUTS SEG NFR BLD: 92 % (ref 32–75)
NEUTS SEG NFR BLD: 97 % (ref 32–75)
NRBC # BLD: 0 K/UL (ref 0–0.01)
NRBC BLD-RTO: 0 PER 100 WBC
OSMOLALITY SERPL: 280 MOSM/KG H2O
P-R INTERVAL, ECG05: 160 MS
PATH REV BLD -IMP: ABNORMAL
PHOSPHATE SERPL-MCNC: 2.1 MG/DL (ref 2.6–4.7)
PHOSPHATE SERPL-MCNC: 3.7 MG/DL (ref 2.6–4.7)
PHOSPHATE SERPL-MCNC: 4.1 MG/DL (ref 2.6–4.7)
PLATELET # BLD AUTO: 102 K/UL (ref 150–400)
PLATELET # BLD AUTO: 123 K/UL (ref 150–400)
PLATELET # BLD AUTO: 135 K/UL (ref 150–400)
PLATELET # BLD AUTO: 135 K/UL (ref 150–400)
PLATELET # BLD AUTO: 176 K/UL (ref 150–400)
PLATELET # BLD AUTO: 181 K/UL (ref 150–400)
PLATELET # BLD AUTO: 28 K/UL (ref 150–400)
PLATELET # BLD AUTO: 51 X10E3/UL (ref 150–379)
PLATELET # BLD AUTO: 55 K/UL (ref 150–400)
PLATELET # BLD AUTO: 57 K/UL (ref 150–400)
PLATELET # BLD AUTO: 58 K/UL (ref 150–400)
PLATELET # BLD AUTO: 60 K/UL (ref 150–400)
PLATELET # BLD AUTO: 60 K/UL (ref 150–400)
PLATELET # BLD AUTO: 61 K/UL (ref 150–400)
PLATELET # BLD AUTO: 65 K/UL (ref 150–400)
PLATELET # BLD AUTO: 66 K/UL (ref 150–400)
PLATELET # BLD AUTO: 68 K/UL (ref 150–400)
PLATELET # BLD AUTO: 69 K/UL (ref 150–400)
PLATELET # BLD AUTO: 82 K/UL (ref 150–400)
PLATELET # BLD AUTO: 85 K/UL (ref 150–400)
PLATELET # BLD AUTO: 90 K/UL (ref 150–400)
PMV BLD AUTO: 10 FL (ref 8.9–12.9)
PMV BLD AUTO: 10.4 FL (ref 8.9–12.9)
PMV BLD AUTO: 10.5 FL (ref 8.9–12.9)
PMV BLD AUTO: 7.7 FL (ref 8.9–12.9)
PMV BLD AUTO: 8 FL (ref 8.9–12.9)
PMV BLD AUTO: 8.4 FL (ref 8.9–12.9)
PMV BLD AUTO: 8.7 FL (ref 8.9–12.9)
PMV BLD AUTO: 8.8 FL (ref 8.9–12.9)
PMV BLD AUTO: 8.8 FL (ref 8.9–12.9)
PMV BLD AUTO: 8.9 FL (ref 8.9–12.9)
PMV BLD AUTO: 9.1 FL (ref 8.9–12.9)
PMV BLD AUTO: 9.3 FL (ref 8.9–12.9)
PMV BLD AUTO: 9.4 FL (ref 8.9–12.9)
PMV BLD AUTO: 9.4 FL (ref 8.9–12.9)
PMV BLD AUTO: 9.6 FL (ref 8.9–12.9)
PMV BLD AUTO: 9.8 FL (ref 8.9–12.9)
PMV BLD AUTO: 9.9 FL (ref 8.9–12.9)
PMV BLD AUTO: 9.9 FL (ref 8.9–12.9)
POTASSIUM SERPL-SCNC: 3.1 MMOL/L (ref 3.5–5.1)
POTASSIUM SERPL-SCNC: 3.6 MMOL/L (ref 3.5–5.1)
POTASSIUM SERPL-SCNC: 3.8 MMOL/L (ref 3.5–5.1)
POTASSIUM SERPL-SCNC: 3.9 MMOL/L (ref 3.5–5.1)
POTASSIUM SERPL-SCNC: 4.1 MMOL/L (ref 3.5–5.1)
POTASSIUM SERPL-SCNC: 4.2 MMOL/L (ref 3.5–5.1)
PROT SERPL-MCNC: 5.7 G/DL (ref 6.4–8.2)
PROT SERPL-MCNC: 5.9 G/DL (ref 6.4–8.2)
PROT SERPL-MCNC: 6.1 G/DL (ref 6.4–8.2)
PROT SERPL-MCNC: 6.1 G/DL (ref 6.4–8.2)
PROT SERPL-MCNC: 6.3 G/DL (ref 6.4–8.2)
PROT SERPL-MCNC: 6.5 G/DL (ref 6.4–8.2)
PROT SERPL-MCNC: 6.6 G/DL (ref 6.4–8.2)
PROT SERPL-MCNC: 6.9 G/DL (ref 6.4–8.2)
PROT SERPL-MCNC: 6.9 G/DL (ref 6.4–8.2)
PROTHROMBIN TIME: 12.5 SEC (ref 9–11.1)
Q-T INTERVAL, ECG07: 298 MS
Q-T INTERVAL, ECG07: 366 MS
QRS DURATION, ECG06: 102 MS
QRS DURATION, ECG06: 112 MS
QTC CALCULATION (BEZET), ECG08: 421 MS
QTC CALCULATION (BEZET), ECG08: 442 MS
RBC # BLD AUTO: 1.84 M/UL (ref 4.1–5.7)
RBC # BLD AUTO: 1.9 M/UL (ref 4.1–5.7)
RBC # BLD AUTO: 1.95 M/UL (ref 4.1–5.7)
RBC # BLD AUTO: 1.98 M/UL (ref 4.1–5.7)
RBC # BLD AUTO: 2.03 M/UL (ref 4.1–5.7)
RBC # BLD AUTO: 2.26 M/UL (ref 4.1–5.7)
RBC # BLD AUTO: 2.28 M/UL (ref 4.1–5.7)
RBC # BLD AUTO: 2.3 M/UL (ref 4.1–5.7)
RBC # BLD AUTO: 2.36 M/UL (ref 4.1–5.7)
RBC # BLD AUTO: 2.46 M/UL (ref 4.1–5.7)
RBC # BLD AUTO: 2.47 M/UL (ref 4.1–5.7)
RBC # BLD AUTO: 2.5 M/UL (ref 4.1–5.7)
RBC # BLD AUTO: 2.55 M/UL (ref 4.1–5.7)
RBC # BLD AUTO: 2.62 M/UL (ref 4.1–5.7)
RBC # BLD AUTO: 2.75 M/UL (ref 4.1–5.7)
RBC # BLD AUTO: 2.77 M/UL (ref 4.1–5.7)
RBC # BLD AUTO: 2.79 M/UL (ref 4.1–5.7)
RBC # BLD AUTO: 2.82 M/UL (ref 4.1–5.7)
RBC # BLD AUTO: 2.92 M/UL (ref 4.1–5.7)
RBC # BLD AUTO: 2.95 X10E6/UL (ref 4.14–5.8)
RBC # BLD AUTO: 3.13 M/UL (ref 4.1–5.7)
RBC MORPH BLD: ABNORMAL
RSV RNA SPEC QL NAA+PROBE: NOT DETECTED
RV+EV RNA SPEC QL NAA+PROBE: NOT DETECTED
SERVICE CMNT-IMP: ABNORMAL
SERVICE CMNT-IMP: NORMAL
SERVICE CMNT-IMP: NORMAL
SODIUM SERPL-SCNC: 126 MMOL/L (ref 136–145)
SODIUM SERPL-SCNC: 127 MMOL/L (ref 136–145)
SODIUM SERPL-SCNC: 128 MMOL/L (ref 136–145)
SODIUM SERPL-SCNC: 130 MMOL/L (ref 136–145)
SODIUM SERPL-SCNC: 131 MMOL/L (ref 136–145)
SODIUM SERPL-SCNC: 132 MMOL/L (ref 136–145)
SODIUM SERPL-SCNC: 133 MMOL/L (ref 136–145)
SODIUM SERPL-SCNC: 133 MMOL/L (ref 136–145)
SODIUM SERPL-SCNC: 134 MMOL/L (ref 136–145)
SODIUM SERPL-SCNC: 134 MMOL/L (ref 136–145)
SODIUM SERPL-SCNC: 136 MMOL/L (ref 136–145)
SODIUM UR-SCNC: 19 MMOL/L
SPECIMEN EXP DATE BLD: NORMAL
STATUS OF UNIT,%ST: NORMAL
THERAPEUTIC RANGE,PTTT: ABNORMAL SECS (ref 58–77)
TROPONIN I SERPL-MCNC: <0.04 NG/ML
TROPONIN I SERPL-MCNC: <0.04 NG/ML
TSH SERPL DL<=0.05 MIU/L-ACNC: 0.9 UIU/ML (ref 0.36–3.74)
UNIT DIVISION, %UDIV: 0
VENTRICULAR RATE, ECG03: 120 BPM
VENTRICULAR RATE, ECG03: 88 BPM
WBC # BLD AUTO: 1.8 K/UL (ref 4.1–11.1)
WBC # BLD AUTO: 1.9 K/UL (ref 4.1–11.1)
WBC # BLD AUTO: 10.7 X10E3/UL (ref 3.4–10.8)
WBC # BLD AUTO: 12.4 K/UL (ref 4.1–11.1)
WBC # BLD AUTO: 13 K/UL (ref 4.1–11.1)
WBC # BLD AUTO: 13.7 K/UL (ref 4.1–11.1)
WBC # BLD AUTO: 2.5 K/UL (ref 4.1–11.1)
WBC # BLD AUTO: 2.8 K/UL (ref 4.1–11.1)
WBC # BLD AUTO: 3.1 K/UL (ref 4.1–11.1)
WBC # BLD AUTO: 3.8 K/UL (ref 4.1–11.1)
WBC # BLD AUTO: 3.9 K/UL (ref 4.1–11.1)
WBC # BLD AUTO: 4.1 K/UL (ref 4.1–11.1)
WBC # BLD AUTO: 4.1 K/UL (ref 4.1–11.1)
WBC # BLD AUTO: 4.2 K/UL (ref 4.1–11.1)
WBC # BLD AUTO: 4.6 K/UL (ref 4.1–11.1)
WBC # BLD AUTO: 4.9 K/UL (ref 4.1–11.1)
WBC # BLD AUTO: 5 K/UL (ref 4.1–11.1)
WBC # BLD AUTO: 5.5 K/UL (ref 4.1–11.1)
WBC # BLD AUTO: 6.4 K/UL (ref 4.1–11.1)
WBC # BLD AUTO: 6.9 K/UL (ref 4.1–11.1)
WBC # BLD AUTO: 9.5 K/UL (ref 4.1–11.1)
WBC MORPH BLD: ABNORMAL

## 2018-01-01 PROCEDURE — 74011250637 HC RX REV CODE- 250/637: Performed by: STUDENT IN AN ORGANIZED HEALTH CARE EDUCATION/TRAINING PROGRAM

## 2018-01-01 PROCEDURE — 85025 COMPLETE CBC W/AUTO DIFF WBC: CPT | Performed by: INTERNAL MEDICINE

## 2018-01-01 PROCEDURE — 74011250636 HC RX REV CODE- 250/636: Performed by: NURSE PRACTITIONER

## 2018-01-01 PROCEDURE — 74011250636 HC RX REV CODE- 250/636: Performed by: INTERNAL MEDICINE

## 2018-01-01 PROCEDURE — 36415 COLL VENOUS BLD VENIPUNCTURE: CPT | Performed by: INTERNAL MEDICINE

## 2018-01-01 PROCEDURE — 80048 BASIC METABOLIC PNL TOTAL CA: CPT | Performed by: STUDENT IN AN ORGANIZED HEALTH CARE EDUCATION/TRAINING PROGRAM

## 2018-01-01 PROCEDURE — 83735 ASSAY OF MAGNESIUM: CPT

## 2018-01-01 PROCEDURE — 74011250636 HC RX REV CODE- 250/636: Performed by: FAMILY MEDICINE

## 2018-01-01 PROCEDURE — 74011000250 HC RX REV CODE- 250: Performed by: NURSE PRACTITIONER

## 2018-01-01 PROCEDURE — 74011636320 HC RX REV CODE- 636/320: Performed by: RADIOLOGY

## 2018-01-01 PROCEDURE — 74011000250 HC RX REV CODE- 250: Performed by: RADIOLOGY

## 2018-01-01 PROCEDURE — 65660000000 HC RM CCU STEPDOWN

## 2018-01-01 PROCEDURE — P9016 RBC LEUKOCYTES REDUCED: HCPCS | Performed by: EMERGENCY MEDICINE

## 2018-01-01 PROCEDURE — 86923 COMPATIBILITY TEST ELECTRIC: CPT | Performed by: NURSE PRACTITIONER

## 2018-01-01 PROCEDURE — 77030012965 HC NDL HUBR BBMI -A

## 2018-01-01 PROCEDURE — 84484 ASSAY OF TROPONIN QUANT: CPT | Performed by: EMERGENCY MEDICINE

## 2018-01-01 PROCEDURE — P9040 RBC LEUKOREDUCED IRRADIATED: HCPCS | Performed by: EMERGENCY MEDICINE

## 2018-01-01 PROCEDURE — 83880 ASSAY OF NATRIURETIC PEPTIDE: CPT | Performed by: EMERGENCY MEDICINE

## 2018-01-01 PROCEDURE — 77030010507 HC ADH SKN DERMBND J&J -B

## 2018-01-01 PROCEDURE — 77030029684 HC NEB SM VOL KT MONA -A

## 2018-01-01 PROCEDURE — 74177 CT ABD & PELVIS W/CONTRAST: CPT

## 2018-01-01 PROCEDURE — 85018 HEMOGLOBIN: CPT | Performed by: STUDENT IN AN ORGANIZED HEALTH CARE EDUCATION/TRAINING PROGRAM

## 2018-01-01 PROCEDURE — 74011000250 HC RX REV CODE- 250: Performed by: INTERNAL MEDICINE

## 2018-01-01 PROCEDURE — 77030013169 SET IV BLD ICUM -A

## 2018-01-01 PROCEDURE — 83930 ASSAY OF BLOOD OSMOLALITY: CPT | Performed by: FAMILY MEDICINE

## 2018-01-01 PROCEDURE — 85025 COMPLETE CBC W/AUTO DIFF WBC: CPT | Performed by: EMERGENCY MEDICINE

## 2018-01-01 PROCEDURE — 86900 BLOOD TYPING SEROLOGIC ABO: CPT | Performed by: INTERNAL MEDICINE

## 2018-01-01 PROCEDURE — 80053 COMPREHEN METABOLIC PANEL: CPT | Performed by: INTERNAL MEDICINE

## 2018-01-01 PROCEDURE — 96375 TX/PRO/DX INJ NEW DRUG ADDON: CPT

## 2018-01-01 PROCEDURE — 94640 AIRWAY INHALATION TREATMENT: CPT

## 2018-01-01 PROCEDURE — 74011250637 HC RX REV CODE- 250/637: Performed by: FAMILY MEDICINE

## 2018-01-01 PROCEDURE — 96417 CHEMO IV INFUS EACH ADDL SEQ: CPT

## 2018-01-01 PROCEDURE — 86900 BLOOD TYPING SEROLOGIC ABO: CPT | Performed by: EMERGENCY MEDICINE

## 2018-01-01 PROCEDURE — 74011250636 HC RX REV CODE- 250/636: Performed by: STUDENT IN AN ORGANIZED HEALTH CARE EDUCATION/TRAINING PROGRAM

## 2018-01-01 PROCEDURE — 78306 BONE IMAGING WHOLE BODY: CPT

## 2018-01-01 PROCEDURE — 85384 FIBRINOGEN ACTIVITY: CPT | Performed by: INTERNAL MEDICINE

## 2018-01-01 PROCEDURE — G0151 HHCP-SERV OF PT,EA 15 MIN: HCPCS

## 2018-01-01 PROCEDURE — 77030011256 HC DRSG MEPILEX <16IN NO BORD MOLN -A

## 2018-01-01 PROCEDURE — 74011250637 HC RX REV CODE- 250/637: Performed by: INTERNAL MEDICINE

## 2018-01-01 PROCEDURE — P9016 RBC LEUKOCYTES REDUCED: HCPCS | Performed by: INTERNAL MEDICINE

## 2018-01-01 PROCEDURE — 96413 CHEMO IV INFUSION 1 HR: CPT

## 2018-01-01 PROCEDURE — 97535 SELF CARE MNGMENT TRAINING: CPT

## 2018-01-01 PROCEDURE — 74011000250 HC RX REV CODE- 250: Performed by: FAMILY MEDICINE

## 2018-01-01 PROCEDURE — 97116 GAIT TRAINING THERAPY: CPT

## 2018-01-01 PROCEDURE — 36591 DRAW BLOOD OFF VENOUS DEVICE: CPT

## 2018-01-01 PROCEDURE — 85025 COMPLETE CBC W/AUTO DIFF WBC: CPT | Performed by: STUDENT IN AN ORGANIZED HEALTH CARE EDUCATION/TRAINING PROGRAM

## 2018-01-01 PROCEDURE — 36430 TRANSFUSION BLD/BLD COMPNT: CPT

## 2018-01-01 PROCEDURE — 36561 INSERT TUNNELED CV CATH: CPT

## 2018-01-01 PROCEDURE — 96367 TX/PROPH/DG ADDL SEQ IV INF: CPT

## 2018-01-01 PROCEDURE — 74011000258 HC RX REV CODE- 258: Performed by: INTERNAL MEDICINE

## 2018-01-01 PROCEDURE — 99285 EMERGENCY DEPT VISIT HI MDM: CPT

## 2018-01-01 PROCEDURE — 86923 COMPATIBILITY TEST ELECTRIC: CPT | Performed by: INTERNAL MEDICINE

## 2018-01-01 PROCEDURE — 85025 COMPLETE CBC W/AUTO DIFF WBC: CPT | Performed by: NURSE PRACTITIONER

## 2018-01-01 PROCEDURE — C1788 PORT, INDWELLING, IMP: HCPCS

## 2018-01-01 PROCEDURE — 74011250637 HC RX REV CODE- 250/637: Performed by: NURSE PRACTITIONER

## 2018-01-01 PROCEDURE — 77030012935 HC DRSG AQUACEL BMS -B

## 2018-01-01 PROCEDURE — G0152 HHCP-SERV OF OT,EA 15 MIN: HCPCS

## 2018-01-01 PROCEDURE — 76450000000

## 2018-01-01 PROCEDURE — 80076 HEPATIC FUNCTION PANEL: CPT | Performed by: INTERNAL MEDICINE

## 2018-01-01 PROCEDURE — 82570 ASSAY OF URINE CREATININE: CPT | Performed by: FAMILY MEDICINE

## 2018-01-01 PROCEDURE — G0300 HHS/HOSPICE OF LPN EA 15 MIN: HCPCS

## 2018-01-01 PROCEDURE — 400013 HH SOC

## 2018-01-01 PROCEDURE — 86923 COMPATIBILITY TEST ELECTRIC: CPT | Performed by: EMERGENCY MEDICINE

## 2018-01-01 PROCEDURE — 77010033678 HC OXYGEN DAILY

## 2018-01-01 PROCEDURE — G0158 HHC OT ASSISTANT EA 15: HCPCS

## 2018-01-01 PROCEDURE — 87040 BLOOD CULTURE FOR BACTERIA: CPT

## 2018-01-01 PROCEDURE — C1894 INTRO/SHEATH, NON-LASER: HCPCS

## 2018-01-01 PROCEDURE — 71275 CT ANGIOGRAPHY CHEST: CPT

## 2018-01-01 PROCEDURE — 93005 ELECTROCARDIOGRAM TRACING: CPT

## 2018-01-01 PROCEDURE — 99152 MOD SED SAME PHYS/QHP 5/>YRS: CPT

## 2018-01-01 PROCEDURE — 74011250636 HC RX REV CODE- 250/636

## 2018-01-01 PROCEDURE — P9016 RBC LEUKOCYTES REDUCED: HCPCS | Performed by: NURSE PRACTITIONER

## 2018-01-01 PROCEDURE — 83605 ASSAY OF LACTIC ACID: CPT | Performed by: NURSE PRACTITIONER

## 2018-01-01 PROCEDURE — G0157 HHC PT ASSISTANT EA 15: HCPCS

## 2018-01-01 PROCEDURE — 80048 BASIC METABOLIC PNL TOTAL CA: CPT | Performed by: INTERNAL MEDICINE

## 2018-01-01 PROCEDURE — 77030031139 HC SUT VCRL2 J&J -A

## 2018-01-01 PROCEDURE — G0299 HHS/HOSPICE OF RN EA 15 MIN: HCPCS

## 2018-01-01 PROCEDURE — 97162 PT EVAL MOD COMPLEX 30 MIN: CPT

## 2018-01-01 PROCEDURE — 84100 ASSAY OF PHOSPHORUS: CPT | Performed by: STUDENT IN AN ORGANIZED HEALTH CARE EDUCATION/TRAINING PROGRAM

## 2018-01-01 PROCEDURE — 86920 COMPATIBILITY TEST SPIN: CPT | Performed by: NURSE PRACTITIONER

## 2018-01-01 PROCEDURE — 85610 PROTHROMBIN TIME: CPT | Performed by: INTERNAL MEDICINE

## 2018-01-01 PROCEDURE — 97110 THERAPEUTIC EXERCISES: CPT

## 2018-01-01 PROCEDURE — 84300 ASSAY OF URINE SODIUM: CPT | Performed by: FAMILY MEDICINE

## 2018-01-01 PROCEDURE — 73502 X-RAY EXAM HIP UNI 2-3 VIEWS: CPT

## 2018-01-01 PROCEDURE — 86900 BLOOD TYPING SEROLOGIC ABO: CPT | Performed by: NURSE PRACTITIONER

## 2018-01-01 PROCEDURE — 74011250636 HC RX REV CODE- 250/636: Performed by: EMERGENCY MEDICINE

## 2018-01-01 PROCEDURE — 71045 X-RAY EXAM CHEST 1 VIEW: CPT

## 2018-01-01 PROCEDURE — 84443 ASSAY THYROID STIM HORMONE: CPT | Performed by: INTERNAL MEDICINE

## 2018-01-01 PROCEDURE — 36415 COLL VENOUS BLD VENIPUNCTURE: CPT | Performed by: EMERGENCY MEDICINE

## 2018-01-01 PROCEDURE — 93970 EXTREMITY STUDY: CPT

## 2018-01-01 PROCEDURE — 99211 OFF/OP EST MAY X REQ PHY/QHP: CPT

## 2018-01-01 PROCEDURE — 96360 HYDRATION IV INFUSION INIT: CPT

## 2018-01-01 PROCEDURE — 93306 TTE W/DOPPLER COMPLETE: CPT

## 2018-01-01 PROCEDURE — 96374 THER/PROPH/DIAG INJ IV PUSH: CPT

## 2018-01-01 PROCEDURE — 96361 HYDRATE IV INFUSION ADD-ON: CPT

## 2018-01-01 PROCEDURE — 30233N1 TRANSFUSION OF NONAUTOLOGOUS RED BLOOD CELLS INTO PERIPHERAL VEIN, PERCUTANEOUS APPROACH: ICD-10-PCS | Performed by: FAMILY MEDICINE

## 2018-01-01 PROCEDURE — 87086 URINE CULTURE/COLONY COUNT: CPT | Performed by: EMERGENCY MEDICINE

## 2018-01-01 PROCEDURE — 36415 COLL VENOUS BLD VENIPUNCTURE: CPT | Performed by: NURSE PRACTITIONER

## 2018-01-01 PROCEDURE — 74011250636 HC RX REV CODE- 250/636: Performed by: RADIOLOGY

## 2018-01-01 PROCEDURE — 83735 ASSAY OF MAGNESIUM: CPT | Performed by: STUDENT IN AN ORGANIZED HEALTH CARE EDUCATION/TRAINING PROGRAM

## 2018-01-01 PROCEDURE — 87070 CULTURE OTHR SPECIMN AEROBIC: CPT | Performed by: NURSE PRACTITIONER

## 2018-01-01 PROCEDURE — 85027 COMPLETE CBC AUTOMATED: CPT | Performed by: FAMILY MEDICINE

## 2018-01-01 PROCEDURE — 36415 COLL VENOUS BLD VENIPUNCTURE: CPT | Performed by: STUDENT IN AN ORGANIZED HEALTH CARE EDUCATION/TRAINING PROGRAM

## 2018-01-01 PROCEDURE — 74011250636 HC RX REV CODE- 250/636: Performed by: PHYSICIAN ASSISTANT

## 2018-01-01 PROCEDURE — 80053 COMPREHEN METABOLIC PANEL: CPT | Performed by: EMERGENCY MEDICINE

## 2018-01-01 PROCEDURE — 97165 OT EVAL LOW COMPLEX 30 MIN: CPT

## 2018-01-01 PROCEDURE — 85730 THROMBOPLASTIN TIME PARTIAL: CPT | Performed by: INTERNAL MEDICINE

## 2018-01-01 PROCEDURE — 87633 RESP VIRUS 12-25 TARGETS: CPT | Performed by: NURSE PRACTITIONER

## 2018-01-01 PROCEDURE — 84100 ASSAY OF PHOSPHORUS: CPT

## 2018-01-01 RX ORDER — SODIUM CHLORIDE 0.9 % (FLUSH) 0.9 %
10 SYRINGE (ML) INJECTION AS NEEDED
Status: ACTIVE | OUTPATIENT
Start: 2018-01-01 | End: 2018-01-01

## 2018-01-01 RX ORDER — ACETAMINOPHEN 325 MG/1
650 TABLET ORAL ONCE
Status: ACTIVE | OUTPATIENT
Start: 2018-01-01 | End: 2018-01-01

## 2018-01-01 RX ORDER — ENOXAPARIN SODIUM 100 MG/ML
80 INJECTION SUBCUTANEOUS EVERY 12 HOURS
Status: DISCONTINUED | OUTPATIENT
Start: 2018-01-01 | End: 2018-01-01 | Stop reason: HOSPADM

## 2018-01-01 RX ORDER — SODIUM CHLORIDE 9 MG/ML
250 INJECTION, SOLUTION INTRAVENOUS AS NEEDED
Status: DISPENSED | OUTPATIENT
Start: 2018-01-01 | End: 2018-01-01

## 2018-01-01 RX ORDER — SODIUM CHLORIDE 9 MG/ML
25 INJECTION, SOLUTION INTRAVENOUS CONTINUOUS
Status: DISPENSED | OUTPATIENT
Start: 2018-01-01 | End: 2018-01-01

## 2018-01-01 RX ORDER — POTASSIUM CHLORIDE 750 MG/1
80 TABLET, FILM COATED, EXTENDED RELEASE ORAL
Status: COMPLETED | OUTPATIENT
Start: 2018-01-01 | End: 2018-01-01

## 2018-01-01 RX ORDER — LEVOFLOXACIN 5 MG/ML
750 INJECTION, SOLUTION INTRAVENOUS EVERY 24 HOURS
Status: DISCONTINUED | OUTPATIENT
Start: 2018-01-01 | End: 2018-01-01

## 2018-01-01 RX ORDER — SODIUM CHLORIDE 0.9 % (FLUSH) 0.9 %
5-10 SYRINGE (ML) INJECTION AS NEEDED
Status: ACTIVE | OUTPATIENT
Start: 2018-01-01 | End: 2018-01-01

## 2018-01-01 RX ORDER — PALONOSETRON 0.05 MG/ML
0.25 INJECTION, SOLUTION INTRAVENOUS ONCE
Status: COMPLETED | OUTPATIENT
Start: 2018-01-01 | End: 2018-01-01

## 2018-01-01 RX ORDER — OXYCODONE HYDROCHLORIDE 5 MG/1
5-10 TABLET ORAL
Qty: 36 TAB | Refills: 0 | Status: SHIPPED | OUTPATIENT
Start: 2018-01-01 | End: 2018-01-01 | Stop reason: SDUPTHER

## 2018-01-01 RX ORDER — SODIUM CHLORIDE 9 MG/ML
250 INJECTION, SOLUTION INTRAVENOUS AS NEEDED
Status: DISCONTINUED | OUTPATIENT
Start: 2018-01-01 | End: 2018-01-01 | Stop reason: HOSPADM

## 2018-01-01 RX ORDER — METOPROLOL TARTRATE 5 MG/5ML
2.5 INJECTION INTRAVENOUS ONCE
Status: COMPLETED | OUTPATIENT
Start: 2018-01-01 | End: 2018-01-01

## 2018-01-01 RX ORDER — DIPHENHYDRAMINE HCL 25 MG
25 CAPSULE ORAL ONCE
Status: ACTIVE | OUTPATIENT
Start: 2018-01-01 | End: 2018-01-01

## 2018-01-01 RX ORDER — SODIUM CHLORIDE 9 MG/ML
10 INJECTION INTRAMUSCULAR; INTRAVENOUS; SUBCUTANEOUS AS NEEDED
Status: ACTIVE | OUTPATIENT
Start: 2018-01-01 | End: 2018-01-01

## 2018-01-01 RX ORDER — HEPARIN 100 UNIT/ML
500 SYRINGE INTRAVENOUS AS NEEDED
Status: ACTIVE | OUTPATIENT
Start: 2018-01-01 | End: 2018-01-01

## 2018-01-01 RX ORDER — DIPHENHYDRAMINE HCL 25 MG
25 CAPSULE ORAL ONCE
Status: COMPLETED | OUTPATIENT
Start: 2018-01-01 | End: 2018-01-01

## 2018-01-01 RX ORDER — FAMOTIDINE 20 MG/1
20 TABLET, FILM COATED ORAL
Status: DISCONTINUED | OUTPATIENT
Start: 2018-01-01 | End: 2018-01-01

## 2018-01-01 RX ORDER — SODIUM CHLORIDE 0.9 % (FLUSH) 0.9 %
10-40 SYRINGE (ML) INJECTION AS NEEDED
Status: ACTIVE | OUTPATIENT
Start: 2018-01-01 | End: 2018-01-01

## 2018-01-01 RX ORDER — ALBUTEROL SULFATE 90 UG/1
2 AEROSOL, METERED RESPIRATORY (INHALATION)
Status: DISPENSED | OUTPATIENT
Start: 2018-01-01 | End: 2018-01-01

## 2018-01-01 RX ORDER — ARFORMOTEROL TARTRATE 15 UG/2ML
15 SOLUTION RESPIRATORY (INHALATION)
Status: DISCONTINUED | OUTPATIENT
Start: 2018-01-01 | End: 2018-01-01 | Stop reason: HOSPADM

## 2018-01-01 RX ORDER — ACETAMINOPHEN 325 MG/1
650 TABLET ORAL ONCE
Status: COMPLETED | OUTPATIENT
Start: 2018-01-01 | End: 2018-01-01

## 2018-01-01 RX ORDER — HEPARIN SODIUM 200 [USP'U]/100ML
500 INJECTION, SOLUTION INTRAVENOUS ONCE
Status: COMPLETED | OUTPATIENT
Start: 2018-01-01 | End: 2018-01-01

## 2018-01-01 RX ORDER — PALONOSETRON 0.05 MG/ML
0.25 INJECTION, SOLUTION INTRAVENOUS ONCE
Status: ACTIVE | OUTPATIENT
Start: 2018-01-01 | End: 2018-01-01

## 2018-01-01 RX ORDER — LEVOFLOXACIN 750 MG/1
750 TABLET ORAL EVERY 24 HOURS
Qty: 2 TAB | Refills: 0 | Status: SHIPPED | OUTPATIENT
Start: 2018-01-01

## 2018-01-01 RX ORDER — FAMOTIDINE 20 MG/1
20 TABLET, FILM COATED ORAL ONCE
Status: COMPLETED | OUTPATIENT
Start: 2018-01-01 | End: 2018-01-01

## 2018-01-01 RX ORDER — SODIUM CHLORIDE 9 MG/ML
10 INJECTION INTRAMUSCULAR; INTRAVENOUS; SUBCUTANEOUS AS NEEDED
Status: DISPENSED | OUTPATIENT
Start: 2018-01-01 | End: 2018-01-01

## 2018-01-01 RX ORDER — PREDNISONE 10 MG/1
TABLET ORAL
Qty: 40 TAB | Refills: 0 | Status: SHIPPED | OUTPATIENT
Start: 2018-01-01 | End: 2018-01-01 | Stop reason: SDUPTHER

## 2018-01-01 RX ORDER — PALONOSETRON 0.05 MG/ML
0.25 INJECTION, SOLUTION INTRAVENOUS ONCE
Status: DISCONTINUED | OUTPATIENT
Start: 2018-01-01 | End: 2018-01-01

## 2018-01-01 RX ORDER — CALCIUM CARBONATE 200(500)MG
200 TABLET,CHEWABLE ORAL
Status: DISCONTINUED | OUTPATIENT
Start: 2018-01-01 | End: 2018-01-01 | Stop reason: HOSPADM

## 2018-01-01 RX ORDER — DIPHENHYDRAMINE HCL 25 MG
25 CAPSULE ORAL ONCE
Status: DISCONTINUED | OUTPATIENT
Start: 2018-01-01 | End: 2018-01-01

## 2018-01-01 RX ORDER — HEPARIN 100 UNIT/ML
300 SYRINGE INTRAVENOUS AS NEEDED
Status: DISCONTINUED | OUTPATIENT
Start: 2018-01-01 | End: 2018-01-01 | Stop reason: HOSPADM

## 2018-01-01 RX ORDER — MIDAZOLAM HYDROCHLORIDE 1 MG/ML
.5-5 INJECTION, SOLUTION INTRAMUSCULAR; INTRAVENOUS
Status: DISCONTINUED | OUTPATIENT
Start: 2018-01-01 | End: 2018-01-01 | Stop reason: HOSPADM

## 2018-01-01 RX ORDER — PREDNISONE 10 MG/1
20 TABLET ORAL DAILY
Qty: 20 TAB | Refills: 0 | Status: SHIPPED | OUTPATIENT
Start: 2018-01-01

## 2018-01-01 RX ORDER — VANCOMYCIN/0.9 % SOD CHLORIDE 1.5G/250ML
1500 PLASTIC BAG, INJECTION (ML) INTRAVENOUS ONCE
Status: COMPLETED | OUTPATIENT
Start: 2018-01-01 | End: 2018-01-01

## 2018-01-01 RX ORDER — FUROSEMIDE 10 MG/ML
20 INJECTION INTRAMUSCULAR; INTRAVENOUS ONCE
Status: COMPLETED | OUTPATIENT
Start: 2018-01-01 | End: 2018-01-01

## 2018-01-01 RX ORDER — LEVOFLOXACIN 750 MG/1
750 TABLET ORAL EVERY 24 HOURS
Status: DISCONTINUED | OUTPATIENT
Start: 2018-01-01 | End: 2018-01-01 | Stop reason: HOSPADM

## 2018-01-01 RX ORDER — SODIUM CHLORIDE 0.9 % (FLUSH) 0.9 %
5-10 SYRINGE (ML) INJECTION EVERY 8 HOURS
Status: DISCONTINUED | OUTPATIENT
Start: 2018-01-01 | End: 2018-01-01 | Stop reason: HOSPADM

## 2018-01-01 RX ORDER — TIOTROPIUM BROMIDE AND OLODATEROL 3.124; 2.736 UG/1; UG/1
SPRAY, METERED RESPIRATORY (INHALATION)
Refills: 6 | COMMUNITY
Start: 2018-01-01

## 2018-01-01 RX ORDER — AMOXICILLIN 250 MG
1 CAPSULE ORAL 2 TIMES DAILY
Status: DISCONTINUED | OUTPATIENT
Start: 2018-01-01 | End: 2018-01-01 | Stop reason: HOSPADM

## 2018-01-01 RX ORDER — DEXAMETHASONE SODIUM PHOSPHATE 4 MG/ML
8 INJECTION, SOLUTION INTRA-ARTICULAR; INTRALESIONAL; INTRAMUSCULAR; INTRAVENOUS; SOFT TISSUE ONCE
Status: COMPLETED | OUTPATIENT
Start: 2018-01-01 | End: 2018-01-01

## 2018-01-01 RX ORDER — NYSTATIN 100000 U/G
CREAM TOPICAL 2 TIMES DAILY
Qty: 30 G | Refills: 3 | Status: SHIPPED | OUTPATIENT
Start: 2018-01-01 | End: 2018-01-01

## 2018-01-01 RX ORDER — IPRATROPIUM BROMIDE AND ALBUTEROL SULFATE 2.5; .5 MG/3ML; MG/3ML
3 SOLUTION RESPIRATORY (INHALATION)
Status: DISCONTINUED | OUTPATIENT
Start: 2018-01-01 | End: 2018-01-01 | Stop reason: HOSPADM

## 2018-01-01 RX ORDER — OXYCODONE HYDROCHLORIDE 5 MG/1
10 TABLET ORAL
Status: DISCONTINUED | OUTPATIENT
Start: 2018-01-01 | End: 2018-01-01 | Stop reason: HOSPADM

## 2018-01-01 RX ORDER — MORPHINE SULFATE 15 MG/1
15 TABLET, FILM COATED, EXTENDED RELEASE ORAL EVERY 12 HOURS
Qty: 60 TAB | Refills: 0 | Status: CANCELLED | OUTPATIENT
Start: 2018-01-01

## 2018-01-01 RX ORDER — ACETAMINOPHEN 325 MG/1
650 TABLET ORAL
Status: DISCONTINUED | OUTPATIENT
Start: 2018-01-01 | End: 2018-01-01 | Stop reason: HOSPADM

## 2018-01-01 RX ORDER — FENTANYL CITRATE 50 UG/ML
25-100 INJECTION, SOLUTION INTRAMUSCULAR; INTRAVENOUS
Status: DISCONTINUED | OUTPATIENT
Start: 2018-01-01 | End: 2018-01-01 | Stop reason: HOSPADM

## 2018-01-01 RX ORDER — OXYCODONE HYDROCHLORIDE 5 MG/1
5-10 TABLET ORAL
Qty: 100 TAB | Refills: 0 | Status: SHIPPED | OUTPATIENT
Start: 2018-01-01

## 2018-01-01 RX ORDER — BUDESONIDE 0.5 MG/2ML
500 INHALANT ORAL
Status: DISCONTINUED | OUTPATIENT
Start: 2018-01-01 | End: 2018-01-01 | Stop reason: HOSPADM

## 2018-01-01 RX ORDER — OXYCODONE HYDROCHLORIDE 5 MG/1
5 TABLET ORAL
Status: DISCONTINUED | OUTPATIENT
Start: 2018-01-01 | End: 2018-01-01 | Stop reason: HOSPADM

## 2018-01-01 RX ORDER — OXYCODONE HYDROCHLORIDE 5 MG/1
5 TABLET ORAL
Qty: 60 TAB | Refills: 0 | Status: SHIPPED | OUTPATIENT
Start: 2018-01-01 | End: 2018-01-01 | Stop reason: SDUPTHER

## 2018-01-01 RX ORDER — LIDOCAINE 50 MG/G
1 PATCH TOPICAL EVERY 24 HOURS
Status: DISCONTINUED | OUTPATIENT
Start: 2018-01-01 | End: 2018-01-01 | Stop reason: HOSPADM

## 2018-01-01 RX ORDER — LOPERAMIDE HYDROCHLORIDE 2 MG/1
2 CAPSULE ORAL AS NEEDED
COMMUNITY

## 2018-01-01 RX ORDER — RANITIDINE 15 MG/ML
150 SYRUP ORAL ONCE
Status: DISCONTINUED | OUTPATIENT
Start: 2018-01-01 | End: 2018-01-01 | Stop reason: CLARIF

## 2018-01-01 RX ORDER — NYSTATIN 100000 U/G
CREAM TOPICAL
COMMUNITY

## 2018-01-01 RX ORDER — ACETAMINOPHEN 325 MG/1
650 TABLET ORAL ONCE
Status: DISCONTINUED | OUTPATIENT
Start: 2018-01-01 | End: 2018-01-01

## 2018-01-01 RX ORDER — LIDOCAINE HYDROCHLORIDE 10 MG/ML
10-20 INJECTION INFILTRATION; PERINEURAL
Status: DISCONTINUED | OUTPATIENT
Start: 2018-01-01 | End: 2018-01-01 | Stop reason: HOSPADM

## 2018-01-01 RX ORDER — SODIUM CHLORIDE 0.9 % (FLUSH) 0.9 %
10-40 SYRINGE (ML) INJECTION AS NEEDED
Status: DISCONTINUED | OUTPATIENT
Start: 2018-01-01 | End: 2018-01-01 | Stop reason: HOSPADM

## 2018-01-01 RX ORDER — METOPROLOL TARTRATE 5 MG/5ML
5 INJECTION INTRAVENOUS ONCE
Status: DISCONTINUED | OUTPATIENT
Start: 2018-01-01 | End: 2018-01-01

## 2018-01-01 RX ORDER — POLYETHYLENE GLYCOL 3350 17 G/17G
17 POWDER, FOR SOLUTION ORAL DAILY
Status: DISCONTINUED | OUTPATIENT
Start: 2018-01-01 | End: 2018-01-01 | Stop reason: HOSPADM

## 2018-01-01 RX ORDER — SODIUM CHLORIDE 0.9 % (FLUSH) 0.9 %
5-10 SYRINGE (ML) INJECTION AS NEEDED
Status: DISCONTINUED | OUTPATIENT
Start: 2018-01-01 | End: 2018-01-01 | Stop reason: HOSPADM

## 2018-01-01 RX ORDER — OXYCODONE HYDROCHLORIDE 5 MG/1
5-10 TABLET ORAL
Qty: 100 TAB | Refills: 0 | Status: SHIPPED | OUTPATIENT
Start: 2018-01-01 | End: 2018-01-01 | Stop reason: SDUPTHER

## 2018-01-01 RX ORDER — LIDOCAINE HYDROCHLORIDE AND EPINEPHRINE 10; 10 MG/ML; UG/ML
.5-5 INJECTION, SOLUTION INFILTRATION; PERINEURAL
Status: DISCONTINUED | OUTPATIENT
Start: 2018-01-01 | End: 2018-01-01

## 2018-01-01 RX ORDER — SODIUM CHLORIDE 9 MG/ML
25 INJECTION, SOLUTION INTRAVENOUS CONTINUOUS
Status: DISCONTINUED | OUTPATIENT
Start: 2018-01-01 | End: 2018-01-01

## 2018-01-01 RX ORDER — MAGNESIUM SULFATE HEPTAHYDRATE 40 MG/ML
2 INJECTION, SOLUTION INTRAVENOUS ONCE
Status: COMPLETED | OUTPATIENT
Start: 2018-01-01 | End: 2018-01-01

## 2018-01-01 RX ORDER — FACIAL-BODY WIPES
10 EACH TOPICAL DAILY
Status: DISCONTINUED | OUTPATIENT
Start: 2018-01-01 | End: 2018-01-01 | Stop reason: HOSPADM

## 2018-01-01 RX ADMIN — SODIUM CHLORIDE, PRESERVATIVE FREE 300 UNITS: 5 INJECTION INTRAVENOUS at 15:20

## 2018-01-01 RX ADMIN — SODIUM CHLORIDE 150 MG: 900 INJECTION, SOLUTION INTRAVENOUS at 13:11

## 2018-01-01 RX ADMIN — FENTANYL CITRATE 25 MCG: 50 INJECTION, SOLUTION INTRAMUSCULAR; INTRAVENOUS at 09:47

## 2018-01-01 RX ADMIN — GEMCITABINE HYDROCHLORIDE 1400 MG: 200 INJECTION, SOLUTION INTRAVENOUS at 12:53

## 2018-01-01 RX ADMIN — ENOXAPARIN SODIUM 80 MG: 80 INJECTION SUBCUTANEOUS at 19:42

## 2018-01-01 RX ADMIN — Medication 500 UNITS: at 12:28

## 2018-01-01 RX ADMIN — OXYCODONE HYDROCHLORIDE 10 MG: 5 TABLET ORAL at 19:42

## 2018-01-01 RX ADMIN — SODIUM CHLORIDE 10 ML: 9 INJECTION INTRAMUSCULAR; INTRAVENOUS; SUBCUTANEOUS at 11:20

## 2018-01-01 RX ADMIN — GEMCITABINE HYDROCHLORIDE 1400 MG: 200 INJECTION, SOLUTION INTRAVENOUS at 14:53

## 2018-01-01 RX ADMIN — Medication 10 ML: at 17:10

## 2018-01-01 RX ADMIN — BUDESONIDE 500 MCG: 0.5 INHALANT RESPIRATORY (INHALATION) at 20:06

## 2018-01-01 RX ADMIN — ACETAMINOPHEN 650 MG: 325 TABLET ORAL at 11:23

## 2018-01-01 RX ADMIN — ACETAMINOPHEN 650 MG: 325 TABLET ORAL at 11:19

## 2018-01-01 RX ADMIN — Medication 10 ML: at 05:27

## 2018-01-01 RX ADMIN — IPRATROPIUM BROMIDE AND ALBUTEROL SULFATE 3 ML: .5; 3 SOLUTION RESPIRATORY (INHALATION) at 15:36

## 2018-01-01 RX ADMIN — STANDARDIZED SENNA CONCENTRATE AND DOCUSATE SODIUM 1 TABLET: 8.6; 5 TABLET, FILM COATED ORAL at 17:06

## 2018-01-01 RX ADMIN — Medication 500 UNITS: at 15:30

## 2018-01-01 RX ADMIN — Medication 10 ML: at 15:32

## 2018-01-01 RX ADMIN — OXYCODONE HYDROCHLORIDE 10 MG: 5 TABLET ORAL at 19:54

## 2018-01-01 RX ADMIN — PALONOSETRON 0.25 MG: 0.05 INJECTION, SOLUTION INTRAVENOUS at 13:47

## 2018-01-01 RX ADMIN — METHYLPREDNISOLONE SODIUM SUCCINATE 60 MG: 125 INJECTION, POWDER, FOR SOLUTION INTRAMUSCULAR; INTRAVENOUS at 00:05

## 2018-01-01 RX ADMIN — IPRATROPIUM BROMIDE AND ALBUTEROL SULFATE 3 ML: .5; 3 SOLUTION RESPIRATORY (INHALATION) at 11:31

## 2018-01-01 RX ADMIN — FAMOTIDINE 20 MG: 20 TABLET ORAL at 20:28

## 2018-01-01 RX ADMIN — METHYLPREDNISOLONE SODIUM SUCCINATE 80 MG: 125 INJECTION, POWDER, FOR SOLUTION INTRAMUSCULAR; INTRAVENOUS at 05:28

## 2018-01-01 RX ADMIN — METOPROLOL TARTRATE 2.5 MG: 5 INJECTION, SOLUTION INTRAVENOUS at 15:54

## 2018-01-01 RX ADMIN — OXYCODONE HYDROCHLORIDE 5 MG: 5 TABLET ORAL at 11:28

## 2018-01-01 RX ADMIN — SODIUM CHLORIDE 25 ML/HR: 900 INJECTION, SOLUTION INTRAVENOUS at 14:41

## 2018-01-01 RX ADMIN — SODIUM CHLORIDE 10 ML: 9 INJECTION, SOLUTION INTRAMUSCULAR; INTRAVENOUS; SUBCUTANEOUS at 11:10

## 2018-01-01 RX ADMIN — CARBOPLATIN 679 MG: 10 INJECTION, SOLUTION INTRAVENOUS at 14:06

## 2018-01-01 RX ADMIN — IPRATROPIUM BROMIDE AND ALBUTEROL SULFATE 3 ML: .5; 3 SOLUTION RESPIRATORY (INHALATION) at 00:02

## 2018-01-01 RX ADMIN — SODIUM CHLORIDE 250 ML: 900 INJECTION, SOLUTION INTRAVENOUS at 14:51

## 2018-01-01 RX ADMIN — ENOXAPARIN SODIUM 80 MG: 80 INJECTION SUBCUTANEOUS at 09:04

## 2018-01-01 RX ADMIN — Medication 10 ML: at 15:30

## 2018-01-01 RX ADMIN — FENTANYL CITRATE 25 MCG: 50 INJECTION, SOLUTION INTRAMUSCULAR; INTRAVENOUS at 09:36

## 2018-01-01 RX ADMIN — SODIUM CHLORIDE 10 ML: 9 INJECTION, SOLUTION INTRAMUSCULAR; INTRAVENOUS; SUBCUTANEOUS at 10:53

## 2018-01-01 RX ADMIN — Medication 10 ML: at 18:14

## 2018-01-01 RX ADMIN — IPRATROPIUM BROMIDE AND ALBUTEROL SULFATE 3 ML: .5; 3 SOLUTION RESPIRATORY (INHALATION) at 15:54

## 2018-01-01 RX ADMIN — Medication 500 UNITS: at 15:25

## 2018-01-01 RX ADMIN — Medication 10 ML: at 16:56

## 2018-01-01 RX ADMIN — VANCOMYCIN HYDROCHLORIDE 2000 MG: 10 INJECTION, POWDER, LYOPHILIZED, FOR SOLUTION INTRAVENOUS at 18:08

## 2018-01-01 RX ADMIN — IPRATROPIUM BROMIDE AND ALBUTEROL SULFATE 3 ML: .5; 3 SOLUTION RESPIRATORY (INHALATION) at 20:06

## 2018-01-01 RX ADMIN — IPRATROPIUM BROMIDE AND ALBUTEROL SULFATE 3 ML: .5; 3 SOLUTION RESPIRATORY (INHALATION) at 23:36

## 2018-01-01 RX ADMIN — DEXAMETHASONE SODIUM PHOSPHATE 12 MG: 4 INJECTION, SOLUTION INTRA-ARTICULAR; INTRALESIONAL; INTRAMUSCULAR; INTRAVENOUS; SOFT TISSUE at 13:08

## 2018-01-01 RX ADMIN — FUROSEMIDE 20 MG: 10 INJECTION, SOLUTION INTRAMUSCULAR; INTRAVENOUS at 02:51

## 2018-01-01 RX ADMIN — SODIUM CHLORIDE 1000 ML: 900 INJECTION, SOLUTION INTRAVENOUS at 14:50

## 2018-01-01 RX ADMIN — SODIUM CHLORIDE 1000 MG: 900 INJECTION, SOLUTION INTRAVENOUS at 14:20

## 2018-01-01 RX ADMIN — OXYCODONE HYDROCHLORIDE 10 MG: 5 TABLET ORAL at 05:33

## 2018-01-01 RX ADMIN — Medication 500 UNITS: at 17:28

## 2018-01-01 RX ADMIN — Medication 10 ML: at 17:30

## 2018-01-01 RX ADMIN — Medication 500 UNITS: at 17:30

## 2018-01-01 RX ADMIN — ENOXAPARIN SODIUM 80 MG: 80 INJECTION SUBCUTANEOUS at 20:28

## 2018-01-01 RX ADMIN — SODIUM CHLORIDE 10 ML: 9 INJECTION, SOLUTION INTRAMUSCULAR; INTRAVENOUS; SUBCUTANEOUS at 07:30

## 2018-01-01 RX ADMIN — Medication 10 ML: at 10:30

## 2018-01-01 RX ADMIN — Medication 20 ML: at 13:37

## 2018-01-01 RX ADMIN — VANCOMYCIN HYDROCHLORIDE 1500 MG: 10 INJECTION, POWDER, LYOPHILIZED, FOR SOLUTION INTRAVENOUS at 09:39

## 2018-01-01 RX ADMIN — SODIUM CHLORIDE 25 ML/HR: 900 INJECTION, SOLUTION INTRAVENOUS at 10:40

## 2018-01-01 RX ADMIN — Medication 500 UNITS: at 15:32

## 2018-01-01 RX ADMIN — Medication 10 ML: at 15:25

## 2018-01-01 RX ADMIN — Medication 10 ML: at 15:20

## 2018-01-01 RX ADMIN — DEXAMETHASONE SODIUM PHOSPHATE 12 MG: 4 INJECTION, SOLUTION INTRA-ARTICULAR; INTRALESIONAL; INTRAMUSCULAR; INTRAVENOUS; SOFT TISSUE at 15:20

## 2018-01-01 RX ADMIN — IOPAMIDOL 100 ML: 755 INJECTION, SOLUTION INTRAVENOUS at 11:10

## 2018-01-01 RX ADMIN — PALONOSETRON HYDROCHLORIDE 0.25 MG: 0.25 INJECTION INTRAVENOUS at 13:13

## 2018-01-01 RX ADMIN — SODIUM CHLORIDE 240 MG: 9 INJECTION, SOLUTION INTRAVENOUS at 15:42

## 2018-01-01 RX ADMIN — Medication 500 UNITS: at 15:02

## 2018-01-01 RX ADMIN — METHYLPREDNISOLONE SODIUM SUCCINATE 80 MG: 125 INJECTION, POWDER, FOR SOLUTION INTRAMUSCULAR; INTRAVENOUS at 18:29

## 2018-01-01 RX ADMIN — Medication 10 ML: at 12:28

## 2018-01-01 RX ADMIN — PALONOSETRON HYDROCHLORIDE 0.25 MG: 0.25 INJECTION INTRAVENOUS at 14:41

## 2018-01-01 RX ADMIN — SODIUM CHLORIDE 150 MG: 900 INJECTION, SOLUTION INTRAVENOUS at 15:40

## 2018-01-01 RX ADMIN — ENOXAPARIN SODIUM 80 MG: 80 INJECTION SUBCUTANEOUS at 08:31

## 2018-01-01 RX ADMIN — BISACODYL 10 MG: 10 SUPPOSITORY RECTAL at 12:20

## 2018-01-01 RX ADMIN — SODIUM CHLORIDE 25 ML/HR: 900 INJECTION, SOLUTION INTRAVENOUS at 13:22

## 2018-01-01 RX ADMIN — SODIUM CHLORIDE, PRESERVATIVE FREE 500 UNITS: 5 INJECTION INTRAVENOUS at 13:20

## 2018-01-01 RX ADMIN — SODIUM CHLORIDE, PRESERVATIVE FREE 500 UNITS: 5 INJECTION INTRAVENOUS at 17:10

## 2018-01-01 RX ADMIN — SODIUM CHLORIDE 250 ML: 900 INJECTION, SOLUTION INTRAVENOUS at 08:35

## 2018-01-01 RX ADMIN — IPRATROPIUM BROMIDE AND ALBUTEROL SULFATE 3 ML: .5; 3 SOLUTION RESPIRATORY (INHALATION) at 19:21

## 2018-01-01 RX ADMIN — CARBOPLATIN 560 MG: 10 INJECTION, SOLUTION INTRAVENOUS at 13:55

## 2018-01-01 RX ADMIN — METHYLPREDNISOLONE SODIUM SUCCINATE 60 MG: 125 INJECTION, POWDER, FOR SOLUTION INTRAMUSCULAR; INTRAVENOUS at 17:06

## 2018-01-01 RX ADMIN — METHYLPREDNISOLONE SODIUM SUCCINATE 60 MG: 125 INJECTION, POWDER, FOR SOLUTION INTRAMUSCULAR; INTRAVENOUS at 05:27

## 2018-01-01 RX ADMIN — SODIUM CHLORIDE 10 ML: 9 INJECTION, SOLUTION INTRAMUSCULAR; INTRAVENOUS; SUBCUTANEOUS at 10:10

## 2018-01-01 RX ADMIN — SODIUM CHLORIDE 250 ML: 900 INJECTION, SOLUTION INTRAVENOUS at 11:23

## 2018-01-01 RX ADMIN — SODIUM CHLORIDE 1600 MG: 900 INJECTION, SOLUTION INTRAVENOUS at 11:41

## 2018-01-01 RX ADMIN — IOPAMIDOL 100 ML: 755 INJECTION, SOLUTION INTRAVENOUS at 12:02

## 2018-01-01 RX ADMIN — METHYLPREDNISOLONE SODIUM SUCCINATE 80 MG: 125 INJECTION, POWDER, FOR SOLUTION INTRAMUSCULAR; INTRAVENOUS at 11:22

## 2018-01-01 RX ADMIN — Medication 10 ML: at 22:33

## 2018-01-01 RX ADMIN — BUDESONIDE 500 MCG: 0.5 INHALANT RESPIRATORY (INHALATION) at 19:21

## 2018-01-01 RX ADMIN — DEXAMETHASONE SODIUM PHOSPHATE 8 MG: 4 INJECTION, SOLUTION INTRA-ARTICULAR; INTRALESIONAL; INTRAMUSCULAR; INTRAVENOUS; SOFT TISSUE at 10:40

## 2018-01-01 RX ADMIN — DIPHENHYDRAMINE HYDROCHLORIDE 25 MG: 25 CAPSULE ORAL at 10:16

## 2018-01-01 RX ADMIN — Medication 10 ML: at 10:15

## 2018-01-01 RX ADMIN — Medication 500 UNITS: at 11:57

## 2018-01-01 RX ADMIN — Medication 10 ML: at 13:34

## 2018-01-01 RX ADMIN — IPRATROPIUM BROMIDE AND ALBUTEROL SULFATE 3 ML: .5; 3 SOLUTION RESPIRATORY (INHALATION) at 11:26

## 2018-01-01 RX ADMIN — SODIUM CHLORIDE 150 MG: 900 INJECTION, SOLUTION INTRAVENOUS at 13:25

## 2018-01-01 RX ADMIN — LEVOFLOXACIN 750 MG: 750 TABLET, FILM COATED ORAL at 15:17

## 2018-01-01 RX ADMIN — SODIUM CHLORIDE 10 ML: 9 INJECTION INTRAMUSCULAR; INTRAVENOUS; SUBCUTANEOUS at 10:24

## 2018-01-01 RX ADMIN — Medication 10 ML: at 11:57

## 2018-01-01 RX ADMIN — Medication 20 ML: at 10:24

## 2018-01-01 RX ADMIN — DIPHENHYDRAMINE HYDROCHLORIDE 25 MG: 25 CAPSULE ORAL at 14:50

## 2018-01-01 RX ADMIN — Medication 10 ML: at 17:28

## 2018-01-01 RX ADMIN — STANDARDIZED SENNA CONCENTRATE AND DOCUSATE SODIUM 1 TABLET: 8.6; 5 TABLET, FILM COATED ORAL at 09:03

## 2018-01-01 RX ADMIN — Medication 10 ML: at 14:59

## 2018-01-01 RX ADMIN — GEMCITABINE 1600 MG: 38 INJECTION, SOLUTION INTRAVENOUS at 15:00

## 2018-01-01 RX ADMIN — SODIUM CHLORIDE 10 ML: 9 INJECTION INTRAMUSCULAR; INTRAVENOUS; SUBCUTANEOUS at 11:15

## 2018-01-01 RX ADMIN — PALONOSETRON HYDROCHLORIDE 0.25 MG: 0.25 INJECTION INTRAVENOUS at 13:08

## 2018-01-01 RX ADMIN — Medication 500 UNITS: at 13:34

## 2018-01-01 RX ADMIN — CARBOPLATIN 350 MG: 10 INJECTION, SOLUTION INTRAVENOUS at 14:19

## 2018-01-01 RX ADMIN — SODIUM CHLORIDE 2000 MG: 900 INJECTION, SOLUTION INTRAVENOUS at 14:44

## 2018-01-01 RX ADMIN — SODIUM CHLORIDE 150 MG: 900 INJECTION, SOLUTION INTRAVENOUS at 12:48

## 2018-01-01 RX ADMIN — Medication 10 ML: at 11:13

## 2018-01-01 RX ADMIN — SODIUM CHLORIDE, PRESERVATIVE FREE 500 UNITS: 5 INJECTION INTRAVENOUS at 15:30

## 2018-01-01 RX ADMIN — ANTACID TABLETS 200 MG: 500 TABLET, CHEWABLE ORAL at 17:06

## 2018-01-01 RX ADMIN — DEXAMETHASONE SODIUM PHOSPHATE 12 MG: 4 INJECTION, SOLUTION INTRAMUSCULAR; INTRAVENOUS at 13:15

## 2018-01-01 RX ADMIN — Medication 1000 UNITS: at 09:50

## 2018-01-01 RX ADMIN — BUDESONIDE 500 MCG: 0.5 INHALANT RESPIRATORY (INHALATION) at 07:41

## 2018-01-01 RX ADMIN — SODIUM CHLORIDE 25 ML/HR: 900 INJECTION, SOLUTION INTRAVENOUS at 13:10

## 2018-01-01 RX ADMIN — ACETAMINOPHEN 650 MG: 325 TABLET ORAL at 14:50

## 2018-01-01 RX ADMIN — Medication 10 ML: at 21:19

## 2018-01-01 RX ADMIN — POLYETHYLENE GLYCOL (3350) 17 G: 17 POWDER, FOR SOLUTION ORAL at 09:03

## 2018-01-01 RX ADMIN — Medication 10 ML: at 05:29

## 2018-01-01 RX ADMIN — ALBUTEROL SULFATE 2 PUFF: 90 AEROSOL, METERED RESPIRATORY (INHALATION) at 16:10

## 2018-01-01 RX ADMIN — SODIUM CHLORIDE 25 ML/HR: 900 INJECTION, SOLUTION INTRAVENOUS at 13:46

## 2018-01-01 RX ADMIN — SODIUM CHLORIDE 25 ML/HR: 900 INJECTION, SOLUTION INTRAVENOUS at 12:25

## 2018-01-01 RX ADMIN — DEXAMETHASONE SODIUM PHOSPHATE 12 MG: 4 INJECTION, SOLUTION INTRA-ARTICULAR; INTRALESIONAL; INTRAMUSCULAR; INTRAVENOUS; SOFT TISSUE at 13:11

## 2018-01-01 RX ADMIN — IOPAMIDOL 80 ML: 755 INJECTION, SOLUTION INTRAVENOUS at 17:28

## 2018-01-01 RX ADMIN — Medication 10 ML: at 14:05

## 2018-01-01 RX ADMIN — IPRATROPIUM BROMIDE AND ALBUTEROL SULFATE 3 ML: .5; 3 SOLUTION RESPIRATORY (INHALATION) at 07:41

## 2018-01-01 RX ADMIN — SODIUM CHLORIDE 25 ML/HR: 900 INJECTION, SOLUTION INTRAVENOUS at 13:00

## 2018-01-01 RX ADMIN — DIPHENHYDRAMINE HYDROCHLORIDE 25 MG: 25 CAPSULE ORAL at 11:19

## 2018-01-01 RX ADMIN — IPRATROPIUM BROMIDE AND ALBUTEROL SULFATE 3 ML: .5; 3 SOLUTION RESPIRATORY (INHALATION) at 07:52

## 2018-01-01 RX ADMIN — DIPHENHYDRAMINE HYDROCHLORIDE 25 MG: 25 CAPSULE ORAL at 11:23

## 2018-01-01 RX ADMIN — LEVOFLOXACIN 750 MG: 5 INJECTION, SOLUTION INTRAVENOUS at 15:53

## 2018-01-01 RX ADMIN — SODIUM CHLORIDE 10 ML: 9 INJECTION, SOLUTION INTRAMUSCULAR; INTRAVENOUS; SUBCUTANEOUS at 11:05

## 2018-01-01 RX ADMIN — OXYCODONE HYDROCHLORIDE 10 MG: 5 TABLET ORAL at 06:50

## 2018-01-01 RX ADMIN — Medication 500 UNITS: at 14:45

## 2018-01-01 RX ADMIN — SODIUM CHLORIDE 250 ML: 900 INJECTION, SOLUTION INTRAVENOUS at 10:30

## 2018-01-01 RX ADMIN — SODIUM CHLORIDE 25 ML/HR: 900 INJECTION, SOLUTION INTRAVENOUS at 12:51

## 2018-01-01 RX ADMIN — Medication 10 ML: at 15:02

## 2018-01-01 RX ADMIN — VANCOMYCIN HYDROCHLORIDE 1250 MG: 10 INJECTION, POWDER, LYOPHILIZED, FOR SOLUTION INTRAVENOUS at 06:58

## 2018-01-01 RX ADMIN — POLYETHYLENE GLYCOL (3350) 17 G: 17 POWDER, FOR SOLUTION ORAL at 14:59

## 2018-01-01 RX ADMIN — CARBOPLATIN 560 MG: 10 INJECTION, SOLUTION INTRAVENOUS at 16:10

## 2018-01-01 RX ADMIN — SODIUM CHLORIDE 240 MG: 900 INJECTION, SOLUTION INTRAVENOUS at 13:49

## 2018-01-01 RX ADMIN — SODIUM CHLORIDE 10 ML: 9 INJECTION INTRAMUSCULAR; INTRAVENOUS; SUBCUTANEOUS at 10:30

## 2018-01-01 RX ADMIN — MIDAZOLAM HYDROCHLORIDE 1 MG: 1 INJECTION, SOLUTION INTRAMUSCULAR; INTRAVENOUS at 09:47

## 2018-01-01 RX ADMIN — DEXAMETHASONE SODIUM PHOSPHATE 8 MG: 4 INJECTION, SOLUTION INTRA-ARTICULAR; INTRALESIONAL; INTRAMUSCULAR; INTRAVENOUS; SOFT TISSUE at 13:24

## 2018-01-01 RX ADMIN — Medication 10 ML: at 11:05

## 2018-01-01 RX ADMIN — DEXAMETHASONE SODIUM PHOSPHATE 8 MG: 4 INJECTION, SOLUTION INTRA-ARTICULAR; INTRALESIONAL; INTRAMUSCULAR; INTRAVENOUS; SOFT TISSUE at 13:04

## 2018-01-01 RX ADMIN — ACETAMINOPHEN 650 MG: 325 TABLET ORAL at 10:16

## 2018-01-01 RX ADMIN — SODIUM CHLORIDE 10 ML: 9 INJECTION, SOLUTION INTRAMUSCULAR; INTRAVENOUS; SUBCUTANEOUS at 11:13

## 2018-01-01 RX ADMIN — METHYLPREDNISOLONE SODIUM SUCCINATE 40 MG: 40 INJECTION, POWDER, FOR SOLUTION INTRAMUSCULAR; INTRAVENOUS at 15:17

## 2018-01-01 RX ADMIN — GEMCITABINE HYDROCHLORIDE 1600 MG: 200 INJECTION, SOLUTION INTRAVENOUS at 16:50

## 2018-01-01 RX ADMIN — DEXAMETHASONE SODIUM PHOSPHATE 8 MG: 4 INJECTION, SOLUTION INTRA-ARTICULAR; INTRALESIONAL; INTRAMUSCULAR; INTRAVENOUS; SOFT TISSUE at 12:25

## 2018-01-01 RX ADMIN — SODIUM CHLORIDE 25 ML/HR: 900 INJECTION, SOLUTION INTRAVENOUS at 12:46

## 2018-01-01 RX ADMIN — Medication 10 ML: at 11:14

## 2018-01-01 RX ADMIN — Medication 10 ML: at 14:35

## 2018-01-01 RX ADMIN — LEVOFLOXACIN 750 MG: 5 INJECTION, SOLUTION INTRAVENOUS at 15:00

## 2018-01-01 RX ADMIN — Medication 500 UNITS: at 16:55

## 2018-01-01 RX ADMIN — BUDESONIDE 500 MCG: 0.5 INHALANT RESPIRATORY (INHALATION) at 07:52

## 2018-01-01 RX ADMIN — POTASSIUM CHLORIDE 80 MEQ: 750 TABLET, FILM COATED, EXTENDED RELEASE ORAL at 17:27

## 2018-01-01 RX ADMIN — SODIUM CHLORIDE 1600 MG: 900 INJECTION, SOLUTION INTRAVENOUS at 14:05

## 2018-01-01 RX ADMIN — SODIUM CHLORIDE, PRESERVATIVE FREE 500 UNITS: 5 INJECTION INTRAVENOUS at 14:35

## 2018-01-01 RX ADMIN — Medication 10 ML: at 13:20

## 2018-01-01 RX ADMIN — METHYLPREDNISOLONE SODIUM SUCCINATE 80 MG: 125 INJECTION, POWDER, FOR SOLUTION INTRAMUSCULAR; INTRAVENOUS at 23:45

## 2018-01-01 RX ADMIN — SODIUM CHLORIDE 1000 ML: 900 INJECTION, SOLUTION INTRAVENOUS at 14:27

## 2018-01-01 RX ADMIN — LIDOCAINE HYDROCHLORIDE 20 ML: 10 INJECTION, SOLUTION INFILTRATION; PERINEURAL at 09:50

## 2018-01-01 RX ADMIN — Medication 10 ML: at 14:04

## 2018-01-01 RX ADMIN — Medication 500 UNITS: at 13:38

## 2018-01-01 RX ADMIN — MAGNESIUM SULFATE HEPTAHYDRATE 2 G: 40 INJECTION, SOLUTION INTRAVENOUS at 18:34

## 2018-01-01 RX ADMIN — Medication 10 ML: at 10:10

## 2018-01-04 NOTE — PROGRESS NOTES
Patient arrived. ID and allergies verified verbally with patient. Pt voices understanding of procedure to be performed. Consent obtained. Pt prepped for procedure. 09:00  TRANSFER - OUT REPORT:    Verbal report given to Fadumo Burciaag  RN(name) on Jong Tellez  being transferred to Angio lab(unit) for ordered procedure       Report consisted of patients Situation, Background, Assessment and   Recommendations(SBAR). Information from the following report(s) SBAR was reviewed with the receiving nurse. Peripheral IV 01/04/18 Left Forearm (Active)      Opportunity for questions and clarification was provided. Patient transported with:   Registered Nurse  Tech    10:10  TRANSFER - IN REPORT:    Verbal report received from Fadumo Burciaga  RN(name) on Jong Tellez  being received from Angio lab(unit) for routine progression of care      Report consisted of patients Situation, Background, Assessment and   Recommendations(SBAR). Information from the following report(s) Procedure Summary was reviewed with the receiving nurse. Opportunity for questions and clarification was provided. Assessment completed upon patients arrival to unit and care assumed. Post Insertion Tunnelled CVC with PORT     10:53 AM  Discharge instructions reviewed with patient and family. Voiced understanding. Patient given copy of discharge instructions to take home. 11:12 AM  Pt discharged via wheelchair. NO complaints   Personal belongings with patient upon discharge.

## 2018-01-04 NOTE — IP AVS SNAPSHOT
303 [facility] 
 
 
 [address] 
[phone] Patient: Kristi Salazar MRN: [id number] About your hospitalization You were admitted on:  January 4, 2018 You last received care in the:  [facility] PACU You were discharged on:  January 4, 2018 Why you were hospitalized Your primary diagnosis was:  Not on File Follow-up Information None Your Scheduled Appointments Friday January 05, 2018 11:30 AM EST  
(Arrive by 11:00 AM) [facility] NM BONE DOSE with [facility] NM INJ RM 1 SFM RAD NUC MED ([address]) [address] [place] [address]  
[phone] 1. Please bring any recent X-rays with you to the procedure. 2. You must bring a LIST or BAG of all current medication you are taking with you to your appointment. 3. There is no specific physical preparation for this procedure. 4. This is a TWO part study with 3 to 4 hours in between the inject and the scan. Patient can leave the facility if they wish to and return for the scan portion. 5. Materials for this procedure are ordered in advance and are time-sensitive. If you need to cancel or reschedule, you must call [phone] at least 24 hours prior to your appointment time. Registration Park in designated visitor/patient parking. Enter through the main entrance, which is just to the left of the fountain. Once inside, go around the corner to the left. You will register in Outpatient Registration. Friday January 05, 2018  2:30 PM EST  
(Arrive by 2:00 PM) [facility] NM BONE SCAN [address] BODY with [facility] NM RM 1 SFM RAD NUC MED ([address]) [address]  
[phone] 1. Please bring any recent X-rays with you to the procedure.   2. You must bring a LIST or BAG of all current medication you are taking with you to your appointment. 3. There is no specific physical preparation for this procedure. 4. This is a TWO part study with 3 to 4 hours in between the inject and the scan. Patient can leave the facility if they wish to and return for the scan portion. 5. Materials for this procedure are ordered in advance and are time-sensitive. If you need to cancel or reschedule, you must call [phone] at least 24 hours prior to your appointment time. Registration Park in designated visitor/patient parking. Enter through the main entrance, which is just to the left of the fountain. Once inside, go around the corner to the left. You will register in Outpatient Registration. Tuesday January 09, 2018 10:00 AM EST Infusion with [place] INFUSION NURSE 3  
[facility] ([address]) 301 [facility]ly [facility], [phone]  
[phone] Tuesday January 09, 2018 10:45 AM EST  
ESTABLISHED PATIENT with Falguni Aceves NP  
41 [facility] at [facility]) 301 [facility], [address]  
[phone] Tuesday January 09, 2018 To Be Determined PT ROUTINE with Vaughn Centeno [address] ([address]) [address] ([address]) Thursday January 11, 2018 To Be Determined PT DISCHARGE with DAYAMI Guadarrama [address] ([address]) [address] ([address]) Discharge Orders None A check maxi indicates which time of day the medication should be taken. My Medications ASK your doctor about these medications Instructions Each Dose to Equal  
 Morning Noon Evening Bedtime  
 acetaminophen 325 mg tablet Commonly known as:  TYLENOL  
   
 Your last dose was: Your next dose is: Take 1 Tab by mouth every six (6) hours for 28 days. 325 mg  
    
   
   
   
  
 albuterol 2.5 mg /3 mL (0.083 %) nebulizer solution Commonly known as:  PROVENTIL VENTOLIN Your last dose was: Your next dose is:    
   
   
 USE 2 VIALS VIAL NEBULIZER EVERY 4 HOURS AS NEEDED FOR WHEEZING  
     
   
   
   
  
 albuterol-ipratropium 2.5 mg-0.5 mg/3 ml Nebu Commonly known as:  Sharon Cisneros Your last dose was: Your next dose is:    
   
   
 3 mL by Nebulization route every four (4) hours as needed. 3 mL  
    
   
   
   
  
 aspirin 325 mg tablet Commonly known as:  ASPIRIN Your last dose was: Your next dose is: Take 325 mg by mouth two (2) times a day. 325 mg  
    
   
   
   
  
 lidocaine-prilocaine topical cream  
Commonly known as:  EMLA Your last dose was: Your next dose is:    
   
   
 Apply  to affected area as needed for Pain (Apply 30-60 min. prior to having your port accessed). ondansetron hcl 8 mg tablet Commonly known as:  [place] Your last dose was: Your next dose is: Take 1 Tab by mouth every eight (8) hours as needed for Nausea. 8 mg OTHER(NON-FORMULARY) Your last dose was: Your next dose is:    
   
   
 Natural Colon Cleanse four pills by mouth twice daily as needed for constipation. oxyCODONE IR 5 mg immediate release tablet Commonly known as:  Harley Gutierrez Your last dose was: Your next dose is: Take 1-2 Tabs by mouth every four (4) hours as needed. Max Daily Amount: 60 mg.  
 5-10 mg  
    
   
   
   
  
 prochlorperazine 10 mg tablet Commonly known as:  COMPAZINE Your last dose was: Your next dose is: Take 1 Tab by mouth every six (6) hours as needed for Nausea.   
 10 mg  
    
   
   
 tiotropium-olodaterol 2.5-2.5 mcg/actuation Mist  
Commonly known as:  Ernestina Rankin Your last dose was: Your next dose is: Take 2 Puffs by inhalation daily. 2 Puff Discharge Instructions Kirstie [address] Department of Interventional Radiology [facility] 
[phone] Office 
[phone] Fax Tunneled or Non Tunneled Catheter Discharge Instructions General Information: A catheter, either tunneled (permanent) or non-tunneled (temporary) catheter was inserted into your neck today for the purpose of Cancer treatment (apheresis) or dialysis. Your catheter will be used for the length of your apheresis, or until you get a fistula placed in surgery for dialysis. After this time, your catheter may be removed. You may return to our department for the catheter removal.  A non-tunneled catheter will exit at the neck. There will be three ports, two of which will be used for dialysis or apheresis. The one smaller port in the middle can be used like a regular IV. It ideally is used only for about two weeks. A tunneled catheter will exit lower down on the chest wall, and can be used for a longer period of time. There is no IV port on these. The tunneled catheter is used only for dialysis. In case of emergencies only can drugs be given through these catheters and only with written permission from your doctor. Home Care Instructions: You can resume your regular diet. Do not drink alcohol, drive, or make any important legal decisions in the next 24 hours. Watch the site carefully for signs of infection, like fever, drainage, redness, and/or swelling. Your catheter should be \"packed\" with heparin after each use or at least once a week if it is not being used. This \"packing\" should only be done by nurses experienced with caring for this type of catheter.   You may shower in 24 hours, but you need to cover the catheter with plastic wrap and tape to keep it dry while you are showering. Keep the site clean, dry, and protected. Do not immerse yourself in water like in the case of tub baths or swimming as long as you have the catheter. Call If: 
 You should call your Physician and/or the Radiology Nurse if you have any signs of infection, shortness of breath, or if the dressing should come off or become moist.  You will be instructed on where to go for a new dressing. You should not have to change the dressings yourself, as that will be done by the nurses who access the catheter. Follow-Up Instructions:  Please see your ordering doctor as he/she has requested. Introducing [facility] SERVICES! Luc Dickerson introduces [facility] patient portal. Now you can access parts of your medical record, email your doctor's office, and request medication refills online. 1. In your internet browser, go to [email or web address] 2. Click on the First Time User? Click Here link in the Sign In box. You will see the New Member Sign Up page. 3. Enter your [facility] Access Code exactly as it appears below. You will not need to use this code after youve completed the sign-up process. If you do not sign up before the expiration date, you must request a new code. · [facility] Access Code: [email or web address] Expires: 2/19/2018  2:18 PM 
 
4. Enter the last four digits of your Social Security Number (xxxx) and Date of Birth (mm/dd/yyyy) as indicated and click Submit. You will be taken to the next sign-up page. 5. Create a Owl[employer]ing ???t ID. This will be your [facility] login ID and cannot be changed, so think of one that is secure and easy to remember. 6. Create a [facility] password. You can change your password at any time. 7. Enter your Password Reset Question and Answer. This can be used at a later time if you forget your password. 8. Enter your e-mail address. You will receive e-mail notification when new information is available in [address] 9. Click Sign Up. You can now view and download portions of your medical record. 10. Click the Download Summary menu link to download a portable copy of your medical information. If you have questions, please visit the Frequently Asked Questions section of the MyChart website. Remember, [employer] is NOT to be used for urgent needs. For medical emergencies, dial 911. Now available from your iPhone and Android! Unresulted Labs-Please follow up with your PCP about these lab tests Order Current Status IR INSERT TUNL CVC W PORT OVER 5 YEARS In process Providers Seen During Your Hospitalization Provider Specialty Primary office phone Jodi Alcantara MD Hematology and Oncology [phone] Your Primary Care Physician (PCP) Primary Care Physician Office Phone Office Fax [address] [facility] [phone] You are allergic to the following Allergen Reactions Pcn (Penicillins) Hives Tolerated ancef graded challenge 12/21/2017 with no adverse side effects noted Recent Documentation Height Weight BMI Smoking Status 1.803 m 80.8 kg 24.84 kg/m2 Current Every Day Smoker Emergency Contacts Name Discharge Info Relation Home Work Mobile Lucius Jackson DISCHARGE CAREGIVER [3] Other Relative [6] [phone] Patient Belongings The following personal items are in your possession at time of discharge: 
     Visual Aid: None Please provide this summary of care documentation to your next provider. Signatures-by signing, you are acknowledging that this After Visit Summary has been reviewed with you and you have received a copy. Patient Signature:  ____________________________________________________________ Date:  ____________________________________________________________  
  
Tsosie Current Provider Signature:  ____________________________________________________________ Date:  ____________________________________________________________

## 2018-01-04 NOTE — PROGRESS NOTES
Chart reviewed for pre-procedure evaluation and documentation. Areas reviewed include, but are not limited to,patients current and past H&P, labs,all notes, all media records, all radiology records and medications.

## 2018-01-04 NOTE — H&P
Radiology History and Physical    Patient: Vic Vazquez 78 y.o. male       Chief Complaint: No chief complaint on file. History of Present Illness: venous access    History:    Past Medical History:   Diagnosis Date    HTN (hypertension) 10/21/2014    Rheumatoid arthritis(714.0) 10/21/2014    Skin cancer     Sun-damaged skin     Sunburn, blistering      No family history on file. Social History     Social History    Marital status: SINGLE     Spouse name: N/A    Number of children: N/A    Years of education: N/A     Occupational History    Not on file. Social History Main Topics    Smoking status: Current Every Day Smoker     Packs/day: 1.00    Smokeless tobacco: Never Used    Alcohol use No    Drug use: No    Sexual activity: No     Other Topics Concern    Not on file     Social History Narrative       Allergies: Allergies   Allergen Reactions    Pcn [Penicillins] Hives     Tolerated ancef graded challenge 12/21/2017 with no adverse side effects noted       Current Medications:  No current facility-administered medications for this encounter. Physical Exam:  Blood pressure 111/76, pulse 89, temperature 97.4 °F (36.3 °C), resp. rate 12, height 5' 11\" (1.803 m), weight 80.8 kg (178 lb 2.1 oz), SpO2 95 %. GENERAL: alert, cooperative, no distress, appears stated age  LUNG: clear to auscultation bilaterally  HEART: regular rate and rhythm, S1, S2 normal, no murmur, click, rub or gallop      Alerts:    Hospital Problems  Date Reviewed: 12/27/2017    None          Laboratory:    No results for input(s): HGB, HCT, WBC, PLT, INR, BUN, CREA, K, CRCLT, HGBEXT, HCTEXT, PLTEXT in the last 72 hours. No lab exists for component: PTT, PT, INREXT      Plan of Care/Planned Procedure:  Risks, benefits, and alternatives reviewed with patient and he agrees to proceed with the procedure.        Dolores Lara MD

## 2018-01-04 NOTE — DISCHARGE INSTRUCTIONS
2187 [facility] [address]  Department of Interventional Radiology  [facility]  [phone] Office  [phone] Fax    Tunneled or Non Tunneled Catheter Discharge Instructions    General Information:   A catheter, either tunneled (permanent) or non-tunneled (temporary) catheter was inserted into your neck today for the purpose of Cancer treatment (apheresis) or dialysis. Your catheter will be used for the length of your apheresis, or until you get a fistula placed in surgery for dialysis. After this time, your catheter may be removed. You may return to our department for the catheter removal.  A non-tunneled catheter will exit at the neck. There will be three ports, two of which will be used for dialysis or apheresis. The one smaller port in the middle can be used like a regular IV. It ideally is used only for about two weeks. A tunneled catheter will exit lower down on the chest wall, and can be used for a longer period of time. There is no IV port on these. The tunneled catheter is used only for dialysis. In case of emergencies only can drugs be given through these catheters and only with written permission from your doctor. Home Care Instructions: You can resume your regular diet. Do not drink alcohol, drive, or make any important legal decisions in the next 24 hours. Watch the site carefully for signs of infection, like fever, drainage, redness, and/or swelling. Your catheter should be \"packed\" with heparin after each use or at least once a week if it is not being used. This \"packing\" should only be done by nurses experienced with caring for this type of catheter. You may shower in 24 hours, but you need to cover the catheter with plastic wrap and tape to keep it dry while you are showering. Keep the site clean, dry, and protected. Do not immerse yourself in water like in the case of tub baths or swimming as long as you have the catheter. Call If:   You should call your Physician and/or the Radiology Nurse if you have any signs of infection, shortness of breath, or if the dressing should come off or become moist.  You will be instructed on where to go for a new dressing. You should not have to change the dressings yourself, as that will be done by the nurses who access the catheter. Follow-Up Instructions:  Please see your ordering doctor as he/she has requested.

## 2018-01-04 NOTE — PROGRESS NOTES
TRANSFER - OUT REPORT:    Verbal report given to [address] on Doni Dawn  being transferred [facility] for routine post - op       Report consisted of patients Situation, Background, Assessment and   Recommendations(SBAR). Information from the following report(s) Procedure Summary was reviewed with the receiving nurse. Lines:   Venous Access Device BARD PORT 01/04/18 Upper chest (subclavicular area, right (Active)   Central Line Being Utilized No 1/4/2018  9:54 AM   Criteria for Appropriate Use Other (comment) 1/4/2018  9:54 AM   Site Assessment Clean, dry, & intact 1/4/2018  9:54 AM   Date of Last Dressing Change 01/04/18 1/4/2018  9:54 AM   Dressing Status Clean, dry, & intact 1/4/2018  9:54 AM   Dressing Type Disk with Chlorhexadine gluconate (CHG) 1/4/2018  9:54 AM       Peripheral IV 01/04/18 Left Forearm (Active)        Opportunity for questions and clarification was provided.       Patient transported with:   Registered Nurse

## 2018-01-09 PROBLEM — Z66 DNR (DO NOT RESUSCITATE): Status: ACTIVE | Noted: 2018-01-01

## 2018-01-09 NOTE — PROGRESS NOTES
[facility] Maye Soto request to discuss possible participation in [address] study. Pt to start chemo today. He appears eligible for the pre-screen portion of the study. Read only copy of consent, study info, and my business card was provided to pt. We briefly discussed the study and the issue of adequate tissue requirement for analysis. He appears interested but would not agree to another biopsy if there was not enough tissue in most recent biopsy sample.  Will follow-up with him at a later date once he has time to review the study material.

## 2018-01-09 NOTE — PROGRESS NOTES
HPI: Ruthie He is a 78 y.o. male diagnosed with stage IV non-small cell lung cancer here today for Cycle 1, Day 1 chemotherapy counseling. Mr. Wojciech Mosqurea is being treated with carboplatin AUC 5 given on day 1 and gemcitabine 1000 mg/m2 given on days 1 and 8 of each 21 day cycle per David vance JCO [id number]. Education on medications included in the regimen was provided to the patient. Topics covered included, but were not limited to: myelosuppression, nausea and vomiting, alopecia, avoidance of herbal supplements and/or NSAIDs, and maintaining adequate nutrition. NOTE: Mr. Wojciech Mosquera reported that he was started on  mg BID after his recent hip surgery, however, he stopped prior to having his port placed. He asked if he should resume taking ASA for anticoagulation. I discussed with Dr. Katherine Morris and at this time we asked him to discontinue the aspirin as the risk of bleeding while receiving chemotherapy outweighs the benefit of anticoagulation at this time. Mr. Wojciech Mosquera verbalized understanding of the information provided and denied any additional questions or concerns. Thank you for the opportunity to work with Mr. Wojciech Mosquera. Angie Graves, PharmD, BCOP    Time spent with the patient:  40 minutes  Time spent documenting: 10 minutes    Current Outpatient Prescriptions   Medication Sig    prochlorperazine (COMPAZINE) 10 mg tablet Take 1 Tab by mouth every six (6) hours as needed for Nausea.  lidocaine-prilocaine (EMLA) topical cream Apply  to affected area as needed for Pain (Apply 30-60 min. prior to having your port accessed).  ondansetron hcl (ZOFRAN) 8 mg tablet Take 1 Tab by mouth every eight (8) hours as needed for Nausea.  aspirin (ASPIRIN) 325 mg tablet Take 325 mg by mouth two (2) times a day.  oxyCODONE IR (ROXICODONE) 5 mg immediate release tablet Take 1-2 Tabs by mouth every four (4) hours as needed.  Max Daily Amount: 60 mg.    acetaminophen (TYLENOL) 325 mg tablet Take 1 Tab by mouth every six (6) hours for 28 days.  tiotropium-olodaterol (STIOLTO RESPIMAT) 2.5-2.5 mcg/actuation mist Take 2 Puffs by inhalation daily.  OTHER,NON-FORMULARY, Natural Colon Cleanse four pills by mouth twice daily as needed for constipation.  albuterol-ipratropium (DUO-NEB) 2.5 mg-0.5 mg/3 ml nebu 3 mL by Nebulization route every four (4) hours as needed.     albuterol (PROVENTIL VENTOLIN) 2.5 mg /3 mL (0.083 %) nebulizer solution USE 2 VIALS VIAL NEBULIZER EVERY 4 HOURS AS NEEDED FOR WHEEZING     Current Facility-Administered Medications   Medication Dose Route Frequency    sodium chloride 0.9 % injection 10 mL  10 mL IntraVENous PRN    heparin (porcine) pf 500 Units  500 Units IntraVENous PRN    sodium chloride (NS) flush 5-10 mL  5-10 mL IntraVENous PRN    gemcitabine (GEMZAR) 2,000 mg in 0.9% sodium chloride 250 mL, overfill volume 25 mL chemo infusion  2,000 mg IntraVENous ONCE    fosaprepitant (EMEND) 150 mg in 0.9% sodium chloride 150 mL IVPB  150 mg IntraVENous ONCE    0.9% sodium chloride infusion  25 mL/hr IntraVENous CONTINUOUS    palonosetron HCl (ALOXI) injection 0.25 mg  0.25 mg IntraVENous ONCE    dexamethasone (DECADRON) 12 mg in 0.9% sodium chloride 50 mL, overfill volume 5 mL IVPB  12 mg IntraVENous ONCE

## 2018-01-09 NOTE — PROGRESS NOTES
[facility] [place] at [facility]  [address] [facility], [address] [place] [address]  Tyrus Primrose: [phone]  F: [phone]      Reason for Visit:   Jong Tellez is a 78 y.o. male who is seen in consultation at the request of Dr. Joann Stoddard for evaluation of lung cancer. Treatment History:   · Low-dose CT Chest 11/22/2017: Probably left upper lobe mass obstructing left upper lobe bronchus, near complete atelectasis of FANY, probably left hilar adenopathy. 3.9cm left adrenal nodule. Moderate centrilobular emphysema. · CT C/A/P 12/6/2017: Left adrenal metastasis. Large mass in the left upper lobe versus markedly atelectatic lung from central tumor. 7 mm nonspecific pleural-based nodule in the left lower lobe. Extensive emphysematous change. 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 He reports feeling okay today. He states his leg is doing well, only has pain with getting into bed but brief with movement and resolves quickly. Cough improved since antibiotic, now mild though having some hemoptysis with coughing. Denies teodora bleeding or clots. Intermittent left upper chest discomfort continues. He states he does not have follow up set with palliative care or pulmonary yet. Was unable to afford Stiolo inhaler as it was $300+. Ready to start therapy. He has not picked up prescriptions from the pharmacy. PAST HISTORY: The following sections were reviewed and updated in the EMR as appropriate: PMH, SH, FH, Medications, Allergies.       Allergies   Allergen Reactions    Pcn [Penicillins] Hives     Tolerated ancef graded challenge 12/21/2017 with no adverse side effects noted      Review of Systems: A complete review of systems was obtained, reviewed, and scanned into the EMR. Pertinent findings reviewed above. Physical Exam:     Visit Vitals    /69 (BP 1 Location: Left arm, BP Patient Position: Sitting)    Pulse 97    Temp 96.6 °F (35.9 °C) (Temporal)    Resp 20    Ht 5' 10\" (1.778 m)    Wt 179 lb (81.2 kg)    SpO2 99%    BMI 25.68 kg/m2     ECOG PS: 1  General: No distress  Eyes: PERRLA, anicteric sclerae  HENT: Atraumatic, OP clear  Neck: Supple  Lymphatic: No cervical, supraclavicular, or inguinal adenopathy  Respiratory: CTAB, normal respiratory effort  CV: Normal rate, regular rhythm, no murmurs, no peripheral edema  GI: Soft, nontender, nondistended, no masses, no hepatomegaly, no splenomegaly  MS: Normal gait and station. Digits without clubbing or cyanosis. Skin: No rashes, ecchymoses, or petechiae. Normal temperature, turgor, and texture. Psych: Alert, oriented, appropriate affect, normal judgment/insight      Results:     Lab Results   Component Value Date/Time    WBC 5.5 01/09/2018 10:26 AM    HGB 7.8 01/09/2018 10:26 AM    HCT 24.3 01/09/2018 10:26 AM    PLATELET 397 91/11/2771 10:26 AM    MCV 87.7 01/09/2018 10:26 AM    ABS. NEUTROPHILS 3.4 01/09/2018 10:26 AM     Lab Results   Component Value Date/Time    Sodium 133 01/09/2018 10:26 AM    Potassium 4.2 01/09/2018 10:26 AM    Chloride 100 01/09/2018 10:26 AM    CO2 28 01/09/2018 10:26 AM    Glucose 82 01/09/2018 10:26 AM    BUN 13 01/09/2018 10:26 AM    Creatinine 0.66 01/09/2018 10:26 AM    GFR est AA >60 01/09/2018 10:26 AM    GFR est non-AA >60 01/09/2018 10:26 AM    Calcium 9.0 01/09/2018 10:26 AM     Lab Results   Component Value Date/Time    Bilirubin, total 0.3 01/09/2018 10:26 AM    ALT (SGPT) 12 01/09/2018 10:26 AM    AST (SGOT) 14 01/09/2018 10:26 AM    Alk.  phosphatase 67 01/09/2018 10:26 AM    Protein, total 6.6 01/09/2018 10:26 AM    Albumin 2.0 01/09/2018 10:26 AM    Globulin 4.6 01/09/2018 10:26 AM       Radiology report(s) reviewed above. Imaging results reviewed as above. Assessment:   1) Metastatic Non-small cell lung cancer (Squamous Cell Carcinoma)  Stage FARIDEH, PDL1 negative  He has disease within his lung and left adrenal gland. His cancer is not curable and management is with palliative intent. He is interested in receiving some palliative systemic therapy. His PDL1 returned negative, so he is not a candidate for first line immunotherapy. My recommendation is for palliative chemotherapy with Carboplatin and Gemcitabine, given for 4 cycles. This can potentially be followed by maintenance gemcitabine. Bone scan negative, discussed with patient today. He has consented to therapy and we will start today. Encouraged patient to stop on the way home to  home prescriptions. We will plan to restage with CT C/A/P after C#2 and C#4. He may be a candidate for the LungMAP study, discussed with patient today and he is agreeable. Research nurse will meet with patient to discuss. 2) Right Femoral neck fx  S/p surgery 12/21/2017. Getting PT at home. 3) Anemia of chronic disease  Worse since fracture. Discussed blood transfusion today with Hgb <8 but he is asymptomatic and declines for now. Monitor and transfuse PRN Hgb <7.    4) COPD  Seen by pulmonary in the hospital.  Follow up with them as outpatient, we discussed this again today. I will have my office staff reach out to pulmonary for appt. 5) Pain  Secondary to fracture and cancer. Seen by palliative care in the hospital.  Follow up with them as outpatient, not yet scheduled. Discussed today and I will have my staff reach out to palliative. 6) H/o RA  Not currently on therapy, symptoms well controlled. 7) CODE STATUS: DNR  DDNR signed previously and scanned into record. 8) ACP  Introduced by palliative care, he is working on this.   Social worker to discuss further with him.     Plan:     Proceed today with C#1 Gemcitabine (1000 mg/m2 on days 1 and 8) and Carboplatin (AUC 5 on day 1) given every 3 weeks x 4 cycles  Labs: CBC on days 1 and 8, BMP, Magnesium, Phosphorus on day 1  Antiemetic Prophylaxis: Palonosetron on day 1, Dexamethasone on day 1 and day 8, Ondansetron prior to day 8  PRN Antiemetics: Ondansetron, Compazine  EMLA cream for port  · Follow up with pulmonary  · Follow up with palliative care-referral pending  ·  following  · Return to clinic in 3 weeks with next cycle of therapy or sooner if needed       Signed By: Eugenio Cunningham MD

## 2018-01-09 NOTE — PROGRESS NOTES
[facility] VISIT NOTE    1005  Pt arrived at [facility] ambulatory and in no distress for Carbo/Gemzar C1. Assessment completed, pt w/o complaints. Right chest port accessed with . 75 in carroll with no difficulty. Positive blood return noted and labs drawn. Medications received:  Emend IV  Decadron IV  Aloxi IV  Carboplatin IV  Gemzar IV  NS IV  Recent Results (from the past 12 hour(s))   CBC WITH AUTOMATED DIFF    Collection Time: 01/09/18 10:26 AM   Result Value Ref Range    WBC 5.5 4.1 - 11.1 K/uL    RBC 2.77 (L) 4.10 - 5.70 M/uL    HGB 7.8 (L) 12.1 - 17.0 g/dL    HCT 24.3 (L) 36.6 - 50.3 %    MCV 87.7 80.0 - 99.0 FL    MCH 28.2 26.0 - 34.0 PG    MCHC 32.1 30.0 - 36.5 g/dL    RDW 16.1 (H) 11.5 - 14.5 %    PLATELET 629 864 - 338 K/uL    NEUTROPHILS 63 32 - 75 %    LYMPHOCYTES 22 12 - 49 %    MONOCYTES 14 (H) 5 - 13 %    EOSINOPHILS 1 0 - 7 %    BASOPHILS 0 0 - 1 %    ABS. NEUTROPHILS 3.4 1.8 - 8.0 K/UL    ABS. LYMPHOCYTES 1.2 0.8 - 3.5 K/UL    ABS. MONOCYTES 0.8 0.0 - 1.0 K/UL    ABS. EOSINOPHILS 0.1 0.0 - 0.4 K/UL    ABS. BASOPHILS 0.0 0.0 - 0.1 K/UL   METABOLIC PANEL, BASIC    Collection Time: 01/09/18 10:26 AM   Result Value Ref Range    Sodium 133 (L) 136 - 145 mmol/L    Potassium 4.2 3.5 - 5.1 mmol/L    Chloride 100 97 - 108 mmol/L    CO2 28 21 - 32 mmol/L    Anion gap 5 5 - 15 mmol/L    Glucose 82 65 - 100 mg/dL    BUN 13 6 - 20 MG/DL    Creatinine 0.66 (L) 0.70 - 1.30 MG/DL    BUN/Creatinine ratio 20 12 - 20      GFR est AA >60 >60 ml/min/1.73m2    GFR est non-AA >60 >60 ml/min/1.73m2    Calcium 9.0 8.5 - 10.1 MG/DL   HEPATIC FUNCTION PANEL    Collection Time: 01/09/18 10:26 AM   Result Value Ref Range    Protein, total 6.6 6.4 - 8.2 g/dL    Albumin 2.0 (L) 3.5 - 5.0 g/dL    Globulin 4.6 (H) 2.0 - 4.0 g/dL    A-G Ratio 0.4 (L) 1.1 - 2.2      Bilirubin, total 0.3 0.2 - 1.0 MG/DL    Bilirubin, direct 0.1 0.0 - 0.2 MG/DL    Alk.  phosphatase 67 45 - 117 U/L    AST (SGOT) 14 (L) 15 - 37 U/L    ALT (SGPT) 12 12 - 78 U/L     Patient Vitals for the past 12 hrs:   Temp Pulse Resp BP SpO2   01/09/18 1530 97.4 °F (36.3 °C) 73 18 123/75 99 %   01/09/18 1013 97.6 °F (36.4 °C) 66 18 110/68 99 %     Notified Dr Valverde's  Office of HGB 7.8. Peyton Ledesma called back and stated ZEKE Strong, had already spoken with pt about this and no symptoms noted so pt declined  transfusion at this time. Tolerated treatment well, no adverse reaction noted. Port de-accessed and flushed per protocol. Positive blood return noted.   D/C'd from [address] ambulatory and in no distress accompanied by self.  Next appointment is 1/16/18 at 1100am.

## 2018-01-09 NOTE — PROGRESS NOTES
Problem: Knowledge Deficit  Goal: *Verbalizes understanding of procedures and medications  Outcome: Progressing Towards Goal  Pt receiving C1 Carbo/Gemzar

## 2018-01-10 NOTE — TELEPHONE ENCOUNTER
[facility]  [phone]    01/10/18- Phone call placed to palliative medicine spoke to [facility]- patient was seen as inpatient. A new referral has been placed via [facility] for outpatient follow up. There office should call patient to schedule appointment,    Phone call placed to 's office [phone]. Patient scheduled 1/15/18 11:15am with Pancho Pelaez NP at [facility] location. Informed patient of appointment he verbalized understanding. No further questions or concerns. [address] [facility].

## 2018-01-16 NOTE — TELEPHONE ENCOUNTER
[facility] at [facility]  [phone]    01/16/18-  Phone call returned to patient- he reported he is having constipation. Last BM passed yesterday- with some difficulty. Currently only took one dose of miralax daily. Instructed patient on other options to help pass bowels. Educated on him when to return phone call. He denied any further questions or concerns.

## 2018-01-16 NOTE — TELEPHONE ENCOUNTER
Patient called and stated that he would like a call back to discuss his constipation. Patient stated that the miralax is not helping.  # [phone]

## 2018-01-16 NOTE — PROGRESS NOTES
Problem: Chemotherapy Treatment  Goal: *Chemotherapy regimen followed  Outcome: Progressing Towards Goal  Pt arrived ambulatory and in no distress for Carbo/Gemzar C1D8

## 2018-01-16 NOTE — PROGRESS NOTES
[facility]fusion [facility] Nursing Progress Note    6375  Patient arrived to [facility] w/ cane Cycle 1 Day 8. PT complaint of tingling in RLE. R port  Accessed with  0.75 in. carroll needle, labs drawn, port flushed, positive blood return noted. Vitals Blood pressure 120/72, pulse 88, temperature 97.8 °F (36.6 °C), resp. rate 18, height 5' 10.87\" (1.8 m), weight 83.4 kg (183 lb 12.8 oz), SpO2 99 %.      labs   Recent Results (from the past 12 hour(s))   CBC WITH AUTOMATED DIFF    Collection Time: 01/16/18 11:02 AM   Result Value Ref Range    WBC 2.5 (L) 4.1 - 11.1 K/uL    RBC 2.50 (L) 4.10 - 5.70 M/uL    HGB 7.1 (L) 12.1 - 17.0 g/dL    HCT 21.9 (L) 36.6 - 50.3 %    MCV 87.6 80.0 - 99.0 FL    MCH 28.4 26.0 - 34.0 PG    MCHC 32.4 30.0 - 36.5 g/dL    RDW 16.0 (H) 11.5 - 14.5 %    PLATELET 69 (L) 632 - 400 K/uL    NEUTROPHILS 73 32 - 75 %    LYMPHOCYTES 21 12 - 49 %    MONOCYTES 6 5 - 13 %    EOSINOPHILS 0 0 - 7 %    BASOPHILS 0 0 - 1 %    ABS. NEUTROPHILS 1.8 1.8 - 8.0 K/UL    ABS. LYMPHOCYTES 0.5 (L) 0.8 - 3.5 K/UL    ABS. MONOCYTES 0.2 0.0 - 1.0 K/UL    ABS. EOSINOPHILS 0.0 0.0 - 0.4 K/UL    ABS. BASOPHILS 0.0 0.0 - 0.1 K/UL    DF SMEAR SCANNED      RBC COMMENTS NORMOCYTIC, NORMOCHROMIC       1210  Call to Mansi Louis RN to report PLT 69, Hgb 7.1 and ANC 1.8, Janine Ribeiro, States Damon Masters, NP is in the office and he would report findings to her, awaiting orders. PT denies SOB, fatigue has not changed. 1300  New orders received with Gemzar 50% dose reduction   Patient received infusion without difficulty. Medications this visit:    NS KVO  Decadron IVP  Gemzar IVPB    Vitals Blood pressure 116/72, pulse 83, temperature 99 °F (37.2 °C), resp. rate 16, height 5' 10.87\" (1.8 m), weight 83.4 kg (183 lb 12.8 oz), SpO2 98 %. Next appointment,  1/30/18 @ 80.    1525 VAD maintained +blood return throughout [address] [place] 153 and prior to D/C.   PT D/C from [facility] ambulatory in no distress, accompanied by family.

## 2018-01-16 NOTE — PROGRESS NOTES
Pt has been screened for all eligibility/ineligibility criteria and has been determined eligible for this protocol. Informed consent was reviewed by RN with patient prior to signing. All questions were answered adequately by RN. Patient signed consent today for study : \"A Biomarker-Driven Master Protocol for Previously Treated Squamous Cell Lung Cancer (Pre-Screening Step) Lung-MAP Study. \"  A copy of ICF given to patient. No procedures done prior to patient signing consent. No additional questions at this time. Patient also signed consent for DCP-001: \"Use of Clinical Trial Screening Tool to Address Cancer Health Disparities in the [address] [place] [address] ([facility])\". Patient and RN reviewed and filled in questionnaire with no additional questions at this time. A copy of ICF was given to patient.

## 2018-01-18 NOTE — PROGRESS NOTES
OUTPATIENT INFUSION CENTER    DISCHARGE INSTRUCTIONS FOR:  BLOOD TRANSFUSION    We hope you are feeling better after your blood transfusion. Some mild tenderness or slight bruising at your IV site is normal.  Avoid lifting or heavy use of that extremity for the rest of the day. Drink plenty of fluids, eat a normal diet and get some rest.    There are some important signs that you need to watch for in case you experience a delayed reaction to the blood you have received. Call your physician immediately if you develop any of the following symptoms:    1. Severe headache or backache;    2. Fever above 100 degrees;    3. Chills;    4. Difficulty breathing;    5.  Blood or red color in urine;    6. The feeling of weakness or constant fatigue;    7. Yellowing of the whites of your eyes or skin (jaundice). If your physician is not available, call or go to the nearest emergency room, or dial 911.     Bryan Baxter, Signature: ___________________________ 1/18/2018  Thais Gosselin

## 2018-01-18 NOTE — PROGRESS NOTES
0730 Pt arrived at [facility] ambulatory with cane and in no distress for transfusion of 2 units PRBCs. Assessment completed, no new complaints voiced. Right chest port accessed without difficulty. Signs/symptoms of adverse blood reaction discussed with pt, voiced understanding. Medications received:  NS @ KVO  Tylenol 650 mg po (patient took at home)  Espiridion Uvaldo with MD to hold benadryl. Makes patient very jittery and restless legs. 5614:  1st unit PRBCs started and infusing without difficulty, observed x 15 minutes  1115:  1st unit completed without adverse reaction noted, NS flushing line. 1125:  2nd unit PRBCs started and infusing without difficulty, observed x 15 minutes  1345:  2nd unit completed without adverse reaction noted, NS flushing line. Patient Vitals for the past 12 hrs:   Temp Pulse Resp BP   01/18/18 1400 98.5 °F (36.9 °C) 73 16 118/68   01/18/18 1350 98.4 °F (36.9 °C) 76 16 119/67   01/18/18 1325 97.8 °F (36.6 °C) 70 16 109/69   01/18/18 1225 97.5 °F (36.4 °C) 79 16 124/69   01/18/18 1155 97.1 °F (36.2 °C) 75 16 113/71   01/18/18 1140 97.9 °F (36.6 °C) 74 16 116/72   01/18/18 1115 97.1 °F (36.2 °C) 74 16 123/70   01/18/18 1055 98.2 °F (36.8 °C) 74 16 110/68   01/18/18 0955 97.3 °F (36.3 °C) 72 16 97/58   01/18/18 0925 98.1 °F (36.7 °C) 66 16 90/54   01/18/18 0910 98.1 °F (36.7 °C) 71 16 94/58   01/18/18 0846 98.2 °F (36.8 °C) 75 16 97/60   01/18/18 0731 98.4 °F (36.9 °C) 93 16 107/65     1400 Tolerated transfusion  well, no adverse reaction noted. D/C instructions reviewed and signed, copy to pt, voiced understanding. Patient declined 1 hour post transfusion observation. D/Cd from [facility] 1400 and in no distress. Next appt 1/30/18 at 1100.     Marty Sanchez rn

## 2018-01-22 NOTE — MR AVS SNAPSHOT
[address] [address] 
[phone] Patient: Laurel Marquez MRN: [id number] LAP:[birth year] Visit Information Date & Time Provider Department Dept. Phone Encounter #  
 1/22/2018  1:45 PM Fantasma Raya MD [address] [facility] [phone] [id number] Follow-up Instructions Return if symptoms worsen or fail to improve. Your Appointments 1/30/2018 11:15 AM  
ESTABLISHED PATIENT with Sarah Roach NP  
[facility] at [address] [place] [address]) Appt Note: labs, Asif Stains #2  
 301 [facility], 2329 [facility] 99 [address] [phone]  
  
   
 301 [facility], [address] Upcoming Health Maintenance Date Due DTaP/Tdap/Td series (1 - Tdap) 9/29/1963 ZOSTER VACCINE AGE 60> 7/29/2002 GLAUCOMA SCREENING Q2Y 9/29/2007 Pneumococcal 65+ High/Highest Risk (1 of 2 - PCV13) 9/29/2007 MEDICARE YEARLY EXAM 11/14/2018 Allergies as of 1/22/2018  Review Complete On: 1/22/2018 By: Fantasma Raya MD  
  
 Severity Noted Reaction Type Reactions Pcn [Penicillins]  10/21/2014    Hives Tolerated ancef graded challenge 12/21/2017 with no adverse side effects noted Current Immunizations  Reviewed on 1/18/2018 No immunizations on file. Not reviewed this visit You Were Diagnosed With   
  
 Codes Comments Right hip pain    -  Primary ICD-10-CM: M25.551 ICD-9-CM: 719.45 Essential hypertension     ICD-10-CM: I10 
ICD-9-CM: 401.9 Rheumatoid arthritis involving multiple sites, unspecified rheumatoid factor presence ([facility]     ICD-10-CM: M06.9 ICD-9-CM: 714.0 Chronic obstructive pulmonary disease, unspecified COPD type ([facility]     ICD-10-CM: J44.9 ICD-9-CM: 949 Closed fracture of neck of right femur with routine healing, subsequent encounter     ICD-10-CM: S72.001D ICD-9-CM: V54.13   
 Non-small cell cancer of left lung (HCC)     ICD-10-CM: C34.92 
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  
 111/62 (!) 50 98.8 °F (37.1 °C) (Oral) 5' 10.5\" (1.791 m) 178 lb (80.7 kg) 100% BMI Smoking Status 25.18 kg/m2 Current Every Day Smoker BMI and BSA Data Body Mass Index Body Surface Area  
 25.18 kg/m 2 2 m 2 Preferred Pharmacy Pharmacy Name Phone [facility] [address] [place] [address] [phone] Your Updated Medication List  
  
   
This list is accurate as of: 1/22/18  2:31 PM.  Always use your most recent med list.  
  
  
  
  
 albuterol 2.5 mg /3 mL (0.083 %) nebulizer solution Commonly known as:  PROVENTIL VENTOLIN  
USE 2 VIALS VIAL NEBULIZER EVERY 4 HOURS AS NEEDED FOR WHEEZING  
  
 albuterol-ipratropium 2.5 mg-0.5 mg/3 ml Nebu Commonly known as:  DUO-NEB  
3 mL by Nebulization route every four (4) hours as needed. aspirin 325 mg tablet Commonly known as:  ASPIRIN Take 325 mg by mouth two (2) times a day. lidocaine-prilocaine topical cream  
Commonly known as:  EMLA Apply  to affected area as needed for Pain (Apply 30-60 min. prior to having your port accessed). ondansetron hcl 8 mg tablet Commonly known as:  Juluis Jan Take 1 Tab by mouth every eight (8) hours as needed for Nausea. OTHER(NON-FORMULARY) Natural Colon Cleanse four pills by mouth twice daily as needed for constipation. oxyCODONE IR 5 mg immediate release tablet Commonly known as:  Dana Cobia Take 1-2 Tabs by mouth every four (4) hours as needed. Max Daily Amount: 60 mg.  
  
 prochlorperazine 10 mg tablet Commonly known as:  COMPAZINE Take 1 Tab by mouth every six (6) hours as needed for Nausea. tiotropium-olodaterol 2.5-2.5 mcg/actuation Mist  
Commonly known as:  Etjanett Talleys Take 2 Puffs by inhalation daily. Follow-up Instructions Return if symptoms worsen or fail to improve. To-Do List   
 01/22/2018 Imaging:  XR HIP RT W OR WO PELV 2-3 VWS   
  
 01/30/2018 11:00 AM  
  Appointment with [address] at [facility] ([phone]) 02/06/2018 11:00 AM  
  Appointment with [address] at [facility] ([phone])  
  
 02/20/2018 11:00 AM  
  Appointment with [address] at [facility] ([phone]) Introducing \[facility]\"" SERVICES! [facility] introduces [employer] patient portal. Now you can access parts of your medical record, email your doctor's office, and request medication refills online. 1. In your internet browser, go to [email or web address] 2. Click on the First Time User? Click Here link in the Sign In box. You will see the New Member Sign Up page. 3. Enter your [employer] Access Code exactly as it appears below. You will not need to use this code after youve completed the sign-up process. If you do not sign up before the expiration date, you must request a new code. · [employer] Access Code: [email or web address] Expires: 2/19/2018  2:18 PM 
 
4. Enter the last four digits of your Social Security Number (xxxx) and Date of Birth (mm/dd/yyyy) as indicated and click Submit. You will be taken to the next sign-up page. 5. Create a [email or web address] ID. This will be your [employer] login ID and cannot be changed, so think of one that is secure and easy to remember. 6. Create a [employer] password. You can change your password at any time. 7. Enter your Password Reset Question and Answer. This can be used at a later time if you forget your password. 8. Enter your e-mail address. You will receive e-mail notification when new information is available in [address] 9. Click Sign Up. You can now view and download portions of your medical record. 10. Click the Download Summary menu link to download a portable copy of your medical information. If you have questions, please visit the Frequently Asked Questions section of the Location website. Remember, Location is NOT to be used for urgent needs. For medical emergencies, dial 911. Now available from your iPhone and Android! Please provide this summary of care documentation to your next provider. Your primary care clinician is listed as NORMA COY. If you have any questions after today's visit, please call [phone].

## 2018-01-22 NOTE — PROGRESS NOTES
Chief Complaint   Patient presents with    Hip Pain     Replacment 12/22/17     1. Have you been to the ER, urgent care clinic since your last visit? Hospitalized since your last visit? No    2. Have you seen or consulted any other health care providers outside of the [address] [place] [address] since your last visit? Include any pap smears or colon screening. Dr Katerina Morales      Chief Complaint   Patient presents with    Hip Pain     Replacment 12/22/17    Cough     coughing up blood-Chemo TX catheter Rt clavical     He is a 78 y.o. male who presents for evalution. Reviewed PmHx, RxHx, FmHx, SocHx, AllgHx and updated and dated in the chart. 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  HTN (hypertension)    Rheumatoid arthritis involving multiple sites ([address]    Chronic obstructive pulmonary disease (HCC)    Basal cell cancer       Review of Systems - negative except as listed above in the HPI    Objective:     Vitals:    01/22/18 1417   BP: 111/62   Pulse: (!) 50   Temp: 98.8 °F (37.1 °C)   TempSrc: Oral   SpO2: 100%   Weight: 178 lb (80.7 kg)   Height: 5' 10.5\" (1.791 m)     Physical Examination: General appearance - alert, well appearing, and in no distress  Chest - clear to auscultation, no wheezes, rales or rhonchi, symmetric air entry  Heart - normal rate, regular rhythm, normal S1, S2, no murmurs, rubs, clicks or gallops    Assessment/ Plan:   Diagnoses and all orders for this visit:    1. Right hip pain  -     XR HIP RT W OR WO PELV 2-3 VWS; Future  -per ortho request    2. Essential hypertension  -at goal    3.  Rheumatoid arthritis involving multiple sites, unspecified rheumatoid factor presence ([facility]  4. Chronic obstructive pulmonary disease, unspecified COPD type ([facility]  5. Closed fracture of neck of right femur with routine healing, subsequent encounter  6. Non-small cell cancer of left lung ([facility]  7. 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 I have discussed the diagnosis with the patient and the intended plan as seen in the above orders. The patient understands and agrees with the plan. The patient has received an after-visit summary and questions were answered concerning future plans. Medication Side Effects and Warnings were discussed with patient  Patient Labs were reviewed and or requested:  Patient Past Records were reviewed and or requested    Tatianna Schmidt M.D. There are no Patient Instructions on file for this visit.

## 2018-01-30 NOTE — PROGRESS NOTES
[facility] [address], [facility] [address] [place]: [phone]  F: [phone]      Reason for Visit:   Satish aCrlos is a 78 y.o. male who is seen in consultation at the request of Dr. Jg Zendejas for evaluation of lung cancer. Treatment History:   · Low-dose CT Chest 11/22/2017: Probably left upper lobe mass obstructing left upper lobe bronchus, near complete atelectasis of FANY, probably left hilar adenopathy. 3.9cm left adrenal nodule. Moderate centrilobular emphysema. · CT C/A/P 12/6/2017: Left adrenal metastasis. Large mass in the left upper lobe versus markedly atelectatic lung from central tumor. 7 mm nonspecific pleural-based nodule in the left lower lobe. Extensive emphysematous change. 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 He reports feeling okay today. He states his leg is doing well, only has pain with getting into bed but brief with movement and resolves quickly. Cough improved since antibiotic, now mild though having some hemoptysis with coughing. Denies teodora bleeding or clots. Intermittent left upper chest discomfort continues. He states he does not have follow up set with palliative care or pulmonary yet. Was unable to afford Stiolo inhaler as it was $300+. Ready to start therapy. He has not picked up prescriptions from the pharmacy. PAST HISTORY: The following sections were reviewed and updated in the EMR as appropriate: PMH, SH, FH, Medications, Allergies.       Allergies   Allergen Reactions    Pcn [Penicillins] Hives     Tolerated ancef graded challenge 12/21/2017 with no adverse side effects noted      Review of Systems: A complete review of systems was obtained, reviewed, and scanned into the EMR. Pertinent findings reviewed above. Physical Exam:     Visit Vitals    /66 (BP 1 Location: Right arm, BP Patient Position: Sitting)    Pulse 70    Temp 95.1 °F (35.1 °C) (Oral)    Resp 20    Ht 5' 10\" (1.778 m)    Wt 177 lb (80.3 kg)    SpO2 100%    BMI 25.4 kg/m2     ECOG PS: 1  General: No distress  Eyes: PERRLA, anicteric sclerae  HENT: Atraumatic, OP clear  Neck: Supple  Lymphatic: No cervical, supraclavicular, or inguinal adenopathy  Respiratory: CTAB, normal respiratory effort  CV: Normal rate, regular rhythm, no murmurs, no peripheral edema  GI: Soft, nontender, nondistended, no masses, no hepatomegaly, no splenomegaly  MS: Normal gait and station. Digits without clubbing or cyanosis. Skin: No rashes, ecchymoses, or petechiae. Normal temperature, turgor, and texture. Psych: Alert, oriented, appropriate affect, normal judgment/insight      Results:     Lab Results   Component Value Date/Time    WBC 2.5 01/16/2018 11:02 AM    HGB 7.1 01/16/2018 11:02 AM    HCT 21.9 01/16/2018 11:02 AM    PLATELET 69 53/07/6591 11:02 AM    MCV 87.6 01/16/2018 11:02 AM    ABS. NEUTROPHILS 1.8 01/16/2018 11:02 AM     Lab Results   Component Value Date/Time    Sodium 133 01/09/2018 10:26 AM    Potassium 4.2 01/09/2018 10:26 AM    Chloride 100 01/09/2018 10:26 AM    CO2 28 01/09/2018 10:26 AM    Glucose 82 01/09/2018 10:26 AM    BUN 13 01/09/2018 10:26 AM    Creatinine 0.66 01/09/2018 10:26 AM    GFR est AA >60 01/09/2018 10:26 AM    GFR est non-AA >60 01/09/2018 10:26 AM    Calcium 9.0 01/09/2018 10:26 AM     Lab Results   Component Value Date/Time    Bilirubin, total 0.3 01/09/2018 10:26 AM    ALT (SGPT) 12 01/09/2018 10:26 AM    AST (SGOT) 14 01/09/2018 10:26 AM    Alk.  phosphatase 67 01/09/2018 10:26 AM    Protein, total 6.6 01/09/2018 10:26 AM    Albumin 2.0 01/09/2018 10:26 AM    Globulin 4.6 01/09/2018 10:26 AM       Radiology report(s) reviewed above. Imaging results reviewed as above. Assessment:   1) Metastatic Non-small cell lung cancer (Squamous Cell Carcinoma)  Stage FARIDEH, PDL1 negative  He has disease within his lung and left adrenal gland. His cancer is not curable and management is with palliative intent. He is interested in receiving some palliative systemic therapy. His PDL1 returned negative, so he is not a candidate for first line immunotherapy. My recommendation is for palliative chemotherapy with Carboplatin and Gemcitabine, given for 4 cycles. This can potentially be followed by maintenance gemcitabine. Bone scan negative, discussed with patient today. He has consented to therapy and we will start today. Encouraged patient to stop on the way home to  home prescriptions. ***     We will plan to restage with CT C/A/P after C#2 and C#4. He may be a candidate for the LungMAP study, discussed with patient today and he is agreeable. Research nurse will meet with patient to discuss. 2) Diarrhea  Hold Miralax. ***     3) Hemoptysis  ***     4) Right Femoral neck fx  S/p surgery 12/21/2017. Getting PT at home. 5) Anemia of chronic disease  Worse since fracture. Discussed blood transfusion today with Hgb <8 but he is asymptomatic and declines for now. Monitor and transfuse PRN Hgb <7.    6) COPD  Seen by pulmonary in the hospital.  Follow up with them as outpatient, we discussed this again today. I will have my office staff reach out to pulmonary for appt. ***     7) Pain  Secondary to fracture and cancer. Seen by palliative care in the hospital.  Follow up with them as outpatient, not yet scheduled. Discussed today and I will have my staff reach out to palliative. ***     8) H/o RA  Not currently on therapy, symptoms well controlled.     9) CODE STATUS: DNR  DDNR signed previously and scanned into record. 10) ACP  Introduced by palliative care, he is working on this.  to discuss further with him.     Plan:     Proceed today with C#2 Gemcitabine (1000 mg/m2 on days 1 and 8) and Carboplatin (AUC 5 on day 1) given every 3 weeks x 4 cycles  Labs: CBC on days 1 and 8, BMP, Magnesium, Phosphorus on day 1  Antiemetic Prophylaxis: Palonosetron on day 1, Dexamethasone on day 1 and day 8, Ondansetron prior to day 8  PRN Antiemetics: Ondansetron, Compazine  EMLA cream for port  · Follow up with pulmonary  · Follow up with palliative care-referral pending  ·  following  · CT C/A/P prior to next visit  · Return to clinic in 3 weeks with next cycle of therapy or sooner if needed       Signed By: Radha Galindo, NP

## 2018-01-30 NOTE — PROGRESS NOTES
[facility] [place] [facility] [place]  [address] [facility], [address] [facility] [address] [facility]  [place]: [phone]  F: [phone]      Reason for Visit:   Satish Carlos is a 78 y.o. male who is seen in consultation at the request of Dr. Jg Zendejas for evaluation of lung cancer. Treatment History:   · Low-dose CT Chest 11/22/2017: Probably left upper lobe mass obstructing left upper lobe bronchus, near complete atelectasis of FANY, probably left hilar adenopathy. 3.9cm left adrenal nodule. Moderate centrilobular emphysema. · CT C/A/P 12/6/2017: Left adrenal metastasis. Large mass in the left upper lobe versus markedly atelectatic lung from central tumor. 7 mm nonspecific pleural-based nodule in the left lower lobe. Extensive emphysematous change. 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  · MRI Brain 12/23/2017: No intracranial metastatic disease  · Bone scan 1/5/2018: No osseous metastatic disease  · Stage FARIDEH (T4 N0 M1b) Non-small cell lung cancer (AJCC 8th edition)  · Palliative chemotherapy with Carboplatin and Gemcitabine beginning 1/9/2018    History of Present Illness:   Here today for follow up. He states he is feeling okay today. He reports diarrhea has resolved but he continues to have about 3 soft stools per day. He has been holding Miralax since diarrhea started. He reports some fatigue. He continues with coughing \"hawking\" up bloody drainage. He states it looks like a clot to him and is not much mucous just a red lump. Some blood with blowing his nose but no additional bleeding. Denies N/V. PAST HISTORY: The following sections were reviewed and updated in the EMR as appropriate: PMH, SH, FH, Medications, Allergies.       Allergies   Allergen Reactions    Pcn [Penicillins] Hives     Tolerated ancef graded challenge 12/21/2017 with no adverse side effects noted      Review of Systems: A complete review of systems was obtained, reviewed, and scanned into the EMR. Pertinent findings reviewed above. Physical Exam:     Visit Vitals    /66 (BP 1 Location: Right arm, BP Patient Position: Sitting)    Pulse 70    Temp 95.1 °F (35.1 °C) (Oral)    Resp 20    Ht 5' 10\" (1.778 m)    Wt 177 lb (80.3 kg)    SpO2 100%    BMI 25.4 kg/m2     ECOG PS: 1  General: No distress  Eyes: PERRLA, anicteric sclerae  HENT: Atraumatic, OP clear  Neck: Supple  Lymphatic: No cervical, supraclavicular, or inguinal adenopathy  Respiratory: CTAB, normal respiratory effort  CV: Normal rate, regular rhythm, no murmurs, no peripheral edema  GI: Soft, nontender, nondistended, no masses, no hepatomegaly, no splenomegaly  MS: Normal gait and station. Digits without clubbing or cyanosis. Skin: No rashes, ecchymoses, or petechiae. Normal temperature, turgor, and texture. Psych: Alert, oriented, appropriate affect, normal judgment/insight      Results:     Lab Results   Component Value Date/Time    WBC 1.9 01/30/2018 11:14 AM    HGB 6.8 01/30/2018 11:14 AM    HCT 20.3 01/30/2018 11:14 AM    PLATELET 58 09/40/4015 11:14 AM    MCV 89.8 01/30/2018 11:14 AM    ABS. NEUTROPHILS 0.7 01/30/2018 11:14 AM     Lab Results   Component Value Date/Time    Sodium 132 01/30/2018 11:14 AM    Potassium 3.1 01/30/2018 11:14 AM    Chloride 98 01/30/2018 11:14 AM    CO2 27 01/30/2018 11:14 AM    Glucose 112 01/30/2018 11:14 AM    BUN 10 01/30/2018 11:14 AM    Creatinine 0.63 01/30/2018 11:14 AM    GFR est AA >60 01/30/2018 11:14 AM    GFR est non-AA >60 01/30/2018 11:14 AM    Calcium 8.2 01/30/2018 11:14 AM     Lab Results   Component Value Date/Time    Bilirubin, total 0.4 01/30/2018 11:14 AM    ALT (SGPT) 28 01/30/2018 11:14 AM    AST (SGOT) 24 01/30/2018 11:14 AM    Alk.  phosphatase 64 01/30/2018 11:14 AM    Protein, total 5.9 01/30/2018 11:14 AM    Albumin 1.9 01/30/2018 11:14 AM    Globulin 4.0 01/30/2018 11:14 AM         Assessment:   1) Metastatic Non-small cell lung cancer (Squamous Cell Carcinoma)  Stage FARIDEH, PDL1 negative  He has disease within his lung and left adrenal gland. His cancer is not curable and management is with palliative intent. He is interested in receiving some palliative systemic therapy. His PDL1 returned negative, so he is not a candidate for first line immunotherapy. He is currently receiving palliative chemotherapy with Carboplatin and Gemcitabine, with plans for 4 cycles. This can potentially be followed by maintenance gemcitabine. He is tolerating therapy rather poorly with diarrhea and day 8 gemcitabine was dose reduced with cycle 1 due to thrombocytopenia. Unfortunately today he is pancytopenic. We will hold therapy today and retry next week with dose reduction of Carboplatin to AUC 4 and Gemcitabine by 20%. We will plan to reimage prior to next cycle of therapy, ordered today. We will plan to restage with CT C/A/P after C#2 and C#4. He may be a candidate for the LungMAP study, discussed with patient previously and he is agreeable. Research nurse has met with him to discuss. 2) Pancytopenia  Secondary to therapy. Hold therapy and dose reduce as above. 3) Diarrhea  Secondary to therapy. Discussed trying Imodium PRN watery or frequent stool. 4) Right Femoral neck fx  S/p surgery 12/21/2017. Getting PT at home. 5) Anemia   Worsening recently. Labs showed some iron deficiency, so he is now on oral iron. Monitor and transfuse PRN Hgb <7. Transfusion today. 6) COPD  Seen by pulmonary in the hospital.  Follow up with them as outpatient, we discussed this again today. I will have my office staff reach out to pulmonary for appt. 7) Pain  Secondary to fracture and cancer. Seen by palliative care in the hospital.  Follow up with them as outpatient, not yet scheduled. We will reach out to palliative. 8) H/o RA  Not currently on therapy, symptoms well controlled. 9) Hemoptysis  Mild. Monitor.   Consider radiation if worsening significantly. 9) CODE STATUS: DNR  DDNR signed previously and scanned into record. 10) ACP  Introduced by palliative care, he is working on this.  to discuss further with him.     Plan:     Hold therapy today  Blood transfusion  Plan to proceed next week with C#2 Gemcitabine given every 3 weeks x 4 cycles, dose reducing by 20%  Labs: CBC on days 1 and 8, BMP, Magnesium, Phosphorus on day 1  Antiemetic Prophylaxis: Palonosetron on day 1, Dexamethasone on day 1 and day 8, Ondansetron prior to day 8  PRN Antiemetics: Ondansetron, Compazine  EMLA cream for port  · Follow up with pulmonary  · Follow up with palliative care-referral pending  ·  following  · Return to clinic in 4 weeks with next cycle of therapy or sooner if needed       Signed By: Rona Castellon MD

## 2018-01-30 NOTE — DISCHARGE INSTRUCTIONS
OUTPATIENT INFUSION CENTER    DISCHARGE INSTRUCTIONS FOR:  BLOOD TRANSFUSION    We hope you are feeling better after your blood transfusion. Some mild tenderness or slight bruising at your IV site is normal.  Avoid lifting or heavy use of that extremity for the rest of the day. Drink plenty of fluids, eat a normal diet and get some rest.    There are some important signs that you need to watch for in case you experience a delayed reaction to the blood you have received. Call your physician immediately if you develop any of the following symptoms:    1. Severe headache or backache;    2. Fever above 100 degrees;    3. Chills;    4. Difficulty breathing;    5.  Blood or red color in urine;    6. The feeling of weakness or constant fatigue;    7. Yellowing of the whites of your eyes or skin (jaundice). If your physician is not available, call or go to the nearest emergency room, or dial 911.     Roxanne Litten, Signature: ___________________________ 1/30/2018  Merline Vazquez RN

## 2018-01-30 NOTE — PROGRESS NOTES
[facility] VISIT NOTE    1100  Pt arrived at [facility] ambulatory and in no distress for C2D1 Carboplatin + Gemzar. Assessment completed, pt c/o diarrhea 3-4 times per day for the last week. He was given instructions on how to use Immodium since he hasn't tried this yet. Patient reports no other complaints today. Blood pressure 103/62, pulse 79, temperature 97 °F (36.1 °C), resp. rate 16, height 5' 10.87\" (1.8 m), weight 82.1 kg (181 lb), SpO2 100 %. Right chest port accessed with 0.75 in carroll no difficulty. Positive blood return noted and labs drawn. Patient proceeded to MD clinic visit. Recent Results (from the past 12 hour(s))   CBC WITH AUTOMATED DIFF    Collection Time: 01/30/18 11:14 AM   Result Value Ref Range    WBC 1.9 (L) 4.1 - 11.1 K/uL    RBC 2.26 (L) 4.10 - 5.70 M/uL    HGB 6.8 (L) 12.1 - 17.0 g/dL    HCT 20.3 (L) 36.6 - 50.3 %    MCV 89.8 80.0 - 99.0 FL    MCH 30.1 26.0 - 34.0 PG    MCHC 33.5 30.0 - 36.5 g/dL    RDW 14.6 (H) 11.5 - 14.5 %    PLATELET 58 (L) 039 - 400 K/uL    MPV 9.4 8.9 - 12.9 FL    NRBC 0.0 0  WBC    ABSOLUTE NRBC 0.00 0.00 - 0.01 K/uL    NEUTROPHILS 33 32 - 75 %    BAND NEUTROPHILS 4 0 - 6 %    LYMPHOCYTES 41 12 - 49 %    MONOCYTES 22 (H) 5 - 13 %    EOSINOPHILS 0 0 - 7 %    BASOPHILS 0 0 - 1 %    IMMATURE GRANULOCYTES 0 %    ABS. NEUTROPHILS 0.7 (L) 1.8 - 8.0 K/UL    ABS. LYMPHOCYTES 0.8 0.8 - 3.5 K/UL    ABS. MONOCYTES 0.4 0.0 - 1.0 K/UL    ABS. EOSINOPHILS 0.0 0.0 - 0.4 K/UL    ABS. BASOPHILS 0.0 0.0 - 0.1 K/UL    ABS. IMM. GRANS. 0.0 K/UL    DF MANUAL      RBC COMMENTS OVALOCYTES  PRESENT       HEPATIC FUNCTION PANEL    Collection Time: 01/30/18 11:14 AM   Result Value Ref Range    Protein, total 5.9 (L) 6.4 - 8.2 g/dL    Albumin 1.9 (L) 3.5 - 5.0 g/dL    Globulin 4.0 2.0 - 4.0 g/dL    A-G Ratio 0.5 (L) 1.1 - 2.2      Bilirubin, total 0.4 0.2 - 1.0 MG/DL    Bilirubin, direct 0.3 (H) 0.0 - 0.2 MG/DL    Alk.  phosphatase 64 45 - 117 U/L    AST (SGOT) 24 15 - 37 U/L    ALT (SGPT) 28 12 - 78 U/L   METABOLIC PANEL, BASIC    Collection Time: 01/30/18 11:14 AM   Result Value Ref Range    Sodium 132 (L) 136 - 145 mmol/L    Potassium 3.1 (L) 3.5 - 5.1 mmol/L    Chloride 98 97 - 108 mmol/L    CO2 27 21 - 32 mmol/L    Anion gap 7 5 - 15 mmol/L    Glucose 112 (H) 65 - 100 mg/dL    BUN 10 6 - 20 MG/DL    Creatinine 0.63 (L) 0.70 - 1.30 MG/DL    BUN/Creatinine ratio 16 12 - 20      GFR est AA >60 >60 ml/min/1.73m2    GFR est non-AA >60 >60 ml/min/1.73m2    Calcium 8.2 (L) 8.5 - 10.1 MG/DL   TYPE + CROSSMATCH    Collection Time: 01/30/18 12:22 PM   Result Value Ref Range    Crossmatch Expiration 02/02/2018     ABO/Rh(D) A NEGATIVE     Antibody screen NEG     Unit number [id number]     Blood component type RC LR     Unit division 00     Status of unit ISSUED     Crossmatch result Compatible      1215 Patient returned to [place]. Orders received to hold chemo today d/t pancytopenia and give one unit PRBC today and one later this week. Reviewed patient education about blood transfusions with the patient and he verbalized understanding. Medications received:  Tylenol PO  Benadryl PO  NS IV @ kvo  Potassium PO 40mEq now and 40mEq at home    1500 First unit PRBC started. 1700 First unit PRBC completed. Patient Vitals for the past 12 hrs:   Temp Pulse Resp BP SpO2   01/30/18 1730 97.1 °F (36.2 °C) 72 16 110/70 -   01/30/18 1700 96.9 °F (36.1 °C) 67 16 111/74 -   01/30/18 1600 97.1 °F (36.2 °C) 72 16 109/71 -   01/30/18 1530 97.4 °F (36.3 °C) 71 16 105/70 -   01/30/18 1515 97 °F (36.1 °C) 70 16 103/67 -   01/30/18 1453 97 °F (36.1 °C) 71 18 125/79 -   01/30/18 1105 97 °F (36.1 °C) 79 16 103/62 100 %     Patient politely declines to stay in [facility] for one hour observation period. Reviewed discharge instructions. Tolerated treatment well, no adverse reaction noted. Port de-accessed and flushed per protocol. Positive blood return noted.   Type and cross for Thursday sent to the \[facility]\"".    1730  D/C'd from [facility] ambulatory and in no distress.  Next appointment is 2/6/18 at 11am.

## 2018-01-31 NOTE — TELEPHONE ENCOUNTER
Call # 2 to patient to schedule outpatient Palliative Medicine referral (call #1 on 1/19/18, left message, pt did not return call). No answer again today, left message asking pt to call us to schedule appt.

## 2018-02-01 NOTE — PROGRESS NOTES
[address] Main Note  [phone]  Fax [phone]    Patient Name: Sena Armas  YOB: 1940    Patient hospitalized from 12/20/17-12/23/18 [facility] for right hip fracture. He has kept his follow up appts with his physicians. Pt has not returned phone calls; nurse navigator  resolving post 30 day Transitions of Care Coordination episode.

## 2018-02-01 NOTE — PROGRESS NOTES
OUTPATIENT INFUSION CENTER    DISCHARGE INSTRUCTIONS FOR:  BLOOD TRANSFUSION    We hope you are feeling better after your blood transfusion. Some mild tenderness or slight bruising at your IV site is normal.  Avoid lifting or heavy use of that extremity for the rest of the day. Drink plenty of fluids, eat a normal diet and get some rest.    There are some important signs that you need to watch for in case you experience a delayed reaction to the blood you have received. Call your physician immediately if you develop any of the following symptoms:    1. Severe headache or backache;    2. Fever above 100 degrees;    3. Chills;    4. Difficulty breathing;    5.  Blood or red color in urine;    6. The feeling of weakness or constant fatigue;    7. Yellowing of the whites of your eyes or skin (jaundice). If your physician is not available, call or go to the nearest emergency room, or dial 911.     Fermin Reddy, Signature: ___________________________ 2/1/2018  Mayito Ovalle RN

## 2018-02-01 NOTE — PROGRESS NOTES
[facility] Progress Note    Date: 2018    Name: July Childers    MRN: [id number]         : 1940    1100. Mr. Leah Anthony Arrived ambulatory and in no distress for 1 unit PRBC. Patient received first unit of blood on Tuesday. Assessment was completed, no acute issues at this time, no new complaints voiced. Patient reports diarrhea has subsided with use of Imodium. R chest wall port accessed without difficulty using 0.75 inch carroll needle.  + blood return. Signs/symptoms of adverse blood reaction discussed with pt, voiced understanding. Patient Vitals for the past 12 hrs:   Temp Pulse Resp BP SpO2   18 1430 96.8 °F (36 °C) 69 18 124/78 100 %   18 1410 96.8 °F (36 °C) 68 18 112/65 100 %   18 1310 98.1 °F (36.7 °C) 71 18 108/65 100 %   18 1240 96.9 °F (36.1 °C) 69 18 112/69 100 %   18 1225 97.5 °F (36.4 °C) 69 18 119/79 97 %   18 1204 97.1 °F (36.2 °C) 70 18 96/68 100 %   18 1101 96.7 °F (35.9 °C) 78 18 113/67 98 %     1210.  1st unit PRBCs started and infusing without difficulty, observed x 15 minutes  1430. 1st unit completed without adverse reaction noted, NS flushing line. Medications:  NS KVO  Tylenol 650 mg po  Benadryl 25 mg IV    1435. Mr. Leah Anthony tolerated treatment well, no adverse reactions noted. D/C instructions reviewed, copy to pt, voiced understanding. Patient declined 1 hour post transfusion observation. Discharged from [facility] ambulatory and in no distress. Port de-accessed, flushed & heparinized per protocol. Next appointment 18 @ 1100.     Cherelle Galarza RN  2018

## 2018-02-06 NOTE — PROGRESS NOTES
[facility] - Chemotherapy Progress Note    1100 Pt admit to [facility] for [facility] 58 ambulatory in stable condition. Assessment completed. No new concerns voiced. R chest port with positive blood return. Patient stated diarrhea is under control. Chemotherapy Flowsheet 2/6/2018   Cycle C2D1   Date 2/6/2018   Drug / Regimen Carbo/gemzar   Notes -       Patient Vitals for the past 12 hrs:   Temp Pulse Resp BP   02/06/18 1533 97 °F (36.1 °C) 75 18 101/66   02/06/18 1108 97.6 °F (36.4 °C) 74 18 98/67     Recent Results (from the past 12 hour(s))   CBC WITH AUTOMATED DIFF    Collection Time: 02/06/18 11:08 AM   Result Value Ref Range    WBC 4.1 4.1 - 11.1 K/uL    RBC 2.92 (L) 4.10 - 5.70 M/uL    HGB 8.7 (L) 12.1 - 17.0 g/dL    HCT 27.2 (L) 36.6 - 50.3 %    MCV 93.2 80.0 - 99.0 FL    MCH 29.8 26.0 - 34.0 PG    MCHC 32.0 30.0 - 36.5 g/dL    RDW 16.3 (H) 11.5 - 14.5 %    PLATELET 604 510 - 429 K/uL    MPV 8.7 (L) 8.9 - 12.9 FL    NRBC 0.0 0  WBC    ABSOLUTE NRBC 0.00 0.00 - 0.01 K/uL    NEUTROPHILS 61 32 - 75 %    BAND NEUTROPHILS 1 0 - 6 %    LYMPHOCYTES 18 12 - 49 %    MONOCYTES 18 (H) 5 - 13 %    EOSINOPHILS 0 0 - 7 %    BASOPHILS 0 0 - 1 %    MYELOCYTES 2 (H) 0 %    IMMATURE GRANULOCYTES 0 %    ABS. NEUTROPHILS 2.5 1.8 - 8.0 K/UL    ABS. LYMPHOCYTES 0.7 (L) 0.8 - 3.5 K/UL    ABS. MONOCYTES 0.7 0.0 - 1.0 K/UL    ABS. EOSINOPHILS 0.0 0.0 - 0.4 K/UL    ABS. BASOPHILS 0.0 0.0 - 0.1 K/UL    ABS. IMM.  GRANS. 0.0 K/UL    DF AUTOMATED      RBC COMMENTS ANISOCYTOSIS  1+        RBC COMMENTS MACROCYTOSIS  1+       METABOLIC PANEL, BASIC    Collection Time: 02/06/18 11:08 AM   Result Value Ref Range    Sodium 134 (L) 136 - 145 mmol/L    Potassium 3.9 3.5 - 5.1 mmol/L    Chloride 100 97 - 108 mmol/L    CO2 29 21 - 32 mmol/L    Anion gap 5 5 - 15 mmol/L    Glucose 118 (H) 65 - 100 mg/dL    BUN 9 6 - 20 MG/DL    Creatinine 0.65 (L) 0.70 - 1.30 MG/DL    BUN/Creatinine ratio 14 12 - 20      GFR est AA >60 >60 ml/min/1.73m2    GFR est non-AA >60 >60 ml/min/1.73m2    Calcium 8.6 8.5 - 10.1 MG/DL   HEPATIC FUNCTION PANEL    Collection Time: 02/06/18 11:08 AM   Result Value Ref Range    Protein, total 6.5 6.4 - 8.2 g/dL    Albumin 2.1 (L) 3.5 - 5.0 g/dL    Globulin 4.4 (H) 2.0 - 4.0 g/dL    A-G Ratio 0.5 (L) 1.1 - 2.2      Bilirubin, total 0.3 0.2 - 1.0 MG/DL    Bilirubin, direct 0.1 0.0 - 0.2 MG/DL    Alk. phosphatase 72 45 - 117 U/L    AST (SGOT) 21 15 - 37 U/L    ALT (SGPT) 19 12 - 78 U/L       Medications:  Aloxi IVP  Decadron IV  Emend IV  Carbo IV  Gemzar IV    1540 Pt tolerated treatment well. R chest port maintained positive blood return throughout treatment, flushed with positive blood return at conclusion. D/c home ambulatory in no distress. Pt aware of next appointment scheduled for 2/13/18 at 1100.     [facility], RN

## 2018-02-13 NOTE — PROGRESS NOTES
[facility] VISIT NOTE    ***  Pt arrived at [facility] ambulatory and in no distress for ***. Assessment completed, pt c/o ***.    *** chest port accessed with *** in carroll with no difficulty. Positive blood return noted and labs drawn. Patient Vitals for the past 12 hrs:   Temp Pulse Resp BP   02/13/18 1500 97.1 °F (36.2 °C) 79 16 119/68   02/13/18 1126 - 78 17 99/72   02/13/18 1122 97 °F (36.1 °C) - - -     Recent Results (from the past 12 hour(s))   CBC WITH AUTOMATED DIFF    Collection Time: 02/13/18 11:35 AM   Result Value Ref Range    WBC 3.8 (L) 4.1 - 11.1 K/uL    RBC 2.75 (L) 4.10 - 5.70 M/uL    HGB 8.2 (L) 12.1 - 17.0 g/dL    HCT 25.5 (L) 36.6 - 50.3 %    MCV 92.7 80.0 - 99.0 FL    MCH 29.8 26.0 - 34.0 PG    MCHC 32.2 30.0 - 36.5 g/dL    RDW 16.6 (H) 11.5 - 14.5 %    PLATELET 024 (L) 865 - 400 K/uL    MPV 8.0 (L) 8.9 - 12.9 FL    NRBC 0.0 0  WBC    ABSOLUTE NRBC 0.00 0.00 - 0.01 K/uL    NEUTROPHILS 69 32 - 75 %    LYMPHOCYTES 17 12 - 49 %    MONOCYTES 12 5 - 13 %    EOSINOPHILS 0 0 - 7 %    BASOPHILS 1 0 - 1 %    IMMATURE GRANULOCYTES 1 (H) 0.0 - 0.5 %    ABS. NEUTROPHILS 2.7 1.8 - 8.0 K/UL    ABS. LYMPHOCYTES 0.6 (L) 0.8 - 3.5 K/UL    ABS. MONOCYTES 0.5 0.0 - 1.0 K/UL    ABS. EOSINOPHILS 0.0 0.0 - 0.4 K/UL    ABS. BASOPHILS 0.0 0.0 - 0.1 K/UL    ABS. IMM. GRANS. 0.0 0.00 - 0.04 K/UL    DF SMEAR SCANNED      RBC COMMENTS NORMOCYTIC, NORMOCHROMIC         Medications received:  NS IV  Decadron IVP  Gemzar IV    Tolerated treatment well, no adverse reaction noted. Port de-accessed and flushed per protocol. Positive blood return noted. ***  D/C'd from [facility] ambulatory and in no distress accompanied by ***. Next appointment is *** at ***.

## 2018-02-14 NOTE — TELEPHONE ENCOUNTER
[facility]  [phone]    02/14/18- Several attempted calls have been made from concierges to schedule CT. I reached out to patient- he is willing to schedule- phone call transferred to  line. No further questions or concerns from patient.

## 2018-02-19 NOTE — TELEPHONE ENCOUNTER
Calling patient to schedule appt from referral from Dr. Jae Giordano. Patient answered phone. Explained Palliative to patient. He states he does not wish to schedule appt at this time. Advised patient to call our number when he is ready. Called Dr. Frances Clinton office, spoke to Mildred Alex and advised her of conversation with patient to please advise Abram's nurse.

## 2018-02-23 NOTE — TELEPHONE ENCOUNTER
Patient called and stated that he has been having some problems with stomach cramping, as well as diarrhea.  # [phone]

## 2018-02-23 NOTE — TELEPHONE ENCOUNTER
[address] at [place] [address]  [phone]    02/23/18- Phone call placed to patient he reported that he have 2-4 \" loose stools with mild abdominal cramping\"  He reports he has loose stools then constipation- he alternates imodium and miralax. He denies any N/V, fevers or other symptoms. He will continue to monitor and push fluids he will return phone call if symptoms worsen.

## 2018-02-27 PROBLEM — S72.001A CLOSED FRACTURE OF NECK OF RIGHT FEMUR (HCC): Status: RESOLVED | Noted: 2017-01-01 | Resolved: 2018-01-01

## 2018-02-27 PROBLEM — S72.91XA FEMUR FRACTURE, RIGHT (HCC): Status: RESOLVED | Noted: 2017-01-01 | Resolved: 2018-01-01

## 2018-02-27 NOTE — PROGRESS NOTES
Problem: Chemotherapy Treatment  Goal: *Chemotherapy regimen followed  Outcome: Progressing Towards Goal  Pt here for C3D1  Carbo/Gemzar

## 2018-02-27 NOTE — PROGRESS NOTES
[facility] VISIT NOTE    Pt arrived at [facility] ambulatory and in no distress for D1 C3 Carbo/Gemzar. Assessment completed, pt had no complaints. Patient Vitals for the past 12 hrs:   Temp Pulse Resp BP   02/27/18 1100 97.9 °F (36.6 °C) 74 16 112/56     Right chest port accessed with . 75   in carroll no difficulty. Positive blood return noted and labs drawn and sent. Recent Results (from the past 12 hour(s))   CBC WITH AUTOMATED DIFF    Collection Time: 02/27/18 11:20 AM   Result Value Ref Range    WBC 2.8 (L) 4.1 - 11.1 K/uL    RBC 2.28 (L) 4.10 - 5.70 M/uL    HGB 6.8 (L) 12.1 - 17.0 g/dL    HCT 21.2 (L) 36.6 - 50.3 %    MCV 93.0 80.0 - 99.0 FL    MCH 29.8 26.0 - 34.0 PG    MCHC 32.1 30.0 - 36.5 g/dL    RDW 18.3 (H) 11.5 - 14.5 %    PLATELET 57 (L) 059 - 400 K/uL    MPV 10.5 8.9 - 12.9 FL    NRBC 0.0 0  WBC    ABSOLUTE NRBC 0.00 0.00 - 0.01 K/uL    NEUTROPHILS 52 32 - 75 %    BAND NEUTROPHILS 6 0 - 6 %    LYMPHOCYTES 17 12 - 49 %    MONOCYTES 24 (H) 5 - 13 %    EOSINOPHILS 0 0 - 7 %    BASOPHILS 0 0 - 1 %    METAMYELOCYTES 1 (H) 0 %    IMMATURE GRANULOCYTES 0 %    ABS. NEUTROPHILS 1.6 (L) 1.8 - 8.0 K/UL    ABS. LYMPHOCYTES 0.5 (L) 0.8 - 3.5 K/UL    ABS. MONOCYTES 0.7 0.0 - 1.0 K/UL    ABS. EOSINOPHILS 0.0 0.0 - 0.4 K/UL    ABS. BASOPHILS 0.0 0.0 - 0.1 K/UL    ABS. IMM.  GRANS. 0.0 K/UL    DF MANUAL      RBC COMMENTS OVALOCYTES  PRESENT       METABOLIC PANEL, COMPREHENSIVE    Collection Time: 02/27/18 11:20 AM   Result Value Ref Range    Sodium 130 (L) 136 - 145 mmol/L    Potassium 3.9 3.5 - 5.1 mmol/L    Chloride 96 (L) 97 - 108 mmol/L    CO2 25 21 - 32 mmol/L    Anion gap 9 5 - 15 mmol/L    Glucose 117 (H) 65 - 100 mg/dL    BUN 9 6 - 20 MG/DL    Creatinine 0.56 (L) 0.70 - 1.30 MG/DL    BUN/Creatinine ratio 16 12 - 20      GFR est AA >60 >60 ml/min/1.73m2    GFR est non-AA >60 >60 ml/min/1.73m2    Calcium 8.5 8.5 - 10.1 MG/DL    Bilirubin, total 0.4 0.2 - 1.0 MG/DL    ALT (SGPT) 13 12 - 78 U/L    AST (SGOT) 24 15 - 37 U/L    Alk. phosphatase 69 45 - 117 U/L    Protein, total 6.3 (L) 6.4 - 8.2 g/dL    Albumin 2.1 (L) 3.5 - 5.0 g/dL    Globulin 4.2 (H) 2.0 - 4.0 g/dL    A-G Ratio 0.5 (L) 1.1 - 2.2     TYPE + CROSSMATCH    Collection Time: 02/27/18  1:35 PM   Result Value Ref Range    Crossmatch Expiration 03/02/2018     ABO/Rh(D) A NEGATIVE     Antibody screen NEG     Unit number [id number]     Blood component type  LR     Unit division 00     Status of unit ALLOCATED     Crossmatch result Compatible     Unit number [id number]     Blood component type [facility]     Unit division 00     Status of unit ALLOCATED     Crossmatch result Compatible      MD notified of Plts 57 and HGB 6.8. Was ordered to hold infusion and retry next week. Additionally, they are having pt return Thursday for 2 units of blood. Port de-accessed and flushed per protocol. Positive blood return noted. D/C'd from [facility] ambulatory and in no distress. Next appointment is 3/6/18 at 11:00.

## 2018-02-27 NOTE — PROGRESS NOTES
[facility] at [place] [address] [address] [facility]  Delvin Pellet: [phone]  F: [phone]      Reason for Visit:   Aleisha Nunez is a 78 y.o. male who is seen in consultation at the request of Dr. Lowery for evaluation of lung cancer. Treatment History:   · Low-dose CT Chest 11/22/2017: Probably left upper lobe mass obstructing left upper lobe bronchus, near complete atelectasis of FANY, probably left hilar adenopathy. 3.9cm left adrenal nodule. Moderate centrilobular emphysema. · CT C/A/P 12/6/2017: Left adrenal metastasis. Large mass in the left upper lobe versus markedly atelectatic lung from central tumor. 7 mm nonspecific pleural-based nodule in the left lower lobe. Extensive emphysematous change. 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 He states he has been doing better. Stool consistency has improved, some formed stool and no longer watery. Denies abdominal pain. No additional blood streaking in mucous or with coughing. Denies N/V. No new complaints. He is unaccompanied today. PAST HISTORY: The following sections were reviewed and updated in the EMR as appropriate: PMH, SH, FH, Medications, Allergies. Allergies   Allergen Reactions    Pcn [Penicillins] Hives     Tolerated ancef graded challenge 12/21/2017 with no adverse side effects noted      Review of Systems: A complete review of systems was obtained, reviewed, and scanned into the EMR. Pertinent findings reviewed above.       Physical Exam:     Visit Vitals    /69 (BP 1 Location: Right arm, BP Patient Position: Sitting)    Pulse 76    Temp 97.8 °F (36.6 °C) (Oral)    Resp 20    Ht 5' 10.5\" (1.791 m)    Wt 174 lb (78.9 kg)    SpO2 95%    BMI 24.61 kg/m2     ECOG PS: 1  General: No distress  Eyes: PERRLA, anicteric sclerae  HENT: Atraumatic, OP clear  Neck: Supple  Lymphatic: No cervical, supraclavicular, or inguinal adenopathy  Respiratory: CTAB, normal respiratory effort  CV: Normal rate, regular rhythm, no murmurs, no peripheral edema  GI: Soft, nontender, nondistended, no masses, no hepatomegaly, no splenomegaly  MS: Normal gait and station. Digits without clubbing or cyanosis. Skin: No rashes, ecchymoses, or petechiae. Normal temperature, turgor, and texture. Psych: Alert, oriented, appropriate affect, normal judgment/insight      Results:     Lab Results   Component Value Date/Time    WBC 2.8 (L) 02/27/2018 11:20 AM    HGB 6.8 (L) 02/27/2018 11:20 AM    HCT 21.2 (L) 02/27/2018 11:20 AM    PLATELET 57 (L) 92/15/2310 11:20 AM    MCV 93.0 02/27/2018 11:20 AM    ABS. NEUTROPHILS 1.6 (L) 02/27/2018 11:20 AM     Lab Results   Component Value Date/Time    Sodium 130 (L) 02/27/2018 11:20 AM    Potassium 3.9 02/27/2018 11:20 AM    Chloride 96 (L) 02/27/2018 11:20 AM    CO2 25 02/27/2018 11:20 AM    Glucose 117 (H) 02/27/2018 11:20 AM    BUN 9 02/27/2018 11:20 AM    Creatinine 0.56 (L) 02/27/2018 11:20 AM    GFR est AA >60 02/27/2018 11:20 AM    GFR est non-AA >60 02/27/2018 11:20 AM    Calcium 8.5 02/27/2018 11:20 AM     Lab Results   Component Value Date/Time    Bilirubin, total 0.4 02/27/2018 11:20 AM    ALT (SGPT) 13 02/27/2018 11:20 AM    AST (SGOT) 24 02/27/2018 11:20 AM    Alk. phosphatase 69 02/27/2018 11:20 AM    Protein, total 6.3 (L) 02/27/2018 11:20 AM    Albumin 2.1 (L) 02/27/2018 11:20 AM    Globulin 4.2 (H) 02/27/2018 11:20 AM       CT C/A/P 2/24/2018:   1.  Consolidations/mass with central necrosis in the majority of the left upper lobe, not significantly changed in appearance. There is diminished size of a left adrenal mass suggesting response to treatment. 2. There is a short segment of mural thickening in the sigmoid colon. There is also diverticulosis in the sigmoid colon. Findings could represent diverticulitis or colitis. Underlying mass cannot be excluded. Recommend  clinical correlation and attention on follow-up. Assessment:   1) Metastatic Non-small cell lung cancer (Squamous Cell Carcinoma)  Stage FARIDEH, PDL1 negative  He has disease within his lung and left adrenal gland. His cancer is not curable and management is with palliative intent. He is currently receiving palliative chemotherapy with Carboplatin and Gemcitabine, with plans for 4 cycles. This can potentially be followed by maintenance gemcitabine. He is tolerating therapy rather poorly with diarrhea and recurrent cytopenias. CT scan shows stable disease, discussed in detail with patient today. Unfortunately today he is again thrombocytopenic. We will hold therapy today and retry next week. We will plan to restage with CT C/A/P after C#4. He may be a candidate for the LungMAP study, discussed with patient previously and he is agreeable. Research nurse has met with him to discuss. 2) Thrombocytopenia  Secondary to therapy. Hold therapy as above. 3) Diarrhea  Secondary to therapy. Improved at present. Continue Imodium PRN watery or frequent stool. CT with some concern for diverticulitis or colitis. Monitor. 4) Right Femoral neck fx  S/p surgery 12/21/2017. Getting PT at home. 5) Anemia   Worsening recently. Labs showed some iron deficiency, so he is now on oral iron. Monitor and transfuse PRN Hgb <7. Transfusion this week. 6) COPD  Seen by pulmonary in the hospital.  Follow up with them as outpatient, we discussed this again today. I will have my office staff reach out to pulmonary for appt. 7) Pain  Secondary to fracture and cancer.  Seen by palliative care in the hospital.  Follow up with them as outpatient, not yet scheduled. 8) H/o RA  Not currently on therapy, symptoms well controlled. 9) Hemoptysis  Resolved at present. Mild. Monitor. Consider radiation if worsening significantly. 9) CODE STATUS: DNR  DDNR signed previously and scanned into record. 10) ACP  Introduced by palliative care, he is working on this.  to discuss further with him.     Plan:     Hold therapy   Blood transfusion  Plan to proceed next week with C#3 Gemcitabine and cisplatin given every 3 weeks x 4 cycles, dose reduced as above  Labs: CBC on days 1 and 8, BMP, Magnesium, Phosphorus on day 1  Antiemetic Prophylaxis: Palonosetron on day 1, Dexamethasone on day 1 and day 8, Ondansetron prior to day 8  PRN Antiemetics: Ondansetron, Compazine  · Follow up with pulmonary  · Follow up with palliative care-referral pending  ·  following  · Return to clinic in 4 weeks with next cycle of therapy or sooner if needed       Signed By: Madison Rodriguez MD

## 2018-03-01 NOTE — PROGRESS NOTES
[facility] VISIT NOTE    1100  Pt arrived at [facility] ambulatory and in no distress for blood transfusion. Assessment completed, pt c/o constant fatigue and dyspnea with exertion. Blood pressure 113/71, pulse 85, temperature 97.4 °F (36.3 °C), resp. rate 18. Right chest port accessed with 0.75 in carroll with no difficulty. Positive blood return noted. Medications received:  Tylenol PO  Benadryl PO    1155  1st unit of PRBC's initiated and titrated per protocol. No s/s of adverse reaction noted. 1430  2nd unit of PRBC's initiated and titrated per protocol. No s/s of adverse reaction noted. Patient declined to remain in [facility] for 1 hour observation post blood transfusion. Tolerated treatment well, no adverse reaction noted. Port de-accessed and flushed per protocol. Positive blood return noted post treatment. Patient Vitals for the past 12 hrs:   Temp Pulse Resp BP   03/01/18 1650 96.8 °F (36 °C) 63 16 124/72   03/01/18 1630 96.9 °F (36.1 °C) 68 16 108/62   03/01/18 1530 96.9 °F (36.1 °C) 69 16 102/67   03/01/18 1500 96.7 °F (35.9 °C) 67 16 96/62   03/01/18 1445 96.9 °F (36.1 °C) 65 16 (!) 87/64   03/01/18 1425 97.5 °F (36.4 °C) 67 16 (!) 87/62   03/01/18 1410 96.8 °F (36 °C) 66 16 (!) 87/58   03/01/18 1355 97 °F (36.1 °C) 69 16 (!) 89/59   03/01/18 1255 97.6 °F (36.4 °C) 72 16 90/60   03/01/18 1225 98.1 °F (36.7 °C) 76 16 91/53   03/01/18 1210 97.2 °F (36.2 °C) 76 16 95/58   03/01/18 1150 97.5 °F (36.4 °C) 74 16 (!) 89/59   03/01/18 1100 97.4 °F (36.3 °C) 85 18 113/71       1710  D/C'd from [facility] ambulatory and in no distress. Next appointment is 3/6/18 at 1100.

## 2018-03-06 NOTE — PROGRESS NOTES
[facility] VISIT NOTE    Pt arrived at [facility] ambulatory and in no distress for c3D1 Carbo/Gemzar. Assessment completed, pt c/o cramping, diarrhea and gas. MD notified and NP advised him to take benedryl or gas x and if he has loose stools to take immodium. Patient Vitals for the past 12 hrs:   Temp Pulse Resp BP   03/06/18 1725 97.8 °F (36.6 °C) 78 16 123/79   03/06/18 1205 97 °F (36.1 °C) 72 16 110/63     Right chest port accessed with . 75   in carroll no difficulty. Positive blood return noted and labs drawn and sent. Recent Results (from the past 12 hour(s))   CBC WITH AUTOMATED DIFF    Collection Time: 03/06/18 12:14 PM   Result Value Ref Range    WBC 5.0 4.1 - 11.1 K/uL    RBC 2.79 (L) 4.10 - 5.70 M/uL    HGB 8.6 (L) 12.1 - 17.0 g/dL    HCT 26.1 (L) 36.6 - 50.3 %    MCV 93.5 80.0 - 99.0 FL    MCH 30.8 26.0 - 34.0 PG    MCHC 33.0 30.0 - 36.5 g/dL    RDW 19.6 (H) 11.5 - 14.5 %    PLATELET 379 (L) 810 - 400 K/uL    MPV 8.8 (L) 8.9 - 12.9 FL    NRBC 0.0 0  WBC    ABSOLUTE NRBC 0.00 0.00 - 0.01 K/uL    NEUTROPHILS 61 32 - 75 %    LYMPHOCYTES 18 12 - 49 %    MONOCYTES 19 (H) 5 - 13 %    EOSINOPHILS 1 0 - 7 %    BASOPHILS 0 0 - 1 %    IMMATURE GRANULOCYTES 1 (H) 0.0 - 0.5 %    ABS. NEUTROPHILS 3.0 1.8 - 8.0 K/UL    ABS. LYMPHOCYTES 0.9 0.8 - 3.5 K/UL    ABS. MONOCYTES 1.0 0.0 - 1.0 K/UL    ABS. EOSINOPHILS 0.0 0.0 - 0.4 K/UL    ABS. BASOPHILS 0.0 0.0 - 0.1 K/UL    ABS. IMM.  GRANS. 0.1 (H) 0.00 - 0.04 K/UL    DF AUTOMATED     METABOLIC PANEL, COMPREHENSIVE    Collection Time: 03/06/18 12:14 PM   Result Value Ref Range    Sodium 131 (L) 136 - 145 mmol/L    Potassium 3.9 3.5 - 5.1 mmol/L    Chloride 98 97 - 108 mmol/L    CO2 26 21 - 32 mmol/L    Anion gap 7 5 - 15 mmol/L    Glucose 93 65 - 100 mg/dL    BUN 10 6 - 20 MG/DL    Creatinine 0.47 (L) 0.70 - 1.30 MG/DL    BUN/Creatinine ratio 21 (H) 12 - 20      GFR est AA >60 >60 ml/min/1.73m2    GFR est non-AA >60 >60 ml/min/1.73m2    Calcium 8.3 (L) 8.5 - 10.1 MG/DL    Bilirubin, total 0.4 0.2 - 1.0 MG/DL    ALT (SGPT) 10 (L) 12 - 78 U/L    AST (SGOT) 21 15 - 37 U/L    Alk. phosphatase 65 45 - 117 U/L    Protein, total 6.3 (L) 6.4 - 8.2 g/dL    Albumin 2.0 (L) 3.5 - 5.0 g/dL    Globulin 4.3 (H) 2.0 - 4.0 g/dL    A-G Ratio 0.5 (L) 1.1 - 2.2       Medications received:  Emend IV  Aloxi IV  Dexamethasone IV  Carboplatin IV  Gemzar IV    Tolerated treatment well, no adverse reaction noted. Port de-accessed and flushed per protocol. Positive blood return noted. D/C'd from [facility] ambulatory and in no distress. Next appointment is 3/13/18 at 9:00. Destinee Crystal

## 2018-03-13 NOTE — PROGRESS NOTES
[facility] Progress Note    Date: 2018    Name: Judeen Jeans    MRN: [id number]         : 1940    0900. Mr. Misti Deleon Arrived ambulatory and in no distress for C3D8 Carbo/Gemzar regimen. Assessment was completed, no acute issues at this time, no new complaints voiced. R chest wall port accessed without difficulty, labs drawn and in process. + blood return. Chemotherapy Flowsheet 3/13/2018   Cycle C3D8   Date 3/13/2018   Drug / Regimen Carbo/Gemzar   Pre Meds -   Notes -         1000. Proceeded to appt with Dr. Dean Santiago. Mr. Jackson's vitals were reviewed. Patient Vitals for the past 12 hrs:   Temp Pulse Resp BP SpO2   18 1227 98 °F (36.7 °C) 84 18 115/78 100 %   18 0903 97.3 °F (36.3 °C) 88 18 112/73 100 %       Lab results were obtained and reviewed. Recent Results (from the past 12 hour(s))   CBC WITH AUTOMATED DIFF    Collection Time: 18  9:17 AM   Result Value Ref Range    WBC 3.9 (L) 4.1 - 11.1 K/uL    RBC 2.62 (L) 4.10 - 5.70 M/uL    HGB 8.1 (L) 12.1 - 17.0 g/dL    HCT 24.9 (L) 36.6 - 50.3 %    MCV 95.0 80.0 - 99.0 FL    MCH 30.9 26.0 - 34.0 PG    MCHC 32.5 30.0 - 36.5 g/dL    RDW 18.8 (H) 11.5 - 14.5 %    PLATELET 654 (L) 339 - 400 K/uL    MPV 7.7 (L) 8.9 - 12.9 FL    NRBC 0.0 0  WBC    ABSOLUTE NRBC 0.00 0.00 - 0.01 K/uL    NEUTROPHILS 75 32 - 75 %    LYMPHOCYTES 15 12 - 49 %    MONOCYTES 9 5 - 13 %    EOSINOPHILS 0 0 - 7 %    BASOPHILS 0 0 - 1 %    IMMATURE GRANULOCYTES 1 (H) 0.0 - 0.5 %    ABS. NEUTROPHILS 2.9 1.8 - 8.0 K/UL    ABS. LYMPHOCYTES 0.6 (L) 0.8 - 3.5 K/UL    ABS. MONOCYTES 0.4 0.0 - 1.0 K/UL    ABS. EOSINOPHILS 0.0 0.0 - 0.4 K/UL    ABS. BASOPHILS 0.0 0.0 - 0.1 K/UL    ABS. IMM. GRANS. 0.0 0.00 - 0.04 K/UL    DF SMEAR SCANNED      RBC COMMENTS ANISOCYTOSIS  1+           Pre-medications  were administered as ordered and chemotherapy was initiated. Medications:  Dexamethasone IVP  Gemzar IV  NS KVO    1230.   Mr. Chappell Adrienne tolerated treatment well and was discharged from [place] [address] in stable condition. Port de-accessed, flushed & heparinized per protocol. He is to return on 03/27/18  for his next appointment.     Gamal Torres RN  March 13, 2018

## 2018-03-27 PROBLEM — D64.9 ANEMIA: Status: ACTIVE | Noted: 2018-01-01

## 2018-03-27 PROBLEM — D69.6 THROMBOCYTOPENIA (HCC): Status: ACTIVE | Noted: 2018-01-01

## 2018-03-27 NOTE — PROGRESS NOTES
\[facility] [place] [facility]\"" Progress Note    Date: 2018    Name: Cherelle Espinoza    MRN: [id number]         : 1940    Mr. Jackson Arrived ambulatory and in no distress for Cycle 4 day 1 of Carbo/Gemzar regimen. Assessment was completed, no acute issues at this time, no new complaints voiced. RCW port accessed without difficulty, labs drawn and in process. Pt proceeded to MD visit. Chemotherapy Flowsheet 3/27/2018   Cycle C4D1   Date 3/27/2018   Drug / Regimen Carbo/Gemzar   Pre Meds -   Notes Held       Mr. Jackson's vitals were reviewed. Visit Vitals    /64 (BP 1 Location: Left arm, BP Patient Position: At rest;Sitting)    Pulse 89    Temp 97.9 °F (36.6 °C)    Resp 18    Ht 5' 10.87\" (1.8 m)    Wt 80.2 kg (176 lb 11.2 oz)    SpO2 100%    BMI 24.74 kg/m2       Lab results were obtained and reviewed. Recent Results (from the past 12 hour(s))   METABOLIC PANEL, COMPREHENSIVE    Collection Time: 18 11:29 AM   Result Value Ref Range    Sodium 134 (L) 136 - 145 mmol/L    Potassium 3.8 3.5 - 5.1 mmol/L    Chloride 101 97 - 108 mmol/L    CO2 25 21 - 32 mmol/L    Anion gap 8 5 - 15 mmol/L    Glucose 100 65 - 100 mg/dL    BUN 11 6 - 20 MG/DL    Creatinine 0.64 (L) 0.70 - 1.30 MG/DL    BUN/Creatinine ratio 17 12 - 20      GFR est AA >60 >60 ml/min/1.73m2    GFR est non-AA >60 >60 ml/min/1.73m2    Calcium 8.4 (L) 8.5 - 10.1 MG/DL    Bilirubin, total 0.2 0.2 - 1.0 MG/DL    ALT (SGPT) 22 12 - 78 U/L    AST (SGOT) 20 15 - 37 U/L    Alk.  phosphatase 67 45 - 117 U/L    Protein, total 6.1 (L) 6.4 - 8.2 g/dL    Albumin 2.0 (L) 3.5 - 5.0 g/dL    Globulin 4.1 (H) 2.0 - 4.0 g/dL    A-G Ratio 0.5 (L) 1.1 - 2.2     CBC WITH AUTOMATED DIFF    Collection Time: 18 11:29 AM   Result Value Ref Range    WBC 1.8 (L) 4.1 - 11.1 K/uL    RBC 1.84 (L) 4.10 - 5.70 M/uL    HGB 5.7 (LL) 12.1 - 17.0 g/dL    HCT 17.7 (LL) 36.6 - 50.3 %    MCV 96.2 80.0 - 99.0 FL    MCH 31.0 26.0 - 34.0 PG    MCHC 32.2 30.0 - 36.5 g/dL    RDW 19.9 (H) 11.5 - 14.5 %    PLATELET 28 (LL) 477 - 400 K/uL    MPV 9.3 8.9 - 12.9 FL    NRBC 0.0 0  WBC    ABSOLUTE NRBC 0.00 0.00 - 0.01 K/uL    NEUTROPHILS 52 32 - 75 %    LYMPHOCYTES 24 12 - 49 %    MONOCYTES 23 (H) 5 - 13 %    EOSINOPHILS 0 0 - 7 %    BASOPHILS 0 0 - 1 %    METAMYELOCYTES 1 (H) 0 %    IMMATURE GRANULOCYTES 0 %    ABS. NEUTROPHILS 0.9 (L) 1.8 - 8.0 K/UL    ABS. LYMPHOCYTES 0.4 (L) 0.8 - 3.5 K/UL    ABS. MONOCYTES 0.4 0.0 - 1.0 K/UL    ABS. EOSINOPHILS 0.0 0.0 - 0.4 K/UL    ABS. BASOPHILS 0.0 0.0 - 0.1 K/UL    ABS. IMM. GRANS. 0.0 K/UL    DF AUTOMATED      RBC COMMENTS ANISOCYTOSIS  2+        RBC COMMENTS MICROCYTOSIS  1+        RBC COMMENTS HYPOCHROMIA  1+        RBC COMMENTS SPHEROCYTES  PRESENT        RBC COMMENTS POIKILOCYTOSIS  1+        RBC COMMENTS OVALOCYTES  PRESENT        RBC COMMENTS SCHISTOCYTES  PRESENT        RBC COMMENTS MACROCYTOSIS  PRESENT         MD office contacted to relay critical lab values. Spoke to [facility]. Treatment to be held; T&C sent to lab. Pt scheduled for transfusion tomorrow. Port flushed, deaccessed, bandaid dressing applied. Mr. Belia Schaffer  was discharged from [place] [address] in stable condition at 1325 hrs. He is to return on 03/28/18 @  at 10 am for his next appointment.     Percy Dumont  March 27, 2018

## 2018-03-27 NOTE — PROGRESS NOTES
[facility] [place] [facility] [place] [address] [facility]  Harley Blank: [phone]  F: [phone]      Reason for Visit:   Yg Barry is a 78 y.o. male who is seen in consultation at the request of Dr. Ab Cassidy for evaluation of lung cancer. Treatment History:   · Low-dose CT Chest 11/22/2017: Probably left upper lobe mass obstructing left upper lobe bronchus, near complete atelectasis of FANY, probably left hilar adenopathy. 3.9cm left adrenal nodule. Moderate centrilobular emphysema. · CT C/A/P 12/6/2017: Left adrenal metastasis. Large mass in the left upper lobe versus markedly atelectatic lung from central tumor. 7 mm nonspecific pleural-based nodule in the left lower lobe. Extensive emphysematous change. 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 He reports feeling okay today. Has some intermittent loose stool but not every day. Denies pain, bleeding. Fatigue stable. He reports discomfort to gluteal cleft with sitting, new since last visit. No additional complaints. He is unaccompanied today. PAST HISTORY: The following sections were reviewed and updated in the EMR as appropriate: PMH, SH, FH, Medications, Allergies. Allergies   Allergen Reactions    Pcn [Penicillins] Hives     Tolerated ancef graded challenge 12/21/2017 with no adverse side effects noted      Review of Systems: A complete review of systems was obtained, reviewed, and scanned into the EMR. Pertinent findings reviewed above.       Physical Exam:     Visit Vitals    BP 94/57 (BP 1 Location: Left arm, BP Patient Position: Sitting)    Pulse 67    Temp 96.8 °F (36 °C) (Temporal)    Resp 20    Ht 5' 10\" (1.778 m)    Wt 176 lb (79.8 kg)    SpO2 98%    BMI 25.25 kg/m2     ECOG PS: 1  General: No distress  Eyes: PERRLA, anicteric sclerae  HENT: Atraumatic, OP clear  Neck: Supple  Lymphatic: No cervical, supraclavicular, or inguinal adenopathy  Respiratory: CTAB, normal respiratory effort  CV: Normal rate, regular rhythm, no murmurs, no peripheral edema  GI: Soft, nontender, nondistended, no masses, no hepatomegaly, no splenomegaly  MS: Normal gait and station. Digits without clubbing or cyanosis. Skin: No ecchymoses, or petechiae. Normal temperature, turgor, and texture. Excoriation to gluteal cleft without drainage, small fissure noted. Psych: Alert, oriented, appropriate affect, normal judgment/insight      Results:     Lab Results   Component Value Date/Time    WBC 1.8 (L) 03/27/2018 11:29 AM    HGB 5.7 (LL) 03/27/2018 11:29 AM    HCT 17.7 (LL) 03/27/2018 11:29 AM    PLATELET 28 (LL) 64/31/8637 11:29 AM    MCV 96.2 03/27/2018 11:29 AM    ABS. NEUTROPHILS 0.9 (L) 03/27/2018 11:29 AM     Lab Results   Component Value Date/Time    Sodium 134 (L) 03/27/2018 11:29 AM    Potassium 3.8 03/27/2018 11:29 AM    Chloride 101 03/27/2018 11:29 AM    CO2 25 03/27/2018 11:29 AM    Glucose 100 03/27/2018 11:29 AM    BUN 11 03/27/2018 11:29 AM    Creatinine 0.64 (L) 03/27/2018 11:29 AM    GFR est AA >60 03/27/2018 11:29 AM    GFR est non-AA >60 03/27/2018 11:29 AM    Calcium 8.4 (L) 03/27/2018 11:29 AM     Lab Results   Component Value Date/Time    Bilirubin, total 0.2 03/27/2018 11:29 AM    ALT (SGPT) 22 03/27/2018 11:29 AM    AST (SGOT) 20 03/27/2018 11:29 AM    Alk. phosphatase 67 03/27/2018 11:29 AM    Protein, total 6.1 (L) 03/27/2018 11:29 AM    Albumin 2.0 (L) 03/27/2018 11:29 AM    Globulin 4.1 (H) 03/27/2018 11:29 AM       CT C/A/P 2/24/2018:   1.  Consolidations/mass with central necrosis in the majority of the left upper lobe, not significantly changed in appearance. There is diminished size of a left adrenal mass suggesting response to treatment. 2. There is a short segment of mural thickening in the sigmoid colon. There is also diverticulosis in the sigmoid colon. Findings could represent diverticulitis or colitis. Underlying mass cannot be excluded. Recommend  clinical correlation and attention on follow-up. Assessment:   1) Metastatic Non-small cell lung cancer (Squamous Cell Carcinoma)  Stage FARIDEH, PDL1 negative  He has disease within his lung and left adrenal gland. His cancer is not curable and management is with palliative intent. He is currently receiving palliative chemotherapy with Carboplatin and Gemcitabine, with plans for 4 cycles. This can potentially be followed by maintenance gemcitabine. He is tolerating therapy rather poorly with diarrhea and recurrent cytopenias. Unfortunately today he is again thrombocytopenic and severely anemic. We will hold therapy today, transfuse pRBCs and retry next week with dose reductions of carboplatin to AUC 3 and gemcitabine to 700mg/m2. We will plan to restage with CT C/A/P prior to next visit and if stable, proceed with maintenance gemcitabine. He has consented to LungMAP study. We discussed [employer] testing today as well and he is agreeable. 2) Thrombocytopenia  Secondary to therapy. Hold therapy as above. 3) Diarrhea  Secondary to therapy. Improved at present. Continue Imodium PRN watery or frequent stool. CT with some concern for diverticulitis or colitis. Monitor. 4) Right Femoral neck fx  S/p surgery 12/21/2017. Getting PT at home. 5) Anemia   Worsening recently. Labs showed some iron deficiency, so he is now on oral iron. Monitor and transfuse PRN Hgb <7. Transfusion again this week. 6) COPD  Seen by pulmonary in the hospital.  Follow up with them as outpatient, we discussed this again today.  I will have my office staff reach out to pulmonary for appt. 7) Pain  Secondary to fracture and cancer. Seen by palliative care in the hospital.  Follow up with them as outpatient, not yet scheduled. 8) H/o RA  Not currently on therapy, symptoms well controlled. 9) Hemoptysis  Resolved at present. Mild. Monitor. Consider radiation if worsening significantly. 9) CODE STATUS: DNR  DDNR signed previously and scanned into record. 10) ACP  Introduced by palliative care, he is working on this.  to discuss further with him. 11) Yeast infection of the skin  Start nystatin cream BID, patient to notify us if this worsens or fails to improve. Plan:     · Hold therapy today  · Blood transfusion  · Start nystatin cream  · Plan to proceed next week with C#4 Gemcitabine and cisplatin given every 3 weeks x 4 cycles, dose reduced as above  · Labs: CBC on days 1 and 8, BMP, Magnesium, Phosphorus on day 1  · Antiemetic Prophylaxis: Palonosetron on day 1, Dexamethasone on day 1 and day 8, Ondansetron prior to day 8  · PRN Antiemetics: Ondansetron, Compazine  · Follow up with pulmonary  · Follow up with palliative care-referral pending  ·  following  · CT C/A/P prior to next visit   · Return to clinic in 4 weeks with start of maintenance therapy    Patient was seen in conjunction with Sheryl Meneses NP.     Signed By: Henok Belcher MD

## 2018-03-28 NOTE — PROGRESS NOTES
[facility] Progress Note    Date: 2018    Name: John Vann    MRN: [id number]         : 1940    1100. Mr. Waylon Meigs Arrived ambulatory and in no distress for 2 units PRBC. Assessment was completed, no acute issues at this time, reports \"just the normal fatigue\". R chest wall port accessed without difficulty using 0.75 inch carroll needle.  + blood return. Signs/symptoms of adverse blood reaction discussed with pt, voiced understanding. Consent signed 18. Patient Vitals for the past 12 hrs:   Temp Pulse Resp BP SpO2   18 1530 97.9 °F (36.6 °C) 70 18 115/75 100 %   18 1515 97.8 °F (36.6 °C) 74 18 113/75 100 %   18 1415 97.8 °F (36.6 °C) 74 18 112/71 96 %   18 1345 97.6 °F (36.4 °C) 73 18 114/70 96 %   18 1330 97.6 °F (36.4 °C) 73 18 103/72 100 %   18 1306 97.6 °F (36.4 °C) 78 18 105/66 100 %   18 1245 97.8 °F (36.6 °C) 75 18 106/68 100 %   18 1145 97.6 °F (36.4 °C) 73 18 106/66 99 %   18 1115 97.6 °F (36.4 °C) 71 18 111/70 100 %   18 1100 97.6 °F (36.4 °C) 71 18 107/73 99 %   18 1040 97.5 °F (36.4 °C) 78 18 91/61 100 %   18 1003 97.5 °F (36.4 °C) 78 18 103/61 99 %       1045.  1st unit PRBCs started and infusing without difficulty, observed x 15 minutes  1430. 1st unit completed without adverse reaction noted, NS flushing line. 1210.  2nd unit PRBCs started and infusing without difficulty, observed x 15 minutes  1430. 2nd unit completed without adverse reaction noted, NS flushing line. Medications:  NS KVO  Tylenol 650 mg po  Benadryl 25 mg IV    1435. Mr. Waylon Meigs tolerated treatment well, no adverse reactions noted. D/C instructions reviewed, copy to pt, voiced understanding. Patient declined 1 hour post transfusion observation. Discharged from [facility] ambulatory and in no distress. Port de-accessed, flushed & heparinized per protocol. Next appointment 18 @ 1000.     Zenaida Hernandez RN  2018

## 2018-03-28 NOTE — DISCHARGE INSTRUCTIONS
OUTPATIENT INFUSION CENTER    DISCHARGE INSTRUCTIONS FOR:  BLOOD TRANSFUSION    We hope you are feeling better after your blood transfusion. Some mild tenderness or slight bruising at your IV site is normal.  Avoid lifting or heavy use of that extremity for the rest of the day. Drink plenty of fluids, eat a normal diet and get some rest.    There are some important signs that you need to watch for in case you experience a delayed reaction to the blood you have received. Call your physician immediately if you develop any of the following symptoms:    1. Severe headache or backache;    2. Fever above 100 degrees;    3. Chills;    4. Difficulty breathing;    5.  Blood or red color in urine;    6. The feeling of weakness or constant fatigue;    7. Yellowing of the whites of your eyes or skin (jaundice). If your physician is not available, call or go to the nearest emergency room, or dial 911.     Marry Stone, Signature: ___________________________ 3/28/2018  Maryam Johnson RN

## 2018-04-03 NOTE — PROGRESS NOTES
Problem: Chemotherapy Treatment  Goal: *Chemotherapy regimen followed  Outcome: Progressing Towards Goal  C4D1 Carboplatin/Gemzar held due to low platelets, retry next week.

## 2018-04-03 NOTE — PROGRESS NOTES
Outpatient Infusion Center - Chemotherapy Progress Note    4105  Pt admit to [facility] for retry C4D1 Carboplatin/Gemzar ambulatory in stable condition. Assessment completed. Pt reports alternating diarrhea/constipation. Right chest port accessed with 20 gauge 0.75 inch carroll needle with positive blood return. Labs drawn per order and sent. Visit Vitals    /70    Pulse 83    Temp 97.2 °F (36.2 °C)    Resp 18    Ht 5' 10.87\" (1.8 m)    Wt 78.8 kg (173 lb 11.2 oz)    BMI 24.32 kg/m2        Recent Results (from the past 12 hour(s))   METABOLIC PANEL, BASIC    Collection Time: 04/03/18 10:26 AM   Result Value Ref Range    Sodium 136 136 - 145 mmol/L    Potassium 3.8 3.5 - 5.1 mmol/L    Chloride 101 97 - 108 mmol/L    CO2 27 21 - 32 mmol/L    Anion gap 8 5 - 15 mmol/L    Glucose 144 (H) 65 - 100 mg/dL    BUN 13 6 - 20 MG/DL    Creatinine 0.68 (L) 0.70 - 1.30 MG/DL    BUN/Creatinine ratio 19 12 - 20      GFR est AA >60 >60 ml/min/1.73m2    GFR est non-AA >60 >60 ml/min/1.73m2    Calcium 9.2 8.5 - 10.1 MG/DL   HEPATIC FUNCTION PANEL    Collection Time: 04/03/18 10:26 AM   Result Value Ref Range    Protein, total 6.9 6.4 - 8.2 g/dL    Albumin 2.3 (L) 3.5 - 5.0 g/dL    Globulin 4.6 (H) 2.0 - 4.0 g/dL    A-G Ratio 0.5 (L) 1.1 - 2.2      Bilirubin, total 0.3 0.2 - 1.0 MG/DL    Bilirubin, direct 0.1 0.0 - 0.2 MG/DL    Alk.  phosphatase 74 45 - 117 U/L    AST (SGOT) 18 15 - 37 U/L    ALT (SGPT) 16 12 - 78 U/L   CBC WITH AUTOMATED DIFF    Collection Time: 04/03/18 10:26 AM   Result Value Ref Range    WBC 3.1 (L) 4.1 - 11.1 K/uL    RBC 2.47 (L) 4.10 - 5.70 M/uL    HGB 7.8 (L) 12.1 - 17.0 g/dL    HCT 24.6 (L) 36.6 - 50.3 %    MCV 99.6 (H) 80.0 - 99.0 FL    MCH 31.6 26.0 - 34.0 PG    MCHC 31.7 30.0 - 36.5 g/dL    RDW 20.5 (H) 11.5 - 14.5 %    PLATELET 85 (L) 458 - 400 K/uL    MPV 8.8 (L) 8.9 - 12.9 FL    NRBC 0.0 0  WBC    ABSOLUTE NRBC 0.00 0.00 - 0.01 K/uL    NEUTROPHILS 53 32 - 75 %    LYMPHOCYTES 22 12 - 49 % MONOCYTES 22 (H) 5 - 13 %    EOSINOPHILS 1 0 - 7 %    BASOPHILS 0 0 - 1 %    IMMATURE GRANULOCYTES 2 (H) 0.0 - 0.5 %    ABS. NEUTROPHILS 1.6 (L) 1.8 - 8.0 K/UL    ABS. LYMPHOCYTES 0.7 (L) 0.8 - 3.5 K/UL    ABS. MONOCYTES 0.7 0.0 - 1.0 K/UL    ABS. EOSINOPHILS 0.0 0.0 - 0.4 K/UL    ABS. BASOPHILS 0.0 0.0 - 0.1 K/UL    ABS. IMM. GRANS. 0.1 (H) 0.00 - 0.04 K/UL    DF AUTOMATED      RBC COMMENTS ANISOCYTOSIS  2+        RBC COMMENTS MACROCYTOSIS  1+        RBC COMMENTS MICROCYTOSIS  1+        RBC COMMENTS FATOUMATA CELLS  1+        RBC COMMENTS SCHISTOCYTES  PRESENT        RBC COMMENTS POIKILOCYTOSIS  1+        WBC COMMENTS TOXIC GRANULATION       Called and notified Samara Doe, RN for Dr. Devendra Tyler of pt's lab results, orders received to hold chemotherapy today due to platelets and retry next week. [address] flushed and deaccessed per [place]. D/c home ambulatory in no distress.  Pt aware of next [facility] appointment scheduled for 4/10/17 at 10 am.

## 2018-04-10 NOTE — PROGRESS NOTES
[facility] VISIT NOTE    1000  Pt arrived at [facility] ambulatory and in no distress for Carbo/Gemzar C4 D1. Assessment completed, pt c/o left chest soreness. .    Right chest port accessed with. 75 in carroll with no difficulty. Positive blood return noted and labs drawn. Medications received:  Decadron IV  Aloxi IVP  Emend IV  Carboplatin IV  Gemzar IV  NS IV    Tolerated treatment well, no adverse reaction noted. Port de-accessed and flushed per protocol. Positive blood return noted. Recent Results (from the past 12 hour(s))   CBC WITH AUTOMATED DIFF    Collection Time: 04/10/18 10:24 AM   Result Value Ref Range    WBC 4.6 4.1 - 11.1 K/uL    RBC 2.46 (L) 4.10 - 5.70 M/uL    HGB 8.0 (L) 12.1 - 17.0 g/dL    HCT 24.5 (L) 36.6 - 50.3 %    MCV 99.6 (H) 80.0 - 99.0 FL    MCH 32.5 26.0 - 34.0 PG    MCHC 32.7 30.0 - 36.5 g/dL    RDW 20.7 (H) 11.5 - 14.5 %    PLATELET 930 (L) 758 - 400 K/uL    MPV 8.4 (L) 8.9 - 12.9 FL    NRBC 0.0 0  WBC    ABSOLUTE NRBC 0.00 0.00 - 0.01 K/uL    NEUTROPHILS 59 32 - 75 %    LYMPHOCYTES 19 12 - 49 %    MONOCYTES 19 (H) 5 - 13 %    EOSINOPHILS 1 0 - 7 %    BASOPHILS 0 0 - 1 %    IMMATURE GRANULOCYTES 2 (H) 0.0 - 0.5 %    ABS. NEUTROPHILS 2.7 1.8 - 8.0 K/UL    ABS. LYMPHOCYTES 0.9 0.8 - 3.5 K/UL    ABS. MONOCYTES 0.9 0.0 - 1.0 K/UL    ABS. EOSINOPHILS 0.0 0.0 - 0.4 K/UL    ABS. BASOPHILS 0.0 0.0 - 0.1 K/UL    ABS. IMM.  GRANS. 0.1 (H) 0.00 - 0.04 K/UL    DF SMEAR SCANNED      RBC COMMENTS ANISOCYTOSIS  2+        RBC COMMENTS OVALOCYTES  PRESENT        RBC COMMENTS MICROCYTOSIS  1+        RBC COMMENTS MACROCYTOSIS  PRESENT        RBC COMMENTS TEARDROP CELLS  PRESENT        RBC COMMENTS POIKILOCYTOSIS  PRESENT       METABOLIC PANEL, BASIC    Collection Time: 04/10/18 10:24 AM   Result Value Ref Range    Sodium 130 (L) 136 - 145 mmol/L    Potassium 3.8 3.5 - 5.1 mmol/L    Chloride 98 97 - 108 mmol/L    CO2 28 21 - 32 mmol/L    Anion gap 4 (L) 5 - 15 mmol/L    Glucose 145 (H) 65 - 100 mg/dL    BUN 11 6 - 20 MG/DL    Creatinine 0.78 0.70 - 1.30 MG/DL    BUN/Creatinine ratio 14 12 - 20      GFR est AA >60 >60 ml/min/1.73m2    GFR est non-AA >60 >60 ml/min/1.73m2    Calcium 9.0 8.5 - 10.1 MG/DL   HEPATIC FUNCTION PANEL    Collection Time: 04/10/18 10:24 AM   Result Value Ref Range    Protein, total 6.9 6.4 - 8.2 g/dL    Albumin 2.2 (L) 3.5 - 5.0 g/dL    Globulin 4.7 (H) 2.0 - 4.0 g/dL    A-G Ratio 0.5 (L) 1.1 - 2.2      Bilirubin, total 0.5 0.2 - 1.0 MG/DL    Bilirubin, direct 0.1 0.0 - 0.2 MG/DL    Alk. phosphatase 70 45 - 117 U/L    AST (SGOT) 21 15 - 37 U/L    ALT (SGPT) 15 12 - 78 U/L     Patient Vitals for the past 12 hrs:   Temp Pulse BP   04/10/18 1530 98.1 °F (36.7 °C) 73 112/76   04/10/18 1015 96.8 °F (36 °C) 88 99/63     1530  D/C'd from [facility] ambulatory and in no distress accompanied by self. Next appointment is 4/17/18 at 1000.

## 2018-04-10 NOTE — PROGRESS NOTES
Problem: Chemotherapy Treatment  Goal: *Chemotherapy regimen followed  Outcome: Progressing Towards Goal  Pt receiving Carbo/Gemzar C4 D1

## 2018-04-17 NOTE — PROGRESS NOTES
Problem: Chemotherapy Treatment  Goal: *Chemotherapy regimen followed  Outcome: Progressing Towards Goal  Patient here for C4D8 Carbo+Gemzar

## 2018-04-17 NOTE — PROGRESS NOTES
[facility] Progress Note    Date: 2018    Name: Ruth Willoughby    MRN: [id number]         : 1940    Mr. Aleena Galdamez arrived ambulatory at 1000 and in no distress for C4D8 Carbo + Gemzar. Assessment was completed, no acute issues at this time, no new complaints voiced; still experiencing shoulder pain. RCW port accessed without difficulty with 0.75 carroll needle; + blood return noted, labs drawn and sent. Mr. Jackson's vitals were reviewed. Patient Vitals for the past 12 hrs:   Temp Pulse Resp BP SpO2   18 1005 96.9 °F (36.1 °C) 87 18 97/64 99 %     Called MD to clarify appointment for patient; will return on May 1st; and patient will be able to  contrast early for CT scan on Saturday. Lab results were obtained and reviewed. Recent Results (from the past 12 hour(s))   CBC WITH AUTOMATED DIFF    Collection Time: 18 10:29 AM   Result Value Ref Range    WBC 4.2 4.1 - 11.1 K/uL    RBC 1.95 (L) 4.10 - 5.70 M/uL    HGB 6.4 (L) 12.1 - 17.0 g/dL    HCT 19.6 (L) 36.6 - 50.3 %    .5 (H) 80.0 - 99.0 FL    MCH 32.8 26.0 - 34.0 PG    MCHC 32.7 30.0 - 36.5 g/dL    RDW 19.3 (H) 11.5 - 14.5 %    PLATELET 90 (L) 917 - 400 K/uL    MPV 8.9 8.9 - 12.9 FL    NRBC 0.0 0  WBC    ABSOLUTE NRBC 0.00 0.00 - 0.01 K/uL    NEUTROPHILS 68 32 - 75 %    LYMPHOCYTES 18 12 - 49 %    MONOCYTES 14 (H) 5 - 13 %    EOSINOPHILS 0 0 - 7 %    BASOPHILS 0 0 - 1 %    IMMATURE GRANULOCYTES 0 %    ABS. NEUTROPHILS 2.8 1.8 - 8.0 K/UL    ABS. LYMPHOCYTES 0.8 0.8 - 3.5 K/UL    ABS. MONOCYTES 0.6 0.0 - 1.0 K/UL    ABS. EOSINOPHILS 0.0 0.0 - 0.4 K/UL    ABS. BASOPHILS 0.0 0.0 - 0.1 K/UL    ABS. IMM.  GRANS. 0.0 K/UL    DF AUTOMATED      RBC COMMENTS ANISOCYTOSIS  2+        RBC COMMENTS MACROCYTOSIS  1+        RBC COMMENTS MICROCYTOSIS  1+        RBC COMMENTS POIKILOCYTOSIS  1+        RBC COMMENTS TEARDROP CELLS  1+        RBC COMMENTS SCHISTOCYTES  PRESENT           Pre-medications  were administered as ordered and chemotherapy was initiated. Medication:  NS KVO  Decadron IVP  Gemzar IV    Mr. Diana Acevedo tolerated treatment well and was discharged from [place] [address] in stable condition at 1340. He is to return on 05/01/2018 at 10:00am for his next appointment.     Sami Alvarado  April 17, 2018

## 2018-04-23 NOTE — TELEPHONE ENCOUNTER
Pt is scheduled with Radiation Oncology on 4/24/18 at 1130am with Dr. Erwin Ferguson. Pt is also scheduled with Dr. Raysa Escobar and Nuha Limon NP on 4/25 at 1015am.    Called pt and left a voicemail concerning both appointments and encouraged pt to return my call to confirm these appointments.

## 2018-04-23 NOTE — TELEPHONE ENCOUNTER
[address] at [facility]  [phone]    Scans show progression. I called and reviewed with the patient. He is having progressive dyspnea and chest wall pain, which cooresponds with his progressive lung mass and chest wall invasion. He has pre-screening for the LungMAP trial, which we can now proceed with. We will move up his follow up appt to see me this week. [facility] to help with his chest wall invasion and dyspnea. I will refer to pulmonary to help us with medical management of his dyspnea. I will re-refer to palliative care. He is now willing to see them.

## 2018-04-25 NOTE — PROGRESS NOTES
[facility] at [facility]  [address] [facility], [address] [facility] [address] [facility]  [facility]: [phone]  F: [phone]        Reason for Visit:   Michelle Koenig is a 78 y.o. male who is seen for follow up of lung cancer. Treatment History:   · Low-dose CT Chest 11/22/2017: Probably left upper lobe mass obstructing left upper lobe bronchus, near complete atelectasis of FANY, probably left hilar adenopathy. 3.9cm left adrenal nodule. Moderate centrilobular emphysema. · CT C/A/P 12/6/2017: Left adrenal metastasis. Large mass in the left upper lobe versus markedly atelectatic lung from central tumor. 7 mm nonspecific pleural-based nodule in the left lower lobe. Extensive emphysematous change. 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 Recent CT showed progression of disease, I spoke with patient via telephone to discuss switching therapy and he is here to discuss in detail. He states he is having increased shortness of breath and left chest pain with breathing. Fatigue worse. Spending most of his time sitting at home due to shortness of breath. Seen by radiation oncology this morning with plans to start palliative radiation to chest wall mass next week. Has appt with pulmonary this afternoon. He is unaccompanied today. PAST HISTORY: The following sections were reviewed and updated in the EMR as appropriate: PMH, SH, FH, Medications, Allergies.       Allergies   Allergen Reactions    Pcn [Penicillins] Hives     Tolerated ancef graded challenge 12/21/2017 with no adverse side effects noted      Review of Systems: A complete review of systems was obtained, reviewed, and scanned into the EMR. Pertinent findings reviewed above. Physical Exam:     Visit Vitals    /66 (BP 1 Location: Left arm, BP Patient Position: Sitting)    Pulse 93    Temp 96.9 °F (36.1 °C) (Oral)    Resp 16    Ht 5' 10\" (1.778 m)    Wt 174 lb (78.9 kg)    SpO2 94%    BMI 24.97 kg/m2     ECOG PS: 1-2  General: No distress  Eyes: PERRLA, anicteric sclerae  HENT: Atraumatic, OP clear  Neck: Supple  Lymphatic: No cervical, supraclavicular, or inguinal adenopathy  Respiratory: CTAB, normal respiratory effort  CV: Normal rate, regular rhythm, no murmurs, no peripheral edema  GI: Soft, nontender, nondistended, no masses, no hepatomegaly, no splenomegaly  MS: Normal gait and station. Digits without clubbing or cyanosis. Skin: No ecchymoses, or petechiae. Normal temperature, turgor, and texture. Psych: Alert, oriented, appropriate affect, normal judgment/insight      Results:     Lab Results   Component Value Date/Time    WBC 4.2 04/17/2018 10:29 AM    HGB 6.4 (L) 04/17/2018 10:29 AM    HCT 19.6 (L) 04/17/2018 10:29 AM    PLATELET 90 (L) 94/32/6492 10:29 AM    .5 (H) 04/17/2018 10:29 AM    ABS. NEUTROPHILS 2.8 04/17/2018 10:29 AM     Lab Results   Component Value Date/Time    Sodium 130 (L) 04/10/2018 10:24 AM    Potassium 3.8 04/10/2018 10:24 AM    Chloride 98 04/10/2018 10:24 AM    CO2 28 04/10/2018 10:24 AM    Glucose 145 (H) 04/10/2018 10:24 AM    BUN 11 04/10/2018 10:24 AM    Creatinine 0.78 04/10/2018 10:24 AM    GFR est AA >60 04/10/2018 10:24 AM    GFR est non-AA >60 04/10/2018 10:24 AM    Calcium 9.0 04/10/2018 10:24 AM     Lab Results   Component Value Date/Time    Bilirubin, total 0.5 04/10/2018 10:24 AM    ALT (SGPT) 15 04/10/2018 10:24 AM    AST (SGOT) 21 04/10/2018 10:24 AM    Alk.  phosphatase 70 04/10/2018 10:24 AM    Protein, total 6.9 04/10/2018 10:24 AM    Albumin 2.2 (L) 04/10/2018 10:24 AM    Globulin 4.7 (H) 04/10/2018 10:24 AM       CT C/A/P 2/24/2018:   1. Consolidations/mass with central necrosis in the majority of the left upper lobe, not significantly changed in appearance. There is diminished size of a left adrenal mass suggesting response to treatment. 2. There is a short segment of mural thickening in the sigmoid colon. There is also diverticulosis in the sigmoid colon. Findings could represent diverticulitis or colitis. Underlying mass cannot be excluded. Recommend  clinical correlation and attention on follow-up. CT C/A/P 4/21/2018: There has been interval progression of disease with enlargement of left upper lobe mass which now invades the chest wall and the upper mediastinum with increased anterior mediastinal metastatic adenopathy, increased left lower lobe pulmonary metastasis, and increased left adrenal metastasis as above. No other sites of metastasis identified within the thorax, abdomen, or pelvis. Assessment:   1) Metastatic Non-small cell lung cancer (Squamous Cell Carcinoma)  Stage FARIDEH, PDL1 negative  He has disease within his lung and left adrenal gland. His cancer is not curable and management is with palliative intent. He has recently completed four cycles of palliative chemotherapy with Carboplatin and Gemcitabine. Unfortunately, recent imaging shows progression of disease, discussed with patient again today. He has consented to LungMAP trial and was randomized to receive an arm which has been closed due to futility. We discussed this today. He may be eligible for a different arm of the study in the future, once he has progressed on immunotherapy. My recommendation is to switch to palliative immunotherapy with nivolumab. We discussed the risks and benefits of Nivolumab therapy.  Potential side effects include, but are not limited to: fatigue, rash, autoimmune issues (ie: pneumonitis, colitis, hepatitis, etc), and rarely, death. The patient has consented to beginning therapy. Written consent obtained today and we will plan to start next week. He is additionally having worsening pain related to his progressive lung mass and chest wall invasion. I have referred him to radiation oncology and they are planning palliative XRT. Pathology specimen insufficient for FoundationOne. We can consider FoundationACT testing in the future. 2) Thrombocytopenia  Secondary to therapy. Should improve with switching therapy. 3) Diarrhea  Secondary to therapy. Improved at present. Continue Imodium PRN watery or frequent stool. Hopefully this will improve with switching therapy. Monitor. 4) Right Femoral neck fx  S/p surgery 12/21/2017. Getting PT at home. 5) Anemia   Worsening recently. Labs showed some iron deficiency, so he is now on oral iron. Monitor and transfuse PRN Hgb <7.     6) COPD  Seen by pulmonary in the hospital.  Now with worsening dyspnea, likely related to his progressive cancer. Has follow up with pulmonary later today to discuss medical therapy to help with his symptoms. 7) Cancer Pain  Seen by palliative care in the hospital.  Follow up with them as outpatient, not yet scheduled. Start oxycodone 5mg q4h PRN, prescribed today. Palliative radiation planned. 8) H/o RA  Not currently on therapy, symptoms well controlled. 9) Hemoptysis  Resolved at present. Mild. Monitor. Consider radiation if worsening significantly. 10) CODE STATUS: DNR  DDNR signed previously and scanned into record.       Plan:     · D/C Gemcitabine and carboplatin  · Follow up with pulmonary as planned later today  · Follow up with palliative care-referral pending  · Proceed with radiation as planned  · Plan to proceed next week with C#1 nivolumab 240mg given every 2 weeks  · Labs: Check BMP and hepatic function panel every 2 weeks, Check CBC and TSH every 6 weeks  ·  following  · Start oxycodone PRN  · Return to clinic next week with start of immune therapy    Patient was seen in conjunction with Kalyn Allen NP.    >50% of this 30 minute visit was spent on counseling/coordination of care regarding the above diagnoses.       Signed By: Ankit Jaffe MD

## 2018-05-03 NOTE — PROGRESS NOTES
[facility]  Nursing Note  [phone]  Fax [phone]     Name:  Makenna Jordan  YOB: 1940     Received initial outpatient Palliative Medicine referral from Dr. Remi Johnson on 12/27/18 to see pt for symptom management and care decisions. Pt has stage IV non small cell lung cancer of the left lung. Nurse contacted pt and at that time pt declined Palliative. Pt's most recent office visit with Dr. Susan Hodges was on 4/25/18, pt is now experiencing worsening pain related to his progressive lung mass and chest wall invasion. Pt was re-referred to outpatient Palliative Medicine. Nurse called pt, no answer, left message. Pt's grandson immediately returned the call saying he was in the room with his grandfather and heard the message. He says his grandfather is not interested in Palliative Medicine. Nurse explained outpatient Palliative Medicine services and the role of Palliative Medicine (\"extra layer of support\", symptom management, care decisions, improving quality of life, etc.)  Nurse clarified that Palliative Medicine is not the same as hospice and that our team can help pt with his pain and other symptom management. Soledad wrote down our phone number and says he will call back if he can persuade his grandfather to agree to an appt.     LYNN ZuritaN, RN  Clinical Referral Navigator

## 2018-05-07 NOTE — TELEPHONE ENCOUNTER
[facility] at [facility]  [phone]    05/07/18Rona Flynn with radiation oncology called to let  know patient fell in the parking lot today prior to making their appointment. EMS was called but patient denied care and drove him self home. She reported patient reported increased SOB with dizziness and a fall yesterday. Patient updated their office that he made it home safely and will be at [address] appointments. 4:49 PM- Voicemail left for patient- needing to check in on patient and to inquire why patient refused palliative care services. Waiting on returned phone call. 05/09/18- Tried calling patient to check in on him- VM left for patient. Patient returned phone call he reported that \" I am feeling better, my breathing is just bad\". He reported increased SOB upon ambulation- improves with rest. His oxygen SATs have been around 93 % while in radiation. He denied any chest pain, increased heart rate, falls or other symptoms. Discussed palliative care services with him- he is now agreeable to meeting with their office for symptom management. Advised him someone will be calling him to schedule. He verbalized understanding- encouraged him to return phone call if he has any further questions or concerns.

## 2018-05-15 NOTE — PROGRESS NOTES
[facility] at [facility]  [address] [facility], [address] [place] [address]  Nilesh [phone]  F: [phone]        Reason for Visit:   Conor Winn is a 78 y.o. male who is seen for follow up of lung cancer. Treatment History:   · Low-dose CT Chest 2017: Probably left upper lobe mass obstructing left upper lobe bronchus, near complete atelectasis of FANY, probably left hilar adenopathy. 3.9cm left adrenal nodule. Moderate centrilobular emphysema. · CT C/A/P 2017: Left adrenal metastasis. Large mass in the left upper lobe versus markedly atelectatic lung from central tumor. 7 mm nonspecific pleural-based nodule in the left lower lobe. Extensive emphysematous change. 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 He states he is feeling tired today. Shortness of breath may be worse since radiation. He states chest wall pain persists, no improvement with oxycodone 5mg every 4 hours. He is unaccompanied today in exam room, has family in waiting room. PAST HISTORY: The following sections were reviewed and updated in the EMR as appropriate: PMH, SH, FH, Medications, Allergies. Allergies   Allergen Reactions    Pcn [Penicillins] Hives     Tolerated ancef graded challenge 2017 with no adverse side effects noted      Review of Systems: A complete review of systems was obtained, reviewed, and scanned into the EMR.   Pertinent findings reviewed above. Physical Exam:     Visit Vitals    BP (!) 89/55 (BP 1 Location: Right arm, BP Patient Position: Sitting)    Pulse (!) 105    Temp 98.1 °F (36.7 °C) (Oral)    Resp 22    SpO2 93%     ECOG PS: 2  General: No distress, fatigued appearing  Eyes: PERRLA, anicteric sclerae  HENT: Atraumatic, OP clear  Neck: Supple  Lymphatic: No cervical, supraclavicular, or inguinal adenopathy  Respiratory: CTAB, normal respiratory effort  CV: Normal rate, regular rhythm, no murmurs, no peripheral edema  GI: Soft, nontender, nondistended, no masses, no hepatomegaly, no splenomegaly  MS: Normal gait and station. Digits without clubbing or cyanosis. Skin: No ecchymoses, or petechiae. Normal temperature, turgor, and texture. Psych: Alert, oriented, appropriate affect, normal judgment/insight      Results:     Lab Results   Component Value Date/Time    WBC 13.7 (H) 05/15/2018 01:05 PM    HGB 6.7 (L) 05/15/2018 01:05 PM    HCT 21.6 (L) 05/15/2018 01:05 PM    PLATELET 82 (L) [birth year]1947 01:05 PM    .4 (H) 05/15/2018 01:05 PM    ABS. NEUTROPHILS 11.9 (H) 05/15/2018 01:05 PM     Lab Results   Component Value Date/Time    Sodium 126 (L) 05/15/2018 01:05 PM    Potassium 4.1 05/15/2018 01:05 PM    Chloride 93 (L) 05/15/2018 01:05 PM    CO2 27 05/15/2018 01:05 PM    Glucose 108 (H) 05/15/2018 01:05 PM    BUN 13 05/15/2018 01:05 PM    Creatinine 0.64 (L) 05/15/2018 01:05 PM    GFR est AA >60 05/15/2018 01:05 PM    GFR est non-AA >60 05/15/2018 01:05 PM    Calcium 8.7 05/15/2018 01:05 PM     Lab Results   Component Value Date/Time    Bilirubin, total 0.8 05/15/2018 01:05 PM    ALT (SGPT) 25 05/15/2018 01:05 PM    AST (SGOT) 32 05/15/2018 01:05 PM    Alk. phosphatase 75 05/15/2018 01:05 PM    Protein, total 6.1 (L) 05/15/2018 01:05 PM    Albumin 1.9 (L) 05/15/2018 01:05 PM    Globulin 4.2 (H) 05/15/2018 01:05 PM       CT C/A/P 2/24/2018:   1.  Consolidations/mass with central necrosis in the majority of the left upper lobe, not significantly changed in appearance. There is diminished size of a left adrenal mass suggesting response to treatment. 2. There is a short segment of mural thickening in the sigmoid colon. There is also diverticulosis in the sigmoid colon. Findings could represent diverticulitis or colitis. Underlying mass cannot be excluded. Recommend  clinical correlation and attention on follow-up. CT C/A/P 4/21/2018: There has been interval progression of disease with enlargement of left upper lobe mass which now invades the chest wall and the upper mediastinum with increased anterior mediastinal metastatic adenopathy, increased left lower lobe pulmonary metastasis, and increased left adrenal metastasis as above. No other sites of metastasis identified within the thorax, abdomen, or pelvis. Assessment:   1) Metastatic Non-small cell lung cancer (Squamous Cell Carcinoma)  Stage FARIDEH, PDL1 negative  He has disease within his lung and left adrenal gland. His cancer is not curable and management is with palliative intent. He has completed four cycles of palliative chemotherapy with Carboplatin and Gemcitabine. He has now finished palliative radiation to his chest wall. He has consented to LungMAP trial and was randomized to receive an arm which has been closed due to futility. He may be eligible for a different arm of the study in the future, once he has progressed on immunotherapy. My recommendation therefore is to switch to palliative immunotherapy with nivolumab. He has consented to therapy and we will start today. We will plan to see him back in 2 weeks with next cycle of therapy. Pathology specimen insufficient for FoundationOne. We can consider FoundationACT testing in the future. 2) Thrombocytopenia  Secondary to therapy. Hopefully will improve with switching therapy. 3) Diarrhea  Secondary to therapy. Improved at present. Continue Imodium PRN watery or frequent stool. Hopefully this will improve with switching therapy. Monitor. 4) Right Femoral neck fx  S/p surgery 12/21/2017. Getting PT at home. 5) Anemia   Worsening recently. Labs showed some iron deficiency, so he is now on oral iron. Monitor and transfuse PRN Hgb <7.  Low today, transfusion this week. 6) COPD  Pulmonary following. 7) Cancer Pain  S/P palliative radiation. Seen by palliative care in the hospital.  Follow up with them as outpatient, not yet scheduled which I encouraged patient to set up on the way out today. Increase oxycodone to 5-10mg q4h PRN, prescribed today. 8) H/o RA  Not currently on therapy, symptoms well controlled. 9) Hemoptysis  Resolved at present. Mild. Monitor. Consider radiation if worsening significantly. 10) Hypotension  IV fluids with treatment today. Not on BP meds. Monitor. 11) CODE STATUS: DNR  DDNR signed previously and scanned into record. Plan:     · Blood transfusion  · Follow up with palliative care-referral pending  · Proceed today with C#1 nivolumab 240mg given every 2 weeks  · Labs: Check BMP and hepatic function panel every 2 weeks, Check CBC and TSH every 6 weeks  ·  following  · Increase oxycodone to 5-10 mg q4h PRN  · Return to clinic in 2 weeks with next cycle of therapy or sooner if needed    Patient was seen in conjunction with Nelson Camacho NP.    >50% of this 30 minute visit was spent on counseling/coordination of care regarding the above diagnoses.       Signed By: Aline Garsia MD

## 2018-05-15 NOTE — PROGRESS NOTES
Problem: Patient Education:  Go to Education Activity  Goal: Patient/Family Education  Outcome: Progressing Towards Goal  Pt given discharge instructions and education on opdivo.

## 2018-05-15 NOTE — MR AVS SNAPSHOT
[address], [address] [place] [address] Road 
[phone] Patient: Marshal Stanford MRN: [id number] [facility]:2[birth year]19/[birth year]76 Visit Information Date & Time Provider Department Dept. Phone Encounter #  
 5/15/2018  1:15 PM Zoltan Huggins NP [address] at [facility] [phone] Follow-up Instructions Return in 2 weeks (on 5/29/2018) for labs, Eladia Nunez #2. Upcoming Health Maintenance Date Due DTaP/Tdap/Td series (1 - Tdap) 9/29/1963 ZOSTER VACCINE AGE 60> 7/29/2002 GLAUCOMA SCREENING Q2Y 9/29/2007 Pneumococcal 65+ High/Highest Risk (1 of 2 - PCV13) 9/29/2007 Influenza Age 5 to Adult 8/1/2018 MEDICARE YEARLY EXAM 11/14/2018 Allergies as of 5/15/2018  Review Complete On: 5/15/2018 By: Zoltan Huggins NP Severity Noted Reaction Type Reactions Pcn [Penicillins]  10/21/2014    Hives Tolerated ancef graded challenge 12/21/2017 with no adverse side effects noted Current Immunizations  Reviewed on 5/15/2018 No immunizations on file. Reviewed by Nicky Talamantes on 5/15/2018 at 12:58 PM  
You Were Diagnosed With   
  
 Codes Comments Non-small cell cancer of left lung ([facility].)    -  Primary ICD-10-CM: C34.92 
ICD-9-CM: 162.9 Malignant neoplasm metastatic to left adrenal gland [facility])     ICD-10-CM: K93.78 ICD-9-CM: 198. 7 Chronic obstructive pulmonary disease, unspecified COPD type ([facility]     ICD-10-CM: J44.9 ICD-9-CM: 349 DNR (do not resuscitate)     ICD-10-CM: C04 ICD-9-CM: V49.86 Thrombocytopenia ([facility]     ICD-10-CM: D69.6 ICD-9-CM: 287.5 Anemia, unspecified type     ICD-10-CM: D64.9 ICD-9-CM: 285.9 Leg swelling     ICD-10-CM: M79.89 ICD-9-CM: 729.81 Hypotension, unspecified hypotension type     ICD-10-CM: I95.9 ICD-9-CM: 458.9 Cancer associated pain     ICD-10-CM: G89.3 ICD-9-CM: 338. 3 Vitals BP Pulse Temp Resp SpO2 Smoking Status (!) 89/55 (BP 1 Location: Right arm, BP Patient Position: Sitting) (!) 105 98.1 °F (36.7 °C) (Oral) 22 93% Current Every Day Smoker Preferred Pharmacy Pharmacy Name Phone [facility] [address] [place] [address], [facility] [address] [phone] Your Updated Medication List  
  
   
This list is accurate as of 5/15/18  2:05 PM.  Always use your most recent med list.  
  
  
  
  
 albuterol 2.5 mg /3 mL (0.083 %) nebulizer solution Commonly known as:  PROVENTIL VENTOLIN  
USE 2 VIALS VIAL NEBULIZER EVERY 4 HOURS AS NEEDED FOR WHEEZING  
  
 albuterol-ipratropium 2.5 mg-0.5 mg/3 ml Nebu Commonly known as:  DUO-NEB  
3 mL by Nebulization route every four (4) hours as needed. lidocaine-prilocaine topical cream  
Commonly known as:  EMLA Apply  to affected area as needed for Pain (Apply 30-60 min. prior to having your port accessed). loperamide 2 mg capsule Commonly known as:  IMODIUM Take 2 mg by mouth as needed for Diarrhea. Indications: Diarrhea  
  
 nystatin topical cream  
Commonly known as:  MYCOSTATIN Apply  to affected area two (2) times a day. ondansetron hcl 8 mg tablet Commonly known as:  Liliane Dakins Take 1 Tab by mouth every eight (8) hours as needed for Nausea. OTHER(NON-FORMULARY) Natural Colon Cleanse four pills by mouth twice daily as needed for constipation. oxyCODONE IR 5 mg immediate release tablet Commonly known as:  Lillian Balk Take 1-2 Tabs by mouth every four (4) hours as needed for Pain (cancer pain). Max Daily Amount: 60 mg.  
  
 prochlorperazine 10 mg tablet Commonly known as:  COMPAZINE Take 1 Tab by mouth every six (6) hours as needed for Nausea. tiotropium-olodaterol 2.5-2.5 mcg/actuation Mist  
Commonly known as:  Bryan Severe Take 2 Puffs by inhalation daily. Prescriptions Printed Refills oxyCODONE IR (ROXICODONE) 5 mg immediate release tablet 0 Sig: Take 1-2 Tabs by mouth every four (4) hours as needed for Pain (cancer pain). Max Daily Amount: 60 mg.  
 Class: Print Route: Oral  
  
Follow-up Instructions Return in 2 weeks (on 5/29/2018) for labs, Solomon Moore #2. To-Do List   
 05/29/2018 11:00 AM  
  Appointment with Hollie Fish 5 at [place] [address] ([phone])  
  
 06/12/2018 11:00 AM  
  Appointment with Kaushik 2 at [place] [address] ([phone]) Introducing [facility]! Padma George introduces famPlus patient portal. Now you can access parts of your medical record, email your doctor's office, and request medication refills online. 1. In your internet browser, go to [email or web address] 2. Click on the First Time User? Click Here link in the Sign In box. You will see the New Member Sign Up page. 3. Enter your famPlus Access Code exactly as it appears below. You will not need to use this code after youve completed the sign-up process. If you do not sign up before the expiration date, you must request a new code. · famPlus Access Code: [email or web address] Expires: 5/24/2018  4:52 PM 
 
4. Enter the last four digits of your Social Security Number (xxxx) and Date of Birth (mm/dd/yyyy) as indicated and click Submit. You will be taken to the next sign-up page. 5. Create a [employer] ID. This will be your famPlus login ID and cannot be changed, so think of one that is secure and easy to remember. 6. Create a famPlus password. You can change your password at any time. 7. Enter your Password Reset Question and Answer. This can be used at a later time if you forget your password. 8. Enter your e-mail address. You will receive e-mail notification when new information is available in [address]. 9. Click Sign Up. You can now view and download portions of your medical record. 10. Click the Download Summary menu link to download a portable copy of your medical information. If you have questions, please visit the Frequently Asked Questions section of the [employer] website. Remember, [employer] is NOT to be used for urgent needs. For medical emergencies, dial 911. Now available from your iPhone and Android! Please provide this summary of care documentation to your next provider. Your primary care clinician is listed as NORMA COY. If you have any questions after today's visit, please call [phone].

## 2018-05-15 NOTE — TELEPHONE ENCOUNTER
Calling patient back regarding scheduling appt with Dr. Kala Jensen at [facility]. Patient not home yet so left message with my name and number for patient to call me to schedule. Dr. Kala Jensen will be off on 5/29/18 so we will need to schedule for another date.

## 2018-05-15 NOTE — PROGRESS NOTES

## 2018-05-15 NOTE — PROGRESS NOTES
[facility] Progress Note    Date: May 15, 2018    Name: Marlon Carcamo    MRN: [id number]         : 1940    Mr. Saida Huffman Arrived ambulatory and in no distress for cycle 1 of Opdivo regimen. Assessment was completed, no acute issues at this time, no new complaints voiced. Patient still having pain in rib area and weakness/fatigue as well as cough and SOB. Right chest port accessed without difficulty, labs drawn and in process. HgB 6.7 type and cross sent blood ordered for 18, MD office to send order. 1L NS d/t low BP. Proceed with opdivo per MD office order sent. Discharge instructions given pt signed informed consent no further questions from patient. Patient left his inhaler at home and was past time for his treatment, pt started to feel wheezy, notified MD office who gave order for one time use of albuterol inhaler. Patients wheezing resolved after use of inhaler. 1320:  Proceeded to appt with Dr. Clay Du. Mr. Jackson's vitals were reviewed. Patient Vitals for the past 12 hrs:   Temp Pulse Resp BP SpO2   05/15/18 1639 97.4 °F (36.3 °C) 84 18 94/63 94 %   05/15/18 1312 97.8 °F (36.6 °C) (!) 110 - (!) 87/55 93 %     Lab results were obtained and reviewed. Recent Results (from the past 12 hour(s))   CBC WITH AUTOMATED DIFF    Collection Time: 05/15/18  1:05 PM   Result Value Ref Range    WBC 13.7 (H) 4.1 - 11.1 K/uL    RBC 2.03 (L) 4.10 - 5.70 M/uL    HGB 6.7 (L) 12.1 - 17.0 g/dL    HCT 21.6 (L) 36.6 - 50.3 %    .4 (H) 80.0 - 99.0 FL    MCH 33.0 26.0 - 34.0 PG    MCHC 31.0 30.0 - 36.5 g/dL    RDW 18.8 (H) 11.5 - 14.5 %    PLATELET 82 (L) 486 - 400 K/uL    MPV 9.1 8.9 - 12.9 FL    NRBC 0.0 0  WBC    ABSOLUTE NRBC 0.00 0.00 - 0.01 K/uL    NEUTROPHILS 82 (H) 32 - 75 %    BAND NEUTROPHILS 5 0 - 6 %    LYMPHOCYTES 2 (L) 12 - 49 %    MONOCYTES 9 5 - 13 %    EOSINOPHILS 0 0 - 7 %    BASOPHILS 0 0 - 1 %    MYELOCYTES 2 (H) 0 %    IMMATURE GRANULOCYTES 0 %    ABS.  NEUTROPHILS 11.9 (H) 1.8 - 8.0 K/UL    ABS. LYMPHOCYTES 0.3 (L) 0.8 - 3.5 K/UL    ABS. MONOCYTES 1.2 (H) 0.0 - 1.0 K/UL    ABS. EOSINOPHILS 0.0 0.0 - 0.4 K/UL    ABS. BASOPHILS 0.0 0.0 - 0.1 K/UL    ABS. IMM. GRANS. 0.0 K/UL    DF MANUAL      RBC COMMENTS OVALOCYTES  PRESENT        RBC COMMENTS ANISOCYTOSIS  1+       METABOLIC PANEL, COMPREHENSIVE    Collection Time: 05/15/18  1:05 PM   Result Value Ref Range    Sodium 126 (L) 136 - 145 mmol/L    Potassium 4.1 3.5 - 5.1 mmol/L    Chloride 93 (L) 97 - 108 mmol/L    CO2 27 21 - 32 mmol/L    Anion gap 6 5 - 15 mmol/L    Glucose 108 (H) 65 - 100 mg/dL    BUN 13 6 - 20 MG/DL    Creatinine 0.64 (L) 0.70 - 1.30 MG/DL    BUN/Creatinine ratio 20 12 - 20      GFR est AA >60 >60 ml/min/1.73m2    GFR est non-AA >60 >60 ml/min/1.73m2    Calcium 8.7 8.5 - 10.1 MG/DL    Bilirubin, total 0.8 0.2 - 1.0 MG/DL    ALT (SGPT) 25 12 - 78 U/L    AST (SGOT) 32 15 - 37 U/L    Alk. phosphatase 75 45 - 117 U/L    Protein, total 6.1 (L) 6.4 - 8.2 g/dL    Albumin 1.9 (L) 3.5 - 5.0 g/dL    Globulin 4.2 (H) 2.0 - 4.0 g/dL    A-G Ratio 0.5 (L) 1.1 - 2.2     TSH 3RD GENERATION    Collection Time: 05/15/18  1:05 PM   Result Value Ref Range    TSH 0.90 0.36 - 3.74 uIU/mL     Pre-medications  were administered as ordered and chemotherapy was initiated. 1 L NS  opdivo IV  Albuterol inhaler    Mr. Serafin Sheridan tolerated treatment well and was discharged from [place] [address] in stable condition at 1655. He is to return on 5/17/18 at 0900 for his next appointment.     [facility]  May 15, 2018

## 2018-05-16 PROBLEM — I48.91 A-FIB (HCC): Status: ACTIVE | Noted: 2018-01-01

## 2018-05-16 NOTE — ED PROVIDER NOTES
HPI Comments: 78 y.o. male with past medical history significant for lung cancer with metastases, skin cancer, HTN, and RA who presents to the ED with chief complaint of SOB. Pt reports SOB onset this morning accompanied by hypotension, says his O2 sats are low. Pt's O2 sats upon arrival to the ED are noted to be in the mid 80's on room air. Pt is also noted to be tachycardic and pale. Pt states he has hx of anemia. There are no other acute medical complaints voiced at this time. Social Hx: Smoker. Lives with grandson in [place]. PCP: Alanna Driver MD    Note written by Brandon Heredia, as dictated by Aneta Mckeon MD 2:03 PM     The history is provided by the patient. Past Medical History:   Diagnosis Date    HTN (hypertension) 10/21/2014    Rheumatoid arthritis(714.0) 10/21/2014    Skin cancer     Sun-damaged skin     Sunburn, blistering        Past Surgical History:   Procedure Laterality Date    HX OTHER SURGICAL      Hemmorhiodectomy         No family history on file. Social History     Social History    Marital status: SINGLE     Spouse name: N/A    Number of children: N/A    Years of education: N/A     Occupational History    Not on file. Social History Main Topics    Smoking status: Current Every Day Smoker     Packs/day: 1.00    Smokeless tobacco: Never Used    Alcohol use No    Drug use: No    Sexual activity: No     Other Topics Concern    Not on file     Social History Narrative         ALLERGIES: Pcn [penicillins]    Review of Systems   Constitutional: Negative for chills and fever. HENT: Negative for ear pain and sore throat. Eyes: Negative for photophobia and pain. Respiratory: Positive for shortness of breath. Negative for chest tightness. Cardiovascular: Negative for chest pain and leg swelling. Gastrointestinal: Negative for abdominal pain, nausea and vomiting. Genitourinary: Negative for dysuria and flank pain. Musculoskeletal: Negative for back pain and neck pain. Skin: Positive for pallor. Negative for rash and wound. Neurological: Negative for dizziness, light-headedness and headaches. All other systems reviewed and are negative. Vitals:    05/16/18 1348 05/16/18 1352 05/16/18 1354 05/16/18 1400   BP: 109/52   113/68   Pulse:       Resp:       Temp:       SpO2:  (!) 84% 100% (!) 89%   Weight:       Height:                Physical Exam   Constitutional: He is oriented to person, place, and time. He appears well-developed and well-nourished. He appears distressed. HENT:   Head: Normocephalic and atraumatic. Mouth/Throat: Oropharynx is clear and moist.   Eyes: EOM are normal.   Pale conjunctiva   Neck: Normal range of motion. Cardiovascular: Regular rhythm, normal heart sounds and intact distal pulses. Tachycardia present. No murmur heard. Pulmonary/Chest: Effort normal and breath sounds normal. No stridor. No respiratory distress. He has no wheezes. Abdominal: Soft. Bowel sounds are normal. There is no tenderness. Musculoskeletal: Normal range of motion. He exhibits no edema or tenderness. Neurological: He is alert and oriented to person, place, and time. No cranial nerve deficit. Skin: Skin is warm and dry. He is not diaphoretic. There is pallor. Psychiatric: He has a normal mood and affect. Nursing note and vitals reviewed. MDM  Number of Diagnoses or Management Options  Diagnosis management comments: Patient with hypoxic with SOB and tachycardia and anemia - check labs, plan for admit and blood transfusion. Consult to pulmonary medicine for lung cancer and pleural effusion. Admit to family practice.   Labs, CXR, EKG,        Amount and/or Complexity of Data Reviewed  Clinical lab tests: reviewed and ordered  Tests in the radiology section of CPT®: ordered and reviewed  Discuss the patient with other providers: yes          ED Course       Procedures    ED EKG interpretation:  Rhythm: atrial fib with RVR; and irregular. Rate (approx.): 120; incomplete RBBB; LAFB; ST/T wave: No STEMI. Note written by Brandon Ayala, as dictated by Kellen Worrell MD 2:00 PM    CONSULT NOTE:  3:00 PM Kellen Worrell MD spoke with Dr. Jens Salomon for Pulmonology. Discussed available diagnostic tests and clinical findings. Dr. Brenda Mckeon will see pt. CONSULT NOTE:  3:08 PM Kellen Worrell MD spoke with resident, Consult for [facility]. Discussed available diagnostic tests and clinical findings. Family Practice will admit pt. Recent Results (from the past 24 hour(s))   TYPE + CROSSMATCH    Collection Time: 05/15/18  2:47 PM   Result Value Ref Range    Crossmatch Expiration 05/18/2018     ABO/Rh(D) A NEGATIVE     Antibody screen NEG     Unit number [id number]     Blood component type RC LR     Unit division 00     Status of unit ALLOCATED     Crossmatch result Compatible     Unit number [id number]     Blood component type RC LR,2     Unit division 00     Status of unit ALLOCATED     Crossmatch result Compatible        Xr Chest Port    Result Date: 5/16/2018  EXAM:  XR CHEST PORT INDICATION:  Shortness of breath, hypotension, and hypoxia. Metastatic left upper lobe lung carcinoma with adrenal and pulmonary metastases. COMPARISON: Chest views on 12/20/2017. CT chest on 4/21/2018. TECHNIQUE: 2 images of upright portable chest AP digital view FINDINGS: Right port and catheter are unchanged since last month. Cardiac size and aortic contours are within normal limits. Mediastinal lymphadenopathy may be decreased. The pulmonary vasculature is within normal limits. Left upper lobe mass is less conspicuous. Moderate left pleural effusion is new. No pneumothorax. Emphysema is unchanged. Bones are unchanged. IMPRESSION: New moderate left pleural effusion. Decreased left upper lobe mass. No pneumothorax.

## 2018-05-16 NOTE — PROGRESS NOTES
2018  4:07 PM  Reason for Admission:   Hypotension and hypoxia               RRAT Score:  25                Resources/supports as identified by patient/family:   Pt lives w/ his grandson Susy Ramirez who is self-employed and disabled, Figueroa Eng is able to assist the Pt w/ needs and is primary homemaker for the Pt. Top Challenges facing patient (as identified by patient/family and CM): None identified by Pt/Family                     Finances/Medication cost?    Pt has Rx coverage, fills at [facility] in [place], which is walking distance from his home and grandson is able to               Transportation? Pt does not drive, grandson Car Monson does not drive, Pt relies on friends/neighbors for transport              Support system or lack thereof? Pt has good support from grandson Figueroa Eng with whom he resides, Figueroa Eng is able to assist the Pt. w/ needs and the Pt is rarely left alone; currently tay provides meal prep and homemaking, Pt reports he is currently independent w/ ADL's. Living arrangements? Currently resides w/ his grandson Susy Ramirez in a 1 story home w/ no entry steps, Car Monson is self-employed and disabled and Pt has care  providing the Pt w/ meals and  as needed. Self-care/ADLs/Cognition? Pt reports he currently is able to perform ADL's independently but grandson can assist.          Current Advanced Directive/Advance Care Plan:  None                          Plan for utilizing home health:    Prefers [address]                      Likelihood of readmission:  Red/High due to chronic disease/COPD                 Transition of Care Plan:       Pt to admit to [facility] for medical management, PT/OT evals have been ordered to determine discharge needs, safe dispo. CM met w/ Pt and samanta Ramirez to begin d/c planning, charted demographics verified.   PCP is Calixto Odell MD, Pt has not seen PCP since 2017, [facility] notified of admission. Rx Pt has coverage prefers [facility] in [place]. [facility]  12/2017, prefers [facility]. DME: RW which Pt uses PRN, nebulizer, Pt does not have home O2. Care Management Interventions  PCP Verified by CM: Yes Afshin Monterroso MD; last visit 2017)  Palliative Care Criteria Met (RRAT>21 & CHF Dx)?: No (No documented co-morbidities)  Mode of Transport at Discharge: Self (Friend will transport)  Physical Therapy Consult: Yes  Occupational Therapy Consult: Yes  Current Support Network: Lives with Caregiver (Pt lives w/ Aldean Rides C) [phone])  Confirm Follow Up Transport: Friends  Discharge Location  Discharge Placement: Unable to determine at this time      Current plan is admit to [facility] for medical management, PT/OT montserrat to determine dispo at d/c, floor CM will follow and assist w/ d/c needs.   Per Pt friend Osmany Anais can provide transport at d/c.  Corrina Viera

## 2018-05-16 NOTE — PROGRESS NOTES
[facility] Dosing Services: Antimicrobial Stewardship Progress Note    Consult for antibiotic dosing of Vancomycin by Mary Kay Javed NP  Pharmacist reviewed antibiotic appropriateness for 78year old , male  for indication of Pneumonia (CAP)  Day of Therapy: 1  Rx note: patient received Opdivo infusion on 5/15/2018. Plan:  Vancomycin therapy:  Start Vancomycin therapy, with loading dose of 2,000 mg IV at 1600 on 5/16/2018. Follow with maintenance dose of 1,250 mg IV at 0400 on 5/17/2018, every 12 hours. Dose calculated to approximate a therapeutic trough of 15-20 mcg/mL. Pharmacist will follow daily and will make changes to dose and/or frequency based on clinical status. Serum Creatinine     Lab Results   Component Value Date/Time    Creatinine 0.64 (L) 05/15/2018 01:05 PM       Creatinine Clearance Estimated Creatinine Clearance: 103 mL/min (based on Cr of 0.64).      Temp   98.9 °F (37.2 °C)    WBC   Lab Results   Component Value Date/Time    WBC 12.4 (H) 05/16/2018 02:28 PM       H/H   Lab Results   Component Value Date/Time    HGB 6.5 (L) 05/16/2018 02:28 PM        Platelets   Lab Results   Component Value Date/Time    PLATELET 68 (L) 79/04/5445 02:28 PM      Krystal Mcneil, Pharmacist

## 2018-05-16 NOTE — ED NOTES
TRANSFER - OUT REPORT:    Verbal report given to Leticia Sanders RN (name) on Alessandra Miller  being transferred to Tele [phone]) (unit) for routine progression of care       Report consisted of patients Situation, Background, Assessment and   Recommendations(SBAR). Information from the following report(s) SBAR and ED Summary was reviewed with the receiving nurse. Lines:   Venous Access Device BARD PORT 01/04/18 Upper chest (subclavicular area, right (Active)       Venous Access Device 05/16/18 Upper chest (subclavicular area, right (Active)       Peripheral IV 05/16/18 Right Forearm (Active)   Site Assessment Clean, dry, & intact 5/16/2018  3:39 PM   Phlebitis Assessment 0 5/16/2018  3:39 PM   Infiltration Assessment 0 5/16/2018  3:39 PM   Dressing Status Clean, dry, & intact 5/16/2018  3:39 PM        Opportunity for questions and clarification was provided.       Patient transported with:   [employer]

## 2018-05-16 NOTE — CONSULTS
Name: Sofia Darnell: [address]   : 1940 Admit Date: 2018   Phone: [phone]  Room: ER18/18   PCP: Reyna Do MD  MRN: [id number]   Date: 2018  Code: Prior        HPI:      Chart and notes reviewed. Data reviewed. I review the patient's current medications in the medical record at each encounter.  I have evaluated and examined the patient. 3:14 PM       History was obtained from patient. I was asked by No admitting provider for patient encounter. to see Luis Max in consultation for a chief complaint of COPD, pneumonia, lung CA. History of Present Illness:   is a very pleasant, 78year old gentleman that presented to [facility] directly after seeing Oli Carrasco NP of our practice today due to worsening shortness of breath. Per her report his oxygen saturations were in the 70s and his BP was 78 systolic in the office. 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 He also started immunotherapy with Nivolumab on 5/15. Reports that he completed radiation last week. He states that he has been feeling \"terrible,\" but has felt significantly worse since completing radiation last week. Feels tired. Coughing up phlegm that is dark and thick, but no more than usual.  Denies CP, LE pain. Has had some LE swelling that is at baseline for him. He was seen by Sandi Weber yesterday in the office and received fluids for hypotension: he was also scheduled for transfusion this week. He is a former smoker of 1ppd for 57 year and quit 10 months ago. His home COPD regimen includes Duo-Nebs, which he reports he uses \"every 2 or 3 hours. \"  Has tried Stiolto in the past without benefit. He is not on oxygen at home currently.   His last PFTs demonstrated an FEV1 of .85L (35% predicted) and FEV1/FVC of 37%.    Images:  Personally reviewed. CXR:   New moderate left pleural effusion. Decreased left upper lobe mass. No pneumothorax. WBC 12.4  Hgb 6.5  Plts 68  Na 126  Creat . 59    Past Medical History:   Diagnosis Date    HTN (hypertension) 10/21/2014    Rheumatoid arthritis(714.0) 10/21/2014    Skin cancer     Sun-damaged skin     Sunburn, blistering        Past Surgical History:   Procedure Laterality Date    HX OTHER SURGICAL      Hemmorhiodectomy       No family history on file. Social History   Substance Use Topics    Smoking status: Current Every Day Smoker     Packs/day: 1.00    Smokeless tobacco: Never Used    Alcohol use No       Allergies   Allergen Reactions    Pcn [Penicillins] Hives     Tolerated ancef graded challenge 12/21/2017 with no adverse side effects noted       Current Facility-Administered Medications   Medication Dose Route Frequency    sodium chloride 0.9 % bolus infusion 1,000 mL  1,000 mL IntraVENous ONCE     Current Outpatient Prescriptions   Medication Sig    oxyCODONE IR (ROXICODONE) 5 mg immediate release tablet Take 1-2 Tabs by mouth every four (4) hours as needed for Pain (cancer pain). Max Daily Amount: 60 mg.    nystatin (MYCOSTATIN) topical cream Apply  to affected area two (2) times a day.  loperamide (IMODIUM) 2 mg capsule Take 2 mg by mouth as needed for Diarrhea. Indications: Diarrhea    OTHER,NON-FORMULARY, Natural Colon Cleanse four pills by mouth twice daily as needed for constipation.  prochlorperazine (COMPAZINE) 10 mg tablet Take 1 Tab by mouth every six (6) hours as needed for Nausea.  lidocaine-prilocaine (EMLA) topical cream Apply  to affected area as needed for Pain (Apply 30-60 min. prior to having your port accessed).  ondansetron hcl (ZOFRAN) 8 mg tablet Take 1 Tab by mouth every eight (8) hours as needed for Nausea.  tiotropium-olodaterol (STIOLTO RESPIMAT) 2.5-2.5 mcg/actuation mist Take 2 Puffs by inhalation daily.     albuterol-ipratropium (DUO-NEB) 2.5 mg-0.5 mg/3 ml nebu 3 mL by Nebulization route every four (4) hours as needed.  albuterol (PROVENTIL VENTOLIN) 2.5 mg /3 mL (0.083 %) nebulizer solution USE 2 VIALS VIAL NEBULIZER EVERY 4 HOURS AS NEEDED FOR WHEEZING     Facility-Administered Medications Ordered in Other Encounters   Medication Dose Route Frequency    [START ON 5/17/2018] acetaminophen (TYLENOL) tablet 650 mg  650 mg Oral ONCE    [START ON 5/17/2018] diphenhydrAMINE (BENADRYL) capsule 25 mg  25 mg Oral ONCE    0.9% sodium chloride infusion 250 mL  250 mL IntraVENous PRN    albuterol (PROVENTIL HFA, VENTOLIN HFA, PROAIR HFA) inhaler 2 Puff  2 Puff Inhalation ONCE PRN         REVIEW OF SYSTEMS   Negative except as stated in the HPI. Physical Exam:   Visit Vitals    /70    Pulse (!) 121    Temp 98.9 °F (37.2 °C)    Resp 22    Ht 5' 10\" (1.778 m)    Wt 77.1 kg (170 lb)    SpO2 93%    BMI 24.39 kg/m2       General:  Alert, cooperative, no distress, appears stated age. Head:  Normocephalic, without obvious abnormality, atraumatic. Eyes:  Conjunctivae/corneas clear. Nose: Nares normal. Septum midline. Mucosa normal.    Throat: Lips, mucosa, and tongue normal. Teeth and gums normal.   Neck: Supple, symmetrical, trachea midline, no adenopathy   Back:   Symmetric, no curvature. ROM normal.   Lungs:   Poor air movement throughout, crackles on right   Chest wall:  No tenderness or deformity. Heart:  Regular rate and rhythm, S1, S2 normal, no murmur, click, rub or gallop. Abdomen:   Soft, non-tender. Bowel sounds normal.    Extremities: Extremities normal, atraumatic, no cyanosis, NP LE edema   Pulses: 2+ and symmetric all extremities.    Skin: Skin color, texture, turgor normal. No rashes or lesions   Lymph nodes: Cervical nodes normal.   Neurologic: Grossly nonfocal       Lab Results   Component Value Date/Time    Sodium 126 (L) 05/15/2018 01:05 PM    Potassium 4.1 05/15/2018 01:05 PM Chloride 93 (L) 05/15/2018 01:05 PM    CO2 27 05/15/2018 01:05 PM    BUN 13 05/15/2018 01:05 PM    Creatinine 0.64 (L) 05/15/2018 01:05 PM    Glucose 108 (H) 05/15/2018 01:05 PM    Calcium 8.7 05/15/2018 01:05 PM       Lab Results   Component Value Date/Time    WBC 12.4 (H) 05/16/2018 02:28 PM    HGB 6.5 (L) 05/16/2018 02:28 PM    PLATELET 68 (L) 39/12/1716 02:28 PM    .6 (H) 05/16/2018 02:28 PM       Lab Results   Component Value Date/Time    INR 1.1 12/20/2017 11:45 PM    aPTT 31.7 11/29/2017 04:26 PM    AST (SGOT) 32 05/15/2018 01:05 PM    Alk.  phosphatase 75 05/15/2018 01:05 PM    Protein, total 6.1 (L) 05/15/2018 01:05 PM    Albumin 1.9 (L) 05/15/2018 01:05 PM    Globulin 4.2 (H) 05/15/2018 01:05 PM       Lab Results   Component Value Date/Time    Iron 17 (L) 11/29/2017 04:26 PM    TIBC 202 (L) 11/29/2017 04:26 PM    Iron % saturation 8 (L) 11/29/2017 04:26 PM    Ferritin 461 (H) 11/29/2017 04:26 PM       Lab Results   Component Value Date/Time    TSH 0.90 05/15/2018 01:05 PM        No results found for: PH, PHI, PCO2, PCO2I, PO2, PO2I, HCO3, HCO3I, FIO2, FIO2I    No results found for: CPK, RCK1, RCK2, RCK3, RCK4, CKNDX, CKND1, TROPT, TROIQ, BNPP, BNP     No results found for: CULT    No results found for: TOXA1, RPR, HBCM, HBSAG, HAAB, HCAB1, HAAT, G6PD, CRYAC, HIVGT, HIVR, HIV1, HIV12, HIVPC, HIVRPI    No results found for: VANCT, CPK    No results found for: COLOR, APPRN, SPGRU, APOORVA, PROTU, GLUCU, KETU, BILU, BLDU, UROU, FLORA, LEUKU, WBCU, RBCU, UEPI, BACTU, CASTS, UCRY    IMPRESSION  · Acute Respiratory Failure with Hypoxia  · Abnormal CXR: with new left lower effusion with potential for underlying PNA vs. 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 We will follow along with you in 's care.          Horace Leal NP

## 2018-05-16 NOTE — H&P
[address] [facility] [address] [place] [address]   Office [phone], Fax [phone]      History & Physical    Date of admission: 5/16/2018    Patient name: Alberto Stern  MRN: [id number]  YOB: 1940  Age: 78 y.o. Primary care provider:  Luana Bradley MD     Source of Information: patient and medical records                                      Subjective:       Chief complain: \" I am short of breath\"    History of present illness:  Mr. Daniel Malhotra is a 78 y.o. who comes to ER after seeing Mercedes Baxter NP with pulmonology complaining of shortness of breath, fatigue. At the time of the visit his O2 sats were in the 70s on RA and his systolic BPs were in the 91F. Pt is not on home oxygen. Home COPD regimen is DUONEBS, uses every 2 -3 hours. He complains of feeling fatigue, worsening of SOB and anemia. He reports routinely coughing thick dark phlegm with residual left sided chest pain that has been off and on for the past three months. He endorses persistent lower extremity swelling    Patient is currently being treated for Stage 4 metastatic non small cell lung cancer with palliative chemo. He is followed by Dr. Jessie Pearce. He was seen by them yesterday. At the time of the visit pt was noted to have complained of dyspnea and was found to be hypotensive requiring fluids. As a result pt was scheduled for transfusion  tomorrow. Of note, pt recently completed radiation last week. Patient denies hematochezia or melena. Denies history of ever having colonoscopy. Denies history of MI,CAD, arrhythmias, Afib.     Emergency Room Course:   Patient Vitals for the past 12 hrs:   Temp Pulse Resp BP SpO2   05/16/18 2045 98.6 °F (37 °C) (!) 105 23 96/76 97 %   05/16/18 2030 98.4 °F (36.9 °C) (!) 109 24 105/63 97 %   05/16/18 2029 98.4 °F (36.9 °C) - - - -   05/16/18 2015 97.6 °F (36.4 °C) 96 (!) 31 111/71 98 %   05/16/18 2000 - (!) 120 24 106/72 100 %   05/16/18 1930 97.6 °F (36.4 °C) 97 26 101/68 93 %   05/16/18 1924 - - - - 94 %   05/16/18 1915 - 89 30 106/67 100 %   05/16/18 1900 - (!) 114 26 101/68 99 %   05/16/18 1815 - - - 105/78 95 %   05/16/18 1809 98.3 °F (36.8 °C) (!) 108 25 94/68 (!) 88 %   05/16/18 1645 - (!) 108 23 101/56 95 %   05/16/18 1615 - (!) 114 24 106/61 93 %   05/16/18 1554 - (!) 124 - 123/67 -   05/16/18 1545 - (!) 136 (!) 31 123/67 92 %   05/16/18 1536 - (!) 128 18 - 100 %   05/16/18 1504 - (!) 121 22 - 93 %   05/16/18 1500 - (!) 107 25 108/70 -   05/16/18 1445 - (!) 114 20 108/70 93 %   05/16/18 1430 - (!) 119 19 123/79 95 %   05/16/18 1415 - - - 126/74 91 %   05/16/18 1400 - - - 113/68 93 %   05/16/18 1354 - - - - 100 %   05/16/18 1352 - - - - (!) 84 %   05/16/18 1348 - - - 109/52 -   05/16/18 1333 98.9 °F (37.2 °C) (!) 114 16 107/65 90 %      Labs: remarkable for WBC 12.4, Hemoglobin 6.5  ( baseline 8-9), PLT 65  ( ), Na 127, K 4.2, Cr.0.59. Portable Chest Xray showed new moderate left pleural effusion. Decreased left upper lobe mass. No pneumothorax. .   Pt was treated with   Medications   methylPREDNISolone (PF) (SOLU-MEDROL) injection 80 mg (80 mg IntraVENous Given 5/16/18 1829)   albuterol-ipratropium (DUO-NEB) 2.5 MG-0.5 MG/3 ML (3 mL Nebulization Given 5/16/18 1921)   arformoterol (BROVANA) neb solution 15 mcg (0 mcg Nebulization Held 5/16/18 2000)   budesonide (PULMICORT) 500 mcg/2 ml nebulizer suspension (500 mcg Nebulization Given 5/16/18 1921)   levoFLOXacin (LEVAQUIN) 750 mg in D5W IVPB (750 mg IntraVENous New Bag 5/16/18 1553)   0.9% sodium chloride infusion 250 mL (not administered)   vancomycin (VANCOCIN) 1,250 mg in 0.9% sodium chloride 250 mL IVPB (not administered)   sodium chloride (NS) flush 5-10 mL (10 mL IntraVENous Given 5/16/18 1814)   sodium chloride (NS) flush 5-10 mL (not administered)   enoxaparin (LOVENOX) injection 80 mg (80 mg SubCUTAneous Given 5/1940)   oxyCODONE IR (ROXICODONE) tablet 5 mg (not administered)   oxyCODONE IR (ROXICODONE) tablet 10 mg (10 mg Oral Given 5/1940)   sodium chloride 0.9 % bolus infusion 1,000 mL (0 mL IntraVENous IV Completed 5/16/18 1654)   metoprolol (LOPRESSOR) injection 2.5 mg (2.5 mg IntraVENous Given 5/16/18 1554)   vancomycin (VANCOCIN) 2,000 mg in 0.9% sodium chloride 500 mL IVPB (2,000 mg IntraVENous New Bag 5/16/18 1808)   iopamidol (ISOVUE-370) 76 % injection 100 mL (80 mL IntraVENous Given 5/16/18 1728)   magnesium sulfate 2 g/50 ml IVPB (premix or compounded) (2 g IntraVENous New Bag 5/16/18 1834)         Allergies   Allergen Reactions    Pcn [Penicillins] Hives     Tolerated ancef graded challenge 12/21/2017 with no adverse side effects noted       Prior to Admission Medications   Prescriptions Last Dose Informant Patient Reported? Taking? OTHER,NON-FORMULARY,   Yes No   Sig: Natural Colon Cleanse four pills by mouth twice daily as needed for constipation. albuterol (PROVENTIL VENTOLIN) 2.5 mg /3 mL (0.083 %) nebulizer solution   No No   Sig: USE 2 VIALS VIAL NEBULIZER EVERY 4 HOURS AS NEEDED FOR WHEEZING   albuterol-ipratropium (DUO-NEB) 2.5 mg-0.5 mg/3 ml nebu   No No   Sig: 3 mL by Nebulization route every four (4) hours as needed. lidocaine-prilocaine (EMLA) topical cream   No No   Sig: Apply  to affected area as needed for Pain (Apply 30-60 min. prior to having your port accessed). loperamide (IMODIUM) 2 mg capsule   Yes No   Sig: Take 2 mg by mouth as needed for Diarrhea. Indications: Diarrhea   nystatin (MYCOSTATIN) topical cream   No No   Sig: Apply  to affected area two (2) times a day. ondansetron hcl (ZOFRAN) 8 mg tablet   No No   Sig: Take 1 Tab by mouth every eight (8) hours as needed for Nausea. oxyCODONE IR (ROXICODONE) 5 mg immediate release tablet   No No   Sig: Take 1-2 Tabs by mouth every four (4) hours as needed for Pain (cancer pain). Max Daily Amount: 60 mg.   prochlorperazine (COMPAZINE) 10 mg tablet   No No   Sig: Take 1 Tab by mouth every six (6) hours as needed for Nausea. tiotropium-olodaterol (STIOLTO RESPIMAT) 2.5-2.5 mcg/actuation mist   No No   Sig: Take 2 Puffs by inhalation daily. Facility-Administered Medications: None       Past Medical History:   Diagnosis Date    HTN (hypertension) 10/21/2014    Rheumatoid arthritis(714.0) 10/21/2014    Skin cancer     Sun-damaged skin     Sunburn, blistering         Past Surgical History:   Procedure Laterality Date    HX OTHER SURGICAL      Hemmorhiodectomy          SOCIAL HISTORY    - Alcohol history: Not at all    - Smoking history: Former smoker, smoked 1 ppd x 57 years, quit 1 years ago    - Illicit drug history: Not at all    - Living arrangement: patient lives with their family. - Ambulates: Assisted walker      Preventive history: There is no immunization history on file for this patient. CODE STATUS discussed with the patient/caregivers  DNR/ DNI      The following are negative unless bolded. General Fatigue, fever, chills and unexpected weight change. HENT Ear pain, sinus pressure and sore throat. Eyes Visual disturbance. Respiratory Cough, chest tightness, shortness of breath and wheezing. Cardio Chest pain, palpitations and leg swelling. GI Nausea, vomiting, abd pain, diarrhea, constipation and blood in stool.  Dysuria. Extremities Myalgias and arthralgias. Skin Color change and pallor. Neuro Weakness and headaches. Behavioral Confusion, nervous, anxious. The remainder of the review of systems was reviewed and is noncontributory.     Objective:      Patient Vitals for the past 12 hrs:   BP Temp Pulse Resp SpO2 Height Weight   05/16/18 2045 96/76 98.6 °F (37 °C) (!) 105 23 97 % - -   05/16/18 2030 105/63 98.4 °F (36.9 °C) (!) 109 24 97 % - -   05/16/18 2029 - 98.4 °F (36.9 °C) - - - - -   05/16/18 2015 111/71 97.6 °F (36.4 °C) 96 (!) 31 98 % - -   05/16/18 2000 106/72 - (!) 120 24 100 % - -   05/16/18 1930 101/68 97.6 °F (36.4 °C) 97 26 93 % - -   05/16/18 1924 - - - - 94 % - -   18 1915 106/67 - 89 30 100 % - -   18 1900 101/68 - (!) 114 26 99 % - -   18 1815 105/78 - - - 95 % - -   18 1809 94/68 98.3 °F (36.8 °C) (!) 108 25 (!) 88 % - -   18 1645 101/56 - (!) 108 23 95 % - -   18 1615 106/61 - (!) 114 24 93 % - -   18 1554 123/67 - (!) 124 - - - -   18 1545 123/67 - (!) 136 (!) 31 92 % - -   18 1536 - - (!) 128 18 100 % - -   18 1504 - - (!) 121 22 93 % - -   18 1500 108/70 - (!) 107 25 - - -   18 1445 108/70 - (!) 114 20 93 % - -   18 1430 123/79 - (!) 119 19 95 % - -   18 1415 126/74 - - - 91 % - -   18 1400 113/68 - - - 93 % - -   18 1354 - - - - 100 % - -   18 1352 - - - - (!) 84 % - -   18 1348 109/52 - - - - - -   18 1333 107/65 98.9 °F (37.2 °C) (!) 114 16 90 % 5' 10\" (1.778 m) 170 lb (77.1 kg)     Temp (24hrs), Av.3 °F (36.8 °C), Min:97.6 °F (36.4 °C), Max:98.9 °F (37.2 °C)    No intake or output data in the 24 hours ending 18 2103   O2 Flow Rate (L/min): 4 l/min   O2 Device: Nasal cannula      Physical Exam  Visit Vitals    BP 96/76    Pulse (!) 105    Temp 98.6 °F (37 °C)    Resp 23    Ht 5' 10\" (1.778 m)    Wt 170 lb (77.1 kg)    SpO2 97%    BMI 24.39 kg/m2       Vitals Reviewed. General Ill appearing,malnourished,pale. No distress. Not diaphoretic. HENT Head Normocephalic and atraumatic. Eyes  No scleral icterus. Nose Nose normal, clear turbinates. Oral Oropharynx is clear and dry. No oropharyngeal exudate. Neck No thyromegaly present. No cervical adenopathy. Cardio Irregular rate, irregular rhythm. Exam reveals no gallop and no friction rub. Eura Destiny No chest wall tenderness. Pulmonary Poor air movement to the left upper lobe, right lower lobe with coarse breath sounds and faint crackles. No respiratory distress. Abdominal Soft. Bowel sounds normal. No distension. No tenderness.  Deferred.     Extremities 2+pitting edema of lower extremities L> R. No tenderness. Distal pulses intact. Neurological Strength 5/5 UE/LEbilaterally. Sensation intact. Alert and oriented to person, place, and time. Dermatology Skin is warm and dry. No rash noted. No erythema or pallor. Psychiatric Affect and judgment normal.        Data Review    Laboratory Data  Recent Results (from the past 8 hour(s))   EKG, 12 LEAD, INITIAL    Collection Time: 05/16/18  1:56 PM   Result Value Ref Range    Ventricular Rate 120 BPM    Atrial Rate 105 BPM    QRS Duration 102 ms    Q-T Interval 298 ms    QTC Calculation (Bezet) 421 ms    Calculated R Axis -58 degrees    Calculated T Axis 90 degrees    Diagnosis       Atrial fibrillation with rapid ventricular response  Incomplete right bundle branch block  Left anterior fascicular block  Abnormal ECG  When compared with ECG of 20-DEC-2017 22:53,  Atrial fibrillation has replaced Sinus rhythm  Inverted T waves have replaced nonspecific T wave abnormality in Lateral   leads     MAGNESIUM    Collection Time: 05/16/18  2:20 PM   Result Value Ref Range    Magnesium 0.8 (LL) 1.6 - 2.4 mg/dL   PHOSPHORUS    Collection Time: 05/16/18  2:20 PM   Result Value Ref Range    Phosphorus 2.1 (L) 2.6 - 4.7 MG/DL   CBC WITH AUTOMATED DIFF    Collection Time: 05/16/18  2:28 PM   Result Value Ref Range    WBC 12.4 (H) 4.1 - 11.1 K/uL    RBC 1.98 (L) 4.10 - 5.70 M/uL    HGB 6.5 (L) 12.1 - 17.0 g/dL    HCT 21.3 (L) 36.6 - 50.3 %    .6 (H) 80.0 - 99.0 FL    MCH 32.8 26.0 - 34.0 PG    MCHC 30.5 30.0 - 36.5 g/dL    RDW 19.1 (H) 11.5 - 14.5 %    PLATELET 68 (L) 046 - 400 K/uL    MPV 9.9 8.9 - 12.9 FL    NRBC 0.0 0  WBC    ABSOLUTE NRBC 0.00 0.00 - 0.01 K/uL    NEUTROPHILS 90 (H) 32 - 75 %    LYMPHOCYTES 2 (L) 12 - 49 %    MONOCYTES 7 5 - 13 %    EOSINOPHILS 0 0 - 7 %    BASOPHILS 0 0 - 1 %    IMMATURE GRANULOCYTES 1 (H) 0.0 - 0.5 %    ABS. NEUTROPHILS 11.2 (H) 1.8 - 8.0 K/UL    ABS.  LYMPHOCYTES 0.2 (L) 0.8 - 3.5 K/UL    ABS. MONOCYTES 0.9 0.0 - 1.0 K/UL    ABS. EOSINOPHILS 0.0 0.0 - 0.4 K/UL    ABS. BASOPHILS 0.0 0.0 - 0.1 K/UL    ABS. IMM. GRANS. 0.1 (H) 0.00 - 0.04 K/UL    DF AUTOMATED      RBC COMMENTS ANISOCYTOSIS  2+        RBC COMMENTS MACROCYTOSIS  1+        RBC COMMENTS MICROCYTOSIS  1+        RBC COMMENTS POIKILOCYTOSIS  1+        RBC COMMENTS SCHISTOCYTES  PRESENT        RBC COMMENTS TEARDROP CELLS  1+        RBC COMMENTS FATOUMATA CELLS  1+        WBC COMMENTS TOXIC GRANULATION     TYPE & SCREEN    Collection Time: 05/16/18  2:28 PM   Result Value Ref Range    Crossmatch Expiration 05/19/2018     ABO/Rh(D) A NEGATIVE     Antibody screen NEG     Unit number [id number]     Blood component type RC LR     Unit division 00     Status of unit ISSUED     Crossmatch result Compatible     Unit number [id number]     Blood component type RC LR,2     Unit division 00     Status of unit ALLOCATED     Crossmatch result Compatible    CBC WITH AUTOMATED DIFF    Collection Time: 05/16/18  3:31 PM   Result Value Ref Range    WBC 13.0 (H) 4.1 - 11.1 K/uL    RBC 1.90 (L) 4.10 - 5.70 M/uL    HGB 6.3 (L) 12.1 - 17.0 g/dL    HCT 20.7 (L) 36.6 - 50.3 %    .9 (H) 80.0 - 99.0 FL    MCH 33.2 26.0 - 34.0 PG    MCHC 30.4 30.0 - 36.5 g/dL    RDW 18.9 (H) 11.5 - 14.5 %    PLATELET 65 (L) 902 - 400 K/uL    MPV 10.0 8.9 - 12.9 FL    NRBC 0.0 0  WBC    ABSOLUTE NRBC 0.00 0.00 - 0.01 K/uL    NEUTROPHILS 89 (H) 32 - 75 %    LYMPHOCYTES 2 (L) 12 - 49 %    MONOCYTES 8 5 - 13 %    EOSINOPHILS 0 0 - 7 %    BASOPHILS 0 0 - 1 %    IMMATURE GRANULOCYTES 1 (H) 0.0 - 0.5 %    ABS. NEUTROPHILS 11.6 (H) 1.8 - 8.0 K/UL    ABS. LYMPHOCYTES 0.3 (L) 0.8 - 3.5 K/UL    ABS. MONOCYTES 1.0 0.0 - 1.0 K/UL    ABS. EOSINOPHILS 0.0 0.0 - 0.4 K/UL    ABS. BASOPHILS 0.0 0.0 - 0.1 K/UL    ABS. IMM.  GRANS. 0.1 (H) 0.00 - 0.04 K/UL    DF SMEAR SCANNED      RBC COMMENTS ANISOCYTOSIS  2+        RBC COMMENTS MICROCYTOSIS  2+        RBC COMMENTS MACROCYTOSIS  1+ RBC COMMENTS POLYCHROMASIA  1+        RBC COMMENTS SCHISTOCYTES  PRESENT        RBC COMMENTS OVALOCYTES  PRESENT       METABOLIC PANEL, COMPREHENSIVE    Collection Time: 05/16/18  3:31 PM   Result Value Ref Range    Sodium 127 (L) 136 - 145 mmol/L    Potassium 4.2 3.5 - 5.1 mmol/L    Chloride 95 (L) 97 - 108 mmol/L    CO2 26 21 - 32 mmol/L    Anion gap 6 5 - 15 mmol/L    Glucose 78 65 - 100 mg/dL    BUN 12 6 - 20 MG/DL    Creatinine 0.59 (L) 0.70 - 1.30 MG/DL    BUN/Creatinine ratio 20 12 - 20      GFR est AA >60 >60 ml/min/1.73m2    GFR est non-AA >60 >60 ml/min/1.73m2    Calcium 8.1 (L) 8.5 - 10.1 MG/DL    Bilirubin, total 0.7 0.2 - 1.0 MG/DL    ALT (SGPT) 21 12 - 78 U/L    AST (SGOT) 24 15 - 37 U/L    Alk. phosphatase 60 45 - 117 U/L    Protein, total 5.7 (L) 6.4 - 8.2 g/dL    Albumin 1.7 (L) 3.5 - 5.0 g/dL    Globulin 4.0 2.0 - 4.0 g/dL    A-G Ratio 0.4 (L) 1.1 - 2.2     TROPONIN I    Collection Time: 05/16/18  3:31 PM   Result Value Ref Range    Troponin-I, Qt. <0.04 <0.05 ng/mL   NT-PRO BNP    Collection Time: 05/16/18  3:31 PM   Result Value Ref Range    NT pro-BNP 2484 (H) 0 - 450 PG/ML   LACTIC ACID    Collection Time: 05/16/18  4:02 PM   Result Value Ref Range    Lactic acid 0.7 0.4 - 2.0 MMOL/L       Imaging  CXR Results  (Last 48 hours)               05/16/18 1418  XR CHEST PORT Final result    Impression:  IMPRESSION:       New moderate left pleural effusion. Decreased left upper lobe mass. No   pneumothorax. Narrative:  EXAM:  XR CHEST PORT       INDICATION:  Shortness of breath, hypotension, and hypoxia. Metastatic left   upper lobe lung carcinoma with adrenal and pulmonary metastases. COMPARISON: Chest views on 12/20/2017. CT chest on 4/21/2018. TECHNIQUE: 2 images of upright portable chest AP digital view       FINDINGS: Right port and catheter are unchanged since last month. Cardiac size   and aortic contours are within normal limits.  Mediastinal lymphadenopathy may be decreased. The pulmonary vasculature is within normal limits. Left upper lobe mass is less conspicuous. Moderate left pleural effusion is new. No pneumothorax. Emphysema is unchanged. Bones are unchanged. CT Results  (Last 48 hours)               05/16/18 1732  CTA CHEST W OR W WO CONT Final result    Impression:  IMPRESSION:   1. No evidence of pulmonary embolus. 2.  Moderate partially layering left pleural effusion, new since the prior   study. 3. Left lower lobe pulmonary metastasis has mildly increased in size. 4. Bulky left upper lobe malignancy with chest wall invasion, demonstrating mild   interval progression since the prior study. 5. Left adrenal metastasis, slightly increased in size. Narrative:  INDICATION:   Shortness of breath produced by exertion or stress        COMPARISON:  CT 4/21/2018       TECHNIQUE:  Following the uneventful intravenous administration of 100 cc Isovue   827, thin helical axial images were obtained through the chest. Postprocessing   was performed. 3D image postprocessing was performed. CT dose reduction was achieved through the use of a standardized protocol   tailored for this examination and automatic exposure control for dose   modulation. FINDINGS:       There is no mediastinal lymphadenopathy. There is chronic cardiomegaly. There   is no pericardial effusion. No filling defect is seen within the pulmonary arterial system to suggest   pulmonary embolus. The aorta is normal in caliber. There is interval development of a moderate partially layering left pleural   effusion, which is new since the prior study. A left lower lobe metastasis   measures 11 mm, previously 8 mm. There is a background of centrilobular   emphysema. The right lung is clear and well inflated.  The large left upper lobe   malignancy with invasion of the chest wall has further progressed, now measuring   approximately 12.8 x 7.7 cm, previously 12.5 x 7.5 cm. There is occlusion of the   segmental artery to the upper lobe, similar to the prior study. There is further   encasement of the left main pulmonary artery. The pulmonary arteries are also   chronically enlarged, consistent with pulmonary arterial hypertension. There is   worsening aeration of the left upper lobe. .       Limited evaluation of the upper abdomen demonstrates a known left adrenal   metastasis measuring 3.5 x 2.3 cm, previously 3.5 x 2.1 cm. There is also a left   renal cyst, partially imaged. . The osseous structures are unremarkable. EKG: A fib with RVR (see image in chart)      Assessment and Plan     Tru Morris is a 78 y.o. male with severe COPD and metastatic Non Small Cell Lung  Cancer who is admitted for Anemia requiring transfusion in the setting AHRF and new Afib RVR. Anemia- likely 2/2 to malignancy. . History negative for GIB and per chart review Hb has been stable since Hb in April. Baseline ~8.   - Admit to stepdown  - Type and screen  - 1 units transfuse stat and keep 2 units ahead   - 2 hour post transfusion  H/H  - Daily CBCs  - Threshold is >8    A Fib with RVR- new. TSH 0.9 May 15, 2018. - Will give Lopressor 2.5mg now  - CTA given new onset afib, chest pain, and dyspnea  - ECHO  - Trend trops  - Cardiac monitoring  - Consult Cardiology, appreciate recommendations    Chest Wall Pain- not new. Chronic in nature, well documented in chart; however, given constellation of symptoms will rule out cardiac process  - Trend trops and monitoring as outlined above    Non Small Cell Lung Cancer, Stage 4 ([id number])- followed by Dr. Laurent Rodriguez. On palliative chemo with Carboplatin and Gemcitabine. Palliative radiation to chest wall and palliative immune therapy with nivolumab as of 5/15/2018  - Heme/Onc consulted and verbally discussed patient. Transfusing 1 units  - Appreciate heme/onc rec  - Consult palliative for overwhelming symptoms and goals of care. Patient is amenable to this, states Tammy Easley is tired\"  - Oxycodone 10mg IR q4 prn    SIRS - Patient tachycardic, tachypneic, and has leukocytosis. Tachycardia and tachypnea likely 2/2 dyspnea. Leukocytosis possibly multifactorial. CXR negative for pneumonia. No signs of infection at this point, and patient afebrile; however, patient immunocompromised and immunocompromised patients at risk for gram negative septicemia. - Broad spectrum antibiotics   - Closely monitor VS and augment plan as appropriate  - Blood Cx  - Urine Cx      Hyponatremia : POA Na : 127, TSH wnl  - Follow up urine lytes:plasma osmolality, urine Na, urine Creatinine  - Daily BMPs    AHRF with history COPD: now with new Pulmonary Effusions  No on supplemental oxygen   - Supplemental Oxygen,keep sats >90  - IV Solumedrol 80mg q6  - Pulmicort Maru Posey  - DUO-NEB  - F/U BNP  - Respiratory panel,   - Lower extremity dopplers  - Strict I/O   - Continue IVF   - Scheduled Brovana/Pulmicort  - Will consider diuresis     Chronic Thrombocytopenia: 2/2 chemo and radiation  ( baseline difficult to determine  plt range  in 2018)  - CBC daily   - Hold anticoagulation for plt<50K          FEN/GI - Regular diet. NS at 110 mL/hr. Although patient tolerating diet, appears frail and mildly dehydrated on exam  Activity - Ambulate with assistance  DVT prophylaxis - holding lovenox for now given anemia/ thrombocytopenia  GI prophylaxis - Not indicated at this time  Disposition - Admit to Stepdown. Plan to d/c to TBD. Code Status - DNR/DNI, discussed with patient / caregivers. Patient Tru Morris to be discussed Cihquis Strong (Attending Physician).     3:04 PM, 05/16/18  [place], MD  Family Medicine Resident    For Billing   Chief Complaint   Patient presents with   [facility] Hypotension       Hospital Problems  Date Reviewed: 5/15/2018          Codes Class Noted POA    A-fib [facility]) ICD-10-CM: I48.91  ICD-9-CM: 427.31  5/16/2018 Unknown        Anemia ICD-10-CM: D64.9  ICD-9-CM: 285.9  3/27/2018 Unknown

## 2018-05-16 NOTE — PROGRESS NOTES
[facility] [place] FAMILY MEDICINE RESIDENCY PROGRAM   Daily Progress Note    Date: 5/16/2018    Assessment/Plan:   Stacey Sanchez is a 78 y.o. male who is admitted for anemia requiring transfusion found to be in afib RVR. Overnight events: s/p 2 units of pRBC since admission. Lasix 20 mg IV for volume overload noted on PE      Respiratory    Acute hypoxic respiratory failure now with left pulmonary effusions seen on CT and CXR ( compared from 4/21). Patient with 3/4 SIRS (tachycardia, tachypenia and leukocytosis) but not source of infection. CXR negative for PNA. Given immunocompromised state and risk of gram negative septicemia, patient was started on broad spectrum Abx. - Levaquin + Vanc( started on 5/16- day 2)  - F/u RVP, blood cx  - Supplemental Oxygen,keep sats >90  - Strict I/O   - Continue IVF   - Scheduled Brovana/Pulmicort  - Pulm following, appreciate recs    Cancer Pain/Chest pain- not new. Chronic in nature, well documented in chart; however, given constellation of symptoms will rule out cardiac process. Trop negative x2. - Oxycodone 5 mg q 4hr  PRN for moderate pain and oxy 10 mg q 4hrs PRN for severe pain      Heme/Onc     Anemia- likely anemia of chronic disease 2/2 malignancy. Last colonoscopy unknown. History negative for GIB and per chart review Hb has been stable since Hb in April. Baseline ~8. possible bone marrow involvement given low ptlts, per hem/onc. POA Hgb 6.5 S/P 2 unit of pRBCs on   - Monitor H&H q 6hr, next due at 8:00 AM  - Transfuse threshold Hgb>8 per cards recs  - Daily CBCs  - Continue to closely monitor    Thrombocytopenia: low plts s/p completion of chemotherapy. POA plt 68, possible bone marrow involvement. - Daily CBC, continue to monitor  - Hold AC for plt<50  - Heme/onc following, appreciate recs      Non Small Cell Lung Cancer, Stage 4 ([id number])- followed by Dr. Monisha Aparicio s/p 4 cycles of palliative chemotherapy with Carboplatin and Gemcitabine ( 1/9/2018- 4/17/2018). Of note, CTA on 5/16 showed, new left pleural effusion with increased size of mets in the left lower lobe with increased in size of left adrenal metastasis. Palliative radiation to chest wall and palliative immune therapy with nivolumab as of 5/15/2018. Discussing supportive care  vs comfort care. - Palliative consulted, appreciate recs  - Continue with aggressive care per Hem/onc, appreciate recs      DVT 2/2 malignancy: DVT in the left LE ( posterior tibial veins at the mid calf) dx on 5/16  - Lovenox 1mg/kg BID, hold for plt <50  - Eliquis up on discharge, per Hem/onc recs          Cardiovascular     A Fib with RVR- new. S/p lopressor 2.5mg. TSH 0.9 May 15, 2018. - CTA was obtained  On 5/16 given new onset afib, chest pain, and dyspnea with no evidence of PE. Trop negative X2  - Esmolol gtt if HR in 130s and SBP>110, cards following, appreciate recs  - Cardiac monitoring  - Appreciate cards recs     Acute on Chronic diastolic HF  -BNP 0357, s/p IV lasix o/n  - F/u with Echo    Electrolyte Abnormalities     Hyponatremia  : POA Na : 127, TSH wnl  - Follow up urine lytes Urine Na, Urine Creatinine       FEN/GI - Regular diet. Activity - Ambulate with assistance  DVT prophylaxis - Lovenox 1mg/Kg BID, hold for plt <50  GI prophylaxis - Not indicated at this time  Disposition -Plan to d/c to TBD. Code Status - DNR, discussed with patient / caregivers.       Patient to be discussed with Dr. Mauricio Marte his grandson India Regan at [phone] and updated on current medical status and answered all questions. Beatriz Pisano MD  Family Medicine Resident         CC: \" I feel fine\"    Subjective  No acute events overnight. Patient reports his breathing is better with oxygen. Patient reports he's voiding. Patient denies chest pain, sob, nausea, vomiting or abdominal pain.        Inpatient Medications  Current Facility-Administered Medications   Medication Dose Route Frequency    methylPREDNISolone (PF) (SOLU-MEDROL) injection 80 mg  80 mg IntraVENous Q6H    albuterol-ipratropium (DUO-NEB) 2.5 MG-0.5 MG/3 ML  3 mL Nebulization Q4H RT    arformoterol (BROVANA) neb solution 15 mcg  15 mcg Nebulization BID RT    budesonide (PULMICORT) 500 mcg/2 ml nebulizer suspension  500 mcg Nebulization BID RT    levoFLOXacin (LEVAQUIN) 750 mg in D5W IVPB  750 mg IntraVENous Q24H    0.9% sodium chloride infusion 250 mL  250 mL IntraVENous PRN    vancomycin (VANCOCIN) 2,000 mg in 0.9% sodium chloride 500 mL IVPB  2,000 mg IntraVENous ONCE    [START ON 5/17/2018] vancomycin (VANCOCIN) 1,250 mg in 0.9% sodium chloride 250 mL IVPB  1,250 mg IntraVENous Q12H    sodium chloride (NS) flush 5-10 mL  5-10 mL IntraVENous Q8H    sodium chloride (NS) flush 5-10 mL  5-10 mL IntraVENous PRN    magnesium sulfate 2 g/50 ml IVPB (premix or compounded)  2 g IntraVENous ONCE    enoxaparin (LOVENOX) injection 80 mg  80 mg SubCUTAneous Q12H    oxyCODONE IR (ROXICODONE) tablet 5 mg  5 mg Oral Q4H PRN    oxyCODONE IR (ROXICODONE) tablet 10 mg  10 mg Oral Q4H PRN     Facility-Administered Medications Ordered in Other Encounters   Medication Dose Route Frequency    [START ON 5/17/2018] acetaminophen (TYLENOL) tablet 650 mg  650 mg Oral ONCE    [START ON 5/17/2018] diphenhydrAMINE (BENADRYL) capsule 25 mg  25 mg Oral ONCE    0.9% sodium chloride infusion 250 mL  250 mL IntraVENous PRN         Allergies  Allergies   Allergen Reactions    Pcn [Penicillins] Hives     Tolerated ancef graded challenge 12/21/2017 with no adverse side effects noted         Objective  Vitals:  Patient Vitals for the past 8 hrs:   Temp Pulse Resp BP SpO2   05/16/18 1815 - - - 105/78 95 %   05/16/18 1809 98.3 °F (36.8 °C) (!) 108 25 94/68 (!) 88 %   05/16/18 1645 - (!) 108 23 101/56 95 %   05/16/18 1615 - (!) 114 24 106/61 93 %   05/16/18 1554 - (!) 124 - 123/67 -   05/16/18 1545 - (!) 136 (!) 31 123/67 92 %   05/16/18 1536 - (!) 128 18 - 100 %   05/16/18 1504 - (!) 121 22 - 93 % 05/16/18 1500 - (!) 107 25 108/70 -   05/16/18 1445 - (!) 114 20 108/70 93 %   05/16/18 1430 - (!) 119 19 123/79 95 %   05/16/18 1415 - - - 126/74 91 %   05/16/18 1400 - - - 113/68 93 %   05/16/18 1354 - - - - 100 %   05/16/18 1352 - - - - (!) 84 %   05/16/18 1348 - - - 109/52 -   05/16/18 1333 98.9 °F (37.2 °C) (!) 114 16 107/65 90 %         I/O:  No intake or output data in the 24 hours ending 05/16/18 1920  Last shift:       Last 3 shifts:         Physical Exam:  General: No acute distress. Alert. Cooperative. Head: Normocephalic. Atraumatic. Respiratory: Rales noted in the Left and right UL, L>R   Cardiovascular: RRR. Normal S1,S2. GI:  Nontender. No rebound tenderness or guarding.  Nondistended   Extremities: 1+ pitting edema in lower extremities bilaterally,     Laboratory Data  Recent Results (from the past 12 hour(s))   EKG, 12 LEAD, INITIAL    Collection Time: 05/16/18  1:56 PM   Result Value Ref Range    Ventricular Rate 120 BPM    Atrial Rate 105 BPM    QRS Duration 102 ms    Q-T Interval 298 ms    QTC Calculation (Bezet) 421 ms    Calculated R Axis -58 degrees    Calculated T Axis 90 degrees    Diagnosis       Atrial fibrillation with rapid ventricular response  Incomplete right bundle branch block  Left anterior fascicular block  Abnormal ECG  When compared with ECG of 20-DEC-2017 22:53,  Atrial fibrillation has replaced Sinus rhythm  Inverted T waves have replaced nonspecific T wave abnormality in Lateral   leads     MAGNESIUM    Collection Time: 05/16/18  2:20 PM   Result Value Ref Range    Magnesium 0.8 (LL) 1.6 - 2.4 mg/dL   PHOSPHORUS    Collection Time: 05/16/18  2:20 PM   Result Value Ref Range    Phosphorus 2.1 (L) 2.6 - 4.7 MG/DL   CBC WITH AUTOMATED DIFF    Collection Time: 05/16/18  2:28 PM   Result Value Ref Range    WBC 12.4 (H) 4.1 - 11.1 K/uL    RBC 1.98 (L) 4.10 - 5.70 M/uL    HGB 6.5 (L) 12.1 - 17.0 g/dL    HCT 21.3 (L) 36.6 - 50.3 %    .6 (H) 80.0 - 99.0 FL    MCH 32.8 26.0 - 34.0 PG    MCHC 30.5 30.0 - 36.5 g/dL    RDW 19.1 (H) 11.5 - 14.5 %    PLATELET 68 (L) 130 - 400 K/uL    MPV 9.9 8.9 - 12.9 FL    NRBC 0.0 0  WBC    ABSOLUTE NRBC 0.00 0.00 - 0.01 K/uL    NEUTROPHILS 90 (H) 32 - 75 %    LYMPHOCYTES 2 (L) 12 - 49 %    MONOCYTES 7 5 - 13 %    EOSINOPHILS 0 0 - 7 %    BASOPHILS 0 0 - 1 %    IMMATURE GRANULOCYTES 1 (H) 0.0 - 0.5 %    ABS. NEUTROPHILS 11.2 (H) 1.8 - 8.0 K/UL    ABS. LYMPHOCYTES 0.2 (L) 0.8 - 3.5 K/UL    ABS. MONOCYTES 0.9 0.0 - 1.0 K/UL    ABS. EOSINOPHILS 0.0 0.0 - 0.4 K/UL    ABS. BASOPHILS 0.0 0.0 - 0.1 K/UL    ABS. IMM.  GRANS. 0.1 (H) 0.00 - 0.04 K/UL    DF AUTOMATED      RBC COMMENTS ANISOCYTOSIS  2+        RBC COMMENTS MACROCYTOSIS  1+        RBC COMMENTS MICROCYTOSIS  1+        RBC COMMENTS POIKILOCYTOSIS  1+        RBC COMMENTS SCHISTOCYTES  PRESENT        RBC COMMENTS TEARDROP CELLS  1+        RBC COMMENTS FATOUMATA CELLS  1+        WBC COMMENTS TOXIC GRANULATION     TYPE & SCREEN    Collection Time: 05/16/18  2:28 PM   Result Value Ref Range    Crossmatch Expiration 05/19/2018     ABO/Rh(D) A NEGATIVE     Antibody screen NEG     Unit number [id number]     Blood component type  LR     Unit division 00     Status of unit ALLOCATED     Crossmatch result Compatible     Unit number [id number]     Blood component type  LR,2     Unit division 00     Status of unit ALLOCATED     Crossmatch result Compatible    CBC WITH AUTOMATED DIFF    Collection Time: 05/16/18  3:31 PM   Result Value Ref Range    WBC 13.0 (H) 4.1 - 11.1 K/uL    RBC 1.90 (L) 4.10 - 5.70 M/uL    HGB 6.3 (L) 12.1 - 17.0 g/dL    HCT 20.7 (L) 36.6 - 50.3 %    .9 (H) 80.0 - 99.0 FL    MCH 33.2 26.0 - 34.0 PG    MCHC 30.4 30.0 - 36.5 g/dL    RDW 18.9 (H) 11.5 - 14.5 %    PLATELET 65 (L) 377 - 400 K/uL    MPV 10.0 8.9 - 12.9 FL    NRBC 0.0 0  WBC    ABSOLUTE NRBC 0.00 0.00 - 0.01 K/uL    NEUTROPHILS 89 (H) 32 - 75 %    LYMPHOCYTES 2 (L) 12 - 49 %    MONOCYTES 8 5 - 13 % EOSINOPHILS 0 0 - 7 %    BASOPHILS 0 0 - 1 %    IMMATURE GRANULOCYTES 1 (H) 0.0 - 0.5 %    ABS. NEUTROPHILS 11.6 (H) 1.8 - 8.0 K/UL    ABS. LYMPHOCYTES 0.3 (L) 0.8 - 3.5 K/UL    ABS. MONOCYTES 1.0 0.0 - 1.0 K/UL    ABS. EOSINOPHILS 0.0 0.0 - 0.4 K/UL    ABS. BASOPHILS 0.0 0.0 - 0.1 K/UL    ABS. IMM. GRANS. 0.1 (H) 0.00 - 0.04 K/UL    DF SMEAR SCANNED      RBC COMMENTS ANISOCYTOSIS  2+        RBC COMMENTS MICROCYTOSIS  2+        RBC COMMENTS MACROCYTOSIS  1+        RBC COMMENTS POLYCHROMASIA  1+        RBC COMMENTS SCHISTOCYTES  PRESENT        RBC COMMENTS OVALOCYTES  PRESENT       METABOLIC PANEL, COMPREHENSIVE    Collection Time: 05/16/18  3:31 PM   Result Value Ref Range    Sodium 127 (L) 136 - 145 mmol/L    Potassium 4.2 3.5 - 5.1 mmol/L    Chloride 95 (L) 97 - 108 mmol/L    CO2 26 21 - 32 mmol/L    Anion gap 6 5 - 15 mmol/L    Glucose 78 65 - 100 mg/dL    BUN 12 6 - 20 MG/DL    Creatinine 0.59 (L) 0.70 - 1.30 MG/DL    BUN/Creatinine ratio 20 12 - 20      GFR est AA >60 >60 ml/min/1.73m2    GFR est non-AA >60 >60 ml/min/1.73m2    Calcium 8.1 (L) 8.5 - 10.1 MG/DL    Bilirubin, total 0.7 0.2 - 1.0 MG/DL    ALT (SGPT) 21 12 - 78 U/L    AST (SGOT) 24 15 - 37 U/L    Alk.  phosphatase 60 45 - 117 U/L    Protein, total 5.7 (L) 6.4 - 8.2 g/dL    Albumin 1.7 (L) 3.5 - 5.0 g/dL    Globulin 4.0 2.0 - 4.0 g/dL    A-G Ratio 0.4 (L) 1.1 - 2.2     TROPONIN I    Collection Time: 05/16/18  3:31 PM   Result Value Ref Range    Troponin-I, Qt. <0.04 <0.05 ng/mL   NT-PRO BNP    Collection Time: 05/16/18  3:31 PM   Result Value Ref Range    NT pro-BNP 2484 (H) 0 - 450 PG/ML   LACTIC ACID    Collection Time: 05/16/18  4:02 PM   Result Value Ref Range    Lactic acid 0.7 0.4 - 2.0 MMOL/L         Hospital Problems:  Hospital Problems  Date Reviewed: 5/15/2018          Codes Class Noted POA    [facility] ICD-10-CM: I48.91  ICD-9-CM: 427.31  5/16/2018 Unknown        Anemia ICD-10-CM: D64.9  ICD-9-CM: 285.9  3/27/2018 Unknown

## 2018-05-16 NOTE — PROCEDURES
[facility]  *** FINAL REPORT ***    Name: Katherine Green  MRN: [id number]    Inpatient  : 29 Sep 1940  HIS Order #: [id number]  [address] [facility] Visit #: [id number]  Date: 16 May 2018    TYPE OF TEST: Peripheral Venous Testing    REASON FOR TEST  Pain in limb, Limb swelling    Right Leg:-  Deep venous thrombosis:           No  Superficial venous thrombosis:    No  Deep venous insufficiency:        No  Superficial venous insufficiency: No    Left Leg:-  Deep venous thrombosis:           Yes  Proximal extent of thrombus:      Posterior Tibial  Superficial venous thrombosis:    No  Deep venous insufficiency:        No  Superficial venous insufficiency: No      INTERPRETATION/FINDINGS  PROCEDURE:  BILATERAL LE VENOUS DUPLEX. Evaluation of lower extremity veins with ultrasound (B-mode imaging,  pulsed Doppler, color Doppler). Includes the common femoral, deep  femoral, femoral, popliteal, posterior tibial, peroneal, and great  saphenous veins. FINDINGS:  Technically difficult exam due to patient edema secondary  to positioning. Gray scale imaging suboptimal in the left calf. Gray  scale and color flow duplex images of 1 of 2 paired posterior tibial  veins in the left mid calf demonstrate no compressibility, with  filling defects. Thrombus appears acute. CONCLUSION: Technically difficult study due to patient edema secondary   to positioning. Left lower extremity venous duplex positive for  acute deep venous thrombosis involving 1 of 2 paired posterior tibial  veins at the mid calf. Right lower extremity is thrombus free. ADDITIONAL COMMENTS    NOTE: Left peroneal vein in the calf was not visualized therefore  ruling out DVT cannot be concluded for that segment. I have personally reviewed the data relevant to the interpretation of  this  study. TECHNOLOGIST: Melonie Marcano  Signed: 2018 05:29 PM    PHYSICIAN: Naren Roach MD  Signed: 2018 04:42 PM

## 2018-05-16 NOTE — PROGRESS NOTES
Chart accessed to assist primary RN. This RN and Melissa performed skin assessment, see flowsheet 7 skin note.

## 2018-05-16 NOTE — CONSULTS
[facility] at [address], [address] [place] [address] [phone]  F: [phone]      Reason for Visit:   Conor Winn is a 78 y.o. male who is seen in consultation at the request of Dr. Micha Talbot for evaluation of lung cancer, worsening SOB, anemia. Treatment History:   · Low-dose CT Chest 2017: Probably left upper lobe mass obstructing left upper lobe bronchus, near complete atelectasis of FANY, probably left hilar adenopathy. 3.9cm left adrenal nodule. Moderate centrilobular emphysema. · CT C/A/P 2017: Left adrenal metastasis. Large mass in the left upper lobe versus markedly atelectatic lung from central tumor. 7 mm nonspecific pleural-based nodule in the left lower lobe. Extensive emphysematous change. 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  · MRI Brain 2017: No intracranial metastatic disease  · Bone scan 2018: No osseous metastatic disease  · Stage FARIDEH (T4 N0 M1b) Non-small cell lung cancer (AJCC 8th edition)  · Palliative chemotherapy with Carboplatin and Gemcitabine 2018 - 2018 for a total of 4 cycles  · Palliative radiation to chest wall   · Palliative immune therapy with nivolumab beginning 5/15/2018    Last seen in the clinic on 5/15/2018    History of Present Illness:   Mr Arthur Magallon was admitted from the ED on 2018 when he presented after being seen in the pulmonary clinic and was noted to have O2 sats in the 20E and systolic BP of 78. Pt c/o SOB and swelling to legs. ED labs WBC 13.7 Hgb 6.7 plt 82. Therefore he was admitted for further eval and management. He reports considerable fatigue, weakness, dyspnea. Worsened since I saw him yesterday. Pain improved after increasing oxycodone to 10mg as instructed yesterday. Denies bleeding.       Past Medical History:   Diagnosis Date    HTN (hypertension) 10/21/2014    Rheumatoid arthritis(714.0) 10/21/2014    Skin cancer     Sun-damaged skin     Sunburn, blistering       Past Surgical History:   Procedure Laterality Date    HX OTHER SURGICAL      Hemmorhiodectomy      Social History   Substance Use Topics    Smoking status: Current Every Day Smoker     Packs/day: 1.00    Smokeless tobacco: Never Used    Alcohol use No      No family history on file. Current Facility-Administered Medications   Medication Dose Route Frequency    methylPREDNISolone (PF) (SOLU-MEDROL) injection 80 mg  80 mg IntraVENous Q6H    albuterol-ipratropium (DUO-NEB) 2.5 MG-0.5 MG/3 ML  3 mL Nebulization Q4H RT    arformoterol (BROVANA) neb solution 15 mcg  15 mcg Nebulization BID RT    budesonide (PULMICORT) 500 mcg/2 ml nebulizer suspension  500 mcg Nebulization BID RT    levoFLOXacin (LEVAQUIN) 750 mg in D5W IVPB  750 mg IntraVENous Q24H    0.9% sodium chloride infusion 250 mL  250 mL IntraVENous PRN    vancomycin (VANCOCIN) 2,000 mg in 0.9% sodium chloride 500 mL IVPB  2,000 mg IntraVENous ONCE    [START ON 5/17/2018] vancomycin (VANCOCIN) 1,250 mg in 0.9% sodium chloride 250 mL IVPB  1,250 mg IntraVENous Q12H    sodium chloride (NS) flush 5-10 mL  5-10 mL IntraVENous Q8H    sodium chloride (NS) flush 5-10 mL  5-10 mL IntraVENous PRN    magnesium sulfate 2 g/50 ml IVPB (premix or compounded)  2 g IntraVENous ONCE     Facility-Administered Medications Ordered in Other Encounters   Medication Dose Route Frequency    [START ON 5/17/2018] acetaminophen (TYLENOL) tablet 650 mg  650 mg Oral ONCE    [START ON 5/17/2018] diphenhydrAMINE (BENADRYL) capsule 25 mg  25 mg Oral ONCE    0.9% sodium chloride infusion 250 mL  250 mL IntraVENous PRN      Allergies   Allergen Reactions    Pcn [Penicillins] Hives     Tolerated ancef graded challenge 12/21/2017 with no adverse side effects noted        Review of Systems: A complete review of systems was obtained, negative except as described above.       Physical Exam:     Visit Vitals    /78 (BP 1 Location: Right arm, BP Patient Position: At rest)    Pulse (!) 108    Temp 98.3 °F (36.8 °C)    Resp 25    Ht 5' 10\" (1.778 m)    Wt 170 lb (77.1 kg)    SpO2 95%    BMI 24.39 kg/m2     General: No distress, ill appearing  Eyes: PERRLA, anicteric sclerae  HENT: Atraumatic with normal appearance of ears and nose; OP clear  Neck: Supple; no thyromegaly   Lymphatic: No cervical, supraclavicular, or axillary adenopathy  Respiratory: CTAB, normal respiratory effort  CV: Normal rate, regular rhythm, no murmurs, no peripheral edema  GI: Soft, nontender, nondistended, no masses, no hepatomegaly, no splenomegaly  Skin: No rashes, ecchymoses, or petechiae. Normal temperature, turgor, and texture. Neuro/Psych: Alert, oriented, appropriate affect, normal judgment/insight      Results:     Lab Results   Component Value Date/Time    WBC 13.0 (H) 05/16/2018 03:31 PM    HGB 6.3 (L) 05/16/2018 03:31 PM    HCT 20.7 (L) 05/16/2018 03:31 PM    PLATELET 65 (L) 09/66/5651 03:31 PM    .9 (H) 05/16/2018 03:31 PM    ABS. NEUTROPHILS 11.6 (H) 05/16/2018 03:31 PM     Lab Results   Component Value Date/Time    Sodium 127 (L) 05/16/2018 03:31 PM    Potassium 4.2 05/16/2018 03:31 PM    Chloride 95 (L) 05/16/2018 03:31 PM    CO2 26 05/16/2018 03:31 PM    Glucose 78 05/16/2018 03:31 PM    BUN 12 05/16/2018 03:31 PM    Creatinine 0.59 (L) 05/16/2018 03:31 PM    GFR est AA >60 05/16/2018 03:31 PM    GFR est non-AA >60 05/16/2018 03:31 PM    Calcium 8.1 (L) 05/16/2018 03:31 PM     Lab Results   Component Value Date/Time    Bilirubin, total 0.7 05/16/2018 03:31 PM    ALT (SGPT) 21 05/16/2018 03:31 PM    AST (SGOT) 24 05/16/2018 03:31 PM    Alk.  phosphatase 60 05/16/2018 03:31 PM    Protein, total 5.7 (L) 05/16/2018 03:31 PM    Albumin 1.7 (L) 05/16/2018 03:31 PM    Globulin 4.0 05/16/2018 03:31 PM     Lab Results   Component Value Date/Time    Reticulocyte count 0.8 11/29/2017 04:26 PM    Iron % saturation 8 (L) 11/29/2017 04:26 PM    TIBC 202 (L) 11/29/2017 04:26 PM    Ferritin 461 (H) 11/29/2017 04:26 PM    Vitamin B12 858 11/29/2017 04:26 PM    Folate 16.0 11/29/2017 04:26 PM    TSH 0.90 05/15/2018 01:05 PM     Lab Results   Component Value Date/Time    INR 1.1 12/20/2017 11:45 PM    aPTT 31.7 11/29/2017 04:26 PM     Lab Results   Component Value Date/Time    Prostate Specific Ag 3.6 02/09/2016 02:45 PM     5/16/2018 XR CHEST  IMPRESSION:     New moderate left pleural effusion. Decreased left upper lobe mass. No  pneumothorax. 5/16/2018 CTA CHEST   Pending    5/16/2018 Duplex LE Bilateral  CONCLUSION: Technically difficult study due to patient edema secondary   to positioning. Left lower extremity venous duplex positive for  acute deep venous thrombosis involving 1 of 2 paired posterior tibial  veins at the mid calf. Right lower extremity is thrombus free. CTA Chest 5/16/2018:  1. No evidence of pulmonary embolus. 2.  Moderate partially layering left pleural effusion, new since the prior  study. 3. Left lower lobe pulmonary metastasis has mildly increased in size. 4. Bulky left upper lobe malignancy with chest wall invasion, demonstrating mild  interval progression since the prior study. 5. Left adrenal metastasis, slightly increased in size. Assessment and Recommendations:   1. Acute Resp Failure  Pulmonary consulted: steroids, abx initiated  Consider thoracentesis? 2. DVT LE (distal)   Dx 5/16/2018  Secondary to malignancy  Recommend treatment with Lovenox 1 mg/kg bid for now, in case procedures (such as thoracentesis) are needed, but would favor a DOAC such as apixaban on discharge. 3. Afib with RVR  Primary team managing    4. Anemia   Worsening recently, despite increasing time away from chemotherapy. Likely anemia of chronic disease, but I do wonder about possible bone marrow involvement from his cancer.   Transfuse to keep HGB >7.  Consider a higher threshold given his significant dyspnea and fatigue. 5. Thrombocytopenia  Not improving despite increasing time away from chemotherapy. Concerning for possible marrow involvement from his cancer. Continue to monitor; hold anticoagulation for for plt < 50 K    6. Metastatic Non-small cell lung cancer (Squamous Cell Carcinoma)  Stage FARIDEH, PDL1 negative  His cancer is not curable and management is with palliative intent. He has completed four cycles of palliative chemotherapy with Carboplatin and Gemcitabine, but recently was noted to have progressive disease. He is now s/p  palliative radiation to his chest wall. He has started palliative immunotherapy with nivolumab, first dose yesterday (5/15). I discussed his prognosis with him again this evening, and discussed goals of care. He tells me his goal is to spend as much time with his grandson as possible. We discussed that, should his disease progress on current therapy, he most likely would not be a candidate for additional therapy. We reviewed the options which include continuing with aggressive supportive care and continued immunotherapy, versus shifting our focus on comfort measures alone. He desires to continue to aggressive care for now. He is agreeable to meeting with palliative care here in the hospital for continued discussions. 7. Cancer Pain  S/P palliative radiation. I have been trying to get him in with palliative care as an outpatient, but he has been reluctant to schedule. Palliative team consulted to see in the hospital.      8. CODE STATUS: DNR  I confirmed again with him this evening. He was previously provided with a DDNR from my office.         Signed By: Marie Ovalles MD

## 2018-05-16 NOTE — PROGRESS NOTES
[facility]: MED RECONCILIATION    Comments/Recommendations:     Currently on Opdivo for Lung CA  - last dose received 5/15/18  - previously receiving Carboplatin + Gemcitabine (last received 4/10/18 and 4/17/18)    Medications removed:  · Stiolto Respimat 2.5-2.5 daily    Medications adjusted:  · Nystatin cream- previously BID    Information obtained from: Patient, son, [address], previous medical records      Allergies: Pcn [penicillins]    Prior to Admission Medications:     Prior to Admission Medications   Prescriptions Last Dose Informant Patient Reported? Taking? OTHER,NON-FORMULARY,   Yes Yes   Sig: Natural Colon Cleanse four pills by mouth twice daily as needed for constipation. albuterol (PROVENTIL VENTOLIN) 2.5 mg /3 mL (0.083 %) nebulizer solution 5/16/2018 at AM  No Yes   Sig: USE 2 VIALS VIAL NEBULIZER EVERY 4 HOURS AS NEEDED FOR WHEEZING   albuterol-ipratropium (DUO-NEB) 2.5 mg-0.5 mg/3 ml nebu 5/16/2018 at AM  No Yes   Sig: 3 mL by Nebulization route every four (4) hours as needed. lidocaine-prilocaine (EMLA) topical cream   No Yes   Sig: Apply  to affected area as needed for Pain (Apply 30-60 min. prior to having your port accessed). loperamide (IMODIUM) 2 mg capsule   Yes Yes   Sig: Take 2 mg by mouth as needed for Diarrhea. Indications: Diarrhea   nystatin (MYCOSTATIN) topical cream   Yes Yes   Sig: Apply  to affected area two (2) times daily as needed for Skin Irritation. ondansetron hcl (ZOFRAN) 8 mg tablet   No Yes   Sig: Take 1 Tab by mouth every eight (8) hours as needed for Nausea. oxyCODONE IR (ROXICODONE) 5 mg immediate release tablet 5/16/2018 at AM  No Yes   Sig: Take 1-2 Tabs by mouth every four (4) hours as needed for Pain (cancer pain). Max Daily Amount: 60 mg.   phenyleph/mineral oil/petrolat (PREPARATION H RE)   Yes Yes   Sig: Insert  into rectum daily as needed.    prochlorperazine (COMPAZINE) 10 mg tablet   No Yes   Sig: Take 1 Tab by mouth every six (6) hours as needed for Nausea. Facility-Administered Medications: None         Thank you,  Alix Sosa, Pharm. D.

## 2018-05-16 NOTE — IP AVS SNAPSHOT
303 [facility] 
 
 
 380 [facility] 70 [facility] 
[phone] Patient: Rinku Padron MRN: [id number] About your hospitalization You were admitted on:  May 16, 2018 You last received care in the:  [facility] 3 100 [facility] 2 You were discharged on:  May 18, 2018 Why you were hospitalized Your primary diagnosis was:  Not on File Your diagnoses also included:  Anemia, A-Fib (Hcc) Follow-up Information Follow up With Details Comments Contact Info Sohail Perales MD On 5/21/2018 at 2:45 PM for hospital follow up  [address]  [address] [address] 
[phone] Chuyita Brock MD Go on 5/23/2018 Lab only appt at 9 am [address] [place] [address] 70 [facility] 
[phone] Chuyita Brock MD Go on 5/24/2018 transfusion appt at 9 am if needed based on previous day's labs [address] [place] [address] 70 [facility] 
[phone] Chuyita Brock MD Go on 5/29/2018 appt at 11 am for follow up with Dr Valverde [address] [place] [address] 221 [address] 
[phone] [facility]  THEY WILL FURNISH YOUR OXYGEN [address] [address] Larry Stone MD On 6/15/2018 1000 AM [address] [place] [address] 600 [address] 
[phone] [facility]  THEY WILL CONTACT YOU TO SCHEDULE A HOME VISIT [address] 
1st Floor [facility] [id number] [phone] Your Scheduled Appointments Monday May 21, 2018  2:45 PM EDT Any with Sohail Perales MD  
5900 [facility] [address] [place] [address]) [address]  [address] [address] [phone] Wednesday May 23, 2018  9:00 AM EDT  
OPIC BLOOD DRAW with [place] PHLEBOTOMIST  
Jean Julien ([address]) [address] [place] [address] [facility] 70 [facility] 99 [address]  
[phone]  Thursday May 24, 2018  8:30 AM EDT  
 ESTABLISHED PATIENT with Felicia Richter MD  
Palliative Medicine ([facility]) 301 [facility] [address] 1007 [facility]  
[phone] Thursday May 24, 2018  9:00 AM EDT  
OPIC TRANSFUSION with [facility] INFUSION NURSE 3  
[address] (Jean Carlos Lawrence) 301 [facility] [place] [address] [address]  
[phone] Tuesday May 29, 2018 11:00 AM EDT INFUSION 30 with [facility] INFUSION NURSE 5  
[address] (Jean Carlos Lawrence) 301 [facility] [place] [address] [address]  
[phone] Tuesday May 29, 2018 11:15 AM EDT  
ESTABLISHED PATIENT with Juan C Quinn NP  
[facility]) 301 [facility], [address] 1007 [facility]  
[phone] Tuesday June 12, 2018 11:00 AM EDT INFUSION 30 with [facility] FT CHAIR 2  
[address] (Jean Carlos Lawrence) 301 [facility] [address] [address]  
[phone] Friday Faustina 15, 2018 10:00 AM EDT  
ESTABLISHED PATIENT with Zaina Islas MD  
[facility] ([facility]) 320 [facility] [place] 600 1007 [facility]  
[phone] Discharge Orders None A check amxi indicates which time of day the medication should be taken. My Medications START taking these medications Instructions Each Dose to Equal  
 Morning Noon Evening Bedtime * apixaban 5 mg tablet Commonly known as:  Obadiah Severe Your last dose was:  N/A Your next dose is:  5/18/18 @ Dinner Take 2 Tabs by mouth two (2) times a day for 7 days. 10 mg  
    
  
   
   
  
   
  
 * apixaban 5 mg tablet Commonly known as:  Obadiah Severe Take 1 Tab by mouth two (2) times a day for 30 days. 5 mg  
    
   
   
   
  
 levoFLOXacin 750 mg tablet Commonly known as:  Sherry Saha Your last dose was:  5/18/18 @ 3:15 PM  
Your next dose is:  5/19/18 @ Dinner Take 1 Tab by mouth every twenty-four (24) hours. 750 mg  
    
   
   
  
   
  
 predniSONE 10 mg tablet Commonly known as:  Adolfo Fast Your last dose was:  N/A Your next dose is:  5/19/18 Take 6 tablets  for three days Take 4 tablets  for three days Take 2 tablets  for three days Take one tablet for three days * Notice: This list has 2 medication(s) that are the same as other medications prescribed for you. Read the directions carefully, and ask your doctor or other care provider to review them with you. CONTINUE taking these medications Instructions Each Dose to Equal  
 Morning Noon Evening Bedtime  
 albuterol 2.5 mg /3 mL (0.083 %) nebulizer solution Commonly known as:  PROVENTIL VENTOLIN Your last dose was:  Prior to Admission Notes to Patient:  AS NEEDED  
   
 USE 2 VIALS VIAL NEBULIZER EVERY 4 HOURS AS NEEDED FOR WHEEZING  
     
   
   
   
  
 albuterol-ipratropium 2.5 mg-0.5 mg/3 ml Nebu Commonly known as:  Emilee Oates Your last dose was:  5/18/18 @ 11:31 AM  
   
 3 mL by Nebulization route every four (4) hours as needed. 3 mL  
    
   
   
   
  
 lidocaine-prilocaine topical cream  
Commonly known as:  EMLA Your last dose was:  Prior to admission Notes to Patient:  AS NEEDED Apply  to affected area as needed for Pain (Apply 30-60 min. prior to having your port accessed). loperamide 2 mg capsule Commonly known as:  IMODIUM Your last dose was:  Prior to Admission Notes to Patient:  AS NEEDED Take 2 mg by mouth as needed for Diarrhea. Indications: Diarrhea  
 2 mg  
    
   
   
   
  
 nystatin topical cream  
Commonly known as:  MYCOSTATIN Your last dose was:  Prior to Admission Notes to Patient:  AS NEEDED  
   
 Apply  to affected area two (2) times daily as needed for Skin Irritation. ondansetron hcl 8 mg tablet Commonly known as:  Bowen Lomas Your last dose was:  Prior to Admission Notes to Patient:  AS NEEDED Take 1 Tab by mouth every eight (8) hours as needed for Nausea. 8 mg OTHER(NON-FORMULARY) Natural Colon Cleanse four pills by mouth twice daily as needed for constipation. oxyCODONE IR 5 mg immediate release tablet Commonly known as:  Lai Cox Your last dose was:  5/18/18 @ 6:50 AM  
Notes to Patient:  AS NEEDED Take 1-2 Tabs by mouth every four (4) hours as needed for Pain (cancer pain). Max Daily Amount: 60 mg.  
 5-10 mg PREPARATION H RE Insert  into rectum daily as needed. prochlorperazine 10 mg tablet Commonly known as:  COMPAZINE Your last dose was:  Prior to Admission Notes to Patient:  AS NEEDED Take 1 Tab by mouth every six (6) hours as needed for Nausea. 10 mg Where to Get Your Medications Information on where to get these meds will be given to you by the nurse or doctor. ! Ask your nurse or doctor about these medications  
  apixaban 5 mg tablet  
 apixaban 5 mg tablet  
 levoFLOXacin 750 mg tablet  
 predniSONE 10 mg tablet Opioid Education Prescription Opioids: What You Need to Know: 
 
Prescription opioids can be used to help relieve moderate-to-severe pain and are often prescribed following a surgery or injury, or for certain health conditions. These medications can be an important part of treatment but also come with serious risks. Opioids are strong pain medicines. Examples include hydrocodone, oxycodone, fentanyl, and morphine. Heroin is an example of an illegal opioid.   It is important to work with your health care provider to make sure you are getting the safest, most effective care. WHAT ARE THE RISKS AND SIDE EFFECTS OF OPIOID USE? Prescription opioids carry serious risks of addiction and overdose, especially with prolonged use. An opioid overdose, often marked by slow breathing, can cause sudden death. The use of prescription opioids can have a number of side effects as well, even when taken as directed. · Tolerance-meaning you might need to take more of a medication for the same pain relief · Physical dependence-meaning you have symptoms of withdrawal when the medication is stopped. Withdrawal symptoms can include nausea, sweating, chills, diarrhea, stomach cramps, and muscle aches. Withdrawal can last up to several weeks, depending on which drug you took and how long you took it. · Increased sensitivity to pain · Constipation · Nausea, vomiting, and dry mouth · Sleepiness and dizziness · Confusion · Depression · Low levels of testosterone that can result in lower sex drive, energy, and strength · Itching and sweating RISKS ARE GREATER WITH:      
· History of drug misuse, substance use disorder, or overdose · Mental health conditions (such as depression or anxiety) · Sleep apnea · Older age (72 years or older) · Pregnancy Avoid alcohol while taking prescription opioids. Also, unless specifically advised by your health care provider, medications to avoid include: · Benzodiazepines (such as Xanax or Valium) · Muscle relaxants (such as Soma or Flexeril) · Hypnotics (such as Ambien or Lunesta) · Other prescription opioids KNOW YOUR OPTIONS Talk to your health care provider about ways to manage your pain that don't involve prescription opioids. Some of these options may actually work better and have fewer risks and side effects. Options may include: 
· Pain relievers such as acetaminophen, ibuprofen, and naproxen · Some medications that are also used for depression or seizures · Physical therapy and exercise · Counseling to help patients learn how to cope better with triggers of pain and stress. · Application of heat or cold compress · Massage therapy · Relaxation techniques Be Informed Make sure you know the name of your medication, how much and how often to take it, and its potential risks & side effects. IF YOU ARE PRESCRIBED OPIOIDS FOR PAIN: 
· Never take opioids in greater amounts or more often than prescribed. Remember the goal is not to be pain-free but to manage your pain at a tolerable level. · Follow up with your primary care provider to: · Work together to create a plan on how to manage your pain. · Talk about ways to help manage your pain that don't involve prescription opioids. · Talk about any and all concerns and side effects. · Help prevent misuse and abuse. · Never sell or share prescription opioids · Help prevent misuse and abuse. · Store prescription opioids in a secure place and out of reach of others (this may include visitors, children, friends, and family). · Safely dispose of unused/unwanted prescription opioids: Find your community drug take-back program or your pharmacy mail-back program, or flush them down the toilet, following guidance from the Food and Drug Administration (www.fda.gov/Drugs/ResourcesForYou). · Visit www.cdc.gov/drugoverdose to learn about the risks of opioid abuse and overdose. · If you believe you may be struggling with addiction, tell your health care provider and ask for guidance or call [address] [place], [place] [address] at 1-060-544-OKPM. Discharge Instructions Avoiding Triggers With Heart Failure: Care Instructions Your Care Instructions Triggers are anything that make your heart failure flare up. A flare-up is also called \"sudden heart failure\" or \"acute heart failure. \" When you have a flare-up, fluid builds up in your lungs, and you have problems breathing. You might need to go to the hospital. By watching for changes in your condition and avoiding triggers, you can prevent heart failure flare-ups. Follow-up care is a key part of your treatment and safety. Be sure to make and go to all appointments, and call your doctor if you are having problems. It's also a good idea to know your test results and keep a list of the medicines you take. How can you care for yourself at home? Watch for changes in your weight and condition · Weigh yourself without clothing at the same time each day. Record your weight. Call your doctor if you have sudden weight gain, such as more than 2 to 3 pounds in a day or 5 pounds in a week. (Your doctor may suggest a different range of weight gain.) A sudden weight gain may mean that your heart failure is getting worse. · Keep a daily record of your symptoms. Write down any changes in how you feel, such as new shortness of breath, cough, or problems eating. Also record if your ankles are more swollen than usual and if you feel more tired than usual. Note anything that you ate or did that could have triggered these changes. Limit sodium Sodium causes your body to hold on to extra water. This may cause your heart failure symptoms to get worse. People get most of their sodium from processed foods. Fast food and restaurant meals also tend to be very high in sodium. · Your doctor may suggest that you limit sodium to 2,000 milligrams (mg) a day or less. That is less than 1 teaspoon of salt a day, including all the salt you eat in cooking or in packaged foods. · Read food labels on cans and food packages. They tell you how much sodium you get in one serving. Check the serving size. If you eat more than one serving, you are getting more sodium.  
· Be aware that sodium can come in forms other than salt, including monosodium glutamate (MSG), sodium citrate, and sodium bicarbonate (baking soda). MSG is often added to Asian food. You can sometimes ask for food without MSG or salt. · Slowly reducing salt will help you adjust to the taste. Take the salt shaker off the table. · Flavor your food with garlic, lemon juice, onion, vinegar, herbs, and spices instead of salt. Do not use soy sauce, steak sauce, onion salt, garlic salt, mustard, or ketchup on your food, unless it is labeled \"low-sodium\" or \"low-salt. \" 
· Make your own salad dressings, sauces, and ketchup without adding salt. · Use fresh or frozen ingredients, instead of canned ones, whenever you can. Choose low-sodium canned goods. · Eat less processed food and food from restaurants, including fast food. Exercise as directed Moderate, regular exercise is very good for your heart. It improves your blood flow and helps control your weight. But too much exercise can stress your heart and cause a heart failure flare-up. · Check with your doctor before you start an exercise program. 
· Walking is an easy way to get exercise. Start out slowly. Gradually increase the length and pace of your walk. Swimming, riding a bike, and using a treadmill are also good forms of exercise. · When you exercise, watch for signs that your heart is working too hard. You are pushing yourself too hard if you cannot talk while you are exercising. If you become short of breath or dizzy or have chest pain, stop, sit down, and rest. 
· Do not exercise when you do not feel well. Take medicines correctly · Take your medicines exactly as prescribed. Call your doctor if you think you are having a problem with your medicine. · Make a list of all the medicines you take. Include those prescribed to you by other doctors and any over-the-counter medicines, vitamins, or supplements you take. Take this list with you when you go to any doctor. · Take your medicines at the same time every day.  It may help you to post a list of all the medicines you take every day and what time of day you take them. · Make taking your medicine as simple as you can. Plan times to take your medicines when you are doing other things, such as eating a meal or getting ready for bed. This will make it easier to remember to take your medicines. · Get organized. Use helpful tools, such as daily or weekly pill containers. When should you call for help? Call 911 if you have symptoms of sudden heart failure such as: 
? · You have severe trouble breathing. ? · You cough up pink, foamy mucus. ? · You have a new irregular or rapid heartbeat. ?Call your doctor now or seek immediate medical care if: 
? · You have new or increased shortness of breath. ? · You are dizzy or lightheaded, or you feel like you may faint. ? · You have sudden weight gain, such as more than 2 to 3 pounds in a day or 5 pounds in a week. (Your doctor may suggest a different range of weight gain.) ? · You have increased swelling in your legs, ankles, or feet. ? · You are suddenly so tired or weak that you cannot do your usual activities. ? Watch closely for changes in your health, and be sure to contact your doctor if you develop new symptoms. Where can you learn more? Go to http://kayla-dylan.info/. Enter L089 in the search box to learn more about \"Avoiding Triggers With Heart Failure: Care Instructions. \" Current as of: 2016 Content Version: 11.4 © 7204-3654 [employer]. Care instructions adapted under license by [employer] (which disclaims liability or warranty for this information). If you have questions about a medical condition or this instruction, always ask your healthcare professional. [place] [address] any warranty or liability for your use of this information. HOME DISCHARGE INSTRUCTIONS [employer]ading / [id number] : 1940 Admission date: 5/16/2018 Discharge date: 5/17/2018 9:30 PM  
 
Please bring this form with you to show your care provider at your follow-up appointment. Primary care provider:  Randall Piper MD 
 
Discharging provider:  Isamar Cuevas MD  - Family Medicine Resident Dr. Bonilla Coronel Attending You have been admitted to the hospital with the following diagnoses: 
 
ACUTE DIAGNOSES: 
· Anemia · A-fib ([address] Diana Peaks . . . . . . . . . . . . . . . . . . . . . . . . . . . . . . . . . . . . . . . . . . . . . . . . . . . . . . . . . . . . . . . . . . . . . . Diana Peaks FOLLOW-UP CARE RECOMMENDATIONS: 
 
Start using home oxygen at 2L at all times to help with breathing Please take Levaquin 750 mg daily for the next two days for suspected lung infection. Stop date on 05/20/2018 Please take 10 mg of  prednisone as instructed below for COPD Take 6 tablets for three days  
Take 4 tablets for three days   Take 2 tablets for three days   Take one tablet for three days Start taking Eliquis 10 mg two times daily for the first seven days to prevent blood clots Then, continue taking Eliquis 5 mg two times daily to prevent blood clots. Discuss with your primary care doctor and Oncologist the length of treatment for blood clots. Appointments Follow-up Information Follow up With Details Comments Contact Info Randall Piper MD On 5/21/2018 at 2:45 PM for hospital follow up  N [address] [place] [address] 
[phone] Amanda Aceves MD Go on 5/23/2018 Lab only appt at 9 am [address] [place] [address] 
[phone] Amanda Aceves MD Go on 5/24/2018 transfusion appt at 9 am if needed based on previous day's labs [address] [place] [address] 
[phone] Amanda Aceves MD Go on 5/29/2018 appt at 11 am for follow up with Dr Valverde [address] [place] [address] 
[phone] [facility]  THEY WILL FURNISH YOUR OXYGEN [facility] [place] [address] Follow-up tests needed: None Pending test results: At the time of your discharge the following test results are still pending: None Please make sure you review these results with your outpatient follow-up provider(s). Specific symptoms to watch for: chest pain, shortness of breath, fever, chills, nausea, vomiting, diarrhea, change in mentation, falling, weakness, bleeding. DIET/what to eat:  Regular Diet ACTIVITY:  Activity as tolerated Wound care: none Equipment needed:  Home Oxygen What to do if new or unexpected symptoms occur? If you experience any of the above symptoms (or should other concerns or questions arise after discharge) please call your primary care physician. Return to the emergency room if you cannot get hold of your doctor. · It is very important that you keep your follow-up appointment(s). · Please bring discharge papers, medication list (and/or medication bottles) to your follow-up appointments for review by your outpatient provider(s). · Please check the list of medications and be sure it includes every medication (even non-prescription medications) that your provider wants you to take. · It is important that you take the medication exactly as they are prescribed. · Keep your medication in the bottles provided by the pharmacist and keep a list of the medication names, dosages, and times to be taken in your wallet. · Do not take other medications without consulting your doctor. · If you have any questions about your medications or other instructions, please talk to your nurse or care provider before you leave the hospital.  
 
Information obtained by:  
 
I understand that if any problems occur once I am at home I am to contact my physician. These instructions were explained to me and I had the opportunity to ask questions. I understand and acknowledge receipt of the instructions indicated above. Physician's or R.N.'s Signature                                                                  Date/Time Patient or Representative Signature                                                          Date/Time [facility] Announcement We are excited to announce that we are making your provider's discharge notes available to you in [facility]. You will see these notes when they are completed and signed by the physician that discharged you from your recent hospital stay. If you have any questions or concerns about any information you see in [facility], please call the Health Information Department where you were seen or reach out to your Primary Care Provider for more information about your plan of care. Introducing [facility]! [employer] introduces [facility] patient portal. Now you can access parts of your medical record, email your doctor's office, and request medication refills online. 1. In your internet browser, go to [email or web address] 2. Click on the First Time User? Click Here link in the Sign In box. You will see the New Member Sign Up page. 3. Enter your [facility] Access Code exactly as it appears below. You will not need to use this code after youve completed the sign-up process. If you do not sign up before the expiration date, you must request a new code. · [facility] Access Code: [email or web address] Expires: 5/24/2018  4:52 PM 
 
4. Enter the last four digits of your Social Security Number (xxxx) and Date of Birth (mm/dd/yyyy) as indicated and click Submit. You will be taken to the next sign-up page. 5. Create a [employer] ID. This will be your [employer] login ID and cannot be changed, so think of one that is secure and easy to remember. 6. Create a [employer] password. You can change your password at any time. 7. Enter your Password Reset Question and Answer. This can be used at a later time if you forget your password. 8. Enter your e-mail address. You will receive e-mail notification when new information is available in [address] 9. Click Sign Up. You can now view and download portions of your medical record. 10. Click the Download Summary menu link to download a portable copy of your medical information. If you have questions, please visit the Frequently Asked Questions section of the [employer] website. Remember, [employer] is NOT to be used for urgent needs. For medical emergencies, dial 911. Now available from your iPhone and Android! Introducing Barry Lam As a [facility] patient, I wanted to make you aware of our electronic visit tool called Barry Lam. [facility] 24/7 allows you to connect within minutes with a medical provider 24 hours a day, seven days a week via a mobile device or tablet or logging into a secure website from your computer. You can access Barry Moralesfin from anywhere in the [place]. A virtual visit might be right for you when you have a simple condition and feel like you just dont want to get out of bed, or cant get away from work for an appointment, when your regular [facility] provider is not available (evenings, weekends or holidays), or when youre out of town and need minor care. Electronic visits cost only $49 and if the [facility] 24/7 provider determines a prescription is needed to treat your condition, one can be electronically transmitted to a nearby pharmacy*. Please take a moment to enroll today if you have not already done so. The enrollment process is free and takes just a few minutes.   To enroll, please download the [employer] 24/7 john to your tablet or phone, or visit [email or web address] to enroll on your computer. And, as an [facility] patient with a [employer] account, the results of your visits will be scanned into your electronic medical record and your primary care provider will be able to view the scanned results. We urge you to continue to see your regular [employer] provider for your ongoing medical care. And while your primary care provider may not be the one available when you seek a [employer] virtual visit, the peace of mind you get from getting a real diagnosis real time can be priceless. For more information on [employer], view our Frequently Asked Questions (FAQs) at [email or web address]. Sincerely, 
 
Maris Meraz MD 
Chief Medical Officer Wendy Duncan *:  certain medications cannot be prescribed via [employer] Unresulted Labs-Please follow up with your PCP about these lab tests Order Current Status CULTURE, BLOOD, PAIRED Preliminary result CULTURE, RESPIRATORY/SPUTUM/BRONCH W GRAM STAIN Preliminary result EKG, 12 LEAD, INITIAL Preliminary result Last Palliative Care Discharge Note 05/18/18 1108  Version 1 of 1 The Palliative Medicine team was consulted as part of your / your loved one's care in the hospital. Our team is a supportive service; we strive to relieve suffering and improve quality of life. You identified the following goal(s) as your main focus for healthcare: Patient/Health Care Proxy Stated Goals: Prolong life (Full restorative measures in hopes of recovery and continuation of cancer  treatment  to have as much time as he can for his grandson) During this admission, we addressed your symptoms of pain and shortness of breath.  Going forward, your care team from Oncology would like you to be followed by Palliative Medicine Outpatient team to further address your symptoms. An appointment has been made for you for May 24th at [address] at [address] [facility] [place], [id number] [place] [address]. Please call [phone] with any questions or concerns regarding this appointment. We reviewed advance care planning information, which includes the following: 
Advance Care Planning 5/17/2018 Patient's Healthcare Decision Maker is: Verbal statement (Legal Next of Kin remains as decision maker) Primary Decision Maker Name Sreedhar Peña Primary Decision Maker Phone Number [phone] Primary Decision Maker Relationship to Patient Other relative Confirm Advance Directive Yes, not on file Patient Would Like to Complete Advance Directive - Does the patient have other document types Do Not Resuscitate We reviewed / discussed your code status as: DNR 
   Full Code means perform CPR in the event of cardiac arrest 
   [facility] means do NOT perform CPR in the event of cardiac arrest 
   Partial Code means you have specific preferences, please discuss with your health care team 
   No Order means this issue was not addressed / resolved during your stay Because of the importance of this information, we are providing you with a printed copy to share with other healthcare providers after this hospitalization is complete. If any of the above information is incomplete or incorrect, please contact the Palliative Medicine team at [phone]. Providers Seen During Your Hospitalization Provider Specialty Primary office phone Kayla Rizvi MD Emergency Medicine [phone] Guillermina Griffin MD [facility] [phone] Sebastian Cook MD [facility] [phone] Your Primary Care Physician (PCP) Primary Care Physician Office Phone Office Fax [address] [facility] [phone] You are allergic to the following Allergen Reactions Pcn (Penicillins) Hives Tolerated ancef graded challenge 12/21/2017 with no adverse side effects noted Recent Documentation Height Weight BMI Smoking Status 1.778 m 76.5 kg 24.2 kg/m2 Current Every Day Smoker Emergency Contacts Name Discharge Info Relation Home Work Mobile Lucius Jackson DISCHARGE CAREGIVER [3] Other Relative [6] [phone] Patient Belongings The following personal items are in your possession at time of discharge: 
  Dental Appliances: None  Visual Aid: Glasses      Home Medications: None   Jewelry: None  Clothing: At bedside    Other Valuables: None Please provide this summary of care documentation to your next provider. Signatures-by signing, you are acknowledging that this After Visit Summary has been reviewed with you and you have received a copy. Patient Signature:  ____________________________________________________________ Date:  ____________________________________________________________  
  
Roger Dhaliwal Provider Signature:  ____________________________________________________________ Date:  ____________________________________________________________

## 2018-05-16 NOTE — CONSULTS
Rohith Curiel MD    [address] [place], [address] [facility]    Office [phone],RSP [phone]  Pager [phone]    Date of  Admission: 5/16/2018  1:35 PM  PCP- Freddie Tse MD    Sage King is a 78 y.o. male admitted for Anemia  A-fib ([facility] [address] Consult requested by Kayla Rizvi MD    Assessment/Plan    Dyspnea-non-small cell CA of the lung/component of acute on chronic diastolic heart failure  A. fib with RVR  Severe anemia/thrombocytopenia/hyponatremia      Plan:  Keep hemoglobin greater than 8.    Echocardiogram pending. Patient's heart rate is in the 110s. If it increases in stress persistently in the 299D-WNG if systolic blood pressures greater than 110, can try esmolol GTT. Not a candidate for anticoagulation currently. I appreciate the opportunity to be involved in [facility]. See below note for details. Please do not hesitate to contact us with questions or concerns. Rohith Curiel MD    Cardiac Testing/ Procedures: A. Cardiac Cath/PCI:    B.ECHO/HARSHAL:    C.StressNuclear/Stress ECHO/Stress test:    D.Vascular:    E. EP:    F. Miscellaneous:    Care Plan discussed with: Patient and Nursing Staff    Subjective:    Patient is a 19-year-old male with history of stage IV non-small cell cancer of the lung and who underwent chemotherapy. He went to see his pulmonologist and was sent to the ED for dyspnea, hypotension and tachycardia. Patient was found to be in A. fib with RVR. Also has significant anemia with hemoglobin of 6.5, thrombocytopenia, hyponatremia. Michaelyn Daily History of chronic chest wall pain, dyspnea. H/o palpitations/cough. Dizziness present. No presyncope/syncope. No prior history of CAD, arrhythmias. Patient was given 2.5 mg of IV metoprolol in the ED. Trop - neg; proBNP 2484  C Xray - New moderate left pleural effusion. Decreased left upper lobe mass. Nopneumothorax.     Past Medical History:   Diagnosis Date    HTN (hypertension) 10/21/2014    Rheumatoid arthritis(714.0) 10/21/2014    Skin cancer     Sun-damaged skin     Sunburn, blistering       Past Surgical History:   Procedure Laterality Date    HX OTHER SURGICAL      Hemmorhiodectomy     Allergies   Allergen Reactions    Pcn [Penicillins] Hives     Tolerated ancef graded challenge 12/21/2017 with no adverse side effects noted     No family history on file. Social History   Substance Use Topics    Smoking status: Current Every Day Smoker     Packs/day: 1.00    Smokeless tobacco: Never Used    Alcohol use No          Medications:    (Not in a hospital admission)  Current Facility-Administered Medications   Medication Dose Route Frequency    methylPREDNISolone (PF) (SOLU-MEDROL) injection 80 mg  80 mg IntraVENous Q6H    albuterol-ipratropium (DUO-NEB) 2.5 MG-0.5 MG/3 ML  3 mL Nebulization Q4H RT    arformoterol (BROVANA) neb solution 15 mcg  15 mcg Nebulization BID RT    budesonide (PULMICORT) 500 mcg/2 ml nebulizer suspension  500 mcg Nebulization BID RT    levoFLOXacin (LEVAQUIN) 750 mg in D5W IVPB  750 mg IntraVENous Q24H    0.9% sodium chloride infusion 250 mL  250 mL IntraVENous PRN    vancomycin (VANCOCIN) 2,000 mg in 0.9% sodium chloride 500 mL IVPB  2,000 mg IntraVENous ONCE    [START ON 5/17/2018] vancomycin (VANCOCIN) 1,250 mg in 0.9% sodium chloride 250 mL IVPB  1,250 mg IntraVENous Q12H    sodium chloride (NS) flush 5-10 mL  5-10 mL IntraVENous Q8H    sodium chloride (NS) flush 5-10 mL  5-10 mL IntraVENous PRN    iopamidol (ISOVUE-370) 76 % injection 100 mL  100 mL IntraVENous RAD ONCE     Current Outpatient Prescriptions   Medication Sig    nystatin (MYCOSTATIN) topical cream Apply  to affected area two (2) times daily as needed for Skin Irritation.  phenyleph/mineral oil/petrolat (PREPARATION H RE) Insert  into rectum daily as needed.     oxyCODONE IR (ROXICODONE) 5 mg immediate release tablet Take 1-2 Tabs by mouth every four (4) hours as needed for Pain (cancer pain). Max Daily Amount: 60 mg.    loperamide (IMODIUM) 2 mg capsule Take 2 mg by mouth as needed for Diarrhea. Indications: Diarrhea    OTHER,NON-FORMULARY, Natural Colon Cleanse four pills by mouth twice daily as needed for constipation.  prochlorperazine (COMPAZINE) 10 mg tablet Take 1 Tab by mouth every six (6) hours as needed for Nausea.  lidocaine-prilocaine (EMLA) topical cream Apply  to affected area as needed for Pain (Apply 30-60 min. prior to having your port accessed).  ondansetron hcl (ZOFRAN) 8 mg tablet Take 1 Tab by mouth every eight (8) hours as needed for Nausea.  albuterol-ipratropium (DUO-NEB) 2.5 mg-0.5 mg/3 ml nebu 3 mL by Nebulization route every four (4) hours as needed.  albuterol (PROVENTIL VENTOLIN) 2.5 mg /3 mL (0.083 %) nebulizer solution USE 2 VIALS VIAL NEBULIZER EVERY 4 HOURS AS NEEDED FOR WHEEZING     Facility-Administered Medications Ordered in Other Encounters   Medication Dose Route Frequency    [START ON 5/17/2018] acetaminophen (TYLENOL) tablet 650 mg  650 mg Oral ONCE    [START ON 5/17/2018] diphenhydrAMINE (BENADRYL) capsule 25 mg  25 mg Oral ONCE    0.9% sodium chloride infusion 250 mL  250 mL IntraVENous PRN         Review of Systems:  (bold if positive, if negative)    As in HPI - rest of ROS noncontributory        Physical Exam:  Visit Vitals    /67    Pulse (!) 124    Temp 98.9 °F (37.2 °C)    Resp (!) 31    Ht 5' 10\" (1.778 m)    Wt 170 lb (77.1 kg)    SpO2 92%    BMI 24.39 kg/m2         Telemetry: Afib    Gen: Well-developed, well-nourished, in mild resp distress  HEENT:  Pale conjunctivae, hearing intact to voice, moist mucous membranes  Neck: Supple,No JVD, No Carotid Bruit, Thyroid- non tender  Resp: No accessory muscle use, Bilat rhonchi+  Card: Irr Irregular ; S1, S2, No murmurs, No rubs or gallop. No thrills.    Abd:  Soft,  BS+,   MSK: No cyanosis   Skin: No rashes  Neuro: , moving all four extremities, no focal deficit, follows commands appropriately  Psych:  Good insight, oriented to person, place and time, alert, Nml Affect  LE: 2+edema  Vascular:Radial Pulses 2+ and symmetric        EKG:  Afib with RVR; NSST      Cxray:    LABS:        Lab Results   Component Value Date/Time    WBC 13.0 (H) 05/16/2018 03:31 PM    HGB 6.3 (L) 05/16/2018 03:31 PM    HCT 20.7 (L) 05/16/2018 03:31 PM    PLATELET 65 (L) 28/30/4531 03:31 PM    .9 (H) 05/16/2018 03:31 PM     Lab Results   Component Value Date/Time    Sodium 127 (L) 05/16/2018 03:31 PM    Potassium 4.2 05/16/2018 03:31 PM    Chloride 95 (L) 05/16/2018 03:31 PM    CO2 26 05/16/2018 03:31 PM    Anion gap 6 05/16/2018 03:31 PM    Glucose 78 05/16/2018 03:31 PM    BUN 12 05/16/2018 03:31 PM    Creatinine 0.59 (L) 05/16/2018 03:31 PM    BUN/Creatinine ratio 20 05/16/2018 03:31 PM    GFR est AA >60 05/16/2018 03:31 PM    GFR est non-AA >60 05/16/2018 03:31 PM    Calcium 8.1 (L) 05/16/2018 03:31 PM     Lab Results   Component Value Date/Time    Troponin-I, Qt. <0.04 05/16/2018 03:31 PM     Lab Results   Component Value Date/Time    aPTT 31.7 11/29/2017 04:26 PM     Lab Results   Component Value Date/Time    INR 1.1 12/20/2017 11:45 PM    INR 1.2 (H) 11/29/2017 04:26 PM    Prothrombin time 11.3 (H) 12/20/2017 11:45 PM    Prothrombin time 11.8 (H) 11/29/2017 04:26 PM     No results found for: BNP, BNPP, Robert Cool MD

## 2018-05-17 NOTE — CONSULTS
Palliative Medicine Consult  Franco: [phone] ([phone])    Patient Name: Ruperto Aparicio  YOB: 1940    Date of Initial Consult: 05/17/2018  Reason for Consult: Care decisions  Requesting Provider: Eveline Coe MD   Primary Care Physician: Gaby Arnold MD     SUMMARY:   Ruperto Aparicio is a 78 y.o. with a past history of met lung CA, skin CA, HTN, COPD and RA, who was admitted on 5/16/2018 from home with a diagnosis of symptomatic anemia. Patient presented to the ED with hypotension and shortness of breath with hypoxia. Was seen earlier in the day by his Pulmonary NP and found to have sats in the 70's on room air with SBP in the 70's. ED eval revealed Hgb 6.5, wbc ct 12.4, and chest xray with new moderate left pleural effusion and decreased left upper lobe mass. CTA negative for PE but with moderate partially layering left pleural effusion, new since the prior study, left lower lobe pulmonary metastasis has mildly increased in size,  bulky left upper lobe malignancy with chest wall invasion, demonstrating mild interval progression since the prior study, and left adrenal metastasis, slightly increased in size. EKG showed new onset afib with RVR. Lower extremity dopplers positive for left lower leg acute deep venous thrombosis involving 1 of 2 paired posterior tibial veins at the mid calf. Patient received 2 unit PRBCs with IV lasix. Pulmonary consulted and recommended broad spectrum antibiotics, scheduled nebs, solumedrol. Cardiology consulted for new onset afib. Recommended anticoagulation and keeping hgb >8 and echo (results pending). On lovenox as inpatient and will transition to [address] [place] [address] at discharge for new DVT/afib. Patient followed by Dr. Danae Mireles for met lung CA and just completed radiation. Was seen in follow-up on 5/15 and was found to be anemic and was scheduled for outpatient transfusion today. Began immunotherapy 5/15.      Current medical issues leading to Palliative Medicine involvement include:Care decisions due to progressive decline, met lung CA, left chest wall pain/cancer pain. SH: [employer] , served in [place]. He lives in [place] with his grandson, Gianni Echeverria who suffers from schizophrenia and is agoraphobic. He is a retired , also did [employer]. work in [employer]. Has an adopted daughter who he has been estranged from for over 13 years. Was driving up until the last month or so, now relies on a friend to take him to appointments. Affiliates with the [employer]. PALLIATIVE DIAGNOSES:   1. Left chest wall pain/cancer pain  2. Shortness of breath  3. Hypoalbuminemia  4. Physical debility  5. Goals of care  6. DNR discussion  7. ACP       PLAN:   1. Met with patient and introduced the role of Palliative Medicine. Patient is known to Palliative Medicine team from admission in December 2017.  2. Left chest wall pain--> Patient states his pain started when he begin radiation and worsened \"when the scar tissue formed\". Patient reports that his pain is currently controlled on oxycodone regimen of 5 mg every four hours as needed for moderate pain and 10 mg every four hours as needed for severe pain. Has received 1 dose of 5 mg and 2 doses of 10 mg in the last 24 hours (25 MME). Confirms that when he was just taking 5 mg every four hours at home the pain was \"unbearable\" and on the 10 mg he gets relief. 3. Shortness of breath--> Patient states that his shortness of breath has improved since admission, was not on oxygen at home and still requiring oxygen here. Reports that it improved with transfusion. Plan to transfuse for hgb >8, continue broad spectrum antibiotics, scheduled nebs, solumedrol. 4.  reviewed--> 2 prescriptions and 2 prescribers. Both for oxycodone 5 mg. One written at time of discharge in Dec 2017 after he underwent hip replacement here.  The second was written for oxycodone 5 mg #60/10 days written by heme/onc NP on 4/25.  5. Recommendations:  1. Continue oxycodone regimen as stated above (5 mg every four hours as needed for moderate pain, 10 mg every four hours as needed for severe pain)  2. Lidoderm patch to chest wall  3. Tylenol 650 as needed for mild pain  4. Discussed staying ahead of the pain  5. Discussed possibility of transitioning to long active opioids if pain worsens to where he is requiring escalating doses of short acting medications. 6. Goals of care--> Discussed current hospitalization and events of the last five months since I saw the patient last in the hospital. He recounted his treatment with chemotherapy and initial decrease in size and then increase in size, and his completion of palliative radiation which worsened his left chest wall pain instead of improving it. Has recently started immunotherapy and states he wants to continue aggressive treatment so that he \"can be here for Yvon\". Inquired into any involvement of his adopted daughter in the past months, with diagnosis and undergoing treatment, and if she has been in contact with him or Ariel Lynn. Patient states he saw her in December for one night, but she returned to [place] and he has called her twice to update her on his progress and she has failed to call him back. Reports that Nakita Reid nothing to do with her\". Patient admits to limited amount of support and is concerned with the increasing amount of clinic visits lately and that he doesn't \"want to be a burden\" by asking his neighbor to have to take him back and forth. Discussed referral to outpatient clinic and patient states he \"doesn't need another doctor--  Doctor Valverde does it all\". I explained our role in symptom management, an extra layer of support for him, and continued discussion of care decisions. Patient declined this offer, stating \"if it aint broke, don't fix it\".  Outpatient palliative medicine team is aware of patient's admission and will ask for scheduled appointment so that I can provide this to the patient before discharge in case he changes his mind, and he can cancel if he so desires. Discussed with Mey with Heme Onc.   7. DNR--> Patient has signed DDNR from 12/2017. Confirmed with patient today. 8. ACP--> Patient reports that he has completed AMD/living will/financial power of  and that all the documents at home and appoint his grandson, Neo Harvey as agent for healthcare decisions. Have asked him to bring these documents in for his EMR since he is adamant he would not want his daughter to be the responsible party (as legal next of kin under [place] law in the absence of AMD appointing someone). He states he will have them brought in for review so that we can ensure this. 9. Initial consult note routed to primary continuity provider  10. Communicated plan of care with: Palliative IDT       GOALS OF CARE / TREATMENT PREFERENCES:     GOALS OF CARE:  Patient/Health Care Proxy Stated Goals: Prolong life (Full restorative measures in hopes of recovery and continuation of cancer  treatment  to have as much time as he can for his grandson)      TREATMENT PREFERENCES:   Code Status: DNR    Advance Care Planning:  Advance Care Planning 5/17/2018   Patient's Healthcare Decision Maker is: Verbal statement (Legal Next of Kin remains as decision maker)   Primary Decision Maker Name Héctor Ruano [address] [facility] Phone Number [phone]   Primary Decision Maker Relationship to Patient Other relative   Confirm Advance Directive Yes, not on file   Patient Would Like to Complete Advance Directive -   Does the patient have other document types Do Not Resuscitate       Medical Interventions: Limited additional interventions (DNR)   Other Instructions:   Artificially Administered Nutrition:  (not addressed)     Other:    As far as possible, the palliative care team has discussed with patient / health care proxy about goals of care / treatment preferences for patient. HISTORY:     History obtained from: patient,, chart, Mey NP    CHIEF COMPLAINT: shortness of breath    HPI/SUBJECTIVE:    The patient is:   [x] Verbal and participatory  [] Non-participatory due to:     Patient presented to the ED with hypotension and shortness of breath with hypoxia. Was seen earlier in the day by his Pulmonary NP and found to have sats in the 70's on room air with SBP in the 70's. ED eval revealed Hgb 6.5, wbc ct 12.4, and chest xray with new moderate left pleural effusion and decreased left upper lobe mass. CTA negative for PE but with moderate partially layering left pleural effusion, new since the prior study, left lower lobe pulmonary metastasis has mildly increased in size,  bulky left upper lobe malignancy with chest wall invasion, demonstrating mild interval progression since the prior study, and left adrenal metastasis, slightly increased in size. EKG showed new onset afib with RVR. Lower extremity dopplers positive for left lower leg acute deep venous thrombosis involving 1 of 2 paired posterior tibial veins at the mid calf. Patient received 2 unit PRBCs with IV lasix. Pulmonary consulted and recommended broad spectrum antibiotics, scheduled nebs, solumedrol. Cardiology consulted for new onset afib. Recommended anticoagulation and keeping hgb >8 and echo (results pending). On lovenox as inpatient and [place] [facility] [place] for new DVT/afib. Patient followed by Dr. Salinas Wallace for met lung CA and just completed radiation. Was seen in follow-up on 5/15 and was found to be anemia and was scheduled for outpatient transfusion today,.          Clinical Pain Assessment (nonverbal scale for severity on nonverbal patients):   Clinical Pain Assessment  Severity: 0          Duration: for how long has pt been experiencing pain (e.g., 2 days, 1 month, years)  Frequency: how often pain is an issue (e.g., several times per day, once every few days, constant)     FUNCTIONAL ASSESSMENT:     Palliative Performance Scale (PPS):  PPS: 60       PSYCHOSOCIAL/SPIRITUAL SCREENING:     Palliative IDT has assessed this patient for cultural preferences / practices and a referral made as appropriate to needs (Cultural Services, Patient Advocacy, Ethics, etc.)    Advance Care Planning:  Advance Care Planning 5/17/2018   Patient's Healthcare Decision Maker is: Verbal statement (Legal Next of Kin remains as decision maker)   Primary Decision Maker Name Torin Bustos   Primary Decision Maker Phone Number [phone]   Primary Decision Maker Relationship to Patient Other relative   Confirm Advance Directive Yes, not on file   Patient Would Like to Complete Advance Directive -   Does the patient have other document types Do Not Resuscitate       Any spiritual / Episcopalian concerns:  [] Yes /  [x] No    Caregiver Burnout:  [] Yes /  [] No /  [x] No Caregiver Present      Anticipatory grief assessment:   [x] Normal  / [] Maladaptive       ESAS Anxiety:      ESAS Depression:          REVIEW OF SYSTEMS:     Positive and pertinent negative findings in ROS are noted above in HPI. The following systems were [x] reviewed / [] unable to be reviewed as noted in HPI  Other findings are noted below. Systems: constitutional, ears/nose/mouth/throat, respiratory, gastrointestinal, genitourinary, musculoskeletal, integumentary, neurologic, psychiatric, endocrine. Positive findings noted below. Modified ESAS Completed by: provider   Fatigue: 2       Pain: 0           Dyspnea: 0                    PHYSICAL EXAM:     From RN flowsheet:  Wt Readings from Last 3 Encounters:   05/17/18 168 lb 10.4 oz (76.5 kg)   04/25/18 174 lb (78.9 kg)   04/17/18 179 lb 4.8 oz (81.3 kg)     Blood pressure 101/68, pulse 92, temperature 97.5 °F (36.4 °C), resp. rate 21, height 5' 10\" (1.778 m), weight 168 lb 10.4 oz (76.5 kg), SpO2 93 %.     Pain Scale 1: Numeric (0 - 10)  Pain Intensity 1: 1     Pain Location 1: Rib cage  Pain Orientation 1: Left  Pain Description 1: Intermittent  Pain Intervention(s) 1: Medication (see MAR)  Last bowel movement, if known:     Constitutional: NAD, sitting up in chair  Eyes: pupils equal, anicteric  ENMT: no nasal discharge, moist mucous membranes  Cardiovascular: regular rhythm, distal pulses intact, +2 KATHERINE bilat  Respiratory: breathing not labored, symmetric, decreased BS throughout  Gastrointestinal: soft non-tender, +bowel sounds  Musculoskeletal: no deformity, no tenderness to palpation  Skin: warm, dry  Neurologic: following commands, moving all extremities  Psychiatric: full affect, no hallucinations  Other:       HISTORY:     Active Problems:    Anemia (3/27/2018)      A-fib ([address] (5/16/2018)      Past Medical History:   Diagnosis Date    HTN (hypertension) 10/21/2014    Rheumatoid arthritis(714.0) 10/21/2014    Skin cancer     Sun-damaged skin     Sunburn, blistering       Past Surgical History:   Procedure Laterality Date    HX OTHER SURGICAL      Hemmorhiodectomy      No family history on file. History reviewed, no pertinent family history.   Social History   Substance Use Topics    Smoking status: Current Every Day Smoker     Packs/day: 1.00    Smokeless tobacco: Never Used    Alcohol use No     Allergies   Allergen Reactions    Pcn [Penicillins] Hives     Tolerated ancef graded challenge 12/21/2017 with no adverse side effects noted      Current Facility-Administered Medications   Medication Dose Route Frequency    0.9% sodium chloride infusion 250 mL  250 mL IntraVENous PRN    methylPREDNISolone (PF) (SOLU-MEDROL) injection 60 mg  60 mg IntraVENous Q6H    senna-docusate (PERICOLACE) 8.6-50 mg per tablet 1 Tab  1 Tab Oral BID    polyethylene glycol (MIRALAX) packet 17 g  17 g Oral DAILY    lidocaine (LIDODERM) 5 % patch 1 Patch  1 Patch TransDERmal Q24H    acetaminophen (TYLENOL) tablet 650 mg  650 mg Oral Q4H PRN    albuterol-ipratropium (DUO-NEB) 2.5 MG-0.5 MG/3 ML  3 mL Nebulization Q4H RT    arformoterol (BROVANA) neb solution 15 mcg  15 mcg Nebulization BID RT    budesonide (PULMICORT) 500 mcg/2 ml nebulizer suspension  500 mcg Nebulization BID RT    levoFLOXacin (LEVAQUIN) 750 mg in D5W IVPB  750 mg IntraVENous Q24H    0.9% sodium chloride infusion 250 mL  250 mL IntraVENous PRN    sodium chloride (NS) flush 5-10 mL  5-10 mL IntraVENous Q8H    sodium chloride (NS) flush 5-10 mL  5-10 mL IntraVENous PRN    enoxaparin (LOVENOX) injection 80 mg  80 mg SubCUTAneous Q12H    oxyCODONE IR (ROXICODONE) tablet 5 mg  5 mg Oral Q4H PRN    oxyCODONE IR (ROXICODONE) tablet 10 mg  10 mg Oral Q4H PRN     Facility-Administered Medications Ordered in Other Encounters   Medication Dose Route Frequency    acetaminophen (TYLENOL) tablet 650 mg  650 mg Oral ONCE    diphenhydrAMINE (BENADRYL) capsule 25 mg  25 mg Oral ONCE    0.9% sodium chloride infusion 250 mL  250 mL IntraVENous PRN          LAB AND IMAGING FINDINGS:     Lab Results   Component Value Date/Time    WBC 4.1 05/17/2018 02:49 PM    HGB 8.1 (L) 05/17/2018 02:49 PM    PLATELET 60 (L) 14/49/5845 02:49 PM     Lab Results   Component Value Date/Time    Sodium 128 (L) 05/17/2018 01:34 AM    Potassium 4.2 05/17/2018 01:34 AM    Chloride 96 (L) 05/17/2018 01:34 AM    CO2 25 05/17/2018 01:34 AM    BUN 11 05/17/2018 01:34 AM    Creatinine 0.56 (L) 05/17/2018 01:34 AM    Calcium 8.5 05/17/2018 01:34 AM    Magnesium 2.0 05/17/2018 01:34 AM    Phosphorus 4.1 05/17/2018 01:34 AM      Lab Results   Component Value Date/Time    AST (SGOT) 24 05/16/2018 03:31 PM    Alk.  phosphatase 60 05/16/2018 03:31 PM    Protein, total 5.7 (L) 05/16/2018 03:31 PM    Albumin 1.7 (L) 05/16/2018 03:31 PM    Globulin 4.0 05/16/2018 03:31 PM     Lab Results   Component Value Date/Time    INR 1.1 12/20/2017 11:45 PM    Prothrombin time 11.3 (H) 12/20/2017 11:45 PM    aPTT 31.7 11/29/2017 04:26 PM      Lab Results   Component Value Date/Time    Iron 17 (L) 11/29/2017 04:26 PM    TIBC 202 (L) 11/29/2017 04:26 PM    Iron % saturation 8 (L) 11/29/2017 04:26 PM    Ferritin 461 (H) 11/29/2017 04:26 PM      No results found for: PH, PCO2, PO2  No components found for: GLPOC   No results found for: CPK, CKMB             Total time: 70 min  Counseling / coordination time, spent as noted above: 60  min  > 50% counseling / coordination?: yes    Prolonged service was provided for  []30 min   []75 min in face to face time in the presence of the patient, spent as noted above. Time Start:   Time End:   Note: this can only be billed with 24983 (initial) or 65431 (follow up). If multiple start / stop times, list each separately.

## 2018-05-17 NOTE — PROGRESS NOTES
Problem: Falls - Risk of  Goal: *Absence of Falls  Document Ginger Fall Risk and appropriate interventions in the flowsheet. Outcome: Progressing Towards Goal  Fall Risk Interventions:  Mobility Interventions: Assess mobility with egress test, Bed/chair exit alarm, Communicate number of staff needed for ambulation/transfer, OT consult for ADLs, Patient to call before getting OOB, PT Consult for mobility concerns, PT Consult for assist device competence         Medication Interventions: Assess postural VS orthostatic hypotension, Bed/chair exit alarm, Evaluate medications/consider consulting pharmacy, Patient to call before getting OOB, Teach patient to arise slowly                  Problem: Pressure Injury - Risk of  Goal: *Prevention of pressure injury  Document Dhaval Scale and appropriate interventions in the flowsheet. Outcome: Progressing Towards Goal  Pressure Injury Interventions: Activity Interventions: Assess need for specialty bed, Chair cushion, Increase time out of bed, Pressure redistribution bed/mattress(bed type), PT/OT evaluation    Mobility Interventions: Assess need for specialty bed, Chair cushion, Float heels, HOB 30 degrees or less, Pressure redistribution bed/mattress (bed type), PT/OT evaluation, Turn and reposition approx.  every two hours(pillow and wedges)    Nutrition Interventions: Document food/fluid/supplement intake, Discuss nutritional consult with provider, Offer support with meals,snacks and hydration

## 2018-05-17 NOTE — PROGRESS NOTES
Spiritual Care Assessment/Progress Note  [address]      NAME: Jose Jackson      MRN: [id number]  AGE: 78 y.o.  SEX: male  Episcopalian Affiliation: No preference   Language: English     5/17/2018     Total Time (in minutes): 13     Spiritual Assessment begun in [facility] PRO CARE TELE 2 through conversation with:         [x]Patient        [] Family    [x] Friend(s)        Reason for Consult: Palliative Care, Initial/Spiritual Assessment     Spiritual beliefs: (Please include comment if needed)     [x] Identifies with a mary beth tradition: [facility]     [] Supported by a mary beth community:      [] Claims no spiritual orientation:      [] Seeking spiritual identity:           [] Adheres to an individual form of spirituality:      [] Not able to assess:                     Identified resources for coping:      [] Prayer                               [] Music                  [] Guided Imagery     [] Family/friends                 [] Pet visits     [] Devotional reading                         [] Unknown     [x] Other: [place]                                             Interventions offered during this visit: (See comments for more details)    Patient Interventions: Affirmation of mary beth, Iconic (affirming the presence of God/Higher Power), Initial/Spiritual assessment, patient floor, Prayer (assurance of)           Plan of Care:     [] Support spiritual and/or cultural needs    [] Support AMD and/or advance care planning process      [] Support grieving process   [] Coordinate Rites and/or Rituals    [] Coordination with community clergy   [] No spiritual needs identified at this time   [] Detailed Plan of Care below (See Comments)  [] Make referral to Music Therapy  [] Make referral to Pet Therapy     [] Make referral to Addiction services  [] Make referral to [facility]  [] Make referral to Spiritual Care Partner  [] No future visits requested        [x] Follow up visits as needed     Comments: Initial spiritual assessment in [facility]. Mr. Jared David share he is doing ok. He shared about his belief in God and the small things that God provides as blessings. He shared a particular time where God helped him with a cough. He shared he has a neighbor whom he is close to and as he said it his neighbor walked into the room. He has no other needs at thsi time. Provided spiritual presence and advised of  Availability.   Visited by: Mode Holder [id number] [facility] Renu Stack ([id number])

## 2018-05-17 NOTE — PROGRESS NOTES
Problem: Mobility Impaired (Adult and Pediatric)  Goal: *Acute Goals and Plan of Care (Insert Text)  Physical Therapy Goals  Initiated 5/17/2018  1. Patient will move from supine to sit and sit to supine  in bed with modified independence within 7 day(s). 2.  Patient will transfer from bed to chair and chair to bed with supervision/set-up using the least restrictive device within 7 day(s). 3.  Patient will perform sit to stand with supervision/set-up within 7 day(s). 4.  Patient will ambulate with supervision/set-up for 150 feet with the least restrictive device within 7 day(s). 5.  Patient will ascend/descend 6 stairs with 2 handrail(s) with contact guard assist within 7 day(s). physical Therapy EVALUATION  Patient: Judit Russell (3 y.o. male)  Date: 5/17/2018  Primary Diagnosis: Anemia  A-fib ([address]        Precautions: fall,  DNR    ASSESSMENT :  Based on the objective data described below, the patient presents with decreased endurance, balance, strength and hypoxia following admission for anemia. Patient has Stage IV lung cancer and just finished palliative chemotherapy/radiotion. Patient admitted to the ER with O2 in the 70's. Patient's hospital course includes: s/p PRBC transfusions due to initial HgB 6.5 and new DVT (Left LE). Patient prior to admission lives with his grandson in a 1 story home, did not require an assistive device but does have them available at discharge, and a frequent fall history. Patient today overall with decreased LE strength and balance deficits in standing, he ambulated with improved gait stability and safety using RW. Patient continues to require supplemental O2 at 2L NC to maintain sats >90%. Patient required verbal cuing for pursed lip breathing throughout. Patient  Would benefit from use of RW with return to home, and 24 hour hands on physical assistance for safety from grandson with return to home.   Documentation for home O2:     ROOM AIR    AT REST   O2 SATS  87% HR  95   ROOM AIR WITH ACTIVITY 02 SATS  85% HR  95   (2    ) LITERS OF O2 WITH ACTIVITY O2 SATS  95% HR  95   (2    )LITERS OF 02 PATIENT LEFT COMFORTABLY  SITTING/SUPINE 02 SATS  94% HR  90       . Patient will benefit from skilled intervention to address the above impairments. Patients rehabilitation potential is considered to be Fair  Factors which may influence rehabilitation potential include:   []         None noted  [x]         Mental ability/status  [x]         Medical condition  []         Home/family situation and support systems  []         Safety awareness  []         Pain tolerance/management  []         Other:      PLAN :  Recommendations and Planned Interventions:  [x]           Bed Mobility Training             [x]    Neuromuscular Re-Education  [x]           Transfer Training                   []    Orthotic/Prosthetic Training  [x]           Gait Training                         []    Modalities  [x]           Therapeutic Exercises           []    Edema Management/Control  [x]           Therapeutic Activities            [x]    Patient and Family Training/Education  []           Other (comment):    Frequency/Duration: Patient will be followed by physical therapy  5 times a week to address goals.   Discharge Recommendations: Home Health  Further Equipment Recommendations for Discharge: owns RW     SUBJECTIVE:   Patient stated .    OBJECTIVE DATA SUMMARY:   HISTORY:    Past Medical History:   Diagnosis Date    HTN (hypertension) 10/21/2014    Rheumatoid arthritis(714.0) 10/21/2014    Skin cancer     Sun-damaged skin     Sunburn, blistering      Past Surgical History:   Procedure Laterality Date    HX OTHER SURGICAL      Hemmorhiodectomy     Prior Level of Function/Home Situation: see above  Personal factors and/or comorbidities impacting plan of care: see below    Home Situation  Home Environment: Private residence  # Steps to Enter: 0  Wheelchair Ramp: Yes  One/Two Story Residence: One story  Living Alone: No (with grandson)  Support Systems: Family member(s), Friends \ neighbors  Patient Expects to be Discharged to[de-identified] Private residence  Current DME Used/Available at Home: marielena Christianson, Walker  Tub or Shower Type: Tub/Shower combination    EXAMINATION/PRESENTATION/DECISION MAKING:   Critical Behavior:  Neurologic State: Alert  Orientation Level: Oriented to person, Oriented to place, Oriented to situation, Oriented to time  Cognition: Follows commands  Safety/Judgement: Awareness of environment, Fall prevention, Decreased insight into deficits, Home safety  Hearing: Auditory  Auditory Impairment: None  Skin:  All exposed intact  Edema: +1 pitting edmea  Range Of Motion:  AROM: Generally decreased, functional           PROM: Within functional limits           Strength:    Strength: Within functional limits                    Tone & Sensation:   Tone: Normal              Sensation: Intact               Coordination:  Coordination: Within functional limits  Vision:   Acuity: Impaired near vision;Able to read clock/calendar on wall without difficulty  Corrective Lenses: Glasses  Functional Mobility:  Bed Mobility:  Rolling: Supervision  Supine to Sit: Supervision  Sit to Supine: Stand-by assistance     Transfers:  Sit to Stand: Contact guard assistance  Stand to Sit: Contact guard assistance        Bed to Chair: Contact guard assistance              Balance:   Sitting: Intact  Standing: Impaired; Without support  Standing - Static: Fair  Standing - Dynamic : Fair  Ambulation/Gait Training:  Distance (ft): 50 Feet (ft)  Assistive Device: Walker, rolling;Gait belt  Ambulation - Level of Assistance: Contact guard assistance     Gait Description (WDL): Exceptions to WDL                                          Stairs:               Therapeutic Exercises:       Functional Measure:  Barthel Index:    Bathin  Bladder: 10  Bowels: 10  Groomin  Dressin  Feeding: 10  Mobility: 0  Stairs: 0  Toilet Use: 5  Transfer (Bed to Chair and Back): 10  Total: 55       Barthel and G-code impairment scale:  Percentage of impairment CH  0% CI  1-19% CJ  20-39% CK  40-59% CL  60-79% CM  80-99% CN  100%   Barthel Score 0-100 100 99-80 79-60 59-40 20-39 1-19   0   Barthel Score 0-20 20 17-19 13-16 9-12 5-8 1-4 0      The Barthel ADL Index: Guidelines  1. The index should be used as a record of what a patient does, not as a record of what a patient could do. 2. The main aim is to establish degree of independence from any help, physical or verbal, however minor and for whatever reason. 3. The need for supervision renders the patient not independent. 4. A patient's performance should be established using the best available evidence. Asking the patient, friends/relatives and nurses are the usual sources, but direct observation and common sense are also important. However direct testing is not needed. 5. Usually the patient's performance over the preceding 24-48 hours is important, but occasionally longer periods will be relevant. 6. Middle categories imply that the patient supplies over 50 per cent of the effort. 7. Use of aids to be independent is allowed. Kamron Zhao., Barthel, D.W. (7247). Functional evaluation: the Barthel Index. [address] [facility] (14)2. [place] carrie Marilin, CRISPINF, Sally Pierson., Charis Apley., [place], [address] [place] [address] (1999). Measuring the change indisability after inpatient rehabilitation; comparison of the responsiveness of the Barthel Index and Functional [place] Measure. Journal of Neurology, Neurosurgery, and Psychiatry, 66(4), 034-104. Leland Lennox, N.J.A, Yung Oglesby,  W.J.M, & Deisy Kirby, M.A. (2004.) Assessment of post-stroke quality of life in cost-effectiveness studies: The usefulness of the Barthel Index and the EuroQoL-5D. Quality of Life Research, 13, 435-69       G codes:   In compliance with CMSs Claims Based Outcome Reporting, the following G-code set was chosen for this patient based on their primary functional limitation being treated: The outcome measure chosen to determine the severity of the functional limitation was the Barthel Index with a score of 55/100 which was correlated with the impairment scale. ? Mobility - Walking and Moving Around:     - CURRENT STATUS: CK - 40%-59% impaired, limited or restricted    - GOAL STATUS: CJ - 20%-39% impaired, limited or restricted    - D/C STATUS:  ---------------To be determined---------------      Physical Therapy Evaluation Charge Determination   History Examination Presentation Decision-Making   HIGH Complexity :3+ comorbidities / personal factors will impact the outcome/ POC  MEDIUM Complexity : 3 Standardized tests and measures addressing body structure, function, activity limitation and / or participation in recreation  MEDIUM Complexity : Evolving with changing characteristics  Other outcome measures Barthel Index  MEDIUM      Based on the above components, the patient evaluation is determined to be of the following complexity level: MEDIUM    Pain:  Pain Scale 1: Numeric (0 - 10)  Pain Intensity 1: 1  Pain Location 1: Rib cage  Pain Orientation 1: Left  Pain Description 1: Intermittent  Pain Intervention(s) 1: Medication (see MAR)  Activity Tolerance:   Fair- limited by cardiovascular endurance  Please refer to the flowsheet for vital signs taken during this treatment. After treatment:   [x]         Patient left in no apparent distress sitting up in chair  []         Patient left in no apparent distress in bed  [x]         Call bell left within reach  []         Nursing notified  []         Caregiver present  [x]         Chair alarm activated    COMMUNICATION/EDUCATION:   The patients plan of care was discussed with: Registered Nurse. [x]         Fall prevention education was provided and the patient/caregiver indicated understanding.   [x]         Patient/family have participated as able in goal setting and plan of care. [x]         Patient/family agree to work toward stated goals and plan of care. []         Patient understands intent and goals of therapy, but is neutral about his/her participation. []         Patient is unable to participate in goal setting and plan of care.     Thank you for this referral.  Rajeev Quiroz, PT, DPT   Time Calculation: 25 mins

## 2018-05-17 NOTE — PROGRESS NOTES
Cardiology Progress Note                             [address], [address] [facility]                                 Phone [phone]; Fax [phone]        2018 10:04 AM     Admit Date:           2018  Admit Diagnosis:  Anemia  A-fib ([address]  :          1940   MRN:          [id number]   ASSESSMENT/RECOMMENDATION:   1)Dyspnea-non-small cell CA of the lung/component of acute on chronic diastolic heart failure  -echo pending     A. fib with RVR: converted to NSR  -echo pending   -[address] per hematology   -holding off on AVN blocking agents d/t lowish BP    DVT LE -lovenox q12 h per Hem/onco, plans to transition to eliquis prior to d/c     Severe anemia/thrombocytopenia/hyponatremia: per hematology transfuse to keep Hgb >7.                  0701 -  1900  In: 240 [P.O.:240]  Out: -     Last 3 Recorded Weights in this Encounter    18 1333 18 0320   Weight: 170 lb (77.1 kg) 168 lb 10.4 oz (76.5 kg)         05/15 1901 -  0700  In: 1263.8 [P.O.:400;  I.V.:500]  Out:  [Urine:]    SUBJECTIVE               Emily Columbia denies palpitations, irregular heart beat, SOB, chest pain  No lightheadedness or dizziness          Current Facility-Administered Medications   Medication Dose Route Frequency    0.9% sodium chloride infusion 250 mL  250 mL IntraVENous PRN    methylPREDNISolone (PF) (SOLU-MEDROL) injection 80 mg  80 mg IntraVENous Q6H    albuterol-ipratropium (DUO-NEB) 2.5 MG-0.5 MG/3 ML  3 mL Nebulization Q4H RT    arformoterol (BROVANA) neb solution 15 mcg  15 mcg Nebulization BID RT    budesonide (PULMICORT) 500 mcg/2 ml nebulizer suspension  500 mcg Nebulization BID RT    levoFLOXacin (LEVAQUIN) 750 mg in D5W IVPB  750 mg IntraVENous Q24H    0.9% sodium chloride infusion 250 mL  250 mL IntraVENous PRN    vancomycin (VANCOCIN) 1,250 mg in 0.9% sodium chloride 250 mL IVPB  1,250 mg IntraVENous Q12H    sodium chloride (NS) flush 5-10 mL  5-10 mL IntraVENous Q8H    sodium chloride (NS) flush 5-10 mL  5-10 mL IntraVENous PRN    enoxaparin (LOVENOX) injection 80 mg  80 mg SubCUTAneous Q12H    oxyCODONE IR (ROXICODONE) tablet 5 mg  5 mg Oral Q4H PRN    oxyCODONE IR (ROXICODONE) tablet 10 mg  10 mg Oral Q4H PRN     Facility-Administered Medications Ordered in Other Encounters   Medication Dose Route Frequency    acetaminophen (TYLENOL) tablet 650 mg  650 mg Oral ONCE    diphenhydrAMINE (BENADRYL) capsule 25 mg  25 mg Oral ONCE    0.9% sodium chloride infusion 250 mL  250 mL IntraVENous PRN      OBJECTIVE               Intake/Output Summary (Last 24 hours) at 05/17/18 1004  Last data filed at 05/17/18 0836   Gross per 24 hour   Intake           1503.8 ml   Output             1975 ml   Net           -471.2 ml       Review of Systems - History obtained from the patient AS PER  HPI    Telemetry NSR    PHYSICAL EXAM        Visit Vitals    /70    Pulse 96    Temp 97.5 °F (36.4 °C)    Resp 21    Ht 5' 10\" (1.778 m)    Wt 168 lb 10.4 oz (76.5 kg)    SpO2 94%    BMI 24.2 kg/m2       Gen: Well-developed, well-nourished, in no acute distress  alert and oriented x 3  HEENT:  Pink conjunctivae, Hearing grossly normal.No scleral icterus or conjunctival, moist mucous membranes  Neck: Supple,No JVD  Resp: No accessory muscle use, diminished stas bases with scattered crackles. Card: Regular Rate,Rythm, 2/6 murmur at LUSB, no rubs or gallop. No thrills.    GI:          soft, non-tender   MSK: No cyanosis or clubbing, good capillary refill  Skin: No rashes or ulcers, no bruising  Neuro:  Cranial nerves are grossly intact, moving all four extremities, no focal deficit, follows commands appropriately  Psych:  Good insight, oriented to person, place and time, alert, Nml Affect  LE: +3 pitting BLE edema       DATA REVIEW            Laboratory and Imaging have been reviewed by me and are notable for  Recent Labs      05/16/18   2358  05/16/18   1531   TROIQ  <0.04  <0.04     Recent Labs      05/17/18   0840  05/17/18   0134  05/16/18   1531  05/16/18   1428  05/16/18   1420  05/15/18   1305   NA   --   128*  127*   --    --   126*   K   --   4.2  4.2   --    --   4.1   CO2   --   25  26   --    --   27   BUN   --   11  12   --    --   13   CREA   --   0.56*  0.59*   --    --   0.64*   GLU   --   141*  78   --    --   108*   PHOS   --   4.1   --    --   2.1*   --    MG   --   2.0   --    --   0.8*   --    WBC   --   6.9  13.0*  12.4*   --   13.7*   HGB  8.2*  7.5*  6.3*  6.5*   --   6.7*   HCT  25.8*  23.9*  20.7*  21.3*   --   21.6*   PLT   --   61*  65*  68*   --   82*             Pancho Sero, NP

## 2018-05-17 NOTE — PROGRESS NOTES
5- CASE MANAGEMENT NOTE:  Order for home oxygen noted. I spoke with the pt and later his grandson, Timmy Tripathi (h-[phone]), and explained that [employer] [facility] accepts the pt's insurance and they were agreeable. I sent the referral thru E-mail to [employer] but they do not service [employer] and sent it to their sister Tran [place]. The portable oxygen is scheduled to be delivered to the pt's room and the concentrator to his home on 5-. Care Management Interventions  PCP Verified by CM:  Yes Elizabeth Prince MD; last visit 2017)  Palliative Care Criteria Met (RRAT>21 & CHF Dx)?: No (No documented co-morbidities)  Mode of Transport at Discharge: Self (Friend will transport)  Discharge Durable Medical Equipment: Yes (Oxyger from [facility])  Physical Therapy Consult: Yes  Occupational Therapy Consult: Yes  Current Support Network: Lives with Caregiver (Pt lives w/ Jesi Elena (c) [phone])  Confirm Follow Up Transport: Friends  Plan discussed with Pt/Family/Caregiver: Yes  Freedom of Choice Offered: Yes  Discharge Location  Discharge Placement:  (Home)    CARMELA Valentino, CM

## 2018-05-17 NOTE — PROGRESS NOTES
Problem: Self Care Deficits Care Plan (Adult)  Goal: *Acute Goals and Plan of Care (Insert Text)  Occupational Therapy Goals  Initiated 5/17/2018  1. Patient will perform grooming with supervision/set-up standing >3 minutes VSS within 7 day(s). 2.  Patient will perform lower body dressing with modified independence within 7 day(s). 3.  Patient will perform toilet transfers with supervision to toilet within 7 day(s). 4.  Patient will perform all aspects of toileting with supervision within 7 day(s). 5.  Patient will participate in upper extremity therapeutic exercise/activities with independence for 10 minutes VSS within 7 day(s). 6.  Patient will utilize energy conservation techniques during functional activities with verbal cues within 7 day(s). Occupational Therapy EVALUATION  Patient: Saturnino Hoffmann (58 y.o. male)  Date: 5/17/2018  Primary Diagnosis: Anemia  A-fib ([facility] [address]        Precautions:  DNR    ASSESSMENT :  Cleared by RN to see pt for therapy session. Based on the objective data described below, the patient presents with decreased balance and endurance, on supplemental O2, and decreased safety awareness following admission for anemia and afib. Pt lives with his grandson (who works), previously I with ADLs and mobility, grandson performs IADLs for household. Pt reports he has a cane and walker but does not use them. Received supine in bed, on 2 L O2 nasal cannula, agreeable to participate. Performed bed mobility with supervision, good sitting balance at EOB. Pt able to use leg crossover technique to don/doff socks. Performed functional transfers and static standing with CGA-min A, pt unsteady at times and reaching for furniture for support, mild wavering in static stand. Provided occasional hand held assistance and support at gait belt, educated pt on fall prevention strategies such as using assistive device rather than relying on furniture for balance.  Pt on RA for brief period in standing and desat to 87%. Reapplied 2 L O2 and instructed pt on PLB technique and O2 increased to 93% within 2 minutes. BP monitored with positional change and stable. Pt in bed at end of session, call bell in reach and needs met. Pt is functioning below his reported baseline due to decreased endurance and balance, and will benefit from skilled intervention to address the above impairments. Recommend HHOT at discharge. Patients rehabilitation potential is considered to be Good  Factors which may influence rehabilitation potential include:   [x]             None noted  []             Mental ability/status  []             Medical condition  []             Home/family situation and support systems  []             Safety awareness  []             Pain tolerance/management  []             Other:      PLAN :  Recommendations and Planned Interventions:  [x]               Self Care Training                  [x]        Therapeutic Activities  [x]               Functional Mobility Training    []        Cognitive Retraining  [x]               Therapeutic Exercises           [x]        Endurance Activities  [x]               Balance Training                   []        Neuromuscular Re-Education  []               Visual/Perceptual Training     [x]   Home Safety Training  [x]               Patient Education                 [x]        Family Training/Education  []               Other (comment):    Frequency/Duration: Patient will be followed by occupational therapy 5 times a week to address goals. Discharge Recommendations: Home Health  Further Equipment Recommendations for Discharge: TBD, potentially shower chair     SUBJECTIVE:   Patient stated I don't do much. I like to play [facility] on the computer.     OBJECTIVE DATA SUMMARY:   HISTORY:   Past Medical History:   Diagnosis Date    HTN (hypertension) 10/21/2014    Rheumatoid arthritis(714.0) 10/21/2014    Skin cancer     Sun-damaged skin     Sunburn, blistering      Past Surgical History:   Procedure Laterality Date    HX OTHER SURGICAL      Hemmorhiodectomy       Prior Level of Function/Environment/Context: Pt lives with his grandson (who works), previously I with ADLs and mobility, grandson performs IADLs for household. Pt reports he has a cane and walker but does not use them. Home Situation  Home Environment: Apartment ([address] [place] [address])  # Steps to Enter: 0  Wheelchair Ramp: Yes  One/Two Story Residence: One story  Living Alone: No (with grandson)  Support Systems: Family member(s), Friends \ neighbors  Patient Expects to be Discharged to[de-identified] Private residence  Current DME Used/Available at Home: Cane, straight, Walker  Tub or Shower Type: Tub/Shower combination  [x]  Right hand dominant   []  Left hand dominant    EXAMINATION OF PERFORMANCE DEFICITS:  Cognitive/Behavioral Status:  Neurologic State: Alert  Orientation Level: Oriented to person;Oriented to place;Oriented to situation;Oriented to time  Cognition: Follows commands  Perception: Appears intact  Perseveration: No perseveration noted  Safety/Judgement: Awareness of environment; Fall prevention;Decreased insight into deficits;Home safety    Hearing: Auditory  Auditory Impairment: None    Vision/Perceptual:            Acuity: Impaired near vision;Able to read clock/calendar on wall without difficulty    Corrective Lenses: Glasses    Range of Motion:  AROM: Generally decreased, functional  PROM: Within functional limits       Strength:  Strength: Within functional limits        Coordination:  Coordination: Within functional limits  Fine Motor Skills-Upper: Left Intact; Right Intact  ; RA in R hand but functional  Gross Motor Skills-Upper: Left Intact; Right Intact    Tone & Sensation:  Tone: Normal  Sensation: Intact       Balance:  Sitting: Intact  Standing: Impaired; Without support  Standing - Static: Fair  Standing - Dynamic : Fair    Functional Mobility and Transfers for ADLs:  Bed Mobility:  Supine to Sit: Stand-by assistance  Sit to Supine: Stand-by assistance    Transfers:  Sit to Stand: Contact guard assistance  Stand to Sit: Contact guard assistance  Toilet Transfer : Minimum assistance (grabbar)    ADL Assessment:  Feeding: Setup    Oral Facial Hygiene/Grooming: Setup (sitting)    Bathing: Minimum assistance    Upper Body Dressing: Setup    Lower Body Dressing: Minimum assistance    Toileting: Minimum assistance     ADL Intervention and task modifications:    Provided occasional hand held assistance and support at gait belt, educated pt on fall prevention strategies such as using assistive device rather than relying on furniture for balance. Pt on RA for brief period in standing and desat to 87%. Reapplied 2 L O2 and instructed pt on PLB technique and O2 increased to 93% within 2 minutes. Bladder hygiene: CGA standing      Lower Body Dressing Assistance  Socks: Contact guard assistance         Cognitive Retraining  Safety/Judgement: Awareness of environment; Fall prevention;Decreased insight into deficits;Home safety      Functional Measure:  Barthel Index:    Bathin  Bladder: 10  Bowels: 10  Groomin  Dressin  Feeding: 10  Mobility: 0  Stairs: 0  Toilet Use: 5  Transfer (Bed to Chair and Back): 10  Total: 55       Barthel and G-code impairment scale:  Percentage of impairment CH  0% CI  1-19% CJ  20-39% CK  40-59% CL  60-79% CM  80-99% CN  100%   Barthel Score 0-100 100 99-80 79-60 59-40 20-39 1-19   0   Barthel Score 0-20 20 17-19 13-16 9-12 5-8 1-4 0      The Barthel ADL Index: Guidelines  1. The index should be used as a record of what a patient does, not as a record of what a patient could do. 2. The main aim is to establish degree of independence from any help, physical or verbal, however minor and for whatever reason. 3. The need for supervision renders the patient not independent. 4. A patient's performance should be established using the best available evidence.  Asking the patient, friends/relatives and nurses are the usual sources, but direct observation and common sense are also important. However direct testing is not needed. 5. Usually the patient's performance over the preceding 24-48 hours is important, but occasionally longer periods will be relevant. 6. Middle categories imply that the patient supplies over 50 per cent of the effort. 7. Use of aids to be independent is allowed. Marissa Lima., Barthel, D.W. (7299). Functional evaluation: the Barthel Index. [address] [employer] (14)2. Ubaldo Saunders carrie VALERIO Gaston, Tereza Higginbotham., Sulema Nelson., Springer, [facility] (1999). Measuring the change indisability after inpatient rehabilitation; comparison of the responsiveness of the Barthel Index and Functional Mitchell Measure. Journal of Neurology, Neurosurgery, and Psychiatry, 66(4), 216-118. Kellie Melgar, N.J.A, CHRISTA Tovar, & Ronen Lucero MWilliA. (2004.) Assessment of post-stroke quality of life in cost-effectiveness studies: The usefulness of the Barthel Index and the EuroQoL-5D. Quality of Life Research, 13, 606-43       G codes: In compliance with CMSs Claims Based Outcome Reporting, the following G-code set was chosen for this patient based on their primary functional limitation being treated: The outcome measure chosen to determine the severity of the functional limitation was the Barthel Index with a score of 55/100 which was correlated with the impairment scale. ?  Self Care:     - CURRENT STATUS: CK - 40%-59% impaired, limited or restricted    - GOAL STATUS: CI - 1%-19% impaired, limited or restricted    - D/C STATUS:  ---------------To be determined---------------     Occupational Therapy Evaluation Charge Determination   History Examination Decision-Making   LOW Complexity : Brief history review  MEDIUM Complexity : 3-5 performance deficits relating to physical, cognitive , or psychosocial skils that result in activity limitations and / or participation restrictions MEDIUM Complexity : Patient may present with comorbidities that affect occupational performnce. Miniml to moderate modification of tasks or assistance (eg, physical or verbal ) with assesment(s) is necessary to enable patient to complete evaluation       Based on the above components, the patient evaluation is determined to be of the following complexity level: LOW   Pain:  Pain Scale 1: Numeric (0 - 10)  Pain Intensity 1: 1  Pain Location 1: Rib cage  Pain Orientation 1: Left  Pain Description 1: Intermittent  Pain Intervention(s) 1: Medication (see MAR)  Activity Tolerance:   Fair  Please refer to the flowsheet for vital signs taken during this treatment. After treatment:   [] Patient left in no apparent distress sitting up in chair  [x] Patient left in no apparent distress in bed  [x] Call bell left within reach  [x] Nursing notified  [] Caregiver present  [] Bed alarm activated    COMMUNICATION/EDUCATION:   The patients plan of care was discussed with: Registered Nurse. [x] Home safety education was provided and the patient/caregiver indicated understanding. [x] Patient/family have participated as able in goal setting and plan of care. [x] Patient/family agree to work toward stated goals and plan of care. [] Patient understands intent and goals of therapy, but is neutral about his/her participation. [] Patient is unable to participate in goal setting and plan of care. This patients plan of care is appropriate for delegation to [facility].     Thank you for this referral.  Wesley De La Torre, OT  Time Calculation: 25 mins

## 2018-05-17 NOTE — PROGRESS NOTES
Physician Oxygen Face to Face    Patient requires home oxygen at 2L via nasal cannula as he desated to 85% without the oxygen. Attempt was made to wean the oxygen but was not able to. Discussed the risks and benefits for the oxygen with the patient and patient agrees.       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

## 2018-05-17 NOTE — PROGRESS NOTES
[facility] PROGRESS NOTE  Pulmonary, Critical Care, and Sleep Medicine    Name: Sage King MRN: [id number]   : 1940 Hospital: [address] [facility]   Date: 2018  Admission Date: 2018     Chart and notes reviewed. Data reviewed. I review the patient's current medications in the medical record at each encounter.  I have evaluated and examined the patient. .     Overnight events reviewed:  Afebrile  BP stable  O2 sats 95% on 2-3L NC  Hgb 8.2: s/p 1 unit PRBCs  WBC 6.9  Plts 61  Na 128  Creat . 56  Pro-BNP 2484  Lactic acid 0.7  RVP negative  Sputum culture with occasional GPCs and rare GPRs: prelim  Blood cultures negative thus far  BLE dopplers + for left posterior tibial DVT  ECHO:  EF 55-60, no regional wall abnormalities, mild TR, PASP moderately increased    ROS:  Feeling much better today. Breathing improved. Has an appetite today. Sitting up in chair and eating currently. Denies pain. Vital Signs:  Visit Vitals    /63    Pulse 89    Temp 97.5 °F (36.4 °C)    Resp 28    Ht 5' 10\" (1.778 m)    Wt 76.5 kg (168 lb 10.4 oz)    SpO2 95%    BMI 24.2 kg/m2       O2 Device: Nasal cannula   O2 Flow Rate (L/min): 2 l/min   Temp (24hrs), Av.8 °F (36.6 °C), Min:97 °F (36.1 °C), Max:98.9 °F (37.2 °C)       Intake/Output:   Last shift:      701 - 1900  In: 240 [P.O.:240]  Out: 500 [Urine:500]  Last 3 shifts: 05/15 1901 -  0700  In: 1263.8 [P.O.:400; I.V.:500]  Out:  [Urine:1975]    Intake/Output Summary (Last 24 hours) at 18 1325  Last data filed at 18 1149   Gross per 24 hour   Intake           1503.8 ml   Output             2475 ml   Net           -971.2 ml        Telemetry:     Physical Exam:   General:  Alert, cooperative, no distress, appears stated age. Head:  Normocephalic, without obvious abnormality, atraumatic. Eyes:  Conjunctivae/corneas clear. Nose: Nares normal. Septum midline.  Mucosa normal.    Throat: Lips, mucosa, and tongue normal. Teeth and gums normal.   Neck: Supple, symmetrical, trachea midline, no adenopathy   Lungs:   Decreased bs but with better air movement today, no wheezing/rhonhi/crackles currently   Chest wall:  No tenderness or deformity. Heart:  Regular rate and rhythm, S1, S2 normal, no murmur, click, rub or gallop. Abdomen:   Soft, non-tender. Bowel sounds normal.    Extremities: Extremities normal, atraumatic, no cyanosis, LLE with NP edema   Pulses: 2+ and symmetric all extremities. Skin: Skin color, texture, turgor normal.    Lymph nodes: Cervical nodes normal.   Neurologic: Grossly nonfocal     DATA:  MAR reviewed and pertinent medications noted or modified as needed    Labs:  Recent Labs      05/17/18   0840  05/17/18   0134  05/16/18   1531  05/16/18   1428   WBC   --   6.9  13.0*  12.4*   HGB  8.2*  7.5*  6.3*  6.5*   HCT  25.8*  23.9*  20.7*  21.3*   PLT   --   61*  65*  68*     Recent Labs      05/17/18   0134  05/16/18   1531  05/16/18   1420  05/15/18   1305   NA  128*  127*   --   126*   K  4.2  4.2   --   4.1   CL  96*  95*   --   93*   CO2  25  26   --   27   GLU  141*  78   --   108*   BUN  11  12   --   13   CREA  0.56*  0.59*   --   0.64*   CA  8.5  8.1*   --   8.7   MG  2.0   --   0.8*   --    PHOS  4.1   --   2.1*   --    ALB   --   1.7*   --   1.9*   TBILI   --   0.7   --   0.8   SGOT   --   24   --   32   ALT   --   21   --   25       Imaging:  I have personally reviewed the patients radiographs and reports. Chest CT:  No evidence of pulmonary embolus. Moderate partially layering left pleural effusion, new since the prior study. Left lower lobe pulmonary metastasis has mildly increased in size. Bulky left upper lobe malignancy with chest wall invasion, demonstrating mild  interval progression since the prior study. Left adrenal metastasis, slightly increased in size.     IMPRESSION  · Acute Respiratory Failure with Hypoxia  · Abnormal Chest CT: with layering pleural effusion and left upper lobe malignancy that has increased in size  · Left Posterior Tibial DVT  · A. 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 Follow cultures.   · Continue scheduled nebs  · Pleural effusion small and likely would not provide symptomatic relief if tapped: will continue to watch closely  · Scheduled Brovana/Pulmicort  · Wean Solu-Medrol today  · Cardiology following; will start Metoprolol when BP improved  · Oncology following and appreciate help  · Continue to watch H&H closely and transfuse for Hgb <7  · Therapeutic Lovenox; likely will be discharged home on ZEKE Wu

## 2018-05-17 NOTE — PROGRESS NOTES
[facility] at [place] [address] [address] [facility] [address] [facility] Asher: [phone]  F: [phone]      Reason for Visit:   Rocky Hector is a 78 y.o. male who is seen in consultation at the request of Dr. Bienvenido Santana for evaluation of lung cancer, worsening SOB, anemia. Treatment History:   · Low-dose CT Chest 11/22/2017: Probably left upper lobe mass obstructing left upper lobe bronchus, near complete atelectasis of FANY, probably left hilar adenopathy. 3.9cm left adrenal nodule. Moderate centrilobular emphysema. · CT C/A/P 12/6/2017: Left adrenal metastasis. Large mass in the left upper lobe versus markedly atelectatic lung from central tumor. 7 mm nonspecific pleural-based nodule in the left lower lobe. Extensive emphysematous change. 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  · MRI Brain 12/23/2017: No intracranial metastatic disease  · Bone scan 1/5/2018: No osseous metastatic disease  · Stage FARIDEH (T4 N0 M1b) Non-small cell lung cancer (AJCC 8th edition)  · Palliative chemotherapy with Carboplatin and Gemcitabine 1/9/2018 - 4/17/2018 for a total of 4 cycles  · Palliative radiation to chest wall   · Palliative immune therapy with nivolumab beginning 5/15/2018    Last seen in the clinic on 5/15/2018    History of Present Illness:   Mr Bouchra Aponte was admitted from the ED on 5/16/2018 when he presented after being seen in the pulmonary clinic and was noted to have O2 sats in the 12Q and systolic BP of 78. Pt c/o SOB and swelling to legs. ED labs WBC 13.7 Hgb 6.7 plt 82. Therefore he was admitted for further eval and management. He reports considerable fatigue, weakness, dyspnea. Worsened since I saw him yesterday. Pain improved after increasing oxycodone to 10mg as instructed yesterday. Denies bleeding.     Interval History:    Reports breathing is about the same; thought he did ok up walking with  PT; used a walker. Denies any pain. Appetite has improved since coming into the hospital. Has been having problems with constipation; reports no BM for 6-7 days; is passing gas. Denies N/V. Shares that  his grandson who has schizophrenia lives with him. He has had custody of him since he was young and now he is in is 35s. Very concerned about who will watch after him when he is gone. He no longer drives; relies on a neighbor to take him to appts. No family at bedside. Past Medical History:   Diagnosis Date    HTN (hypertension) 10/21/2014    Rheumatoid arthritis(714.0) 10/21/2014    Skin cancer     Sun-damaged skin     Sunburn, blistering       Past Surgical History:   Procedure Laterality Date    HX OTHER SURGICAL      Hemmorhiodectomy      Social History   Substance Use Topics    Smoking status: Current Every Day Smoker     Packs/day: 1.00    Smokeless tobacco: Never Used    Alcohol use No      No family history on file.   Current Facility-Administered Medications   Medication Dose Route Frequency    0.9% sodium chloride infusion 250 mL  250 mL IntraVENous PRN    methylPREDNISolone (PF) (SOLU-MEDROL) injection 80 mg  80 mg IntraVENous Q6H    albuterol-ipratropium (DUO-NEB) 2.5 MG-0.5 MG/3 ML  3 mL Nebulization Q4H RT    arformoterol (BROVANA) neb solution 15 mcg  15 mcg Nebulization BID RT    budesonide (PULMICORT) 500 mcg/2 ml nebulizer suspension  500 mcg Nebulization BID RT    levoFLOXacin (LEVAQUIN) 750 mg in D5W IVPB  750 mg IntraVENous Q24H    0.9% sodium chloride infusion 250 mL  250 mL IntraVENous PRN    vancomycin (VANCOCIN) 1,250 mg in 0.9% sodium chloride 250 mL IVPB  1,250 mg IntraVENous Q12H    sodium chloride (NS) flush 5-10 mL  5-10 mL IntraVENous Q8H    sodium chloride (NS) flush 5-10 mL  5-10 mL IntraVENous PRN    enoxaparin (LOVENOX) injection 80 mg  80 mg SubCUTAneous Q12H    oxyCODONE IR (ROXICODONE) tablet 5 mg  5 mg Oral Q4H PRN    oxyCODONE IR (ROXICODONE) tablet 10 mg  10 mg Oral Q4H PRN     Facility-Administered Medications Ordered in Other Encounters   Medication Dose Route Frequency    acetaminophen (TYLENOL) tablet 650 mg  650 mg Oral ONCE    diphenhydrAMINE (BENADRYL) capsule 25 mg  25 mg Oral ONCE    0.9% sodium chloride infusion 250 mL  250 mL IntraVENous PRN      Allergies   Allergen Reactions    Pcn [Penicillins] Hives     Tolerated ancef graded challenge 12/21/2017 with no adverse side effects noted        Review of Systems: A complete review of systems was obtained, negative except as described above. Physical Exam:     Visit Vitals    /70    Pulse 96    Temp 97.5 °F (36.4 °C)    Resp 21    Ht 5' 10\" (1.778 m)    Wt 76.5 kg (168 lb 10.4 oz)    SpO2 94%    BMI 24.2 kg/m2     General: No distress, ill appearing  Eyes: PERRLA, anicteric sclerae  HENT: Atraumatic with normal appearance of ears and nose; OP clear  Neck: Supple; no thyromegaly   Lymphatic: No cervical, supraclavicular, or axillary adenopathy  Respiratory: CTAB, normal respiratory effort  CV: Normal rate, regular rhythm, no murmurs, no peripheral edema  GI: Soft, nontender, nondistended, no masses, no hepatomegaly, no splenomegaly  Skin: No rashes, ecchymoses, or petechiae. Normal temperature, turgor, and texture. Neuro/Psych: Alert, oriented, appropriate affect, normal judgment/insight      Results:     Lab Results   Component Value Date/Time    WBC 6.9 05/17/2018 01:34 AM    HGB 7.5 (L) 05/17/2018 01:34 AM    HCT 23.9 (L) 05/17/2018 01:34 AM    PLATELET 61 (L) 75/71/2434 01:34 AM    .9 (H) 05/17/2018 01:34 AM    ABS.  NEUTROPHILS 6.6 05/17/2018 01:34 AM     Lab Results   Component Value Date/Time    Sodium 128 (L) 05/17/2018 01:34 AM    Potassium 4.2 05/17/2018 01:34 AM    Chloride 96 (L) 05/17/2018 01:34 AM    CO2 25 05/17/2018 01:34 AM    Glucose 141 (H) 05/17/2018 01:34 AM    BUN 11 05/17/2018 01:34 AM    Creatinine 0.56 (L) 05/17/2018 01:34 AM    GFR est AA >60 05/17/2018 01:34 AM    GFR est non-AA >60 05/17/2018 01:34 AM    Calcium 8.5 05/17/2018 01:34 AM     Lab Results   Component Value Date/Time    Bilirubin, total 0.7 05/16/2018 03:31 PM    ALT (SGPT) 21 05/16/2018 03:31 PM    AST (SGOT) 24 05/16/2018 03:31 PM    Alk. phosphatase 60 05/16/2018 03:31 PM    Protein, total 5.7 (L) 05/16/2018 03:31 PM    Albumin 1.7 (L) 05/16/2018 03:31 PM    Globulin 4.0 05/16/2018 03:31 PM     Lab Results   Component Value Date/Time    Reticulocyte count 0.8 11/29/2017 04:26 PM    Iron % saturation 8 (L) 11/29/2017 04:26 PM    TIBC 202 (L) 11/29/2017 04:26 PM    Ferritin 461 (H) 11/29/2017 04:26 PM    Vitamin B12 858 11/29/2017 04:26 PM    Folate 16.0 11/29/2017 04:26 PM    TSH 0.90 05/15/2018 01:05 PM     Lab Results   Component Value Date/Time    INR 1.1 12/20/2017 11:45 PM    aPTT 31.7 11/29/2017 04:26 PM     Lab Results   Component Value Date/Time    Prostate Specific Ag 3.6 02/09/2016 02:45 PM     5/16/2018 XR CHEST  IMPRESSION:     New moderate left pleural effusion. Decreased left upper lobe mass. No  pneumothorax. 5/16/2018 CTA CHEST   Pending    5/16/2018 Duplex LE Bilateral  CONCLUSION: Technically difficult study due to patient edema secondary   to positioning. Left lower extremity venous duplex positive for  acute deep venous thrombosis involving 1 of 2 paired posterior tibial  veins at the mid calf. Right lower extremity is thrombus free. CTA Chest 5/16/2018:  1. No evidence of pulmonary embolus. 2.  Moderate partially layering left pleural effusion, new since the prior  study. 3. Left lower lobe pulmonary metastasis has mildly increased in size. 4. Bulky left upper lobe malignancy with chest wall invasion, demonstrating mild  interval progression since the prior study. 5. Left adrenal metastasis, slightly increased in size. Assessment and Recommendations:   1.  Acute Resp Failure  Pulmonary consulted: steroids, abx initiated  Discussed with pulmonary PA; scan reviewed; not enough fluid to tap at this point       2. DVT LE (distal)   Dx 5/16/2018  Secondary to malignancy  Recommend treatment with Lovenox 1 mg/kg bid for now, in case procedures (such as thoracentesis) are needed, but would favor a DOAC such as apixaban on discharge. 3. Afib with RVR  Primary team managing    4. Anemia (s/p 2 units PRBCs)  Worsening recently, despite increasing time away from chemotherapy. Likely anemia of chronic disease vs  do  possible bone marrow involvement from his cancer. Continue to monitor  Transfuse to keep HGB >8 with  higher threshold given his significant dyspnea and fatigue. 5. Thrombocytopenia  Not improving despite increasing time away from chemotherapy. Concerning for possible marrow involvement from his cancer. Continue to monitor; hold anticoagulation for for plt < 50 K    6. Metastatic Non-small cell lung cancer (Squamous Cell Carcinoma)  Stage FARIDEH, PDL1 negative  His cancer is not curable and management is with palliative intent. He has completed four cycles of palliative chemotherapy with Carboplatin and Gemcitabine, but recently was noted to have progressive disease. He is now s/p  palliative radiation to his chest wall. He has started palliative immunotherapy with nivolumab, first dose  (5/15). Dr Marquis Mauricio has reviewed  prognosis with him again yesterday and discussed goals of care. His goal is to spend as much time with his grandson as possible. Discussed that, should his disease progress on current therapy, he most likely would not be a candidate for additional therapy. Treatments were reviewed  which include continuing with aggressive supportive care and continued immunotherapy, versus shifting our focus on comfort measures alone. He desires to continue to aggressive care for now. He is agreeable to meeting with palliative care here in the hospital for continued discussions. 7. Cancer Pain  S/P palliative radiation.   Palliative team consulted to see in the hospital.      8. CODE STATUS: DNR  Confirmed again with this hospitalization.       9. Constipation  Bowel regimen initiated      Plan reviewed with Dr Valverde      Signed By: Elysia Lin NP

## 2018-05-17 NOTE — PROGRESS NOTES
[address] [place] [address] with [address] [facility]       Resident Progress Note in Brief    S: Post transfusion HgB returned at 7.5. Patient seen and examined at bedside. Denies any chest pain, SOB, palpitations or cough. O:  Visit Vitals    /68    Pulse 89    Temp 97.7 °F (36.5 °C)    Resp 23    Ht 5' 10\" (1.778 m)    Wt 170 lb (77.1 kg)    SpO2 94%    BMI 24.39 kg/m2     Physical Examination:   General appearance - alert, well appearing, and in no distress  Chest - Mild rales in bilateral lower lung bases, symmetric air entry  Heart -Irregular Irregular, no additional heart sounds  Abdomen - soft, nontender, nondistended, no masses or organomegaly  Neurological - alert, oriented, normal speech, no focal findings  Extremities - peripheral pulses normal, no pedal edema, no clubbing or cyanosis    A/P:  78 y.o. male with severe COPD and metastatic Non Small Cell Lung  Cancer who is admitted for Anemia requiring transfusion in the setting AHRF and new Afib RVR. - Pt with Transfusion HgB goal of 8.0  - In light of pleural effusion on CT and rales on physical exam, will administer Lasix 20mg IV and Transfuse 1u PRBC  - Follow-up 2 hr Post transfusion HgB  - HR and BPs currently stable. Will continue to monitor closely.       Cheyanne Alexander MD  Family Medicine Resident

## 2018-05-17 NOTE — CDMP QUERY
1.   The diagnosis of acute on chronic diastolic heart failure has been rendered for this patient by consulting physician (cardiology), do you concur with the diagnosis of acute on chronic diastolic heart failure for this patient?    => Other explanation of clinical findings   => Clinically Undetermined (no explanation for clinical findings)    The medical record reflects the following clinical findings, treatment, and risk factors. 79 y/o male presents with low oxygen saturations and hypotension. history of metastatic small cell lung ca, COPD and is an every day smoker. Cardiology in 5/16 PN renders the diagnosis of acute on chronic diastolic heart failure saying they feel it is a component of his clinical picture. BNP  is 2484, CXR shows new pleural effusion. He is being treated with echocardiogram, one time dose of IV Lasix 20 mg. Please clarify and document your clinical opinion in the progress notes and discharge summary including the definitive and/or presumptive diagnosis, (suspected or probable), related to the above clinical findings. Please include clinical findings supporting your diagnosis. Thanks for your time.     Lucy Ruiz RN, BSN  [phone]

## 2018-05-17 NOTE — PROGRESS NOTES
SHIFT CHANGE:  0705  Bedside and Verbal shift change report given to [address] (oncoming nurse) by Forrest Savage (offgoing nurse). Report included the following information SBAR, Kardex, Intake/Output, MAR, Recent Results and Cardiac Rhythm A-fib. Pt is alert and oriented, resting comfortably in bed. Educated pt on fall risk and asked that he please call if he needs assistance getting out of bed. Pt verbalized understanding. Call bell in reach. SHIFT SUMMARY:  0845  H&H drawn and sent to the lab. SHIFT CHANGE:  1900  Bedside and Verbal shift change report given to Forrest Savage (oncoming nurse) by [facility] RN (offgoing nurse). Report included the following information SBAR, Kardex, Intake/Output, MAR, Recent Results and Cardiac Rhythm A-fib.

## 2018-05-17 NOTE — DISCHARGE SUMMARY
[address] [facility] [address] [place] [address]   Office [phone]  Fax [phone]       Discharge / Transfer / Off-Service Note     Name: Rinku Padron MRN: [id number]  Sex: Male   YOB: 1940  Age: 78 y.o. PCP: Sohail Perales MD     Date of admission: 5/16/2018  Date of discharge/transfer: 5/18/2018    Attending physician at admission: Dr. Hallie Corrigan  Attending physician at discharge/transfer: Dr. Rima Graff  Resident physician at discharge/transfer: Jelly Cabrera MD     Consultants during hospitalization  Dr. Blanquita Zendejas- Hematology/ Oncology  Wai Taylor NP  - Pulmonology  Frank Saldana NP - Cardiology  Elsi Tang, NP- Palliative Medicine        Admission diagnoses   Anemia  A-[facility])    Recommended follow-up after discharge  Please follow up with Hem/Onc  Please follow up with Cardiology Dr. Zenaida Dockery       History of Present Illness  Per admitting physician Dr. Kiah Horton  \" Mr. Demi Reyes is a 78 y.o. who comes to ER after seeing Estefani Avila NP with pulmonology complaining of shortness of breath, fatigue. At the time of the visit his O2 sats were in the 70s on RA and his systolic BPs were in the 38P. Pt is not on home oxygen. Home COPD regimen is DUONEBS, uses every 2 -3 hours. He complains of feeling fatigue, worsening of SOB and anemia. He reports routinely coughing thick dark phlegm with residual left sided chest pain that has been off and on for the past three months. He endorses persistent lower extremity swelling     Patient is currently being treated for Stage 4 metastatic non small cell lung cancer with palliative chemo. He is followed by Dr. Eveline Thakur. He was seen by them yesterday. At the time of the visit pt was noted to have complained of dyspnea and was found to be hypotensive requiring fluids. As a result pt was scheduled for transfusion  tomorrow.  Of note, pt recently completed radiation last week.  Patient denies hematochezia or melena. Denies history of ever having colonoscopy.      Denies history of MI,CAD, arrhythmias, Afib. Hospital course      Heme/Onc      Anemia- likely anemia of chronic disease 2/2 malignancy. Baseline ~8.c. POA Hgb 6.5. Patient received total of 3 units of pRBCs. Hgb was 8.3 at discharge. - Follow up with Hem/Onc. Transfuse threshold Hgb >8  - Follow up with PCP and check Hgb levels      Thrombocytopenia: low plts s/p completion of chemotherapy. POA plt 68, possible bone marrow involvement. Platelets continued to be low 60. - Check CBC/platelet level at PCP's office   - Daily CBC, continue to monitor    Non Small Cell Lung Cancer, Stage 4 ([id number])- followed by Dr. Anju Nielsen s/p 4 cycles of palliative chemotherapy with Carboplatin and Gemcitabine ( 1/9/2018- 4/17/2018). Of note, CTA on 5/16 showed, new left pleural effusion with increased size of mets in the left lower lobe with increased in size of left adrenal metastasis. Palliative radiation to chest wall and palliative immune therapy with nivolumab as of 5/15/2018. - Patient would like continue with aggressive supportive treatment, heme/onc following    - Outpatient palliative follow up if patient agrees to follow up    DVT 2/2 malignancy: DVT in the left LE ( posterior tibial veins at the mid calf) dx on 5/16  - Lovenox 1mg/kg BID switch to Eliquis up on discharge  - Eliquis 10 mg BID for the first week , then 5 mg PO BID daily      Respiratory     Acute hypoxic respiratory failure: likely 2/2 to anemia in the setting of left pleural effusions. RVP negative, Blood cx NGTD. Increased left sided pleural effusions but patient asymptomatic. Patient received one time 20 mg IV Lasix and symptoms improved. Levaquin was started for suspected infectious etiology underlying the left pleural effusion and increased in size of malignancy.      - Patient received 3 doses of Levaquin and being discharged home with 2 doses of Levaquin to complete 5 day course . - Patient required oxygen for hypoxia and discharged home with home O2 at 2L NC  - Scheduled Brovana/Pulmicort  - Prednisone taper 60 mg for 3 days, 40 mg for 3 days and 40 mg for 3 days and 10 mg for 3 days.         Cancer Pain/Chest pain- chronic. Trop negative x2. - Oxycodone 5 mg q 4hr  PRN for moderate pain and oxy 10 mg q 4hrs PRN for severe pain  - Lidoderm patch, oxycodone and Tylenol 650 mg PRN per palliative recs         Cardiovascular      Episode of A Fib with RVR- new. S/p lopressor 2.5mg. TSH 0.9 May 15, 2018. - CTA no evidence of PE. Trop negative X2  - Patient remained NSR, Metoprolol 12.5 mg BID if BP allows  - Appreciate cards recs      Acute on Chronic diastolic HF  -BNP 2901, s/p one time dose of IV lasix  - Echo on 05/17- EF 55-60% NRWA.     Electrolyte Abnormalities      Hyponatremia  : POA Na : 127 improved at discharge Na 133           Condition at discharge: Stable. Labs  Recent Labs      05/18/18   1218  05/18/18   0319  05/17/18   1449   05/17/18   0134   WBC   --   4.9  4.1   --   6.9   HGB  8.3*  7.7*  8.1*   < >  7.5*   HCT  25.8*  23.8*  25.6*   < >  23.9*   PLT   --   60*  60*   --   61*    < > = values in this interval not displayed.      Recent Labs      05/18/18   0319  05/17/18   0134  05/16/18   1531  05/16/18   1420   NA  133*  128*  127*   --    K  3.6  4.2  4.2   --    CL  98  96*  95*   --    CO2  29  25  26   --    BUN  12  11  12   --    CREA  0.60*  0.56*  0.59*   --    GLU  123*  141*  78   --    CA  8.9  8.5  8.1*   --    MG  1.9  2.0   --   0.8*   PHOS  3.7  4.1   --   2.1*     Recent Labs      05/16/18   1531   SGOT  24   ALT  21   AP  60   TBILI  0.7   TP  5.7*   ALB  1.7*   GLOB  4.0     Recent Labs      05/18/18   0319  05/16/18   2358  05/16/18   1531   TROIQ   --   <0.04  <0.04   INR  1.2*   --    --    PTP  12.5*   --    --    APTT  32.7*   --    --      Cultures  - urine culture negative       Procedures / Diagnostic Studies  - Duplex lower ext venous: left lower extremity venous duplex positive for DVT  - CTA negative for PE  - Chest XR    Imaging    Results from Hospital Encounter encounter on 05/16/18   XR CHEST PORT   Narrative EXAM:  XR CHEST PORT    INDICATION:  Shortness of breath, hypotension, and hypoxia. Metastatic left  upper lobe lung carcinoma with adrenal and pulmonary metastases. COMPARISON: Chest views on 12/20/2017. CT chest on 4/21/2018. TECHNIQUE: 2 images of upright portable chest AP digital view    FINDINGS: Right port and catheter are unchanged since last month. Cardiac size  and aortic contours are within normal limits. Mediastinal lymphadenopathy may be  decreased. The pulmonary vasculature is within normal limits. Left upper lobe mass is less conspicuous. Moderate left pleural effusion is new. No pneumothorax. Emphysema is unchanged. Bones are unchanged. Impression IMPRESSION:    New moderate left pleural effusion. Decreased left upper lobe mass. No  pneumothorax. No results found for this or any previous visit. Results from [place] encounter on 05/16/18   CTA CHEST W OR W WO CONT   Narrative INDICATION:   Shortness of breath produced by exertion or stress     COMPARISON:  CT 4/21/2018    TECHNIQUE:  Following the uneventful intravenous administration of 100 cc Isovue  091, thin helical axial images were obtained through the chest. Postprocessing  was performed. 3D image postprocessing was performed. CT dose reduction was achieved through the use of a standardized protocol  tailored for this examination and automatic exposure control for dose  modulation. FINDINGS:    There is no mediastinal lymphadenopathy. There is chronic cardiomegaly. There  is no pericardial effusion. No filling defect is seen within the pulmonary arterial system to suggest  pulmonary embolus. The aorta is normal in caliber.     There is interval development of a moderate partially layering left pleural  effusion, which is new since the prior study. A left lower lobe metastasis  measures 11 mm, previously 8 mm. There is a background of centrilobular  emphysema. The right lung is clear and well inflated. The large left upper lobe  malignancy with invasion of the chest wall has further progressed, now measuring  approximately 12.8 x 7.7 cm, previously 12.5 x 7.5 cm. There is occlusion of the  segmental artery to the upper lobe, similar to the prior study. There is further  encasement of the left main pulmonary artery. The pulmonary arteries are also  chronically enlarged, consistent with pulmonary arterial hypertension. There is  worsening aeration of the left upper lobe. .    Limited evaluation of the upper abdomen demonstrates a known left adrenal  metastasis measuring 3.5 x 2.3 cm, previously 3.5 x 2.1 cm. There is also a left  renal cyst, partially imaged. . The osseous structures are unremarkable. Impression IMPRESSION:  1. No evidence of pulmonary embolus. 2.  Moderate partially layering left pleural effusion, new since the prior  study. 3. Left lower lobe pulmonary metastasis has mildly increased in size. 4. Bulky left upper lobe malignancy with chest wall invasion, demonstrating mild  interval progression since the prior study. 5. Left adrenal metastasis, slightly increased in size. No procedure found.       Chronic diagnoses   Problem List as of 5/18/2018  Date Reviewed: 5/15/2018          Codes Class Noted - Resolved    A-fib [facility]) ICD-10-CM: I48.91  ICD-9-CM: 427.31  5/16/2018 - Present        Thrombocytopenia ([facility] ICD-10-CM: D69.6  ICD-9-CM: 287.5  3/27/2018 - Present        Anemia ICD-10-CM: D64.9  ICD-9-CM: 285.9  3/27/2018 - Present        DNR (do not resuscitate) ICD-10-CM: Z66  ICD-9-CM: V49.86  1/9/2018 - Present        Non-small cell cancer of left lung (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 HTN (hypertension) ICD-10-CM: I10  ICD-9-CM: 401.9  10/21/2014 - Present        Rheumatoid arthritis involving multiple sites [facility]) ICD-10-CM: M06.9  ICD-9-CM: 714.0  10/21/2014 - Present        Chronic obstructive pulmonary disease ([facility] ICD-10-CM: J44.9  ICD-9-CM: 496  10/21/2014 - Present        Basal cell cancer ICD-10-CM: C44.91  ICD-9-CM: 173.91  10/21/2014 - Present        RESOLVED: Closed fracture of neck of right femur ([facility].) ICD-10-CM: S72.001A  ICD-9-CM: 820.8  12/27/2017 - 2/27/2018        RESOLVED: Femur fracture, right ([facility].) ICD-10-CM: Q78.11EO  ICD-9-CM: 821.00  12/21/2017 - 2/27/2018              Discharge/Transfer Medications  Discharge Medication List as of 5/18/2018  3:37 PM      START taking these medications    Details   levoFLOXacin (LEVAQUIN) 750 mg tablet Take 1 Tab by mouth every twenty-four (24) hours. , Print, Disp-2 Tab, R-0      predniSONE (DELTASONE) 10 mg tablet Take 6 tablets  for three days  Take 4 tablets  for three days  Take 2 tablets  for three days  Take one tablet for three days, Print, Disp-40 Tab, R-0      !! apixaban (ELIQUIS) 5 mg tablet Take 2 Tabs by mouth two (2) times a day for 7 days. , Print, Disp-28 Tab, R-0      !! apixaban (ELIQUIS) 5 mg tablet Take 1 Tab by mouth two (2) times a day for 30 days. , Print, Disp-60 Tab, R-2       !! - Potential duplicate medications found. Please discuss with provider. CONTINUE these medications which have NOT CHANGED    Details   nystatin (MYCOSTATIN) topical cream Apply  to affected area two (2) times daily as needed for Skin Irritation. , Historical Med phenyleph/mineral oil/petrolat (PREPARATION H RE) Insert  into rectum daily as needed., Historical Med      oxyCODONE IR (ROXICODONE) 5 mg immediate release tablet Take 1-2 Tabs by mouth every four (4) hours as needed for Pain (cancer pain). Max Daily Amount: 60 mg., Print, Disp-100 Tab, R-0      loperamide (IMODIUM) 2 mg capsule Take 2 mg by mouth as needed for Diarrhea. Indications: Diarrhea, Historical Med      OTHER,NON-FORMULARY, Natural Colon Cleanse four pills by mouth twice daily as needed for constipation. , Historical Med      prochlorperazine (COMPAZINE) 10 mg tablet Take 1 Tab by mouth every six (6) hours as needed for Nausea., Normal, Disp-50 Tab, R-5      lidocaine-prilocaine (EMLA) topical cream Apply  to affected area as needed for Pain (Apply 30-60 min. prior to having your port accessed). , Normal, Disp-30 g, R-0      ondansetron hcl (ZOFRAN) 8 mg tablet Take 1 Tab by mouth every eight (8) hours as needed for Nausea., Normal, Disp-45 Tab, R-5      albuterol-ipratropium (DUO-NEB) 2.5 mg-0.5 mg/3 ml nebu 3 mL by Nebulization route every four (4) hours as needed., Normal, Disp-150 Nebule, R-11      albuterol (PROVENTIL VENTOLIN) 2.5 mg /3 mL (0.083 %) nebulizer solution USE 2 VIALS VIAL NEBULIZER EVERY 4 HOURS AS NEEDED FOR WHEEZING, Normal, Disp-225 Each, R-11              Diet:  Regular diet.     Activity:  As tolerated    Disposition: Home or Self Care    Discharge instructions to patient/family  Please seek medical attention for any new or worsening symptoms particularly fever, chest pain, shortness of breath, abdominal pain, nausea, vomiting    Follow up plans/appointments  Follow-up Information     Follow up With Details Comments Marta Godinez MD On 5/21/2018 at 2:45 PM for hospital follow up  [address] [facility]  [phone]      Marcello Ferreira MD Go on 5/23/2018 Lab only appt at 9 am [address]  [address] [facility]  [phone] Sharath Abdul MD Go on 5/24/2018 transfusion appt at 9 am if needed based on previous day's labs [address]  [address] [facility]  [phone]      Sharath Abdul MD Go on 5/29/2018 appt at 11 am for follow up with Dr Layne Urrutia [address] [facility] [address]  [address]  [address] [facility]  [phone]      [address] [place], [facility]    Jermaine Pedersen MD On 6/15/2018 1000 AM [id number] [address]  [address]  THEY WILL CONTACT YOU TO [facility] A HOME VISIT [address]  1st Floor  [facility]  Varsha Willoughby [address]  Family Medicine Resident       For Billing    Chief Complaint   Patient presents with   [facility]       Hospital Problems  Date Reviewed: 5/15/2018          Codes Class Noted POA    A-fib [facility]) ICD-10-CM: I48.91  ICD-9-CM: 427.31  5/16/2018 Unknown        Anemia ICD-10-CM: D64.9  ICD-9-CM: 285.9  3/27/2018 Unknown                       [address] Medicine Residency Attending Addendum:  I saw and evaluated the patient, performing the key elements of the service. I discussed the findings, assessment and plan with the resident and agree with the resident's findings and plan as documented in the resident's note. The patient was seen on 5/18/18  No acute events overnight  No complaints    Vitals:    05/18/18 0900 05/18/18 0930 05/18/18 1000 05/18/18 1100   BP: 106/71 110/62 122/78 113/72   Pulse: 91 93 92 85   Resp: 16 26 18 21   Temp:  97.5 °F (36.4 °C) 97.6 °F (36.4 °C) 97.4 °F (36.3 °C)   SpO2: 92% 94% 96% 94%   Weight:       Height:         NAD  Lungs-clear  Abdomen;soft, nttp      Assessment/Plan:   Anemia secondary to chronic disease.   Ok to discharge after transfusion    Hospital Problems  Date Reviewed: 5/15/2018          Codes Class Noted POA    A-fib ([address] ICD-10-CM: I48.91  ICD-9-CM: 427.31 5/16/2018 Unknown        Anemia ICD-10-CM: D64.9  ICD-9-CM: 285.9  3/27/2018 Unknown

## 2018-05-18 NOTE — PROGRESS NOTES
Problem: Falls - Risk of  Goal: *Absence of Falls  Document Ginger Fall Risk and appropriate interventions in the flowsheet. Outcome: Progressing Towards Goal  Fall Risk Interventions:  Mobility Interventions: Assess mobility with egress test, Bed/chair exit alarm, Communicate number of staff needed for ambulation/transfer, OT consult for ADLs, Patient to call before getting OOB, PT Consult for mobility concerns, PT Consult for assist device competence, Strengthening exercises (ROM-active/passive)         Medication Interventions: Assess postural VS orthostatic hypotension, Bed/chair exit alarm, Evaluate medications/consider consulting pharmacy, Patient to call before getting OOB, Teach patient to arise slowly    Elimination Interventions: Bed/chair exit alarm, Call light in reach, Elevated toilet seat, Patient to call for help with toileting needs, Toilet paper/wipes in reach, Toileting schedule/hourly rounds, Urinal in reach    History of Falls Interventions: Bed/chair exit alarm, Consult care management for discharge planning, Door open when patient unattended, Evaluate medications/consider consulting pharmacy, Investigate reason for fall, Room close to nurse's station        Problem: Pressure Injury - Risk of  Goal: *Prevention of pressure injury  Document Dhaval Scale and appropriate interventions in the flowsheet. Outcome: Progressing Towards Goal  Pressure Injury Interventions: Activity Interventions: Assess need for specialty bed, Chair cushion, Increase time out of bed, Pressure redistribution bed/mattress(bed type), PT/OT evaluation    Mobility Interventions: Assess need for specialty bed, Chair cushion, Float heels, HOB 30 degrees or less, Pressure redistribution bed/mattress (bed type), PT/OT evaluation, Turn and reposition approx.  every two hours(pillow and wedges)    Nutrition Interventions: Document food/fluid/supplement intake, Discuss nutritional consult with provider, Offer support with meals,snacks and hydration

## 2018-05-18 NOTE — PROGRESS NOTES
[facility]   Daily Progress Note    Date: 5/17/2018    Assessment/Plan:   Maricarmen Shay is a 78 y.o. male who is admitted for anemia requiring transfusion found to be in afib RVR. Overnight events: No acute events overnight     Respiratory    Acute hypoxic respiratory failure: likely 2/2 to anemia in the setting of left pleural effusions. RVP negative, Blood cx NGTD  - No thoracocentesis at this time, therapeutic thoracocentesis if effusion increases in size  - Levaquin day 3, vanc stopped on 5/17  - Supplemental O2and discharge home with home O2 at 2L NC  - Scheduled Brovana/Pulmicort  - Solumedrol 60 mg IV q 6hrs      Cancer Pain/Chest pain- chronic. Trop negative x2. - Oxycodone 5 mg q 4hr  PRN for moderate pain and oxy 10 mg q 4hrs PRN for severe pain  - Lidoderm patch, oxycodone and Tylenol 650 mg PRN per palliative recs      Heme/Onc     Anemia- likely anemia of chronic disease 2/2 malignancy. Baseline ~8.c. POA Hgb 6.5 S/P 2 unit of pRBCs on 05/17  - Hgb 7.7 this morning, will go ahead and transfuse 1 unit of pRBCs   - Transfuse threshold Hgb>8   - 2 hour post transfusion H&H  - Continue to closely monitor    Thrombocytopenia: low plts s/p completion of chemotherapy. POA plt 68, possible bone marrow involvement. - Daily CBC, continue to monitor  - Hold AC for plt<50  - Heme/onc following, appreciate recs      Non Small Cell Lung Cancer, Stage 4 ([id number])- followed by Dr. Aurora Cunningham s/p 4 cycles of palliative chemotherapy with Carboplatin and Gemcitabine ( 1/9/2018- 4/17/2018). Of note, CTA on 5/16 showed, new left pleural effusion with increased size of mets in the left lower lobe with increased in size of left adrenal metastasis. Palliative radiation to chest wall and palliative immune therapy with nivolumab as of 5/15/2018. Patient would like continue with aggressive supportive treatment.     - Outpatient palliative follow up if patient agrees to follow up  - Continue supportive care per Hem/onc, appreciate recs      DVT 2/2 malignancy: DVT in the left LE ( posterior tibial veins at the mid calf) dx on 5/16  - Lovenox 1mg/kg BID, hold for plt <50  - Eliquis up on discharge, per Hem/onc recs      Cardiovascular     A Fib with RVR- new. S/p lopressor 2.5mg. TSH 0.9 May 15, 2018. - CTA no evidence of PE. Trop negative X2  -NSR this am, Metoprolol 12.5 mg BID if BP allows, cards follwing  - Appreciate cards recs     Acute on Chronic diastolic HF  -BNP 7941, s/p one time dose of IV lasix  - Echo on 05/17- EF 55-60% NRWA. Electrolyte Abnormalities     Hyponatremia  : POA Na : 127, TSH wnl  - FeNa 0.25 suggestive of pre-renal likely from IVVD vs CHF picture  - improving Na 133 this morning       FEN/GI - Regular diet. Activity - Ambulate with assistance  DVT prophylaxis - Lovenox 1mg/Kg BID, hold for plt <50  GI prophylaxis - Not indicated at this time  Disposition -Plan to d/c to TBD. Code Status - DNR, discussed with patient / caregivers.       Patient to be discussed with Dr. Rhina Medina his grandson [address] at [phone] and updated on current medical status and answered all questions. Jay Hunt MD  Family Medicine Resident         CC: \" My breathing is better\"    Subjective  No acute events overnight. Patient reports his breathing is better with oxygen. Patient reports he's voiding well . Patient denies chest pain, sob, nausea, vomiting or abdominal pain.        Inpatient Medications  Current Facility-Administered Medications   Medication Dose Route Frequency    0.9% sodium chloride infusion 250 mL  250 mL IntraVENous PRN    methylPREDNISolone (PF) (SOLU-MEDROL) injection 60 mg  60 mg IntraVENous Q6H    senna-docusate (PERICOLACE) 8.6-50 mg per tablet 1 Tab  1 Tab Oral BID    polyethylene glycol (MIRALAX) packet 17 g  17 g Oral DAILY    lidocaine (LIDODERM) 5 % patch 1 Patch  1 Patch TransDERmal Q24H    acetaminophen (TYLENOL) tablet 650 mg 650 mg Oral Q4H PRN    calcium carbonate (TUMS) chewable tablet 200 mg [elemental]  200 mg Oral TID PRN    albuterol-ipratropium (DUO-NEB) 2.5 MG-0.5 MG/3 ML  3 mL Nebulization Q4H RT    arformoterol (BROVANA) neb solution 15 mcg  15 mcg Nebulization BID RT    budesonide (PULMICORT) 500 mcg/2 ml nebulizer suspension  500 mcg Nebulization BID RT    levoFLOXacin (LEVAQUIN) 750 mg in D5W IVPB  750 mg IntraVENous Q24H    sodium chloride (NS) flush 5-10 mL  5-10 mL IntraVENous Q8H    sodium chloride (NS) flush 5-10 mL  5-10 mL IntraVENous PRN    enoxaparin (LOVENOX) injection 80 mg  80 mg SubCUTAneous Q12H    oxyCODONE IR (ROXICODONE) tablet 5 mg  5 mg Oral Q4H PRN    oxyCODONE IR (ROXICODONE) tablet 10 mg  10 mg Oral Q4H PRN     Facility-Administered Medications Ordered in Other Encounters   Medication Dose Route Frequency    0.9% sodium chloride infusion 250 mL  250 mL IntraVENous PRN         Allergies  Allergies   Allergen Reactions    Pcn [Penicillins] Hives     Tolerated ancef graded challenge 12/21/2017 with no adverse side effects noted         Objective  Vitals:  Patient Vitals for the past 8 hrs:   Temp Pulse Resp BP SpO2   05/17/18 2007 - - - - 94 %   05/17/18 2001 - (!) 116 29 108/75 92 %   05/17/18 1930 - 88 24 - 97 %   05/17/18 1915 - (!) 106 26 - 93 %   05/17/18 1900 97.5 °F (36.4 °C) 93 20 113/65 95 %   05/17/18 1415 - 92 21 101/68 93 %   05/17/18 1400 - 97 - - -         I/O:    Intake/Output Summary (Last 24 hours) at 05/17/18 2053  Last data filed at 05/17/18 2000   Gross per 24 hour   Intake           1003.8 ml   Output             3325 ml   Net          -2321.2 ml     Last shift:    05/17 1901 - 05/18 0700  In: -   Out: 350 [Urine:350]  Last 3 shifts:    05/16 0701 - 05/17 1900  In: 1503.8 [P.O.:640; I.V.:500]  Out: 2975 [Urine:2975]    Physical Exam:  General: No acute distress. Alert. Cooperative. Head: Normocephalic. Atraumatic.    Respiratory: Rales noted in the Left and right UL, L>R Cardiovascular: RRR. Normal S1,S2. GI:  Nontender. No rebound tenderness or guarding. Nondistended   Extremities: 1+ pitting edema in lower extremities bilaterally,     Laboratory Data  Recent Results (from the past 12 hour(s))   CBC W/O DIFF    Collection Time: 05/17/18  2:49 PM   Result Value Ref Range    WBC 4.1 4.1 - 11.1 K/uL    RBC 2.55 (L) 4.10 - 5.70 M/uL    HGB 8.1 (L) 12.1 - 17.0 g/dL    HCT 25.6 (L) 36.6 - 50.3 %    .4 (H) 80.0 - 99.0 FL    MCH 31.8 26.0 - 34.0 PG    MCHC 31.6 30.0 - 36.5 g/dL    RDW 22.0 (H) 11.5 - 14.5 %    PLATELET 60 (L) 677 - 400 K/uL    MPV 9.9 8.9 - 12.9 FL    NRBC 0.0 0  WBC    ABSOLUTE NRBC 0.00 0.00 - 0.01 K/uL         Hospital Problems:  Hospital Problems  Date Reviewed: 5/15/2018          Codes Class Noted POA    A-fib [facility]) ICD-10-CM: I48.91  ICD-9-CM: 427.31  5/16/2018 Unknown        Anemia ICD-10-CM: D64.9  ICD-9-CM: 285.9  3/27/2018 Unknown              I saw and evaluated the patient, performing the key elements of the service. I discussed the findings, assessment and plan with the resident and agree with the resident's findings and plan as documented in the resident's note.

## 2018-05-18 NOTE — DISCHARGE INSTRUCTIONS
Avoiding Triggers With Heart Failure: Care Instructions  Your Care Instructions    Triggers are anything that make your heart failure flare up. A flare-up is also called \"sudden heart failure\" or \"acute heart failure. \" When you have a flare-up, fluid builds up in your lungs, and you have problems breathing. You might need to go to the hospital. By watching for changes in your condition and avoiding triggers, you can prevent heart failure flare-ups. Follow-up care is a key part of your treatment and safety. Be sure to make and go to all appointments, and call your doctor if you are having problems. It's also a good idea to know your test results and keep a list of the medicines you take. How can you care for yourself at home? Watch for changes in your weight and condition  · Weigh yourself without clothing at the same time each day. Record your weight. Call your doctor if you have sudden weight gain, such as more than 2 to 3 pounds in a day or 5 pounds in a week. (Your doctor may suggest a different range of weight gain.) A sudden weight gain may mean that your heart failure is getting worse. · Keep a daily record of your symptoms. Write down any changes in how you feel, such as new shortness of breath, cough, or problems eating. Also record if your ankles are more swollen than usual and if you feel more tired than usual. Note anything that you ate or did that could have triggered these changes. Limit sodium  Sodium causes your body to hold on to extra water. This may cause your heart failure symptoms to get worse. People get most of their sodium from processed foods. Fast food and restaurant meals also tend to be very high in sodium. · Your doctor may suggest that you limit sodium to 2,000 milligrams (mg) a day or less. That is less than 1 teaspoon of salt a day, including all the salt you eat in cooking or in packaged foods. · Read food labels on cans and food packages.  They tell you how much sodium you get in one serving. Check the serving size. If you eat more than one serving, you are getting more sodium. · Be aware that sodium can come in forms other than salt, including monosodium glutamate (MSG), sodium citrate, and sodium bicarbonate (baking soda). MSG is often added to Asian food. You can sometimes ask for food without MSG or salt. · Slowly reducing salt will help you adjust to the taste. Take the salt shaker off the table. · Flavor your food with garlic, lemon juice, onion, vinegar, herbs, and spices instead of salt. Do not use soy sauce, steak sauce, onion salt, garlic salt, mustard, or ketchup on your food, unless it is labeled \"low-sodium\" or \"low-salt. \"  · Make your own salad dressings, sauces, and ketchup without adding salt. · Use fresh or frozen ingredients, instead of canned ones, whenever you can. Choose low-sodium canned goods. · Eat less processed food and food from restaurants, including fast food. Exercise as directed  Moderate, regular exercise is very good for your heart. It improves your blood flow and helps control your weight. But too much exercise can stress your heart and cause a heart failure flare-up. · Check with your doctor before you start an exercise program.  · Walking is an easy way to get exercise. Start out slowly. Gradually increase the length and pace of your walk. Swimming, riding a bike, and using a treadmill are also good forms of exercise. · When you exercise, watch for signs that your heart is working too hard. You are pushing yourself too hard if you cannot talk while you are exercising. If you become short of breath or dizzy or have chest pain, stop, sit down, and rest.  · Do not exercise when you do not feel well. Take medicines correctly  · Take your medicines exactly as prescribed. Call your doctor if you think you are having a problem with your medicine. · Make a list of all the medicines you take.  Include those prescribed to you by other doctors and any over-the-counter medicines, vitamins, or supplements you take. Take this list with you when you go to any doctor. · Take your medicines at the same time every day. It may help you to post a list of all the medicines you take every day and what time of day you take them. · Make taking your medicine as simple as you can. Plan times to take your medicines when you are doing other things, such as eating a meal or getting ready for bed. This will make it easier to remember to take your medicines. · Get organized. Use helpful tools, such as daily or weekly pill containers. When should you call for help? Call 911 if you have symptoms of sudden heart failure such as:  ? · You have severe trouble breathing. ? · You cough up pink, foamy mucus. ? · You have a new irregular or rapid heartbeat. ?Call your doctor now or seek immediate medical care if:  ? · You have new or increased shortness of breath. ? · You are dizzy or lightheaded, or you feel like you may faint. ? · You have sudden weight gain, such as more than 2 to 3 pounds in a day or 5 pounds in a week. (Your doctor may suggest a different range of weight gain.)   ? · You have increased swelling in your legs, ankles, or feet. ? · You are suddenly so tired or weak that you cannot do your usual activities. ? Watch closely for changes in your health, and be sure to contact your doctor if you develop new symptoms. Where can you learn more? Go to http://kayla-dylan.info/. Enter L818 in the search box to learn more about \"Avoiding Triggers With Heart Failure: Care Instructions. \"  Current as of: September 21, 2016  Content Version: 11.4  © 9283-9991 [employer]. Care instructions adapted under license by [employer] (which disclaims liability or warranty for this information).  If you have questions about a medical condition or this instruction, always ask your healthcare professional. [employer] disclaims any warranty or liability for your use of this information. HOME DISCHARGE INSTRUCTIONS    Michelle Koenig / [id number] : 1940    Admission date: 2018 Discharge date: 2018 9:30 PM     Please bring this form with you to show your care provider at your follow-up appointment. Primary care provider:  Jad Servin MD    Discharging provider:  Caron Marte MD  - Family Medicine Resident  Dr. Tierra Monroy Attending      You have been admitted to the hospital with the following diagnoses:    ACUTE DIAGNOSES:  · Anemia  · A-fib ([address]  . . . . . . . . . . . . . . . . . . . . . . . . . . . . . . . . . . . . . . . . . . . . . . . . . . . . . . . . . . . . . . . . . . . . . . . Gigi Guerra FOLLOW-UP CARE RECOMMENDATIONS:    Start using home oxygen at 2L at all times to help with breathing    Please take Levaquin 750 mg daily for the next two days for suspected lung infection. Stop date on 2018    Please take 10 mg of  prednisone as instructed below for COPD  Take 6 tablets for three days   Take 4 tablets for three days    Take 2 tablets for three days    Take one tablet for three days      Start taking Eliquis 10 mg two times daily for the first seven days to prevent blood clots    Then, continue taking Eliquis 5 mg two times daily to prevent blood clots. Discuss with your primary care doctor and Oncologist the length of treatment for blood clots.    Appointments  Follow-up Information     Follow up With Details Comments Contact Info    Jad Servin MD On 2018 at 2:45 PM for hospital follow up  [address] [facility]  [phone]      Ahmet Matamoros MD Go on 2018 Lab only appt at 9 am [address]  [address] [facility]  [phone]      Ahmet Matamoros MD Go on 2018 transfusion appt at 9 am if needed based on previous day's labs [address]  [address] [facility] [phone] [id number]  [facility] MD Go on 5/29/2018 appt at 11 am for follow up with Dr Skyler Brennan [address]  [address]  [address] [facility]  [phone]      [address]             Follow-up tests needed: None    Pending test results: At the time of your discharge the following test results are still pending: None  Please make sure you review these results with your outpatient follow-up provider(s). Specific symptoms to watch for: chest pain, shortness of breath, fever, chills, nausea, vomiting, diarrhea, change in mentation, falling, weakness, bleeding. DIET/what to eat:  Regular Diet    ACTIVITY:  Activity as tolerated    Wound care: none    Equipment needed:  Home Oxygen    What to do if new or unexpected symptoms occur? If you experience any of the above symptoms (or should other concerns or questions arise after discharge) please call your primary care physician. Return to the emergency room if you cannot get hold of your doctor. · It is very important that you keep your follow-up appointment(s). · Please bring discharge papers, medication list (and/or medication bottles) to your follow-up appointments for review by your outpatient provider(s). · Please check the list of medications and be sure it includes every medication (even non-prescription medications) that your provider wants you to take. · It is important that you take the medication exactly as they are prescribed. · Keep your medication in the bottles provided by the pharmacist and keep a list of the medication names, dosages, and times to be taken in your wallet. · Do not take other medications without consulting your doctor.    · If you have any questions about your medications or other instructions, please talk to your nurse or care provider before you leave the hospital.     Information obtained by:     I understand that if any problems occur once I am at home I am to contact my physician. These instructions were explained to me and I had the opportunity to ask questions. I understand and acknowledge receipt of the instructions indicated above.                                                                                                                                                Physician's or R.N.'s Signature                                                                  Date/Time                                                                                                                                              Patient or Representative Signature                                                          Date/Time

## 2018-05-18 NOTE — PROGRESS NOTES
5- CASE MANAGEMENT NOTE:  I just received a call from [facility] to inform me I had been given wrong information and they are in network with the pt's insurance. I spoke with the pt who said he is fine with waiting until Sunday, 5-. I did give him a flier on [place] and encouraged him to call them tomorrow if he feels the need.  Caio Michaels, BSW, CM

## 2018-05-18 NOTE — PROGRESS NOTES
Cardiology Progress Note                             [address], [place], [address] [facility]                                 Phone [phone]; Fax [phone]        2018 10:04 AM     Admit Date:           2018  Admit Diagnosis:  Anemia  A-fib ([address]  :          1940   MRN:          [id number]   ASSESSMENT/RECOMMENDATION:   1)Dyspnea-non-small cell CA of the lung/component of acute on chronic diastolic heart failure  -echo with normal LVEF     A. fib with RVR: converted to NSR with frequent PVC. Check ECG this AM.   -echo showing LVEF 55-60%, mild TR  -OAC per hematology   -low dose metoprolol 12.5 mg BID when BP can tolerate    DVT LE -lovenox q12 h per Hem/onco, plans to transition to eliquis prior to d/c     Severe anemia/thrombocytopenia/hyponatremia: per hematology transfuse to keep Hgb >7.  S/p transfusion this AM                     Last 3 Recorded Weights in this Encounter    18 1333 18 0320 18 0312   Weight: 170 lb (77.1 kg) 168 lb 10.4 oz (76.5 kg) 168 lb 10.4 oz (76.5 kg)          1901 -  0700  In: 2068.8 [P.O.:880; I.V.:500]  Out: 5402 [JSINW:2978]    SUBJECTIVE               Maricarmen Shay denies palpitations, irregular heart beat, SOB, chest pain  No lightheadedness or dizziness          Current Facility-Administered Medications   Medication Dose Route Frequency    0.9% sodium chloride infusion 250 mL  250 mL IntraVENous PRN    methylPREDNISolone (PF) (SOLU-MEDROL) injection 40 mg  40 mg IntraVENous Q8H    0.9% sodium chloride infusion 250 mL  250 mL IntraVENous PRN    senna-docusate (PERICOLACE) 8.6-50 mg per tablet 1 Tab  1 Tab Oral BID    polyethylene glycol (MIRALAX) packet 17 g  17 g Oral DAILY    lidocaine (LIDODERM) 5 % patch 1 Patch  1 Patch TransDERmal Q24H    acetaminophen (TYLENOL) tablet 650 mg  650 mg Oral Q4H PRN    calcium carbonate (TUMS) chewable tablet 200 mg [elemental]  200 mg Oral TID PRN    albuterol-ipratropium (DUO-NEB) 2.5 MG-0.5 MG/3 ML  3 mL Nebulization Q4H RT    arformoterol (BROVANA) neb solution 15 mcg  15 mcg Nebulization BID RT    budesonide (PULMICORT) 500 mcg/2 ml nebulizer suspension  500 mcg Nebulization BID RT    levoFLOXacin (LEVAQUIN) 750 mg in D5W IVPB  750 mg IntraVENous Q24H    sodium chloride (NS) flush 5-10 mL  5-10 mL IntraVENous Q8H    sodium chloride (NS) flush 5-10 mL  5-10 mL IntraVENous PRN    enoxaparin (LOVENOX) injection 80 mg  80 mg SubCUTAneous Q12H    oxyCODONE IR (ROXICODONE) tablet 5 mg  5 mg Oral Q4H PRN    oxyCODONE IR (ROXICODONE) tablet 10 mg  10 mg Oral Q4H PRN     Facility-Administered Medications Ordered in Other Encounters   Medication Dose Route Frequency    0.9% sodium chloride infusion 250 mL  250 mL IntraVENous PRN      OBJECTIVE               Intake/Output Summary (Last 24 hours) at 05/18/18 0942  Last data filed at 05/18/18 1954   Gross per 24 hour   Intake              240 ml   Output             1950 ml   Net            -1710 ml       Review of Systems - History obtained from the patient AS PER  HPI    Telemetry NSR with frequent PVCs. Brief run of PAT vs A flutter this AM    PHYSICAL EXAM        Visit Vitals    /62    Pulse 93    Temp 97.6 °F (36.4 °C)    Resp 26    Ht 5' 10\" (1.778 m)    Wt 168 lb 10.4 oz (76.5 kg)    SpO2 94%    BMI 24.2 kg/m2       Gen: Well-developed, well-nourished, in no acute distress  alert and oriented x 3  HEENT:  Pink conjunctivae, Hearing grossly normal.No scleral icterus or conjunctival, moist mucous membranes  Neck: Supple,No JVD  Resp: No accessory muscle use, diminished stas bases with exp wheezing   Card: Regular Rate,Rythm, 2/6 murmur at LUSB/apex, no rubs or gallop. No thrills.    GI:          soft, non-tender   MSK: No cyanosis or clubbing, good capillary refill  Skin: No rashes or ulcers, no bruising  Neuro:  Cranial nerves are grossly intact, moving all four extremities, no focal deficit, follows commands appropriately  Psych:  Good insight, oriented to person, place and time, alert, Nml Affect  LE: +2 pitting BLE edema       DATA REVIEW            Laboratory and Imaging have been reviewed by me and are notable for  Recent Labs      05/16/18   2358  05/16/18   1531   TROIQ  <0.04  <0.04     Recent Labs      05/18/18   0319  05/17/18   1449  05/17/18   0840  05/17/18   0134  05/16/18   1531   05/16/18   1420   NA  133*   --    --   128*  127*   --    --    K  3.6   --    --   4.2  4.2   --    --    CO2  29   --    --   25  26   --    --    BUN  12   --    --   11  12   --    --    CREA  0.60*   --    --   0.56*  0.59*   --    --    GLU  123*   --    --   141*  78   --    --    PHOS  3.7   --    --   4.1   --    --   2.1*   MG  1.9   --    --   2.0   --    --   0.8*   WBC  4.9  4.1   --   6.9  13.0*   < >   --    HGB  7.7*  8.1*  8.2*  7.5*  6.3*   < >   --    HCT  23.8*  25.6*  25.8*  23.9*  20.7*   < >   --    PLT  60*  60*   --   61*  65*   < >   --     < > = values in this interval not displayed.              Delon Barahona, NP

## 2018-05-18 NOTE — PROGRESS NOTES
Palliative Medicine Consult  [place]: [phone] ([phone])    Patient Name: Evie Rodriguez  YOB: 1940    Date of Initial Consult: 05/17/2018  Reason for Consult: Care decisions  Requesting Provider: Nayana Solorio MD   Primary Care Physician: Any Fiore MD     SUMMARY:   Evie Rodriguez is a 78 y.o. with a past history of met lung CA, skin CA, HTN, COPD and RA, who was admitted on 5/16/2018 from home with a diagnosis of symptomatic anemia. Patient presented to the ED with hypotension and shortness of breath with hypoxia. Was seen earlier in the day by his Pulmonary NP and found to have sats in the 70's on room air with SBP in the 70's. ED eval revealed Hgb 6.5, wbc ct 12.4, and chest xray with new moderate left pleural effusion and decreased left upper lobe mass. CTA negative for PE but with moderate partially layering left pleural effusion, new since the prior study, left lower lobe pulmonary metastasis has mildly increased in size,  bulky left upper lobe malignancy with chest wall invasion, demonstrating mild interval progression since the prior study, and left adrenal metastasis, slightly increased in size. EKG showed new onset afib with RVR. Lower extremity dopplers positive for left lower leg acute deep venous thrombosis involving 1 of 2 paired posterior tibial veins at the mid calf. Patient received 2 unit PRBCs with IV lasix. Pulmonary consulted and recommended broad spectrum antibiotics, scheduled nebs, solumedrol. Cardiology consulted for new onset afib. Recommended anticoagulation and keeping hgb >8 and echo (results pending). On lovenox as inpatient and [place] [facility] [place] new [place]/afib. Patient followed by Dr. Dexter Garcia for met lung CA and just completed radiation. Was seen in follow-up on 5/15 and was found to be anemic and was scheduled for outpatient transfusion today. Began immunotherapy 5/15.      Current medical issues leading to Palliative Medicine involvement include:Care decisions due to progressive decline, met lung CA, left chest wall pain/cancer pain. SH: [employer] , served in [place]. He lives in [place] with his grandson, Elihu Fothergill who suffers from schizophrenia and is agoraphobic. He is a retired , also did [employer] work in [employer]. Has an adopted daughter who he has been estranged from for over 13 years. Was driving up until the last month or so, now relies on a friend to take him to appointments. Affiliates with the [facility]. Interim History:  05/18--> received third unit of blood today for hgb 7.7. PALLIATIVE DIAGNOSES:   1. Left chest wall pain/cancer pain  2. Shortness of breath  3. Hypoalbuminemia  4. Physical debility  5. Goals of care  6. DNR   7. ACP  8. Metastatic Lung CA       PLAN:   1. Met with patient in follow-up. 2. Left chest wall pain--> (05/17) Patient states his pain started when he begin radiation and worsened \"when the scar tissue formed\". Patient reports that his pain is currently controlled on oxycodone regimen of 5 mg every four hours as needed for moderate pain and 10 mg every four hours as needed for severe pain. Has received 1 dose of 5 mg and 2 doses of 10 mg in the last 24 hours (25 MME). Confirms that when he was just taking 5 mg every four hours at home the pain was \"unbearable\" and on the 10 mg he gets relief. (05/18) Reports pain is 0/10 on current regimen. Received 3 doses of 10 mg oxycodone in 24 hours and 1 dose of 5 mg oxycodone in 24 hours. 3. Shortness of breath-->(05/17) Patient states that his shortness of breath has improved since admission, was not on oxygen at home and still requiring oxygen here. Reports that it improved with transfusion. Plan to transfuse for hgb >8, continue broad spectrum antibiotics, scheduled nebs, solumedrol. (05/18) Reports breathing is back to baseline, although plans for patient to be discharged on home oxygen based on six minute walk.  Receiving third unit of blood today for hgb 7.7. Continue course of antibiotics, has nebs at home, and solumedrol converted to oral taper at discharge. 4.  reviewed (05/17)--> 2 prescriptions and 2 prescribers. Both for oxycodone 5 mg. One written at time of discharge in Dec 2017 after he underwent hip replacement here. The second was written for oxycodone 5 mg #60/10 days written by heme/onc NP on 4/25. 5. Recommendations for discharge (5/18):  1. Continue oxycodone regimen as stated above (5 mg every four hours as needed for moderate pain, 10 mg every four hours as needed for severe pain)  2. Lidoderm patch to chest wall  3. Tylenol 650 as needed for mild pain  4. Bowel regimen  5. Discussed staying ahead of the pain  6. Discussed possibility of transitioning to long active opioids if pain worsens to where he is requiring escalating doses of short acting medications. Will address further in outpatient clinic. Appointment scheduled for 5/24 at 08:30 am.   6. Goals of care--> (05/17) Discussed current hospitalization and events of the last five months since I saw the patient last in the hospital. He recounted his treatment with chemotherapy and initial decrease in size and then increase in size, and his completion of palliative radiation which worsened his left chest wall pain instead of improving it. Has recently started immunotherapy and states he wants to continue aggressive treatment so that he \"can be here for Yvon\". Inquired into any involvement of his adopted daughter in the past months, with diagnosis and undergoing treatment, and if she has been in contact with him or Lenard Echevarria. Patient states he saw her in December for one night, but she returned to [place] and he has called her twice to update her on his progress and she has failed to call him back. Reports that Robb Hernández nothing to do with her\".  Patient admits to limited amount of support and is concerned with the increasing amount of clinic visits lately and that he doesn't \"want to be a burden\" by asking his neighbor to have to take him back and forth. Discussed referral to outpatient clinic and patient states he \"doesn't need another doctor--  Doctor Abram does it all\". I explained our role in symptom management, an extra layer of support for him, and continued discussion of care decisions. Patient declined this offer, stating \"if it aint broke, don't fix it\". Outpatient palliative medicine team is aware of patient's admission and will ask for scheduled appointment so that I can provide this to the patient before discharge in case he changes his mind, and he can cancel if he so desires. Discussed with Kaitlin Huffman with Heme Onc. (05/18)--> Patient is eager to be discharged home today and neighbor is tentatively planning on picking him up today to transport him home. He is discouraged about needed oxygen but overall feels better than he did when he was admitted. 7. DNR--> (5/17) Patient has signed DDNR from 12/2017. Confirmed with patient today. 8. ACP--> (5/17) Patient reports that he has completed AMD/living will/financial power of  and that all the documents at home and appoint his grandson, Giovanni Bruner as agent for healthcare decisions. Have asked him to bring these documents in for his EMR since he is adamant he would not want his daughter to be the responsible party (as legal next of kin under [place] law in the absence of AMD appointing someone). He states he will have them brought in for review so that we can ensure this. 9. Initial consult note routed to primary continuity provider  10.  Communicated plan of care with: Palliative IDT       GOALS OF CARE / TREATMENT PREFERENCES:     GOALS OF CARE:  Patient/Health Care Proxy Stated Goals: Prolong life      TREATMENT PREFERENCES:   Code Status: DNR    Advance Care Planning:  Advance Care Planning 5/17/2018   Patient's Healthcare Decision Maker is: Verbal statement (Legal Next of Kin remains as decision maker) Primary Decision Maker Name Sher Gutierrez   Primary Decision Maker Phone Number [phone]   Primary Decision Maker Relationship to Patient Other relative   Confirm Advance Directive Yes, not on file   Patient Would Like to Complete Advance Directive -   Does the patient have other document types Do Not Resuscitate       Medical Interventions: Limited additional interventions (DNR)   Other Instructions:   Artificially Administered Nutrition:  (not addressed)     Other:    As far as possible, the palliative care team has discussed with patient / health care proxy about goals of care / treatment preferences for patient. HISTORY:     History obtained from: patient,, chart, Mey NP    CHIEF COMPLAINT: shortness of breath    HPI/SUBJECTIVE:    The patient is:   [x] Verbal and participatory  [] Non-participatory due to:     Patient presented to the ED with hypotension and shortness of breath with hypoxia. Was seen earlier in the day by his Pulmonary NP and found to have sats in the 70's on room air with SBP in the 70's. ED eval revealed Hgb 6.5, wbc ct 12.4, and chest xray with new moderate left pleural effusion and decreased left upper lobe mass. CTA negative for PE but with moderate partially layering left pleural effusion, new since the prior study, left lower lobe pulmonary metastasis has mildly increased in size,  bulky left upper lobe malignancy with chest wall invasion, demonstrating mild interval progression since the prior study, and left adrenal metastasis, slightly increased in size. EKG showed new onset afib with RVR. Lower extremity dopplers positive for left lower leg acute deep venous thrombosis involving 1 of 2 paired posterior tibial veins at the mid calf. Patient received 2 unit PRBCs with IV lasix. Pulmonary consulted and recommended broad spectrum antibiotics, scheduled nebs, solumedrol. Cardiology consulted for new onset afib.  Recommended anticoagulation and keeping hgb >8 and echo (results pending). On lovenox as inpatient and will transition to [address] [place] [address] at discharge for new DVT/afib. Patient followed by Dr. Anju Nielsen for met lung CA and just completed radiation. Was seen in follow-up on 5/15 and was found to be anemia and was scheduled for outpatient transfusion today,. interim History:  05/18--> received third unit of blood today for hgb 7.7. Clinical Pain Assessment (nonverbal scale for severity on nonverbal patients):   Clinical Pain Assessment  Severity: 0          Duration: for how long has pt been experiencing pain (e.g., 2 days, 1 month, years)  Frequency: how often pain is an issue (e.g., several times per day, once every few days, constant)     FUNCTIONAL ASSESSMENT:     Palliative Performance Scale (PPS):  PPS: 60       PSYCHOSOCIAL/SPIRITUAL SCREENING:     Palliative IDT has assessed this patient for cultural preferences / practices and a referral made as appropriate to needs (Cultural Services, Patient Advocacy, Ethics, etc.)    Advance Care Planning:  Advance Care Planning 5/17/2018   Patient's Healthcare Decision Maker is: Verbal statement (Legal Next of Kin remains as decision maker)   Primary Decision Maker Name Monisha Cisneros   Primary Decision Maker Phone Number [phone]   Primary Decision Maker Relationship to Patient Other relative   Confirm Advance Directive Yes, not on file   Patient Would Like to Complete Advance Directive -   Does the patient have other document types Do Not Resuscitate       Any spiritual / Evangelical concerns:  [] Yes /  [x] No    Caregiver Burnout:  [] Yes /  [] No /  [x] No Caregiver Present      Anticipatory grief assessment:   [x] Normal  / [] Maladaptive       ESAS Anxiety: Anxiety: 0    ESAS Depression:          REVIEW OF SYSTEMS:     Positive and pertinent negative findings in ROS are noted above in HPI.   The following systems were [x] reviewed / [] unable to be reviewed as noted in HPI  Other findings are noted below.  Systems: constitutional, ears/nose/mouth/throat, respiratory, gastrointestinal, genitourinary, musculoskeletal, integumentary, neurologic, psychiatric, endocrine. Positive findings noted below. Modified ESAS Completed by: provider   Fatigue: 2       Pain: 0   Anxiety: 0       Dyspnea: 0                    PHYSICAL EXAM:     From RN flowsheet:  Wt Readings from Last 3 Encounters:   05/18/18 168 lb 10.4 oz (76.5 kg)   04/25/18 174 lb (78.9 kg)   04/17/18 179 lb 4.8 oz (81.3 kg)     Blood pressure 113/72, pulse 85, temperature 97.4 °F (36.3 °C), resp. rate 21, height 5' 10\" (1.778 m), weight 168 lb 10.4 oz (76.5 kg), SpO2 94 %. Pain Scale 1: Numeric (0 - 10)  Pain Intensity 1: 0     Pain Location 1: Rib cage  Pain Orientation 1: Left  Pain Description 1: Intermittent  Pain Intervention(s) 1: Medication (see MAR)  Last bowel movement, if known:     Constitutional: NAD, sitting up in bed  Eyes: pupils equal, anicteric  ENMT: no nasal discharge, moist mucous membranes  Cardiovascular: regular rhythm, distal pulses intact, +2 KATHERINE bilat  Respiratory: breathing not labored, symmetric, decreased BS throughout  Gastrointestinal: soft non-tender, +bowel sounds  Musculoskeletal: no deformity, no tenderness to palpation  Skin: warm, dry  Neurologic: following commands, moving all extremities  Psychiatric: full affect, no hallucinations  Other:       HISTORY:     Active Problems:    Anemia (3/27/2018)      A-fib ([address] (5/16/2018)      Past Medical History:   Diagnosis Date    HTN (hypertension) 10/21/2014    Rheumatoid arthritis(714.0) 10/21/2014    Skin cancer     Sun-damaged skin     Sunburn, blistering       Past Surgical History:   Procedure Laterality Date    HX OTHER SURGICAL      Hemmorhiodectomy      No family history on file. History reviewed, no pertinent family history.   Social History   Substance Use Topics    Smoking status: Current Every Day Smoker     Packs/day: 1.00    Smokeless tobacco: Never Used    Alcohol use No     Allergies   Allergen Reactions    Pcn [Penicillins] Hives     Tolerated ancef graded challenge 12/21/2017 with no adverse side effects noted      Current Facility-Administered Medications   Medication Dose Route Frequency    0.9% sodium chloride infusion 250 mL  250 mL IntraVENous PRN    methylPREDNISolone (PF) (SOLU-MEDROL) injection 40 mg  40 mg IntraVENous Q8H    levoFLOXacin (LEVAQUIN) tablet 750 mg  750 mg Oral Q24H    bisacodyl (DULCOLAX) suppository 10 mg  10 mg Rectal DAILY    0.9% sodium chloride infusion 250 mL  250 mL IntraVENous PRN    senna-docusate (PERICOLACE) 8.6-50 mg per tablet 1 Tab  1 Tab Oral BID    polyethylene glycol (MIRALAX) packet 17 g  17 g Oral DAILY    lidocaine (LIDODERM) 5 % patch 1 Patch  1 Patch TransDERmal Q24H    acetaminophen (TYLENOL) tablet 650 mg  650 mg Oral Q4H PRN    calcium carbonate (TUMS) chewable tablet 200 mg [elemental]  200 mg Oral TID PRN    albuterol-ipratropium (DUO-NEB) 2.5 MG-0.5 MG/3 ML  3 mL Nebulization Q4H RT    arformoterol (BROVANA) neb solution 15 mcg  15 mcg Nebulization BID RT    budesonide (PULMICORT) 500 mcg/2 ml nebulizer suspension  500 mcg Nebulization BID RT    sodium chloride (NS) flush 5-10 mL  5-10 mL IntraVENous Q8H    sodium chloride (NS) flush 5-10 mL  5-10 mL IntraVENous PRN    enoxaparin (LOVENOX) injection 80 mg  80 mg SubCUTAneous Q12H    oxyCODONE IR (ROXICODONE) tablet 5 mg  5 mg Oral Q4H PRN    oxyCODONE IR (ROXICODONE) tablet 10 mg  10 mg Oral Q4H PRN     Facility-Administered Medications Ordered in Other Encounters   Medication Dose Route Frequency    0.9% sodium chloride infusion 250 mL  250 mL IntraVENous PRN          LAB AND IMAGING FINDINGS:     Lab Results   Component Value Date/Time    WBC 4.9 05/18/2018 03:19 AM    HGB 7.7 (L) 05/18/2018 03:19 AM    PLATELET 60 (L) 42/23/1550 03:19 AM     Lab Results   Component Value Date/Time    Sodium 133 (L) 05/18/2018 03:19 AM    Potassium 3.6 05/18/2018 03:19 AM    Chloride 98 05/18/2018 03:19 AM    CO2 29 05/18/2018 03:19 AM    BUN 12 05/18/2018 03:19 AM    Creatinine 0.60 (L) 05/18/2018 03:19 AM    Calcium 8.9 05/18/2018 03:19 AM    Magnesium 1.9 05/18/2018 03:19 AM    Phosphorus 3.7 05/18/2018 03:19 AM      Lab Results   Component Value Date/Time    AST (SGOT) 24 05/16/2018 03:31 PM    Alk. phosphatase 60 05/16/2018 03:31 PM    Protein, total 5.7 (L) 05/16/2018 03:31 PM    Albumin 1.7 (L) 05/16/2018 03:31 PM    Globulin 4.0 05/16/2018 03:31 PM     Lab Results   Component Value Date/Time    INR 1.2 (H) 05/18/2018 03:19 AM    Prothrombin time 12.5 (H) 05/18/2018 03:19 AM    aPTT 32.7 (H) 05/18/2018 03:19 AM      Lab Results   Component Value Date/Time    Iron 17 (L) 11/29/2017 04:26 PM    TIBC 202 (L) 11/29/2017 04:26 PM    Iron % saturation 8 (L) 11/29/2017 04:26 PM    Ferritin 461 (H) 11/29/2017 04:26 PM      No results found for: PH, PCO2, PO2  No components found for: GLPOC   No results found for: CPK, CKMB             Total time: 35 min  Counseling / coordination time, spent as noted above: 30  min  > 50% counseling / coordination?: yes    Prolonged service was provided for  []30 min   []75 min in face to face time in the presence of the patient, spent as noted above. Time Start:   Time End:   Note: this can only be billed with 93483 (initial) or 32058 (follow up). If multiple start / stop times, list each separately.

## 2018-05-18 NOTE — PROGRESS NOTES
5- CASE MANAGEMENT NOTE:  The pt is being discharged home today with his grand-son who will pick him up. The portable oxygen has been delivered to the pt's room and the pt and grand-son have been given the contact information to call [facility] to deliver the oxygen concentrator to his home. I called his pharmacy, [facility] in [place] ([phone]), to check on coverage for Eliquis. Since his insurance will only pay for 60 pills/month with a $47 co-pay, I gave him a coupon for a 30 day free supply as for the first 7 days, he will be taking 4 tablets/day putting him over the 6o tablet limit for the first month. I also sent a referral thru [facility] to [facility] and they have accepted but cannot start until Sunday, 5-. I did contact [facility] to see if they could make a home visit tomorrow but they are out of network with his insurance and the cost could be $350 or more. The pt and MD are satisfied with [facility] on 5-. Care Management Interventions  PCP Verified by CM:  Yes Dionna Estrada MD; last visit 2017)  Palliative Care Criteria Met (RRAT>21 & CHF Dx)?: No (No documented co-morbidities)  Mode of Transport at Discharge:  (Friend)  Transition of Care Consult (CM Consult): [address]: Yes  Discharge Durable Medical Equipment: Yes (Oxygen ordered from [facility])  Physical Therapy Consult: Yes  Occupational Therapy Consult: Yes  Current Support Network:  (Lives with grand-son)  Confirm Follow Up Transport: Friends  Plan discussed with Pt/Family/Caregiver: Yes  Freedom of Choice Offered: Yes  Discharge Location  Discharge Placement:  (Home with grand-son, oxygen from [facility] and [facility])    [address], [place]

## 2018-05-18 NOTE — PROGRESS NOTES
[facility] PROGRESS NOTE  Pulmonary, Critical Care, and Sleep Medicine    Name: Emily Juárez MRN: [id number]   : 1940 Hospital: [facility]   Date: 2018  Admission Date: 2018     Chart and notes reviewed. Data reviewed. I review the patient's current medications in the medical record at each encounter.  I have evaluated and examined the patient. Overnight events reviewed:  Afebrile  BP stable  O2 sats 94-95% on 2  Hgb 7.7: s/p 2 unit PRBCs  WBC 4.9  Plts 60  Na 133  RVP negative  Sputum culture with occasional GPCs and rare GPRs: prelim  Blood cultures no growth x 2 days  BLE dopplers + for left posterior tibial DVT  ECHO:  EF 55-60%, no regional wall abnormalities, mild TR, PASP moderately increased    ROS:  Feeling much better again today. Breathing improved and denies SOB currently. Reports pain to be well controlled. Has only occasional cough, improved since starting Levaquin. Denies abd pain. Reports LE swelling, but denies pain. Vital Signs:  Visit Vitals    /61 (BP 1 Location: Right arm, BP Patient Position: At rest)    Pulse 88    Temp 97.6 °F (36.4 °C)    Resp 19    Ht 5' 10\" (1.778 m)    Wt 76.5 kg (168 lb 10.4 oz)    SpO2 94%    BMI 24.2 kg/m2       O2 Device: Nasal cannula   O2 Flow Rate (L/min): 2 l/min   Temp (24hrs), Av.6 °F (36.4 °C), Min:97.5 °F (36.4 °C), Max:97.7 °F (36.5 °C)       Intake/Output:   Last shift:         Last 3 shifts:  1901 -  0700  In: 2068.8 [P.O.:880; I.V.:500]  Out: 8891 [Urine:3925]    Intake/Output Summary (Last 24 hours) at 18 0911  Last data filed at 18 8986   Gross per 24 hour   Intake              240 ml   Output             1950 ml   Net            -1710 ml        Physical Exam:   General:  Alert, cooperative, no distress, appears stated age. Head:  Normocephalic, without obvious abnormality, atraumatic. Eyes:  Conjunctivae/corneas clear.     Nose: Nares normal. Septum midline. Mucosa normal.    Throat: Lips, mucosa, and tongue normal. Teeth and gums normal.   Neck: Supple, symmetrical, trachea midline, no adenopathy   Lungs:   Decreased but improved air movement today, no wheezing/rhonhi/crackles currently   Chest wall:  No tenderness or deformity. Heart:  Regular rate and rhythm, S1, S2 normal, no murmur, click, rub or gallop. Abdomen:   Soft, non-tender. Bowel sounds normal.    Extremities: Extremities normal, atraumatic, no cyanosis, LLE with nonpitting edema   Pulses: 2+ and symmetric all extremities. Skin: Skin color, texture, turgor normal.    Lymph nodes: Cervical nodes normal.   Neurologic: Grossly nonfocal     DATA:  MAR reviewed and pertinent medications noted or modified as needed    Labs:  Recent Labs      05/18/18   0319  05/17/18   1449  05/17/18   0840  05/17/18   0134   WBC  4.9  4.1   --   6.9   HGB  7.7*  8.1*  8.2*  7.5*   HCT  23.8*  25.6*  25.8*  23.9*   PLT  60*  60*   --   61*     Recent Labs      05/18/18   0319  05/17/18   0134  05/16/18   1531  05/16/18   1420  05/15/18   1305   NA  133*  128*  127*   --   126*   K  3.6  4.2  4.2   --   4.1   CL  98  96*  95*   --   93*   CO2  29  25  26   --   27   GLU  123*  141*  78   --   108*   BUN  12  11  12   --   13   CREA  0.60*  0.56*  0.59*   --   0.64*   CA  8.9  8.5  8.1*   --   8.7   MG  1.9  2.0   --   0.8*   --    PHOS  3.7  4.1   --   2.1*   --    ALB   --    --   1.7*   --   1.9*   TBILI   --    --   0.7   --   0.8   SGOT   --    --   24   --   32   ALT   --    --   21   --   25   INR  1.2*   --    --    --    --        Imaging:  I have personally reviewed the patients radiographs and reports. Chest CT (5/16/18): No evidence of pulmonary embolus. Moderate partially layering left pleural effusion, new since the prior study. Left lower lobe pulmonary metastasis has mildly increased in size.  Bulky left upper lobe malignancy with chest wall invasion, demonstrating mild interval progression since the prior study. Left adrenal metastasis, slightly increased in size. IMPRESSION  · Acute Respiratory Failure with Hypoxia  · Abnormal Chest CT: with layering pleural effusion and left upper lobe malignancy that has increased in size  · Left Posterior Tibial DVT  · A. 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 Follow cultures. · Continue scheduled nebs  · Pleural effusion small to moderate and likely would not provide symptomatic relief if tapped: will continue to watch closely  · Scheduled Brovana/Pulmicort  · Wean Solumedrol to 40 mg q 8hr; can switch to prednisone taper at discharge  · Cardiology following; will start Metoprolol when BP improved  · Oncology following and appreciate help  · Continue to watch H&H closely and transfuse for Hgb < 8 per Hematology recs. Just completed 3rd unit. · Therapeutic Lovenox; Eliquis at d/c per Hematology recs. Hold anticoagulation if plt < 50.  · Dispo: likely d/c today if Hgb stable    Patient is stable from pulmonary standpoint. We will sign off and arrange for outpatient follow up in 2 weeks with repeat CXR. Please call with questions.       Ashley Conley

## 2018-05-18 NOTE — PROGRESS NOTES
[facility] [place] [address] [address] [facility] [address] [facility]  [place]: [phone]  F: [phone]      Reason for Visit:   Rehana Willis is a 78 y.o. male who is seen in consultation at the request of Dr. Tatianna Cummings for evaluation of lung cancer, worsening SOB, anemia. Treatment History:   · Low-dose CT Chest 11/22/2017: Probably left upper lobe mass obstructing left upper lobe bronchus, near complete atelectasis of FANY, probably left hilar adenopathy. 3.9cm left adrenal nodule. Moderate centrilobular emphysema. · CT C/A/P 12/6/2017: Left adrenal metastasis. Large mass in the left upper lobe versus markedly atelectatic lung from central tumor. 7 mm nonspecific pleural-based nodule in the left lower lobe. Extensive emphysematous change. 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  · MRI Brain 12/23/2017: No intracranial metastatic disease  · Bone scan 1/5/2018: No osseous metastatic disease  · Stage FARIDEH (T4 N0 M1b) Non-small cell lung cancer (AJCC 8th edition)  · Palliative chemotherapy with Carboplatin and Gemcitabine 1/9/2018 - 4/17/2018 for a total of 4 cycles  · Palliative radiation to chest wall   · Palliative immune therapy with nivolumab beginning 5/15/2018    Last seen in the clinic on 5/15/2018    History of Present Illness:   Mr Frederick Ceballos was admitted from the ED on 5/16/2018 when he presented after being seen in the pulmonary clinic and was noted to have O2 sats in the 90X and systolic BP of 78. Pt c/o SOB and swelling to legs. ED labs WBC 13.7 Hgb 6.7 plt 82. Therefore he was admitted for further eval and management. He reports considerable fatigue, weakness, dyspnea. Worsened since I saw him yesterday. Pain improved after increasing oxycodone to 10mg as instructed yesterday. Denies bleeding. Interval History:   Reports feeling better; anxious to get home. Breathing better; appetite good. No BM yet. No family at bedside. No family history on file. Current Facility-Administered Medications   Medication Dose Route Frequency    0.9% sodium chloride infusion 250 mL  250 mL IntraVENous PRN    methylPREDNISolone (PF) (SOLU-MEDROL) injection 40 mg  40 mg IntraVENous Q8H    levoFLOXacin (LEVAQUIN) tablet 750 mg  750 mg Oral Q24H    0.9% sodium chloride infusion 250 mL  250 mL IntraVENous PRN    senna-docusate (PERICOLACE) 8.6-50 mg per tablet 1 Tab  1 Tab Oral BID    polyethylene glycol (MIRALAX) packet 17 g  17 g Oral DAILY    lidocaine (LIDODERM) 5 % patch 1 Patch  1 Patch TransDERmal Q24H    acetaminophen (TYLENOL) tablet 650 mg  650 mg Oral Q4H PRN    calcium carbonate (TUMS) chewable tablet 200 mg [elemental]  200 mg Oral TID PRN    albuterol-ipratropium (DUO-NEB) 2.5 MG-0.5 MG/3 ML  3 mL Nebulization Q4H RT    arformoterol (BROVANA) neb solution 15 mcg  15 mcg Nebulization BID RT    budesonide (PULMICORT) 500 mcg/2 ml nebulizer suspension  500 mcg Nebulization BID RT    sodium chloride (NS) flush 5-10 mL  5-10 mL IntraVENous Q8H    sodium chloride (NS) flush 5-10 mL  5-10 mL IntraVENous PRN    enoxaparin (LOVENOX) injection 80 mg  80 mg SubCUTAneous Q12H    oxyCODONE IR (ROXICODONE) tablet 5 mg  5 mg Oral Q4H PRN    oxyCODONE IR (ROXICODONE) tablet 10 mg  10 mg Oral Q4H PRN     Facility-Administered Medications Ordered in Other Encounters   Medication Dose Route Frequency    0.9% sodium chloride infusion 250 mL  250 mL IntraVENous PRN      Allergies   Allergen Reactions    Pcn [Penicillins] Hives     Tolerated ancef graded challenge 12/21/2017 with no adverse side effects noted        Review of Systems: A complete review of systems was obtained, negative except as described above.       Physical Exam:     Visit Vitals    /78    Pulse 92    Temp 97.6 °F (36.4 °C)    Resp 18    Ht 5' 10\" (1.778 m)    Wt 76.5 kg (168 lb 10.4 oz)    SpO2 96%    BMI 24.2 kg/m2 General: No distress, ill appearing  Eyes: PERRLA, anicteric sclerae  HENT: Atraumatic with normal appearance of ears and nose; OP clear  Neck: Supple; no thyromegaly   Lymphatic: No cervical, supraclavicular, or axillary adenopathy  Respiratory: CTAB, normal respiratory effort  CV: Normal rate, regular rhythm, no murmurs, no peripheral edema  GI: Soft, nontender, nondistended, no masses, no hepatomegaly, no splenomegaly  Skin: No rashes, ecchymoses, or petechiae. Normal temperature, turgor, and texture. Neuro/Psych: Alert, oriented, appropriate affect, normal judgment/insight      Results:     Lab Results   Component Value Date/Time    WBC 4.9 05/18/2018 03:19 AM    HGB 7.7 (L) 05/18/2018 03:19 AM    HCT 23.8 (L) 05/18/2018 03:19 AM    PLATELET 60 (L) 99/64/9941 03:19 AM    .8 (H) 05/18/2018 03:19 AM    ABS. NEUTROPHILS 4.5 05/18/2018 03:19 AM     Lab Results   Component Value Date/Time    Sodium 133 (L) 05/18/2018 03:19 AM    Potassium 3.6 05/18/2018 03:19 AM    Chloride 98 05/18/2018 03:19 AM    CO2 29 05/18/2018 03:19 AM    Glucose 123 (H) 05/18/2018 03:19 AM    BUN 12 05/18/2018 03:19 AM    Creatinine 0.60 (L) 05/18/2018 03:19 AM    GFR est AA >60 05/18/2018 03:19 AM    GFR est non-AA >60 05/18/2018 03:19 AM    Calcium 8.9 05/18/2018 03:19 AM     Lab Results   Component Value Date/Time    Bilirubin, total 0.7 05/16/2018 03:31 PM    ALT (SGPT) 21 05/16/2018 03:31 PM    AST (SGOT) 24 05/16/2018 03:31 PM    Alk.  phosphatase 60 05/16/2018 03:31 PM    Protein, total 5.7 (L) 05/16/2018 03:31 PM    Albumin 1.7 (L) 05/16/2018 03:31 PM    Globulin 4.0 05/16/2018 03:31 PM     Lab Results   Component Value Date/Time    Reticulocyte count 0.8 11/29/2017 04:26 PM    Iron % saturation 8 (L) 11/29/2017 04:26 PM    TIBC 202 (L) 11/29/2017 04:26 PM    Ferritin 461 (H) 11/29/2017 04:26 PM    Vitamin B12 858 11/29/2017 04:26 PM    Folate 16.0 11/29/2017 04:26 PM    TSH 0.90 05/15/2018 01:05 PM     Lab Results Component Value Date/Time    INR 1.2 (H) 05/18/2018 03:19 AM    aPTT 32.7 (H) 05/18/2018 03:19 AM    Fibrinogen 296 05/18/2018 03:19 AM     Lab Results   Component Value Date/Time    Prostate Specific Ag 3.6 02/09/2016 02:45 PM     5/16/2018 XR CHEST  IMPRESSION:     New moderate left pleural effusion. Decreased left upper lobe mass. No  pneumothorax. 5/16/2018 CTA CHEST   Pending    5/16/2018 Duplex LE Bilateral  CONCLUSION: Technically difficult study due to patient edema secondary   to positioning. Left lower extremity venous duplex positive for  acute deep venous thrombosis involving 1 of 2 paired posterior tibial  veins at the mid calf. Right lower extremity is thrombus free. CTA Chest 5/16/2018:  1. No evidence of pulmonary embolus. 2.  Moderate partially layering left pleural effusion, new since the prior  study. 3. Left lower lobe pulmonary metastasis has mildly increased in size. 4. Bulky left upper lobe malignancy with chest wall invasion, demonstrating mild  interval progression since the prior study. 5. Left adrenal metastasis, slightly increased in size. Assessment and Recommendations:   1. Acute Resp Failure  Pulmonary consulted: steroids, abx initiated  Discussed with pulmonary PA; scan reviewed; not enough fluid to tap at this point   Going home with home O2      2. DVT LE (distal)   Dx 5/16/2018  Secondary to malignancy  Recommend treatment with Lovenox 1 mg/kg bid for now, in case procedures (such as thoracentesis) are needed, but would favor a DOAC such as apixaban on discharge. Discussed with CM; Rx run and pt able to afford Eliquis    3. Afib with RVR  Cardio managing    4. Anemia (s/p 2 units PRBCs)  Worsening recently, despite increasing time away from chemotherapy. Likely anemia of chronic disease vs  do  possible bone marrow involvement from his cancer.     Continue to monitor  Hgb 7.7 this am: agree with transfusion today  Transfuse to keep HGB >8 with  higher threshold given his significant dyspnea and fatigue. Follow up established for clinic for next week to check labs and possible transfusion if needed. 5. Thrombocytopenia  Not improving despite increasing time away from chemotherapy. Concerning for possible marrow involvement from his cancer. Continue to monitor; hold anticoagulation for for plt < 50 K  Labs will be checked next week. 6. Metastatic Non-small cell lung cancer (Squamous Cell Carcinoma)  Stage FARIDEH, PDL1 negative  His cancer is not curable and management is with palliative intent. He has completed four cycles of palliative chemotherapy with Carboplatin and Gemcitabine, but recently was noted to have progressive disease. He is now s/p  palliative radiation to his chest wall. He has started palliative immunotherapy with nivolumab, first dose  (5/15). Aware that at progression of disease. Next step would be hospice. 7. Cancer Pain  S/P palliative radiation. Palliative team following  Appt made with palliative team as out patient for next week. 8. CODE STATUS: DNR  Confirmed again with this hospitalization.       9. Constipation  Bowel regimen adjusted      Plan reviewed with Dr Molly Anaya By: Solo Angel NP

## 2018-05-18 NOTE — PROGRESS NOTES
Problem: Mobility Impaired (Adult and Pediatric)  Goal: *Acute Goals and Plan of Care (Insert Text)  Physical Therapy Goals  Initiated 5/17/2018  1. Patient will move from supine to sit and sit to supine  in bed with modified independence within 7 day(s). 2.  Patient will transfer from bed to chair and chair to bed with supervision/set-up using the least restrictive device within 7 day(s). 3.  Patient will perform sit to stand with supervision/set-up within 7 day(s). 4.  Patient will ambulate with supervision/set-up for 150 feet with the least restrictive device within 7 day(s). 5.  Patient will ascend/descend 6 stairs with 2 handrail(s) with contact guard assist within 7 day(s). physical Therapy TREATMENT  Patient: Alberto Stern (15 y.o. male)  Date: 5/18/2018  Diagnosis: Anemia  A-fib ([address] <principal problem not specified>       Precautions: DNR  Chart, physical therapy assessment, plan of care and goals were reviewed. ASSESSMENT:  Pt seen this AM.Pt declined mobilizing out of bed secondary to fatigue. Pt agreed to and performed LE exercise. Pt performed heel slides ankle pumps and hip abd/add with cues. Pt tolerated treatment well. PT will continue to follow. Progression toward goals:  []    Improving appropriately and progressing toward goals  []    Improving slowly and progressing toward goals  []    Not making progress toward goals and plan of care will be adjusted     PLAN:  Patient continues to benefit from skilled intervention to address the above impairments. Continue treatment per established plan of care.   Discharge Recommendations:  Home Health  Further Equipment Recommendations for Discharge:  rolling walker     SUBJECTIVE:       OBJECTIVE DATA SUMMARY:   Critical Behavior:  Neurologic State: Alert, Appropriate for age  Orientation Level: Oriented X4  Cognition: Appropriate decision making, Appropriate for age attention/concentration, Appropriate safety awareness, Follows commands  Safety/Judgement: Awareness of environment, Fall prevention, Decreased insight into deficits, Home safety          Therapeutic Exercises:   Heel slides ankle pumps hip abd/add with cues  Pain:  Pain Scale 1: Numeric (0 - 10)  Pain Intensity 1: 0              Activity Tolerance:   Pt tolerated treatment well. Please refer to the flowsheet for vital signs taken during this treatment.   After treatment:   []    Patient left in no apparent distress sitting up in chair  [x]    Patient left in no apparent distress in bed  []    Call bell left within reach  []    Nursing notified  []    Caregiver present  []    Bed alarm activated    COMMUNICATION/COLLABORATION:   The patients plan of care was discussed with: Physical Therapist    Nadir Smith, PTA   Time Calculation: 20 mins

## 2018-05-18 NOTE — PALLIATIVE CARE DISCHARGE
The Palliative Medicine team was consulted as part of your / your loved one's care in the hospital. Our team is a supportive service; we strive to relieve suffering and improve quality of life. You identified the following goal(s) as your main focus for healthcare: Patient/Health Care Proxy Stated Goals: Prolong life (Full restorative measures in hopes of recovery and continuation of cancer  treatment  to have as much time as he can for his grandson)      During this admission, we addressed your symptoms of pain and shortness of breath. Going forward, your care team from Oncology would like you to be followed by Palliative Medicine Outpatient team to further address your symptoms. An appointment has been made for you for May 24th at [address] at [address] at [address], [place], [address] [facility]. Please call [phone] with any questions or concerns regarding this appointment.      We reviewed advance care planning information, which includes the following:  Advance Care Planning 5/17/2018   Patient's Healthcare Decision Maker is: Verbal statement (Legal Next of Kin remains as decision maker)   Primary Decision Maker Name Jordan Pack   Primary Decision Maker Phone Number [phone]   Primary Decision Maker Relationship to Patient Other relative   Confirm Advance Directive Yes, not on file   Patient Would Like to Complete Advance Directive -   Does the patient have other document types Do Not Resuscitate       We reviewed / discussed your code status as: DNR     Full Code means perform CPR in the event of cardiac arrest     [facility] means do NOT perform CPR in the event of cardiac arrest     Partial Code means you have specific preferences, please discuss with your health care team     No Order means this issue was not addressed / resolved during your stay    Because of the importance of this information, we are providing you with a printed copy to share with other healthcare providers after this hospitalization is complete. If any of the above information is incomplete or incorrect, please contact the Palliative Medicine team at [phone].

## 2018-05-18 NOTE — PROGRESS NOTES
SHIFT CHANGE:  7:51 AM Report received from Skip Costa RN. SBAR, Kardex, Procedure Summary, Intake/Output, MAR, Recent Results, Med Rec Status and Cardiac Rhythm NSR/AFIB were discussed. SHIFT SUMMARY:  3:25 PM  PO Levaquin given per order. Lidoderm patch held as patient is not going home with a prescription. 300 Units of Heparin instilled in VAD per protocol and port de-accessed. END OF SHIFT REPORT:  4:03 PM IV and telemetry removed. Discharge instructions reviewed and time allowed for questions. Patient ready for discharge.

## 2018-05-21 NOTE — PROGRESS NOTES
Patient here for hosp f/u, [facility] for anemia, afib, dvt  5/16/18. 1. Have you been to the ER, urgent care clinic since your last visit? Hospitalized since your last visit? No    2. Have you seen or consulted any other health care providers outside of the [address] [place] [address] since your last visit? Include any pap smears or colon screening. No       Chief Complaint   Patient presents with   [facility] Follow Up     [facility] 5/18/2018 afib, anemia, dvt     He is a 78 y.o. male who presents for evalution. Reviewed PmHx, RxHx, FmHx, SocHx, AllgHx and updated and dated in the chart. 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  HTN (hypertension)    Rheumatoid arthritis involving multiple sites ([facility]    Chronic obstructive pulmonary disease (HCC)    Basal cell cancer       Review of Systems - negative except as listed above in the HPI    Objective:     Vitals:    05/21/18 1455   BP: 107/73   Pulse: 91   Resp: 18   Temp: 98.3 °F (36.8 °C)   SpO2: 92%   Weight: 170 lb (77.1 kg)   Height: 5' 10\" (1.778 m)     Physical Examination: General appearance - alert, well appearing, and in no distress  Neck - supple, no significant adenopathy  Chest - clear to auscultation, no wheezes, rales or rhonchi, symmetric air entry  Heart - normal rate, regular rhythm, normal S1, S2, no murmurs, rubs, clicks or gallops  Abdomen - soft, nontender, nondistended, no masses or organomegaly    Assessment/ Plan:   Diagnoses and all orders for this visit:    1.  Deep vein thrombosis (DVT) of lower extremity, unspecified chronicity, unspecified laterality, unspecified vein (HCC)  -on new rx and will be on for life due to AF as well    2. Anemia, unspecified type  -     CBC WITH AUTOMATED DIFF  -got transfused  -see DC summary    3. Atrial fibrillation, unspecified type ([facility]  -no palp    4. Essential hypertension  -at goal    5. Chronic obstructive pulmonary disease, unspecified COPD type ([facility]  -stable       Follow-up Disposition:  Return in about 1 month (around 6/21/2018). I have discussed the diagnosis with the patient and the intended plan as seen in the above orders. The patient understands and agrees with the plan. The patient has received an after-visit summary and questions were answered concerning future plans. Medication Side Effects and Warnings were discussed with patient  Patient Labs were reviewed and or requested:  Patient Past Records were reviewed and or requested    Maria Ruby M.D. There are no Patient Instructions on file for this visit.

## 2018-05-21 NOTE — MR AVS SNAPSHOT
[address] [address] 
[phone] Patient: Emily Juárez MRN: [id number] XVX:[id number]/[id number] Visit Information Date & Time Provider Department Dept. Phone Encounter #  
 5/21/2018  2:45 PM Golden Michael MD [address] [facility] [phone] [id number] Follow-up Instructions Return in about 1 month (around 6/21/2018). Your Appointments 5/24/2018  8:30 AM  
ESTABLISHED PATIENT with Opal Crystal MD  
Palliative Medicine ([facility]) Appt Note: EP ok'd by Dr. Jake Hamilton symptom management pb 0.00 cp 0.00 05/18/18 [facility]  
 [address] [facility] [address] [place] [address]  
[phone]  
  
   
 [address] [place] [address]  
  
    
 5/29/2018 11:15 AM  
ESTABLISHED PATIENT with ZEKE Dwyer Oncology at [facility]) Appt Note: labs, Maricruz Mcadams #2.  
 [facility], [address] [facility] [id number] [place] [address]  
[phone]  
  
   
 [facility], [address] [facility],[address]  
  
    
 6/15/2018 10:00 AM  
ESTABLISHED PATIENT with Mora Ro MD  
[facility] ([facility]) Appt Note: hospital follow up for - AFIB - per Kendra Bishop  
 [address]  
[address] [address] [address] Upcoming Health Maintenance Date Due DTaP/Tdap/Td series (1 - Tdap) 9/29/1961 ZOSTER VACCINE AGE 60> 7/29/2002 GLAUCOMA SCREENING Q2Y 9/29/2007 Pneumococcal 65+ High/Highest Risk (1 of 2 - PCV13) 9/29/2007 Influenza Age 5 to Adult 8/1/2018 MEDICARE YEARLY EXAM 11/14/2018 Allergies as of 5/21/2018  Review Complete On: 5/21/2018 By: Golden Michael MD  
  
 Severity Noted Reaction Type Reactions Pcn [Penicillins]  10/21/2014    Hives Tolerated ancef graded challenge 12/21/2017 with no adverse side effects noted Current Immunizations  Reviewed on 5/15/2018 No immunizations on file. Not reviewed this visit You Were Diagnosed With   
  
 Codes Comments Deep vein thrombosis (DVT) of lower extremity, unspecified chronicity, unspecified laterality, unspecified vein (HCC)    -  Primary ICD-10-CM: I82.409 ICD-9-CM: 453.40 Anemia, unspecified type     ICD-10-CM: D64.9 ICD-9-CM: 088. 9 Atrial fibrillation, unspecified type ([facility]     ICD-10-CM: I48.91 
ICD-9-CM: 427.31 Essential hypertension     ICD-10-CM: I10 
ICD-9-CM: 401.9 Chronic obstructive pulmonary disease, unspecified COPD type ([facility].)     ICD-10-CM: J44.9 ICD-9-CM: 919 Vitals BP Pulse Temp Resp Height(growth percentile) Weight(growth percentile) 107/73 91 98.3 °F (36.8 °C) 18 5' 10\" (1.778 m) 170 lb (77.1 kg) SpO2 BMI Smoking Status 92% 24.39 kg/m2 Former Smoker Vitals History BMI and BSA Data Body Mass Index Body Surface Area  
 24.39 kg/m 2 1.95 m 2 Preferred Pharmacy Pharmacy Name Phone [facility] [address] [place] [address] [phone] Your Updated Medication List  
  
   
This list is accurate as of 5/21/18  3:17 PM.  Always use your most recent med list.  
  
  
  
  
 albuterol 2.5 mg /3 mL (0.083 %) nebulizer solution Commonly known as:  PROVENTIL VENTOLIN  
USE 2 VIALS VIAL NEBULIZER EVERY 4 HOURS AS NEEDED FOR WHEEZING  
  
 albuterol-ipratropium 2.5 mg-0.5 mg/3 ml Nebu Commonly known as:  DUO-NEB  
3 mL by Nebulization route every four (4) hours as needed. * apixaban 5 mg tablet Commonly known as:  Doren Juventino Take 2 Tabs by mouth two (2) times a day for 7 days. * apixaban 5 mg tablet Commonly known as:  Doren Juventino Take 1 Tab by mouth two (2) times a day for 30 days. levoFLOXacin 750 mg tablet Commonly known as:  [place] Take 1 Tab by mouth every twenty-four (24) hours. lidocaine-prilocaine topical cream  
Commonly known as:  EMLA Apply  to affected area as needed for Pain (Apply 30-60 min. prior to having your port accessed). loperamide 2 mg capsule Commonly known as:  IMODIUM Take 2 mg by mouth as needed for Diarrhea. Indications: Diarrhea  
  
 nystatin topical cream  
Commonly known as:  MYCOSTATIN Apply  to affected area two (2) times daily as needed for Skin Irritation. ondansetron hcl 8 mg tablet Commonly known as:  Aneta Edmundo Take 1 Tab by mouth every eight (8) hours as needed for Nausea. OTHER(NON-FORMULARY) Natural Colon Cleanse four pills by mouth twice daily as needed for constipation. oxyCODONE IR 5 mg immediate release tablet Commonly known as:  Buren Lyme Take 1-2 Tabs by mouth every four (4) hours as needed for Pain (cancer pain). Max Daily Amount: 60 mg.  
  
 predniSONE 10 mg tablet Commonly known as:  Bridget Nancie Take 6 tablets  for three days Take 4 tablets  for three days Take 2 tablets  for three days Take one tablet for three days PREPARATION H RE Insert  into rectum daily as needed. prochlorperazine 10 mg tablet Commonly known as:  COMPAZINE Take 1 Tab by mouth every six (6) hours as needed for Nausea. * Notice: This list has 2 medication(s) that are the same as other medications prescribed for you. Read the directions carefully, and ask your doctor or other care provider to review them with you. We Performed the Following CBC WITH AUTOMATED DIFF [29893 CPT(R)] Follow-up Instructions Return in about 1 month (around 6/21/2018). To-Do List   
 05/22/2018 To Be Determined Appointment with Madeline Weaver RN at [place] [address]  
  
 05/23/2018 9:00 AM  
  Appointment with Ovidio Nixon at [place] [address] ([phone]) 05/24/2018  9:00 AM  
  Appointment with [address] at [place] [address] ([phone])  
  
 05/29/2018 11:00 AM  
  Appointment with [address] at [place] [address] ([phone])  
  
 06/12/2018 11:00 AM  
  Appointment with Kaushik 2 at [place] [address] ([phone]) Introducing [facility]! Zeina Gordillo introduces [employer] patient portal. Now you can access parts of your medical record, email your doctor's office, and request medication refills online. 1. In your internet browser, go to [email or web address] 2. Click on the First Time User? Click Here link in the Sign In box. You will see the New Member Sign Up page. 3. Enter your [employer] Access Code exactly as it appears below. You will not need to use this code after youve completed the sign-up process. If you do not sign up before the expiration date, you must request a new code. · [employer] Access Code: [email or web address] Expires: 5/24/2018  4:52 PM 
 
4. Enter the last four digits of your Social Security Number (xxxx) and Date of Birth (mm/dd/yyyy) as indicated and click Submit. You will be taken to the next sign-up page. 5. Create a [employer]Match ID. This will be your [employer] login ID and cannot be changed, so think of one that is secure and easy to remember. 6. Create a [employer] password. You can change your password at any time. 7. Enter your Password Reset Question and Answer. This can be used at a later time if you forget your password. 8. Enter your e-mail address. You will receive e-mail notification when new information is available in [address] 9. Click Sign Up. You can now view and download portions of your medical record. 10. Click the Download Summary menu link to download a portable copy of your medical information.  
 
If you have questions, please visit the Frequently Asked Questions section of the [employer]. Remember, [employer] is NOT to be used for urgent needs. For medical emergencies, dial 911. Now available from your iPhone and Android! Please provide this summary of care documentation to your next provider. Your primary care clinician is listed as NORMA COY. If you have any questions after today's visit, please call [phone].

## 2018-05-22 NOTE — TELEPHONE ENCOUNTER
Called patient to advise/confirm upcoming appt with Dr. Ki Canales on     5/24/18 at 8:30am at [facility]. Pt confirmed. Also advised to please bring in your Drivers License and Insurance Card with you to appointment as well as any prescription pain medication in the original container with you to appt.

## 2018-05-23 NOTE — PROGRESS NOTES
Patient arrived at 26. Patient was drawn in [facility] with no complications. Patient left at [phone].

## 2018-05-23 NOTE — PROGRESS NOTES
Called and spoke to patient. Gerard Arvizu) Pt states understanding of lab result per Dr Peter Lindsey: Sl better, recheck in 2 weeks. Pt states he is scheduled for Blood transfusion with Dr Valverde tomorrow and will follow up with Dr Peter Lindsey as recommended by Hematology.

## 2018-05-23 NOTE — PROGRESS NOTES
Goals      Supportive resources in place to maintain patient in the community (ie. Home Health, DME equipment, refer to, medication assistant plan, etc.)            5/23/18 - Called patient for follow-up post hospital admission and PCP appointment. Patient stated he was doing well at this time. Reported that he is being seen by [facility]. 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 Had CBC drawn today and HGB was 10.1. Patient reported no concerns for barriers to care such as transportation or medication management. Patient lives with his grandson who he reports is a great support system for him. Reviewed instructions for Eliquis. Home oxygen has been set up and the patient is using 2L/min via [place]. Reviewed red flags. Patient had no questions or concerns at this time. Will be in communication with palliative NN and will offer support as needed from their perspective as well. Plan to follow-up with the patient next week.  SOBEIDA

## 2018-05-24 NOTE — PATIENT INSTRUCTIONS
Dear Sally Villegas ,    It was a pleasure seeing you today in [facility]. Your stated goal: better pain control and continued cancer therapy to be around as long as possible for [facility] described symptoms were: Fatigue: 7 Drowsiness: 7   Depression: 0 Pain: 10   Anxiety: 1 Nausea: 1   Anorexia: 9 Dyspnea: 10   Best Well-Bein Constipation: Yes   Other Problem (Comment): 0       This is the plan we talked about:     1. Your left rib pain   -Continue oxycodone 5-mg: take 2 pills every 4 hours on a regular schedule to give you more even pain control   -You can take 3 of the 5-mg oxycodone if you have more severe pain   -Continue prednisone 10-mg: take 4 tabs to complete the 3 days of the taper that was started when you left the hospital   -Then, start prednisone 10-mg 2 pills daily and continue with 2 pills daily until your next appointment with us    2. Your constipation   -Continue senokot 3 pills daily   -Start Miralax twice daily    3. Your shortness of breath   -Continue your nebulizer treatments every 4 hours as needed   -It may help if you try using your nebulizer on a scheduled basis    4. Your appetite   -You sometimes get hungry. You often don't have much of an appetite   -You aren't experiencing nausea or stomach pain   -Your constipation is likely one factor in your low appetite and we are working together to regulate your bowel movements   -We talked about eating the foods you enjoy such as the [place] chicken pot pie and [employer] hamburgers   -I will ask the [address], [place], to contact you to talk with you    5. Your fatigue   -You feel tired all the time   -You aren't sleeping well because of your rib pain   -We're working on getting your pain under better control which may allow you to once again lie down to sleep    6. Your mood   -You feel OK.    -You shared your concerns about Yvon's reactions to your cancer and who will care for him after you're gone   -We can continue over your future visits to explore options to support you and Shae Silence    This is what you have shared with us about Jererajesh Patricia Bauer. Planning 5/17/2018   Patient's Healthcare Decision Maker is: Verbal statement (Legal Next of Kin remains as decision maker)   Primary Decision Maker Name Romana Shivers   Primary Decision Maker Phone Number [phone]   Primary Decision Maker Relationship to Patient Other relative   Confirm Advance Directive Yes, not on file   Patient Would Like to Complete Advance Directive -   Does the patient have other document types Do Not Resuscitate         The Palliative Medicine Team is here to support you and your family. We will see you again in 2 to 3 weeks to coordinate with your return to see Dr. Ricky Strickland.     Sincerely,      Jacek Ohara MD and the Palliative Medicine Team

## 2018-05-24 NOTE — PROGRESS NOTES
Palliative Medicine Outpatient Services  [place]: [phone])    Patient Name: Rocky Hector  YOB: 1940    Date of Current Visit: 05/24/18  Location of Current Visit:    [] [facility] Office  [x] [facility] Office  [] [facility] Office  [] Home  [] Other:      Date of Initial Visit: 12/22/17 (seen by Palliative Medicine team during hospitalization)   Requesting Physician: Jd Carreon MD  Primary Care Physician: Li Floyd MD      SUMMARY:   Rocky Hector is a 78y.o. 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 Salinas Wallace for symptom management and psychosocial support. He was diagnosed in 11/2017 and completed 4 cycles of carboplatin and gemcitabine followed by palliative radiation therapy to the chest wall. He was hospitalized 5/16-5/18/18 with anemia and thrombocytopenia attributed to anemia of chronic disease and possible bone marrow involvement, diagnosed with LLE DVT and CTA revealing disease progression with interval development of pleural effusion. He is currently being treated with immune therapy with his goal of prolonging his life as long as possible so her can continue to care for his grandson. The patients social history includes: he is . He has one daughter who lives in [place] and with whom he has limited contact. He raised his 32-year old grandson, Diamond Pyle, who has bipolar disorder and schizophrenia and who still lives in his home. Palliative Medicine is addressing the following current patient/family concerns: left rib pain, constipation, shortness of breath, poor appetite with weight loss, fatigue, psychosocial support. PALLIATIVE DIAGNOSES:       ICD-10-CM ICD-9-CM    1. Rib pain on left side R07.81 786.50    2. Drug-induced constipation K59.03 564.09      E980.5    3. Shortness of breath R06.02 786.05    4. Poor appetite R63.0 783.0    5.  Neoplastic malignant related fatigue R53.0 780.79    6. Non-small cell cancer of left lung (HCC) C34.92 162.9    7. Chronic obstructive pulmonary disease, unspecified COPD type ([facility] J44.9 496    8. Rheumatoid arthritis involving multiple sites, unspecified rheumatoid factor presence ([facility] M06.9 714.0           PLAN:   Patient Instructions     Dear Brittany Woods ,    It was a pleasure seeing you today in [facility]. Your stated goal: better pain control and continued cancer therapy to be around as long as possible for  [facility] described symptoms were: Fatigue: 7 Drowsiness: 7   Depression: 0 Pain: 10   Anxiety: 1 Nausea: 1   Anorexia: 9 Dyspnea: 10   Best Well-Bein Constipation: Yes   Other Problem (Comment): 0       This is the plan we talked about:     1. Your left rib pain   -Continue oxycodone 5-mg: take 2 pills every 4 hours on a regular schedule to give you more even pain control   -You can take 3 of the 5-mg oxycodone if you have more severe pain   -Continue prednisone 10-mg: take 4 tabs to complete the 3 days of the taper that was started when you left the hospital   -Then, start prednisone 10-mg 2 pills daily and continue with 2 pills daily until your next appointment with us    2. You constipation   -Continue senokot 3 pills daily   -Start Miralax twice daily    3. Your shortness of breath   -Continue your nebulizer treatments every 4 hours as needed   -It may help if you try using your nebulizer on a scheduled basis    4. Your appetite   -You sometimes get hungry. You often don't have much of an appetite   -You aren't experiencing nausea or stomach pain   -Your constipation is likely one factor in your low appetite and we are working together to regulate your bowel movements   -We talked about eating the foods you enjoy such as the [place] chicken pot pie and "[employer]" hamburgers   -I will ask the [address], [place], to contact you to talk with you    5.  Your fatigue   -You feel tired all the time   -You aren't sleeping well because of your rib pain   -We're working on getting your pain under better control which may allow you to once again lie down to sleep    6. Your mood   -You feel OK. -You shared your concerns about Yvon's reactions to your cancer and who will care for him after you're gone   -We can continue over your future visits to explore options to support you and Diamond Pyle    This is what you have shared with us about Anthony Glezdonald Bauer. Planning 5/17/2018   Patient's Healthcare Decision Maker is: Verbal statement (Legal Next of Kin remains as decision maker)   Primary Decision Maker Name Ruthy Mcardle   Primary Decision Maker Phone Number [phone]   Primary Decision Maker Relationship to Patient Other relative   Confirm Advance Directive Yes, not on file   Patient Would Like to Complete Advance Directive -   Does the patient have other document types Do Not Resuscitate         The Palliative Medicine Team is here to support you and your family. We will see you again in 2 to 3 weeks to coordinate with your return to see Dr. Salinas Wallace.     Sincerely,      Gurpreet Juárez MD and the Palliative Medicine Team      Counseling and Coordination: note routed to Dr. Elsi Bui; will coordinate with Elvira Starkey about additional resources/support for patient and grandson as patient gets sicker       GOALS OF CARE / TREATMENT PREFERENCES:   [====Goals of Care====]  GOALS OF CARE:  Patient / health care proxy stated goals: pain control, continue cancer treatment as long as possible with goal of being around as long as possible for Diamond england      TREATMENT PREFERENCES:   Code Status:  [] Attempt Resuscitation       [x] Do Not Attempt Resuscitation    Advance Care Planning:  Advance Care Planning 5/17/2018   Patient's Healthcare Decision Maker is: Verbal statement (Legal Next of Kin remains as decision maker)   Primary Decision Maker Name Jonah Bentley   Primary Decision Maker Phone Number [phone]   Primary Decision Maker Relationship to Patient Other relative   Confirm Advance Directive Yes, not on file   Patient Would Like to Complete Advance Directive -   Does the patient have other document types Do Not Resuscitate       Other:  (If patient appropriate for POST, consider using PALLPOST smart phrase here)    The palliative care team has discussed with patient / health care proxy about goals of care / treatment preferences for patient.  [====Goals of Care====]         PRESCRIPTIONS GIVEN:   No orders of the defined types were placed in this encounter. FOLLOW UP:     Future Appointments  Date Time Provider Department Center   5/25/2018 To Be Determined Jumana Berrios,  [facility]   5/29/2018 11:00 AM [place] [facility]   5/29/2018 11:15 AM Zoltan Huggins NP [facility] SCHED   6/12/2018 11:00 AM [place] [facility]   6/15/2018 10:00 AM Caio Velasquez MD [facility]ONIE SCHED           PHYSICIANS INVOLVED IN CARE:   Patient Care Team:  Vero Donahue MD as PCP - General (Family Practice)  Vicky Vásquez MD (Hematology and Oncology)  Chucky Kruger, RN as [id number] Vel[address] MD (Palliative Medicine)  Zane Denton MD (Pulmonary Disease)  Citlali Chamorro MD (Radiation Oncology)  Pierre Bosch, RN as Ambulatory Care Navigator ([facility])       HISTORY:   Nursing documentation from date of visit reviewed. Reviewed patient-completed ESAS and advance care planning form. Reviewed patient record in prescription monitoring program.    CHIEF COMPLAINT: pain, constipation    HPI/SUBJECTIVE:    The patient is: [x] Verbal / [] Nonverbal     He's here for his pain. He has pain in his left ribs and the oxycodone isn't helping anymore. He's taking 2 of the 5-mg and doesn't notice much difference. He takes this 2 to 3 times a day.     He's constipated. His last bowel movement was 6 days ago when he was in the hospital and he needed to have an enema then. He's been taking 3 of the laxative/stool softener pills. Dr. Chalo Jacobsen told him to start taking Miralax but he hasn't started yet. He never had constipation until all of this started. He doesn't feel short of breath just sitting but he gets very short of breath with walking. Getting up in the morning and going to the bathroom, he gets very short of breath. He gets better after he sits down but it takes a long time. His nebulizer treatments help, he uses this 2 to 3 times a day. He isn't eating as much as he used to. He really likes [employer] hamburger but could eat only about 1/2 last time he had one. He really like [place] chicken pot pies, too, and could eat only 1/2. He doesn't have stomach pain or feel sick on his stomach, he just doesn't feel like eating. He's tired. He's not getting enough sleep because of his rib pain. He knows he's has cancer (\"it is what it is\") and he's not worried about himself. He is worried about what will happen to Angelika Ledezma after he's gone. Angelika Ledezma has a doctor who helps him with his medicine for his bipolar and schizophrenia but he doesn't have friends and his mother, who lives in [place], hasn't answered any calls.     Clinical Pain Assessment (nonverbal scale for nonverbal patients):   [++++ Clinical Pain Assessment++++]  [++++Pain Severity++++]: Pain: 10 when it flares; 2 when he's sitting down  [++++Pain Character++++]: sharp  [++++Pain Duration++++]: \"been awhile\"  [++++Pain Effect++++]: unable to sleep lying down (sleeps on couch), limits mobility  [++++Pain Factors++++]: worse when he stands up or puts pressure on left side, feels it more when he takes a deep breath; takes 2 oxycodone maybe 3 times a day  [++++Pain Frequency++++]: constant with varying intensity  [++++Pain Location++++]: left ribs  [++++ Clinical Pain Assessment++++]       FUNCTIONAL ASSESSMENT:     Palliative Performance Scale (PPS):  PPS: 70       PSYCHOSOCIAL/SPIRITUAL SCREENING:     Any spiritual / Confucianism concerns:  [] Yes /  [x] No    Caregiver Burnout:  [] Yes /  [] No /  [x] No Caregiver Present      Anticipatory grief assessment:   [] Normal  / [] Maladaptive       ESAS Anxiety: Anxiety: 1    ESAS Depression: Depression: 0       REVIEW OF SYSTEMS:     The following systems were [x] reviewed / [] unable to be reviewed  Systems: constitutional, ears/nose/mouth/throat, respiratory, gastrointestinal, genitourinary, musculoskeletal, integumentary, neurologic, psychiatric, endocrine. Positive findings noted below. Modified ESAS Completed by: provider   Fatigue: 7 Drowsiness: 7   Depression: 0 Pain: 10   Anxiety: 1 Nausea: 1   Anorexia: 9 Dyspnea: 10   Best Well-Bein Constipation: Yes   Other Problem (Comment): 0          PHYSICAL EXAM:     Wt Readings from Last 3 Encounters:   18 165 lb (74.8 kg)   18 170 lb (77.1 kg)   18 170 lb (77.1 kg)     Blood pressure 112/69, pulse 88, temperature 97.6 °F (36.4 °C), temperature source Oral, resp. rate 16, height 5' 10\" (1.778 m), weight 165 lb (74.8 kg), SpO2 93 %.   Last bowel movement: See Nursing Note    Constitutional: sitting in chair, no acute distress  Eyes: pupils equal, anicteric  ENMT: no nasal discharge, moist mucous membranes; no oral thrush  Cardiovascular: regular rhythm, distal pulses intact  Respiratory: breathing not labored, symmetric; decreased breath sounds throughout  Gastrointestinal: soft non-tender, +bowel sounds  Musculoskeletal: no deformity, no tenderness to palpation; bilateral lower extremity edema L>R  Skin: warm, dry; no rashes or lesions  Neurologic: following commands, moving all extremities  Psychiatric: full affect, no hallucinations  Other:       HISTORY:     Past Medical History:   Diagnosis Date    HTN (hypertension) 10/21/2014    Rheumatoid arthritis(714.0) 10/21/2014    Skin cancer     Sun-damaged skin     Sunburn, blistering       Past Surgical History:   Procedure Laterality Date    HX OTHER SURGICAL      Hemmorhiodectomy      No family history on file. History reviewed, no pertinent family history. Social History   Substance Use Topics    Smoking status: Former Smoker     Packs/day: 1.00     Quit date: 5/1/2017    Smokeless tobacco: Never Used    Alcohol use No     Allergies   Allergen Reactions    Pcn [Penicillins] Hives     Tolerated ancef graded challenge 12/21/2017 with no adverse side effects noted      Current Outpatient Prescriptions   Medication Sig    predniSONE (DELTASONE) 10 mg tablet Take 6 tablets  for three days  Take 4 tablets  for three days  Take 2 tablets  for three days  Take one tablet for three days    apixaban (ELIQUIS) 5 mg tablet Take 2 Tabs by mouth two (2) times a day for 7 days.  apixaban (ELIQUIS) 5 mg tablet Take 1 Tab by mouth two (2) times a day for 30 days.  oxyCODONE IR (ROXICODONE) 5 mg immediate release tablet Take 1-2 Tabs by mouth every four (4) hours as needed for Pain (cancer pain). Max Daily Amount: 60 mg.    lidocaine-prilocaine (EMLA) topical cream Apply  to affected area as needed for Pain (Apply 30-60 min. prior to having your port accessed).  albuterol-ipratropium (DUO-NEB) 2.5 mg-0.5 mg/3 ml nebu 3 mL by Nebulization route every four (4) hours as needed.  albuterol (PROVENTIL VENTOLIN) 2.5 mg /3 mL (0.083 %) nebulizer solution USE 2 VIALS VIAL NEBULIZER EVERY 4 HOURS AS NEEDED FOR WHEEZING    levoFLOXacin (LEVAQUIN) 750 mg tablet Take 1 Tab by mouth every twenty-four (24) hours.  nystatin (MYCOSTATIN) topical cream Apply  to affected area two (2) times daily as needed for Skin Irritation.  phenyleph/mineral oil/petrolat (PREPARATION H RE) Insert  into rectum daily as needed.  loperamide (IMODIUM) 2 mg capsule Take 2 mg by mouth as needed for Diarrhea.  Indications: Diarrhea   [facility], Natural Colon Cleanse four pills by mouth twice daily as needed for constipation.  prochlorperazine (COMPAZINE) 10 mg tablet Take 1 Tab by mouth every six (6) hours as needed for Nausea.  ondansetron hcl (ZOFRAN) 8 mg tablet Take 1 Tab by mouth every eight (8) hours as needed for Nausea. No current facility-administered medications for this visit. Facility-Administered Medications Ordered in Other Visits   Medication Dose Route Frequency    [START ON 5/29/2018] nivolumab (OPDIVO) 240 mg in 0.9% sodium chloride 100 mL, overfill volume 10 mL IVPB  240 mg IntraVENous ONCE    [START ON 5/29/2018] 0.9% sodium chloride infusion  25 mL/hr IntraVENous CONTINUOUS    acetaminophen (TYLENOL) tablet 650 mg  650 mg Oral ONCE    diphenhydrAMINE (BENADRYL) capsule 25 mg  25 mg Oral ONCE          LAB DATA REVIEWED:     Lab Results   Component Value Date/Time    WBC 9.5 05/23/2018 09:00 AM    HGB 10.1 (L) 05/23/2018 09:00 AM    PLATELET 55 (L) 17/23/6615 09:00 AM     Lab Results   Component Value Date/Time    Sodium 133 (L) 05/18/2018 03:19 AM    Potassium 3.6 05/18/2018 03:19 AM    Chloride 98 05/18/2018 03:19 AM    CO2 29 05/18/2018 03:19 AM    BUN 12 05/18/2018 03:19 AM    Creatinine 0.60 (L) 05/18/2018 03:19 AM    Calcium 8.9 05/18/2018 03:19 AM    Magnesium 1.9 05/18/2018 03:19 AM    Phosphorus 3.7 05/18/2018 03:19 AM      Lab Results   Component Value Date/Time    AST (SGOT) 24 05/16/2018 03:31 PM    Alk.  phosphatase 60 05/16/2018 03:31 PM    Protein, total 5.7 (L) 05/16/2018 03:31 PM    Albumin 1.7 (L) 05/16/2018 03:31 PM    Globulin 4.0 05/16/2018 03:31 PM     Lab Results   Component Value Date/Time    INR 1.2 (H) 05/18/2018 03:19 AM    Prothrombin time 12.5 (H) 05/18/2018 03:19 AM    aPTT 32.7 (H) 05/18/2018 03:19 AM      Lab Results   Component Value Date/Time    Iron 17 (L) 11/29/2017 04:26 PM    TIBC 202 (L) 11/29/2017 04:26 PM    Iron % saturation 8 (L) 11/29/2017 04:26 PM    Ferritin 461 (H) 11/29/2017 04:26 PM CTA 5/16/18: There is no mediastinal lymphadenopathy. There is chronic cardiomegaly. There  is no pericardial effusion.     No filling defect is seen within the pulmonary arterial system to suggest  pulmonary embolus. The aorta is normal in caliber.     There is interval development of a moderate partially layering left pleural  effusion, which is new since the prior study. A left lower lobe metastasis  measures 11 mm, previously 8 mm. There is a background of centrilobular  emphysema. The right lung is clear and well inflated. The large left upper lobe  malignancy with invasion of the chest wall has further progressed, now measuring  approximately 12.8 x 7.7 cm, previously 12.5 x 7.5 cm. There is occlusion of the  segmental artery to the upper lobe, similar to the prior study. There is further  encasement of the left main pulmonary artery. The pulmonary arteries are also  chronically enlarged, consistent with pulmonary arterial hypertension. There is  worsening aeration of the left upper lobe. .     Limited evaluation of the upper abdomen demonstrates a known left adrenal  metastasis measuring 3.5 x 2.3 cm, previously 3.5 x 2.1 cm. There is also a left  renal cyst, partially imaged. . The osseous structures are unremarkable.     IMPRESSION:  1. No evidence of pulmonary embolus. 2.  Moderate partially layering left pleural effusion, new since the prior  study. 3. Left lower lobe pulmonary metastasis has mildly increased in size. 4. Bulky left upper lobe malignancy with chest wall invasion, demonstrating mild  interval progression since the prior study. 5. Left adrenal metastasis, slightly increased in size.      CONTROLLED SUBSTANCES SAFETY ASSESSMENT (IF ON CONTROLLED SUBSTANCES):     Reviewed opioid safety handout:  [x] Yes   [] No  24 hour opioid dose >150mg morphine equivalent/day:  [] Yes   [x] No  Benzodiazepines:  [] Yes   [x] No  Sleep apnea:  [] Yes   [x] No  Urine Toxicology Testing within last 6 months:  [] Yes   [] No  History of or new aberrant medication taking behaviors:  [] Yes   [] No            Total time:   Counseling / coordination time:   > 50% counseling / coordination?:

## 2018-05-24 NOTE — PROGRESS NOTES
Palliative Medicine Office Visit  Palliative Medicine Nurse Check In  [phone]    Patient Name: Sage King  YOB: 1940      Date of Office Visit:  5/24/2018      Patient states: \"  I was referred by the Oncologist I have left rib cage pain\". From Check In Sheet (scanned in Media):  Has a medical provider talked with you about cardiopulmonary resuscitation (CPR)? [x] Yes   [] No   [] Unable to obtain    Nurse reminder to complete or update ACP FlowSheet:    Is ACP on the Problem List?    [] Yes    [] No  IF ACP Document is ON FILE; Nurse to place ACP on Problem List     Is there an ACP Note in Chart Review/Note? [] Yes    [] No   If NO: [address] 5/17/2018   Patient's Healthcare Decision Maker is: Verbal statement (Legal Next of Kin remains as decision maker)   Primary Decision Maker Name Sreedhar Peña   Primary Decision Maker Phone Number [phone]   Primary Decision Maker Relationship to Patient Other relative   Confirm Advance Directive Yes, not on file   Patient Would Like to Complete Advance Directive -   Does the patient have other document types Do Not Resuscitate        Is there anything that we should know about you as a person in order to provide you the best care possible? no    Have you been to the ER, urgent care clinic since your last visit? [x] Yes   [] No   [] Unable to obtain    Have you been hospitalized since your last visit? [x] Yes   [] No   [] Unable to obtain    Have you seen or consulted any other health care providers outside of the [facility] since your last visit? [] Yes   [x] No   [] Unable to obtain    Functional status (describe):      Last BM: 5/18/18     accessed (date): 5/23/18    Bottle review (for opioid pain medication): Patient did not bring any medication.   Medication 1:   Date filled:   Directions:   # filled:   # left:   # pills taking per day:  Last dose taken:    Medication 2:   Date filled:   Directions:   # filled:   # left:   # pills taking per day:  Last dose taken:    Medication 3:   Date filled:   Directions:   # filled:   # left:   # pills taking per day:  Last dose taken:    Medication 4:   Date filled:   Directions:   # filled:   # left:   # pills taking per day:  Last dose taken:

## 2018-05-24 NOTE — PROGRESS NOTES
Palliative Medicine  Nursing Note  [phone]  Fax [phone]        Clinic Office Visit  Patient Name: Ruperto Aparicio  YOB: [birth year]      Advance Care Planning 5/17/2018   Patient's Healthcare Decision Maker is: Verbal statement (Legal Next of Kin remains as decision maker)   Primary Decision Maker Name Elke Barkley   Primary Decision Maker Phone Number [phone]   Primary Decision Maker Relationship to Patient Other relative   Confirm Advance Directive Yes, not on file   Patient Would Like to Complete Advance Directive -   Does the patient have other document types Do Not Resuscitate     Met with patient for new outpatient office visit. He requires use of a walker for ambulation. Referral placed to [facility], for dietary support and education. Provided New Patient Welcome Packet: Includes Opioid Safety, PM brochure and flyer, Magnet with Palliative Medicine Phone number. AVS reviewed with patient, verbalized an understanding of material discussed. Instructions given to patient to call the Palliative Medicine Office at [phone] for any questions or concerns 24 hours a day, 7 days a weeks. Follow up appointment scheduled for 3 weeks. Nurse Navigator will provide a follow up phone call in about 1 week.       RINA Morel, RN, [facility]  Palliative Medicine

## 2018-05-24 NOTE — MR AVS SNAPSHOT
[address] [facility] 
 
 
 [address] [facility]. [address] 100[address] [facility] 
[phone] Patient: Maricarmen Shay MRN: [id number] UNC:[id number] Visit Information Date & Time Provider Department Dept. Phone Encounter #  
 5/24/2018  8:30 AM Sallie Rosenberg MD Palliative Medicine [phone] [id number] Your Appointments 5/29/2018 11:15 AM  
ESTABLISHED PATIENT with Galileo Hall NP  
41 [facility]) Appt Note: labs, Sanya Gonzalesuchard #2.  
 [address] [facility] [address], [address] [place] [address] [facility]  
[phone]  
  
   
 301 [facility], [address] [facility],4Th Floor  
  
    
 6/15/2018 10:00 AM  
ESTABLISHED PATIENT with Renetta Tena MD  
[facility] Appt Note: hospital follow up for - AFIB - per Primus Goodpasture  
 [address] [facility] [place] [address] [address] [facility]  
[address] [place] [address] [address] Upcoming Health Maintenance Date Due DTaP/Tdap/Td series (1 - Tdap) 9/29/1963 ZOSTER VACCINE AGE 60> 7/29/2002 GLAUCOMA SCREENING Q2Y 9/29/2007 Pneumococcal 65+ High/Highest Risk (1 of 2 - PCV13) 9/29/2007 Influenza Age 5 to Adult 8/1/2018 MEDICARE YEARLY EXAM 11/14/2018 Allergies as of 5/24/2018  Review Complete On: 5/24/2018 By: Jesika Martin LPN Severity Noted Reaction Type Reactions Pcn [Penicillins]  10/21/2014    Hives Tolerated ancef graded challenge 12/21/2017 with no adverse side effects noted Current Immunizations  Reviewed on 5/15/2018 No immunizations on file. Not reviewed this visit Vitals BP Pulse Temp Resp Height(growth percentile) Weight(growth percentile) 112/69 (BP 1 Location: Right arm, BP Patient Position: Sitting) 88 97.6 °F (36.4 °C) (Oral) 16 5' 10\" (1.778 m) 165 lb (74.8 kg) SpO2 BMI Smoking Status 93% 23.68 kg/m2 Former Smoker BMI and BSA Data Body Mass Index Body Surface Area  
 23.68 kg/m 2 1.92 m 2 Preferred Pharmacy Pharmacy Name Phone [facility] [address] [facility] [address] [phone] Your Updated Medication List  
  
   
This list is accurate as of 5/24/18  9:42 AM.  Always use your most recent med list.  
  
  
  
  
 albuterol 2.5 mg /3 mL (0.083 %) nebulizer solution Commonly known as:  PROVENTIL VENTOLIN  
USE 2 VIALS VIAL NEBULIZER EVERY 4 HOURS AS NEEDED FOR WHEEZING  
  
 albuterol-ipratropium 2.5 mg-0.5 mg/3 ml Nebu Commonly known as:  DUO-NEB  
3 mL by Nebulization route every four (4) hours as needed. * apixaban 5 mg tablet Commonly known as:  Arbutus Bon Take 2 Tabs by mouth two (2) times a day for 7 days. * apixaban 5 mg tablet Commonly known as:  Arbutus Bon Take 1 Tab by mouth two (2) times a day for 30 days. levoFLOXacin 750 mg tablet Commonly known as:  Tammy Ground Take 1 Tab by mouth every twenty-four (24) hours. lidocaine-prilocaine topical cream  
Commonly known as:  EMLA Apply  to affected area as needed for Pain (Apply 30-60 min. prior to having your port accessed). loperamide 2 mg capsule Commonly known as:  IMODIUM Take 2 mg by mouth as needed for Diarrhea. Indications: Diarrhea  
  
 nystatin topical cream  
Commonly known as:  MYCOSTATIN Apply  to affected area two (2) times daily as needed for Skin Irritation. ondansetron hcl 8 mg tablet Commonly known as:  Norina [place] Take 1 Tab by mouth every eight (8) hours as needed for Nausea. OTHER(NON-FORMULARY) Natural Colon Cleanse four pills by mouth twice daily as needed for constipation. oxyCODONE IR 5 mg immediate release tablet Commonly known as:  Gerard Laser Take 1-2 Tabs by mouth every four (4) hours as needed for Pain (cancer pain). Max Daily Amount: 60 mg. predniSONE 10 mg tablet Commonly known as:  Jefm Canter Take 6 tablets  for three days Take 4 tablets  for three days Take 2 tablets  for three days Take one tablet for three days PREPARATION H RE Insert  into rectum daily as needed. prochlorperazine 10 mg tablet Commonly known as:  COMPAZINE Take 1 Tab by mouth every six (6) hours as needed for Nausea. * Notice: This list has 2 medication(s) that are the same as other medications prescribed for you. Read the directions carefully, and ask your doctor or other care provider to review them with you. To-Do List   
 2018 To Be Determined Appointment with Zaina Estrada LPN at [place] [address]  
  
 2018 To Be Determined Appointment with Zaina Estrada LPN at [place] [address]  
  
 2018 11:00 AM  
  Appointment with Hollie Jiang at [place] [address] ([phone])  
  
 2018 To Be Determined Appointment with Zaina Estrada LPN at [place] [address]  
  
 2018 To Be Determined Appointment with Zaina Estrada LPN at [place] [address]  
  
 2018 To Be Determined Appointment with Cuate Cancino RN at [place] [address]  
  
 2018 11:00 AM  
  Appointment with Kaushik Melgoza at [place] [address] ([phone]) Patient Instructions Dear John Perdomo , It was a pleasure seeing you today in [facility]. Your stated goal: better pain control and continued cancer therapy to be around as long as possible for Favio Herman Your described symptoms were: Fatigue: 7 Drowsiness: 7 Depression: 0 Pain: 10 Anxiety: 1 Nausea: 1 Anorexia: 9 Dyspnea: 10 Best Well-Bein Constipation: Yes Other Problem (Comment): 0 This is the plan we talked about:  
 
1. Your left rib pain -Continue oxycodone 5-mg: take 2 pills every 4 hours on a regular schedule to give you more even pain control 
 -You can take 3 of the 5-mg oxycodone if you have more severe pain 
 -Continue prednisone 10-mg: take 4 tabs to complete the 3 days of the taper that was started when you left the hospital 
 -Then, start prednisone 10-mg 2 pills daily and continue with 2 pills daily until your next appointment with us 2. You constipation 
 -Continue senokot 3 pills daily 
 -Start Miralax twice daily 3. Your shortness of breath 
 -Continue your nebulizer treatments every 4 hours as needed 
 -It may help if you try using your nebulizer on a scheduled basis 4. Your appetite 
 -You sometimes get hungry. You often don't have much of an appetite 
 -You aren't experiencing nausea or stomach pain 
 -Your constipation is likely one factor in your low appetite and we are working together to regulate your bowel movements 
 -We talked about eating the foods you enjoy such as the [place] chicken pot pie and Floored hamburgers 
 -I will ask the [employer], Nancy Celibrandon, to contact you to talk with you 5. Your fatigue 
 -You feel tired all the time 
 -You aren't sleeping well because of your rib pain 
 -We're working on getting your pain under better control which may allow you to once again lie down to sleep 6. Your mood 
 -You feel OK. -You shared your concerns about Yvon's reactions to your cancer and who will care for him after you're gone 
 -We can continue over your future visits to explore options to support you and Randall Montelongo This is what you have shared with us about Advance Care Planning Advance Care Planning 5/17/2018 Patient's Healthcare Decision Maker is: Verbal statement (Legal Next of Kin remains as decision maker) Primary Decision Maker Name Giovani Collins Primary Decision Maker Phone Number [phone] Primary Decision Maker Relationship to Patient Other relative Confirm Advance Directive Yes, not on file Patient Would Like to Complete Advance Directive - Does the patient have other document types Do Not Resuscitate The Palliative Medicine Team is here to support you and your family. We will see you again in 2 to 3 weeks to coordinate with your return to see Dr. Jenn Yeung. Sincerely, Delano Villagran MD and the Palliative Medicine Team 
 
 
  
Introducing [facility]! [address] introduces [employer] patient portal. Now you can access parts of your medical record, email your doctor's office, and request medication refills online. 1. In your internet browser, go to [email or web address] 2. Click on the First Time User? Click Here link in the Sign In box. You will see the New Member Sign Up page. 3. Enter your [employer] Access Code exactly as it appears below. You will not need to use this code after youve completed the sign-up process. If you do not sign up before the expiration date, you must request a new code. · [employer] Access Code: [email or web address] Expires: 5/24/2018  4:52 PM 
 
4. Enter the last four digits of your Social Security Number (xxxx) and Date of Birth (mm/dd/yyyy) as indicated and click Submit. You will be taken to the next sign-up page. 5. Create a [employer] ID. This will be your [employer] login ID and cannot be changed, so think of one that is secure and easy to remember. 6. Create a [employer] password. You can change your password at any time. 7. Enter your Password Reset Question and Answer. This can be used at a later time if you forget your password. 8. Enter your e-mail address. You will receive e-mail notification when new information is available in [address] 9. Click Sign Up. You can now view and download portions of your medical record. 10. Click the Download Summary menu link to download a portable copy of your medical information. If you have questions, please visit the Frequently Asked Questions section of the [email or web address] website. Remember, [employer] is NOT to be used for urgent needs. For medical emergencies, dial 911. Now available from your iPhone and Android! Please provide this summary of care documentation to your next provider. Your primary care clinician is listed as NORMA COY. If you have any questions after today's visit, please call [phone].

## 2018-05-29 NOTE — TELEPHONE ENCOUNTER
Gerhardt Charles with [facility] is calling to speak to nurse. Call x-ferred to nurse.

## 2018-05-29 NOTE — TELEPHONE ENCOUNTER
I spoke with Andrew Goldman and he is requesting that a hard copy of the emergency prescription be mailed to the [facility]. PM nurse will put it in the mail today. The prescription was faxed to him already.     Karla Reno RN  Palliative Medicine

## 2018-05-29 NOTE — PROGRESS NOTES
Palliative Medicine  Nursing Note  [phone]  Fax [phone]        Telephone Call  Patient Name: Emily Juárez  YOB: [birth year]      Advance Care Planning 5/17/2018   Patient's Healthcare Decision Maker is: Verbal statement (Legal Next of Kin remains as decision maker)   Primary Decision Maker Name Chasity Altman   Primary Decision Maker Phone Number [phone]   Primary Decision Maker Relationship to Patient Other relative   Confirm Advance Directive Yes, not on file   Patient Would Like to Complete Advance Directive -   Does the patient have other document types Do Not Resuscitate     Called Mr. Celia Hernandez to check on him from over the weekend. He was given an emergency prescription for oxycodone that was called into the [facility]. He did pick it up and was very grateful for the help he received over the weekend. He will need a new prescription written on Thursday for more oxycodone. He prefers to  the prescription at the [facility] office. This information will be passed along to Dr. Jake Hamilton.     LYNN AshtonN, RN, OCN, VIA [facility]  Palliative Medicine

## 2018-05-29 NOTE — PROGRESS NOTES
Cancer Fred at [address], [facility]  Kedric Brittle: [phone]  F: [phone]        Reason for Visit:   Sally Villegas is a 78 y.o. male who is seen for follow up of lung cancer. Treatment History:   · Low-dose CT Chest 11/22/2017: Probably left upper lobe mass obstructing left upper lobe bronchus, near complete atelectasis of FANY, probably left hilar adenopathy. 3.9cm left adrenal nodule. Moderate centrilobular emphysema. · CT C/A/P 12/6/2017: Left adrenal metastasis. Large mass in the left upper lobe versus markedly atelectatic lung from central tumor. 7 mm nonspecific pleural-based nodule in the left lower lobe. Extensive emphysematous change. 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 He was admitted 5/16-5/19 for SOB, anemia, a-fib. He states he is feeling okay today. Biggest complaints is constipation since hospital stay with no BM since discharge. He states he does not feel like he needs to go to the bathroom and has been passing gas without difficulty. Had some worsened shortness of breath over the weekend but used nebulizer several times and it finally helped. Seen by palliative care, continues on oxycodone PRN but had to have emergency refill over the weekend as he ran out. He is unaccompanied today in exam room, has family in waiting room.     PAST HISTORY: The following sections were reviewed and updated in the EMR as appropriate: PMH, SH, FH, Medications, Allergies. Allergies   Allergen Reactions    Pcn [Penicillins] Hives     Tolerated ancef graded challenge 12/21/2017 with no adverse side effects noted      Review of Systems: A complete review of systems was obtained, reviewed, and scanned into the EMR. Pertinent findings reviewed above. Physical Exam:     Visit Vitals    /64 (BP 1 Location: Left arm, BP Patient Position: Sitting)    Pulse 78    Temp 96.6 °F (35.9 °C) (Temporal)    Resp 20     ECOG PS: 2  General: No distress, fatigued appearing  Eyes: PERRLA, anicteric sclerae  HENT: Atraumatic, OP clear  Neck: Supple  Lymphatic: No cervical, supraclavicular, or inguinal adenopathy  Respiratory: CTAB, normal respiratory effort  CV: Normal rate, regular rhythm, no murmurs, no peripheral edema  GI: Soft, nontender, nondistended, no masses, no hepatomegaly, no splenomegaly  MS: Normal gait and station. Digits without clubbing or cyanosis. Skin: No ecchymoses, or petechiae. Normal temperature, turgor, and texture. Psych: Alert, oriented, appropriate affect, normal judgment/insight      Results:     Lab Results   Component Value Date/Time    WBC 6.4 05/29/2018 11:25 AM    HGB 9.1 (L) 05/29/2018 11:25 AM    HCT 29.0 (L) 05/29/2018 11:25 AM    PLATELET 66 (L) 09/18/1436 11:25 AM    .8 (H) 05/29/2018 11:25 AM    ABS.  NEUTROPHILS 5.0 05/29/2018 11:25 AM     Lab Results   Component Value Date/Time    Sodium 130 (L) 05/29/2018 11:25 AM    Potassium 3.9 05/29/2018 11:25 AM    Chloride 97 05/29/2018 11:25 AM    CO2 30 05/29/2018 11:25 AM    Glucose 101 (H) 05/29/2018 11:25 AM    BUN 17 05/29/2018 11:25 AM    Creatinine 0.59 (L) 05/29/2018 11:25 AM    GFR est AA >60 05/29/2018 11:25 AM    GFR est non-AA >60 05/29/2018 11:25 AM    Calcium 8.7 05/29/2018 11:25 AM     Lab Results   Component Value Date/Time    Bilirubin, total 0.9 05/29/2018 11:25 AM    ALT (SGPT) 19 05/29/2018 11:25 AM    AST (SGOT) 20 05/29/2018 11:25 AM    Alk. phosphatase 87 05/29/2018 11:25 AM    Protein, total 6.3 (L) 05/29/2018 11:25 AM    Albumin 2.1 (L) 05/29/2018 11:25 AM    Globulin 4.2 (H) 05/29/2018 11:25 AM       CT C/A/P 2/24/2018:   1. Consolidations/mass with central necrosis in the majority of the left upper lobe, not significantly changed in appearance. There is diminished size of a left adrenal mass suggesting response to treatment. 2. There is a short segment of mural thickening in the sigmoid colon. There is also diverticulosis in the sigmoid colon. Findings could represent diverticulitis or colitis. Underlying mass cannot be excluded. Recommend  clinical correlation and attention on follow-up. CT C/A/P 4/21/2018: There has been interval progression of disease with enlargement of left upper lobe mass which now invades the chest wall and the upper mediastinum with increased anterior mediastinal metastatic adenopathy, increased left lower lobe pulmonary metastasis, and increased left adrenal metastasis as above. No other sites of metastasis identified within the thorax, abdomen, or pelvis. CTA Chest 5/16/18:   1. No evidence of pulmonary embolus. 2.  Moderate partially layering left pleural effusion, new since the prior study. 3. Left lower lobe pulmonary metastasis has mildly increased in size. 4. Bulky left upper lobe malignancy with chest wall invasion, demonstrating mild interval progression since the prior study. 5. Left adrenal metastasis, slightly increased in size      Assessment:   1) Metastatic Non-small cell lung cancer (Squamous Cell Carcinoma)  Stage FARIDEH, PDL1 negative  He has disease within his lung and left adrenal gland. His cancer is not curable and management is with palliative intent. He has completed four cycles of palliative chemotherapy with Carboplatin and Gemcitabine and is s/p palliative radiation to his chest wall.  He consented to LungMAP trial and was randomized to receive an arm which has been closed due to futility. He may be eligible for a different arm of the study in the future, once he has progressed on immunotherapy. He is therefore receiving palliative immunotherapy with nivolumab. He is tolerating therapy fairly well. We will continue with next cycle today. Pathology specimen insufficient for FoundationOne. We can consider FoundationACT testing in the future. 2) Thrombocytopenia  Secondary to therapy. Hopefully will improve with switching therapy. 3) Constipation   Likely secondary to pain meds. Continue stool softeners and increase Miralax to TID. Discussed milk of magnesia or mag citrate tonight and repeat if no BM by tomorrow morning. Patient to notify me if no BM by tomorrow afternoon. 4) Diarrhea  Secondary to therapy. Now constipation. Continue Imodium PRN watery or frequent stool. Hopefully this will improve with switching therapy. Monitor. 4) Right Femoral neck fx  S/p surgery 12/21/2017. Getting PT at home. 5) Anemia   Worsening recently. Labs showed some iron deficiency, so he is now on oral iron. Monitor and transfuse PRN Hgb <7.  Low today, transfusion this week. 6) COPD  Pulmonary following. 7) Cancer Pain  S/P palliative radiation. Palliative care now following. Continue oxycodone per PM.     8) H/o RA  Not currently on therapy, symptoms well controlled. 9) Hemoptysis  Resolved at present. Mild. Monitor. Consider radiation if worsening significantly. 10) Hypotension  Improved today. Not on BP meds. Monitor. 11) CODE STATUS: DNR  DDNR signed previously and scanned into record.       Plan:     · Proceed today with C#2 nivolumab 240mg given every 2 weeks  · Labs: Check BMP and hepatic function panel every 2 weeks, Check CBC and TSH every 6 weeks  ·  following  · Continue oxycodone per palliative care  · Palliative care following   · Return to clinic in 2 weeks with next cycle of therapy or sooner if needed    Patient was seen in conjunction with Robby Kenny NP.       Signed By: Hermilo Zimmer MD

## 2018-05-29 NOTE — PROGRESS NOTES
[facility] Progress Note    Date: May 29, 2018    Name: Mindy Montenegro    MRN: [id number]         : 1940    Mr. Jorge Morgan Arrived ambulatory and in no distress for cycle 2 of Opdivo regimen. Assessment was completed, no acute issues at this time, no new complaints voiced. R chest port accessed without difficulty, labs drawn and in process. Per NP okay to treat with low PLT. Pt still having constipation, breathing has been better last couple of days. Chemotherapy Flowsheet 2018   Cycle C2   Date 2018   Drug / Regimen Opdivo   Pre Hydration -   Post Hydration -   Pre Meds -   Notes -         1130:  Proceeded to appt with Dr. Sharon Cloud. Mr. Jackson's vitals were reviewed. Visit Vitals    /65    Pulse 79    Temp 97.9 °F (36.6 °C)    Resp 18     Lab results were obtained and reviewed. Recent Results (from the past 12 hour(s))   CBC WITH AUTOMATED DIFF    Collection Time: 18 11:25 AM   Result Value Ref Range    WBC 6.4 4.1 - 11.1 K/uL    RBC 2.82 (L) 4.10 - 5.70 M/uL    HGB 9.1 (L) 12.1 - 17.0 g/dL    HCT 29.0 (L) 36.6 - 50.3 %    .8 (H) 80.0 - 99.0 FL    MCH 32.3 26.0 - 34.0 PG    MCHC 31.4 30.0 - 36.5 g/dL    RDW 19.9 (H) 11.5 - 14.5 %    PLATELET 66 (L) 878 - 400 K/uL    MPV 9.8 8.9 - 12.9 FL    NRBC 0.0 0  WBC    ABSOLUTE NRBC 0.00 0.00 - 0.01 K/uL    NEUTROPHILS 80 (H) 32 - 75 %    LYMPHOCYTES 9 (L) 12 - 49 %    MONOCYTES 9 5 - 13 %    EOSINOPHILS 1 0 - 7 %    BASOPHILS 0 0 - 1 %    IMMATURE GRANULOCYTES 1 (H) 0.0 - 0.5 %    ABS. NEUTROPHILS 5.0 1.8 - 8.0 K/UL    ABS. LYMPHOCYTES 0.6 (L) 0.8 - 3.5 K/UL    ABS. MONOCYTES 0.6 0.0 - 1.0 K/UL    ABS. EOSINOPHILS 0.1 0.0 - 0.4 K/UL    ABS. BASOPHILS 0.0 0.0 - 0.1 K/UL    ABS. IMM.  GRANS. 0.1 (H) 0.00 - 0.04 K/UL    DF SMEAR SCANNED      RBC COMMENTS ANISOCYTOSIS  1+        RBC COMMENTS OVALOCYTES  PRESENT        RBC COMMENTS TEARDROP CELLS  PRESENT        WBC COMMENTS TOXIC GRANULATION     METABOLIC PANEL, COMPREHENSIVE Collection Time: 05/29/18 11:25 AM   Result Value Ref Range    Sodium 130 (L) 136 - 145 mmol/L    Potassium 3.9 3.5 - 5.1 mmol/L    Chloride 97 97 - 108 mmol/L    CO2 30 21 - 32 mmol/L    Anion gap 3 (L) 5 - 15 mmol/L    Glucose 101 (H) 65 - 100 mg/dL    BUN 17 6 - 20 MG/DL    Creatinine 0.59 (L) 0.70 - 1.30 MG/DL    BUN/Creatinine ratio 29 (H) 12 - 20      GFR est AA >60 >60 ml/min/1.73m2    GFR est non-AA >60 >60 ml/min/1.73m2    Calcium 8.7 8.5 - 10.1 MG/DL    Bilirubin, total 0.9 0.2 - 1.0 MG/DL    ALT (SGPT) 19 12 - 78 U/L    AST (SGOT) 20 15 - 37 U/L    Alk. phosphatase 87 45 - 117 U/L    Protein, total 6.3 (L) 6.4 - 8.2 g/dL    Albumin 2.1 (L) 3.5 - 5.0 g/dL    Globulin 4.2 (H) 2.0 - 4.0 g/dL    A-G Ratio 0.5 (L) 1.1 - 2.2       chemotherapy was initiated. opdivo IV    Mr. Celaya Nicely tolerated treatment well and was discharged from [place] [address] in stable condition at 1500. He is to return on 6/12/18 at 1100 for his next appointment.     Leonard Krishnan  May 29, 2018

## 2018-05-30 NOTE — PROGRESS NOTES
Palliative Medicine  Nursing Note  [phone]  Fax [phone]        Clinic Office Visit  Patient Name: Carlos Carlos  YOB: [birth year]      Advance Care Planning 5/17/2018   Patient's Healthcare Decision Maker is: Verbal statement (Legal Next of Kin remains as decision maker)   Primary Decision Maker Name Martin Peralta   Primary Decision Maker Phone Number [phone]   Primary Decision Maker Relationship to Patient Other relative   Confirm Advance Directive Yes, not on file   Patient Would Like to Complete Advance Directive -   Does the patient have other document types Do Not Resuscitate     Spoke with Mr. Roberto Syed to remind him he can  his prescription for oxycodone at the [facility] office on 5/31. He thought that he was supposed to pick it up today but as we talked he did realize he had his days mixed up. He will go to [facility] on Thursday to get the prescription. He also shared that he had a large BM yesterday, it was the first one he has had in 7 days. He stated that he feels much better and slept for over 12 hours. He is now taking 3 stool softners daily and Miralax daily. He does not want that to happen again. He was appreciative of the help and that he can get his prescription.           Lavelle Rodriguez, LYNNN, RN, OCN, VIA [facility]  Palliative Medicine

## 2018-05-31 NOTE — Clinical Note
Pt would like to talk about Hospice Care. Pt desires hospital bed. Christiana Young has mental disability and lives with pt. Pt and his grandson are concerned about his grandson handling his medical condition and desires to look into Hospice options.

## 2018-06-04 NOTE — TELEPHONE ENCOUNTER
Spoke with Pooja to let her know that PM nurse will check with Dr. Hailey Shahid about Prednisone order and Hospice. PM nurse called Lolita Gomez RN and Dr. Hailey Shahid reordered prednisone and will talk with oncology regarding whether or not hospice is appropriate at this time. Then PM will get back in touch with Mr. Raphael Phan. Pm nurse called to let Mr. Raphael Phan know that the prednisone has been called into his pharmacy.     Little Mariee RN  Palliative Medicine

## 2018-06-04 NOTE — TELEPHONE ENCOUNTER
Pooja from [address] [place] [address] is calling to speak to nurse. Patient states Dr. Mal London wants him to continue to take prednisone until he comes back on 6/12/18. Patient does not have any refills on his prednisone. Rohini Senior states that patient thinks he is ready for hospice. Advised nurse would call to her back to discuss.

## 2018-06-05 NOTE — PROGRESS NOTES
Call from [facility] stating that they are full and can not take patient for hospice care. Referral for [facility] [address] faxed to [phone], confirmed, scanned.

## 2018-06-05 NOTE — PROGRESS NOTES
Per Dr. Heike Harvey, Mr. Rob Coates needs to continue taking his Prednisone until his next appt with her on June 12th. Prednisone 10mg take 2 tabs by mouth daily #20 was ordered and sent to Pharmacy.      Arvin Miller RN  Palliative Medicine

## 2018-06-06 NOTE — TELEPHONE ENCOUNTER
[id number] [phone]    06/06/18- Spoke to [place] with [facility], she stated patient is wanting hospice and wasn't sure how to get that process started. Looks like patient's PCP initiated a referral already, will follow up with Dr. David Henry about this as well and call her back. Called patient to inquire about symptoms or reasoning for wanting hospice urgently. Stated he feels like he's \"gone downhill\" recently, losing weight, and just done with treatment. Patient denies any uncontrolled pain. Questioned if patient wanted to keep his appointment with us next week to discuss in more detail. He was agreeable, but then put me on the phone with Ramin Newman from [facility]. She was already at his house enrolling him in hospice. Informed her Dr. David Henry was okay with following patient while on hospice. She verbalized understanding. Called Saint George back with home health and informed her of the above information.

## 2018-06-06 NOTE — PROGRESS NOTES
[facility]   [facility] full and could not take patient.)

## 2018-06-07 NOTE — PROGRESS NOTES
Goals Addressed      Coordinate pain management plan for patient.  COMPLETED: Supportive resources in place to maintain patient in the community (ie. Home Health, DME equipment, refer to, medication assistant plan, etc.)                  5/23/18 - Called patient for follow-up post hospital admission and PCP appointment. Patient stated he was doing well at this time. Reported that he is being seen by [facility]. 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 Had CBC drawn today and HGB was 10.1. Patient reported no concerns for barriers to care such as transportation or medication management. Patient lives with his grandson who he reports is a great support system for him. Reviewed instructions for Eliquis. Home oxygen has been set up and the patient is using 2L/min via [place]. Reviewed red flags. Patient had no questions or concerns at this time. Will be in communication with palliative NN and will offer support as needed from their perspective as well. Plan to follow-up with the patient next week. SOBEIDA    6/7/18 - Noted that patient has transitioned to [address] [place] [address]. Will resolve OSCAR episode.  SOBEIDA

## 2018-06-08 NOTE — TELEPHONE ENCOUNTER
[employer] at [facility]  [phone]    06/08/18- Call placed to check in with patient, need to follow up on appointments scheduled for next week. Requested a return call when available. 06/11/18- Spoke to patient, stated he's doing well. Clarified that we do not need to see patient in the office tomorrow since he enrolled in hospice, but reminded him that we're here if he needs anything. Patient verbalized understanding, no further questions or concerns.

## 2018-06-08 NOTE — TELEPHONE ENCOUNTER
Calling to check on patient and see if patient went with hospice. [facility] and confirmed with grandson Randall Mayis that he does not needs to come to appt with Dr. Heike Harvey. [facility] we are all [facility] them.

## 2018-06-15 NOTE — TELEPHONE ENCOUNTER
[facility]  [phone]    06/15/18Sarah Odom with [facility] called to report patient passed away at 3 am this morning. She reported \" I think he was hanging on for his sister to come into town\" . He was able to visit with her and passed away shortly after. She reports the family is doing okay.  informed.

## 2019-05-24 NOTE — ROUTINE PROCESS
Orthopedic & Surgical Nursing Communication Tool        12/21/2017     Bedside and Verbal shift change report given to [facility] SARAHIATRICO MYCHAL RN (incoming nurse) by Bj Hager RN (outgoing nurse) on Delos Mon a 78 y.o. male who was admitted on 12/20/2017  8:51 PM. Report included the following information SBAR, ED Summary, Intake/Output and MAR. Opportunity for questions and clarifications were given to the incoming nurse. Patient's bed is in low position, side rails x3, bed alarm on, call bell within reach and patient not in distress. Hourly rounding performed throughout shift.     Bj Hager RN 11/family member/on unit

## 2021-11-19 NOTE — DISCHARGE INSTRUCTIONS
OUTPATIENT INFUSION CENTER    DISCHARGE INSTRUCTIONS FOR:  BLOOD TRANSFUSION    We hope you are feeling better after your blood transfusion. Some mild tenderness or slight bruising at your IV site is normal.  Avoid lifting or heavy use of that extremity for the rest of the day. Drink plenty of fluids, eat a normal diet and get some rest.    There are some important signs that you need to watch for in case you experience a delayed reaction to the blood you have received. Call your physician immediately if you develop any of the following symptoms:    1. Severe headache or backache;    2. Fever above 100 degrees;    3. Chills;    4. Difficulty breathing;    5.  Blood or red color in urine;    6. The feeling of weakness or constant fatigue;    7. Yellowing of the whites of your eyes or skin (jaundice). If your physician is not available, call or go to the nearest emergency room, or dial 911.     [employer], Signature: ___________________________ 2/1/2018  Traci Anthony RN Yes

## 2022-03-15 NOTE — PROGRESS NOTES
Blood sugar obtained. . MD notified. New orders received.   [employer] at [facility]  [phone]    Full consult to follow. Briefly, patient was due to see me in the office tomorrow to discuss the results of his recent adrenal biopsy, but has now been admitted with a hip fracture after a ground level fall. 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 His cancer is not curable and management is with palliative intent. I reviewed his diagnosis, prognosis, and management options with him this morning. My recommendation is to complete his staging evaluation with an MRI brain. However, I'm not sure this will be possible after his surgery, given the hardware involved. We will see if it can be done stat before his surgery. Otherwise, we may have to make due with a CT Head with contrast.  Eventually, a bone scan would be helpful as well to rule out osseous disease, given his recent fracture. We are awaiting PDL1 testing on his pathology specimen to determine if immunotherapy may be a treatment option for him. He is uncertain about whether he would want palliative chemotherapy, and he wants to give this some thought for a few days. He is rather symptomatic. I had planned to refer him as an outpatient to pulmonary and palliative care to assist with symptom management. Now I suspect he will be in the hospital and then in rehab for some time. I will see if pulmonary and palliative care can see him in the hospital to assist with adjusting medications and arranging outpatient follow up. Of note, further tissue (ie biopsy with bronch) is not needed from my perspective.

## (undated) DEVICE — SUTURE ETHBND EXCEL SZ 2 L30IN NONABSORBABLE GRN L40MM V-37 MX69G

## (undated) DEVICE — STERILE POLYISOPRENE POWDER-FREE SURGICAL GLOVES: Brand: PROTEXIS

## (undated) DEVICE — COVER,MAYO STAND,STERILE: Brand: MEDLINE

## (undated) DEVICE — SUTURE MCRYL SZ 2-0 L36IN ABSRB UD L36MM CT-1 1/2 CIR Y945H

## (undated) DEVICE — DRSG AQUACEL SURG 3.5 X 10IN -- CONVERT TO ITEM 370183

## (undated) DEVICE — STERILE POLYISOPRENE POWDER-FREE SURGICAL GLOVES WITH EMOLLIENT COATING: Brand: PROTEXIS

## (undated) DEVICE — REM POLYHESIVE ADULT PATIENT RETURN ELECTRODE: Brand: VALLEYLAB

## (undated) DEVICE — 4-PORT MANIFOLD: Brand: NEPTUNE 2

## (undated) DEVICE — Device

## (undated) DEVICE — SYR LR LCK 1ML GRAD NSAF 30ML --

## (undated) DEVICE — DEVON™ KNEE AND BODY STRAP 60" X 3" (1.5 M X 7.6 CM): Brand: DEVON

## (undated) DEVICE — DERMABOND SKIN ADH 0.7ML -- DERMABOND ADVANCED 12/BX

## (undated) DEVICE — SUTURE MCRYL SZ 3-0 L27IN ABSRB UD L19MM PS-2 3/8 CIR PRIM Y427H

## (undated) DEVICE — INFECTION CONTROL KIT SYS

## (undated) DEVICE — SUTURE VCRL SZ 2 L54IN ABSRB UD L65MM TP-1 1/2 CIR J880T

## (undated) DEVICE — SUTURE VCRL SZ 0 L27IN ABSRB UD L36MM CT-1 1/2 CIR J260H

## (undated) DEVICE — DRAPE XR C ARM 41X74IN LF --

## (undated) DEVICE — 6619 2 PTNT ISO SYS INCISE AREA&LT;(&GT;&&LT;)&GT;P: Brand: STERI-DRAPE™ IOBAN™ 2

## (undated) DEVICE — SUTURE VCRL SZ 2-0 L27IN ABSRB UD L36MM CP-1 1/2 CIR REV J266H

## (undated) DEVICE — DRAPE,REIN 53X77,STERILE: Brand: MEDLINE

## (undated) DEVICE — T4 HOOD

## (undated) DEVICE — SOLUTION IRRIG 3000ML 0.9% SOD CHL FLX CONT 0797208] ICU MEDICAL INC]

## (undated) DEVICE — (D)PREP SKN CHLRAPRP APPL 26ML -- CONVERT TO ITEM 371833

## (undated) DEVICE — SUTURE STRATAFIX SPRL SZ 1 L14IN ABSRB VLT L48CM CTX 1/2 SXPD2B405

## (undated) DEVICE — BLADE SAW W11XL77.5MM THK1.23MM CUT THK1.17MM S STL RECIP

## (undated) DEVICE — SUTURE STRATAFIX SYMMETRIC SZ 1 L18IN ABSRB VLT CT1 L36CM SXPP1A404

## (undated) DEVICE — BLADE ELECTRODE: Brand: EDGE